# Patient Record
Sex: FEMALE | Race: BLACK OR AFRICAN AMERICAN | NOT HISPANIC OR LATINO | Employment: FULL TIME | URBAN - NONMETROPOLITAN AREA
[De-identification: names, ages, dates, MRNs, and addresses within clinical notes are randomized per-mention and may not be internally consistent; named-entity substitution may affect disease eponyms.]

---

## 2017-03-18 ENCOUNTER — LAB (OUTPATIENT)
Dept: LAB | Facility: HOSPITAL | Age: 57
End: 2017-03-18
Attending: FAMILY MEDICINE

## 2017-03-18 DIAGNOSIS — E83.42 HYPOMAGNESEMIA: ICD-10-CM

## 2017-03-18 DIAGNOSIS — E03.9 ACQUIRED HYPOTHYROIDISM: ICD-10-CM

## 2017-03-18 LAB
MAGNESIUM SERPL-MCNC: 1.4 MG/DL (ref 1.6–2.3)
T4 FREE SERPL-MCNC: 1.1 NG/DL (ref 0.78–2.19)
TSH SERPL DL<=0.05 MIU/L-ACNC: 5.27 MIU/ML (ref 0.46–4.68)

## 2017-03-18 PROCEDURE — 36415 COLL VENOUS BLD VENIPUNCTURE: CPT

## 2017-03-18 PROCEDURE — 83735 ASSAY OF MAGNESIUM: CPT

## 2017-03-18 PROCEDURE — 84439 ASSAY OF FREE THYROXINE: CPT

## 2017-03-18 PROCEDURE — 84443 ASSAY THYROID STIM HORMONE: CPT

## 2017-03-20 ENCOUNTER — TRANSCRIBE ORDERS (OUTPATIENT)
Dept: LAB | Facility: HOSPITAL | Age: 57
End: 2017-03-20

## 2017-03-20 ENCOUNTER — APPOINTMENT (OUTPATIENT)
Dept: LAB | Facility: HOSPITAL | Age: 57
End: 2017-03-20

## 2017-03-20 DIAGNOSIS — Z45.02: ICD-10-CM

## 2017-03-20 DIAGNOSIS — I25.5 ISCHEMIC CARDIOMYOPATHY: Primary | ICD-10-CM

## 2017-03-20 LAB
ALBUMIN SERPL-MCNC: 4.5 G/DL (ref 3.4–4.8)
ALBUMIN/GLOB SERPL: 1.3 G/DL (ref 1.1–1.8)
ALP SERPL-CCNC: 72 U/L (ref 38–126)
ALT SERPL W P-5'-P-CCNC: 28 U/L (ref 9–52)
ANION GAP SERPL CALCULATED.3IONS-SCNC: 15 MMOL/L (ref 5–15)
AST SERPL-CCNC: 44 U/L (ref 14–36)
BILIRUB SERPL-MCNC: 0.6 MG/DL (ref 0.2–1.3)
BUN BLD-MCNC: 18 MG/DL (ref 7–21)
BUN/CREAT SERPL: 17 (ref 7–25)
CALCIUM SPEC-SCNC: 9.3 MG/DL (ref 8.4–10.2)
CHLORIDE SERPL-SCNC: 98 MMOL/L (ref 95–110)
CO2 SERPL-SCNC: 22 MMOL/L (ref 22–31)
CREAT BLD-MCNC: 1.06 MG/DL (ref 0.5–1)
GFR SERPL CREATININE-BSD FRML MDRD: 65 ML/MIN/1.73 (ref 51–120)
GLOBULIN UR ELPH-MCNC: 3.4 GM/DL (ref 2.3–3.5)
GLUCOSE BLD-MCNC: 177 MG/DL (ref 60–100)
POTASSIUM BLD-SCNC: 4.2 MMOL/L (ref 3.5–5.1)
PROT SERPL-MCNC: 7.9 G/DL (ref 6.3–8.6)
SODIUM BLD-SCNC: 135 MMOL/L (ref 137–145)

## 2017-03-20 PROCEDURE — 80053 COMPREHEN METABOLIC PANEL: CPT | Performed by: INTERNAL MEDICINE

## 2017-03-20 PROCEDURE — 36415 COLL VENOUS BLD VENIPUNCTURE: CPT | Performed by: INTERNAL MEDICINE

## 2017-03-20 RX ORDER — POTASSIUM CHLORIDE 750 MG/1
TABLET, EXTENDED RELEASE ORAL
Qty: 60 TABLET | Refills: 3 | Status: SHIPPED | OUTPATIENT
Start: 2017-03-20 | End: 2017-07-14 | Stop reason: SDUPTHER

## 2017-03-26 DIAGNOSIS — E83.42 HYPOMAGNESEMIA: Primary | ICD-10-CM

## 2017-03-26 DIAGNOSIS — E03.9 ACQUIRED HYPOTHYROIDISM: ICD-10-CM

## 2017-03-26 RX ORDER — LEVOTHYROXINE SODIUM 0.07 MG/1
75 TABLET ORAL DAILY
Qty: 30 TABLET | Refills: 5 | Status: SHIPPED | OUTPATIENT
Start: 2017-03-26 | End: 2017-09-19 | Stop reason: SDUPTHER

## 2017-04-05 ENCOUNTER — OFFICE VISIT (OUTPATIENT)
Dept: FAMILY MEDICINE CLINIC | Facility: CLINIC | Age: 57
End: 2017-04-05

## 2017-04-05 VITALS
SYSTOLIC BLOOD PRESSURE: 122 MMHG | OXYGEN SATURATION: 97 % | HEIGHT: 64 IN | HEART RATE: 79 BPM | WEIGHT: 236 LBS | DIASTOLIC BLOOD PRESSURE: 82 MMHG | BODY MASS INDEX: 40.29 KG/M2

## 2017-04-05 DIAGNOSIS — E83.42 HYPOMAGNESEMIA: ICD-10-CM

## 2017-04-05 DIAGNOSIS — E03.9 ACQUIRED HYPOTHYROIDISM: Primary | ICD-10-CM

## 2017-04-05 DIAGNOSIS — E66.01 MORBID OBESITY WITH BMI OF 40.0-44.9, ADULT (HCC): ICD-10-CM

## 2017-04-05 DIAGNOSIS — E11.65 TYPE 2 DIABETES MELLITUS WITH HYPERGLYCEMIA, WITHOUT LONG-TERM CURRENT USE OF INSULIN (HCC): ICD-10-CM

## 2017-04-05 PROCEDURE — 99214 OFFICE O/P EST MOD 30 MIN: CPT | Performed by: FAMILY MEDICINE

## 2017-04-05 NOTE — PROGRESS NOTES
"  Subjective:     Danae Ngo is a 56 y.o. female who presents for a recheck of Type 2 diabetes mellitus, thyroid, hypomagnesium, and other medical problems.  She is gaining weight and thinks it is due to her heart medicine Mexiletine.  She fell down the stairs at home and scraped her right leg.  Her sugar is okay.     Known diabetic complications: peripheral neuropathy and cardiovascular disease  Cardiovascular risk factors: diabetes mellitus, dyslipidemia, family history of premature cardiovascular disease, hypertension, microalbuminuria, obesity (BMI >= 30 kg/m2) and sedentary lifestyle  Current diabetic medications include see mar.     Eye exam current (within one year): yes  Weight trend: increasing steadily  Prior visit with dietician: no  Current diet: in general, a \"healthy\" diet    Current exercise: none    Current monitoring regimen: home blood tests - daily  Home blood sugar records: trend: stable  Any episodes of hypoglycemia? no     ACE inhibitor or angiotensin II receptor blocker?     Yes     lisinopril (generic)               Statin?     Yes    ASA?     Yes     Hemoglobin A1C (%TotHgb)   Date Value   10/27/2016 7.5 (H)   12/05/2015 7.5 (H)   03/31/2015 8.4 (H)       ASSOCIATED SYMPTOMS:     FatigueYes   MalaiseYes    DiaphoresisNo    Weight lossNo    Weight gainYes    Visual impairmentNo   Chest painNo    PalpitationsNo    TachycardiaNo   ClaudicationNo    PolyphagiaNo    PolydipsiaNo    PolyuriaNo    NumbnessYes    Foot painYes    Skin lesionsNo    Memory lossNo     COMORBID CONDITIONS:     ObesityYes    HypothyroidismYes    HyperlipidemiaYes    CADYes    Cerebrovascular diseaseNot Indicated   PVDNo    HTNYes    Sexual dysfunctionNo    DepressionNo      The following portions of the patient's history were reviewed and updated as appropriate: allergies, current medications, past family history, past medical history, past social history, past surgical history and problem " list.    Preventative:  Over the past 2 weeks, have you felt down, depressed, or hopeless?No   Over the past 2 weeks, have you felt little interest or pleasure in doing things?No  Clinical depression screening refused by patient.No     On osteoporosis therapy?No     Past Medical Hx:  Past Medical History:   Diagnosis Date   • Abnormal thyroid function test    • Acquired hypothyroidism    • Acute bacterial pharyngitis    • Acute conjunctivitis    • Acute sinusitis    • Acute tonsillitis    • Allergic rhinitis    • Anemia    • Coronary atherosclerosis     unspecified type of vessel, native or graft      • Dilated cardiomyopathy    • Dyslipidemia    • Esophageal reflux    • Essential hypertension    • Family history of malignant neoplasm of breast    • Fibrocystic disease of breast     Low Mireille score   • GERD (gastroesophageal reflux disease)    • Headache    • Herpes zoster    • History of chicken pox    • Hyperlipidemia    • Hypokalemia    • Hypomagnesemia    • Measles    • Mumps    • Myocardial infarction    • Nasal septal deformity    • Obesity    • Rhinitis    • Type 2 diabetes mellitus     Uncontrolled   • URI (upper respiratory infection)    • V tach    • Visit for screening mammogram    • Vitamin D deficiency        Past Surgical Hx:  Past Surgical History:   Procedure Laterality Date   • BREAST BIOPSY  2010    left breast benign   • CARDIAC CATHETERIZATION  2002    Left cardiac cath, selective coronary angiography, PTCA to the left anterior descending. Stent placement to the left anterior descending   •  SECTION      Intrauterine pregnancy, term gestation, cephalopelvic disproportion   • ECHO - CONVERTED      dilated cardiomyopathy ef 30-40%   • ENDOSCOPY AND COLONOSCOPY  04/15/2008    Normal   • ESOPHAGOSCOPY / EGD  2007    With tube-Esophagus appeared normal. Gastritis of the stomach. A biopsy of the stomach was obtained from the stomach. The duodenum appeared normal   •  EXTERNAL EAR SURGERY  08/15/1991    Repair biifd ear   • INJECTION OF MEDICATION  05/30/2015    Bicillin LA   • INJECTION OF MEDICATION  09/02/2016    Kenalog   • OOPHORECTOMY  1993    Left salpingo-oophorectomy, wedge resection of the right ovary, lysis of adhesions   • PAP SMEAR  10/20/2009   • TRANSESOPHAGEAL ECHOCARDIOGRAM (LORELEI)  03/02/2012    Without color flow-Moderate to severe LV dysfumctional w/ an EF of 30-35%. Wall motion abnormalities as described above. LV enlargement. Mild aortic insufficiency, mild TR regurg, mild M regurg. mild pulmonary insufficiency       Health Maintenance:  Health Maintenance   Topic Date Due   • DIABETIC EYE EXAM  10/07/2016   • URINE MICROALBUMIN  12/05/2016   • HEMOGLOBIN A1C  04/27/2017   • DIABETIC FOOT EXAM  10/07/2017   • LIPID PANEL  10/27/2017   • COLONOSCOPY  04/15/2018   • MAMMOGRAM  09/06/2018   • TDAP/TD VACCINES (2 - Td) 01/20/2019   • PAP SMEAR  10/25/2019   • PNEUMOCOCCAL VACCINE (19-64 MEDIUM RISK)  Completed   • HEPATITIS C SCREENING  Completed   • INFLUENZA VACCINE  Completed       Current Meds:    Current Outpatient Prescriptions:   •  amLODIPine (NORVASC) 5 MG tablet, TAKE ONE TABLET BY MOUTH EVERY NIGHT AT BEDTIME, Disp: 90 tablet, Rfl: 2  •  aspirin 325 MG tablet, Take 325 mg by mouth daily., Disp: , Rfl:   •  atorvastatin (LIPITOR) 20 MG tablet, Take 1 tablet by mouth Every Night., Disp: 90 tablet, Rfl: 3  •  cetirizine (ZyrTEC) 10 MG tablet, Take 10 mg by mouth., Disp: , Rfl:   •  dexlansoprazole (DEXILANT) 60 MG capsule, Take 1 capsule by mouth Daily., Disp: 90 capsule, Rfl: 3  •  FeAsp-FeFum -Suc-C-Thre-B12-FA (MULTIGEN PLUS) -1 MG tablet tablet, TAKE 1 TABLET BY MOUTH ONCE DAILY, Disp: 30 tablet, Rfl: 4  •  glipiZIDE (GLUCOTROL) 10 MG 24 hr tablet, Take 1 tablet by mouth Daily., Disp: 90 tablet, Rfl: 3  •  hydrochlorothiazide (HYDRODIURIL) 50 MG tablet, TAKE ONE TABLET BY MOUTH ONCE A DAY, Disp: 90 tablet, Rfl: 2  •  JANUMET  MG per  tablet, TAKE ONE TABLET BY MOUTH TWICE A DAY, Disp: 180 tablet, Rfl: 2  •  KLOR-CON 10 MEQ CR tablet, TAKE TWO TABLETS BY MOUTH DAILY, Disp: 60 tablet, Rfl: 3  •  levothyroxine (SYNTHROID) 75 MCG tablet, Take 1 tablet by mouth Daily., Disp: 30 tablet, Rfl: 5  •  lisinopril (PRINIVIL,ZESTRIL) 40 MG tablet, , Disp: , Rfl:   •  magnesium oxide (MAGOX) 400 (241.3 MG) MG tablet tablet, Take 2 tablets by mouth 2 (Two) Times a Day., Disp: 120 tablet, Rfl: 5  •  metoprolol succinate XL (TOPROL-XL) 100 MG 24 hr tablet, , Disp: , Rfl:   •  mexiletine (MEXITIL) 150 MG capsule, , Disp: , Rfl:   •  simvastatin (ZOCOR) 20 MG tablet, Take 20 mg by mouth., Disp: , Rfl:     Allergies:  Codeine    Family Hx:  Family History   Problem Relation Age of Onset   • Breast cancer Mother      50's   • Other Mother      CVA   • Coronary artery disease Other    • Diabetes Other    • Heart disease Other    • Hypertension Other    • Autism Other      grandson   • Alcohol abuse Brother    • Cardiomyopathy Brother    • Coronary artery disease Brother         Social History:  Social History     Social History   • Marital status: Single     Spouse name: N/A   • Number of children: N/A   • Years of education: N/A     Occupational History   • Not on file.     Social History Main Topics   • Smoking status: Never Smoker   • Smokeless tobacco: Never Used   • Alcohol use No   • Drug use: No   • Sexual activity: Not on file     Other Topics Concern   • Not on file     Social History Narrative       Review of Systems  Review of Systems   Constitutional: Positive for fatigue and unexpected weight change. Negative for appetite change, chills, diaphoresis and fever.   HENT: Negative for ear pain, nosebleeds, sinus pressure and sore throat.    Eyes: Negative for visual disturbance.   Respiratory: Negative for cough, shortness of breath and wheezing.    Cardiovascular: Negative for chest pain, palpitations and leg swelling.   Gastrointestinal: Negative for  "abdominal pain, blood in stool, constipation, nausea and vomiting.   Genitourinary: Negative for dysuria, hematuria and vaginal bleeding.   Musculoskeletal: Positive for arthralgias.   Skin: Negative for rash.   Neurological: Positive for headaches. Negative for seizures and syncope.   Psychiatric/Behavioral: Negative for hallucinations, self-injury, sleep disturbance and suicidal ideas. The patient is not nervous/anxious.        Objective:     /82 (BP Location: Left arm, Patient Position: Sitting, Cuff Size: Adult)  Pulse 79  Ht 64\" (162.6 cm)  Wt 236 lb (107 kg)  SpO2 97%  BMI 40.51 kg/m2    General:  alert, appears stated age, cooperative, no distress and morbidly obese   Oropharynx: lips, mucosa, and tongue normal; teeth and gums normal    Eyes:  perrl, eomi, no scleral icterus    Ears:  normal TM's and external ear canals both ears   Neck: no adenopathy, no carotid bruit, no JVD, supple, symmetrical, trachea midline and thyroid not enlarged, symmetric, no tenderness/mass/nodules   Thyroid:  no palpable nodule   Lung: clear to auscultation bilaterally   Heart:  regular rate and rhythm, S1, S2 normal, no murmur, click, rub or gallop   Abdomen: soft, non-tender; bowel sounds normal; no masses,  no organomegaly   Extremities: slight edema   Skin: warm and dry, no hyperpigmentation, vitiligo, or suspicious lesions and abrasion right shin   Pulses: 2+ and symmetric   Neuro: normal without focal findings, mental status, speech normal, alert and oriented x3          Assessment:     Diabetes Mellitus type II, under fair control.  1. Acquired hypothyroidism    2. Type 2 diabetes mellitus with hyperglycemia, without long-term current use of insulin    3. Hypomagnesemia    4. Morbid obesity with BMI of 40.0-44.9, adult         Plan:     1.  Rx changes: see mar  2.  Education: Reviewed ‘ABCs’ of diabetes management (respective goals in parentheses):  A1C (<7), preprandial glucose (70 mg/dl - 130 mg/dl), " postprandial glucose (< 180 mg/dl), blood pressure (<130/80), and cholesterol (LDL <100 mg/dl; HDL > 50 mg/dl; Trig < 150 mg/dl).  3.  Issues reviewed with Danae Ngo: annual eye examinations at Ophthalmology discussed, glycohemoglobin and other lab monitoring discussed and long term diabetic complications discussed.  4.  Compliance at present is estimated to be good. Efforts to improve compliance (if necessary) will be directed at increased exercise.  5. Follow up: 6 months    GOALS:  Weight loss  BARRIERS TO GOALS:  meds    Preventative:  Female Preventative: Last PAP was 10/2016  Colon cancer screening is up to date  Mammogram up to date.  increase water intake and increase physical activity  Labs end of the month.  Given samples of Trulicity lot [id number] exp 4/18 and instructed how to use it.    RISK SCORE: 4

## 2017-04-18 ENCOUNTER — TRANSCRIBE ORDERS (OUTPATIENT)
Dept: LAB | Facility: HOSPITAL | Age: 57
End: 2017-04-18

## 2017-04-18 DIAGNOSIS — I25.5 ISCHEMIC CARDIOMYOPATHY: Primary | ICD-10-CM

## 2017-05-08 RX ORDER — IRON FUM,AG/C/B12/FOLIC/CA/SUC 151-60-1MG
TABLET ORAL
Qty: 30 TABLET | Refills: 3 | Status: SHIPPED | OUTPATIENT
Start: 2017-05-08 | End: 2017-09-01 | Stop reason: SDUPTHER

## 2017-05-16 RX ORDER — METOPROLOL SUCCINATE 100 MG/1
TABLET, EXTENDED RELEASE ORAL
Qty: 270 TABLET | Refills: 2 | Status: SHIPPED | OUTPATIENT
Start: 2017-05-16 | End: 2018-02-06 | Stop reason: SDUPTHER

## 2017-05-16 RX ORDER — LISINOPRIL 40 MG/1
TABLET ORAL
Qty: 90 TABLET | Refills: 2 | Status: SHIPPED | OUTPATIENT
Start: 2017-05-16 | End: 2018-02-06 | Stop reason: SDUPTHER

## 2017-07-14 RX ORDER — POTASSIUM CHLORIDE 750 MG/1
TABLET, EXTENDED RELEASE ORAL
Qty: 60 TABLET | Refills: 2 | Status: SHIPPED | OUTPATIENT
Start: 2017-07-14 | End: 2017-10-08 | Stop reason: SDUPTHER

## 2017-07-19 RX ORDER — HYDROCHLOROTHIAZIDE 50 MG/1
TABLET ORAL
Qty: 90 TABLET | Refills: 1 | Status: SHIPPED | OUTPATIENT
Start: 2017-07-19 | End: 2018-01-15 | Stop reason: SDUPTHER

## 2017-08-16 RX ORDER — AMLODIPINE BESYLATE 5 MG/1
TABLET ORAL
Qty: 90 TABLET | Refills: 1 | Status: SHIPPED | OUTPATIENT
Start: 2017-08-16 | End: 2018-02-08 | Stop reason: SDUPTHER

## 2017-08-16 RX ORDER — SITAGLIPTIN AND METFORMIN HYDROCHLORIDE 1000; 50 MG/1; MG/1
TABLET, FILM COATED ORAL
Qty: 180 TABLET | Refills: 1 | Status: SHIPPED | OUTPATIENT
Start: 2017-08-16 | End: 2018-02-08 | Stop reason: SDUPTHER

## 2017-09-01 RX ORDER — IRON FUM,AG/C/B12/FOLIC/CA/SUC 151-60-1MG
TABLET ORAL
Qty: 30 TABLET | Refills: 2 | Status: SHIPPED | OUTPATIENT
Start: 2017-09-01 | End: 2017-11-30 | Stop reason: SDUPTHER

## 2017-09-11 ENCOUNTER — OFFICE VISIT (OUTPATIENT)
Dept: SURGERY | Facility: CLINIC | Age: 57
End: 2017-09-11

## 2017-09-11 VITALS
HEIGHT: 64 IN | BODY MASS INDEX: 38.93 KG/M2 | DIASTOLIC BLOOD PRESSURE: 80 MMHG | SYSTOLIC BLOOD PRESSURE: 140 MMHG | WEIGHT: 228 LBS

## 2017-09-11 DIAGNOSIS — N60.19 FIBROCYSTIC DISEASE OF BREAST, UNSPECIFIED LATERALITY: ICD-10-CM

## 2017-09-11 DIAGNOSIS — Z80.3 FAMILY HISTORY OF MALIGNANT NEOPLASM OF BREAST: Primary | ICD-10-CM

## 2017-09-11 PROCEDURE — 99213 OFFICE O/P EST LOW 20 MIN: CPT | Performed by: SURGERY

## 2017-09-11 NOTE — PROGRESS NOTES
Chief Complaint   Patient presents with   • Follow-up     Routine check on breast and mammograms.        HPI  Routine breast check and mammogram. No newly palpable masses, skin dimpling, nipple discharge, axillary adenopathy, breast pain. Mammograms:    Study Result   MAMMOGRAM: Screening.         HISTORY: Screening mammogram.        COMPARISON:  September 2, 2016.        FINDINGS:     Bilateral low dose digital mammography was performed.    This study was interpreted with the assistance of CAD (computer  aided diagnosis. 3-D Mammotomosynthesis was obtained.     Parenchymal pattern: Scattered areas of parenchymal densities.        There is no evidence of a newly appearing discrete mass lesion,  area of architectural distortion, or abnormal microcalcifications  to suspect the presence of malignancy.  Stable benign mammographic exam.        IMPRESSION:  CONCLUSION:  1.  No mammographic evidence of malignancy - no interval change.     Follow-up recommendations:  annual mammographic surveillance.  BIRADS: 2, Benign finding(s)         Electronically signed by:  Pineda Miguel MD  9/6/2017 8:07 AM CDT  Workstation: [id number]     ( I have personally reviewed the breast imaging and concur with the findings of the radiologist- BiRADS 2)        Past Medical History:   Diagnosis Date   • Abnormal thyroid function test    • Acquired hypothyroidism    • Allergic rhinitis    • Anemia    • Coronary atherosclerosis     unspecified type of vessel, native or graft      • Dilated cardiomyopathy    • Dyslipidemia         • Essential hypertension    • Family history of malignant neoplasm of breast    • Fibrocystic disease of breast        • GERD (gastroesophageal reflux disease)    • Herpes zoster    • History of chicken pox    • Hyperlipidemia    • Measles    • Mumps    • Myocardial infarction 2001   • Nasal septal deformity    • Obesity    • Type 2 diabetes mellitus     Uncontrolled   • V tach    • Vitamin D deficiency        Past Surgical  History:   Procedure Laterality Date   • BREAST BIOPSY  2010    left breast benign   • CARDIAC CATHETERIZATION  2002    Left cardiac cath, selective coronary angiography, PTCA to the left anterior descending. Stent placement to the left anterior descending   •  SECTION      Intrauterine pregnancy, term gestation, cephalopelvic disproportion   • ENDOSCOPY AND COLONOSCOPY  04/15/2008    Normal   • ESOPHAGOSCOPY / EGD  2007    With tube-Esophagus appeared normal. Gastritis of the stomach. A biopsy of the stomach was obtained from the stomach. The duodenum appeared normal   • EXTERNAL EAR SURGERY  08/15/1991    Repair biifd ear   • OOPHORECTOMY      Left salpingo-oophorectomy, wedge resection of the right ovary, lysis of adhesions   • TRANSESOPHAGEAL ECHOCARDIOGRAM (LORELEI)  2012    Without color flow-Moderate to severe LV dysfumctional w/ an EF of 30-35%. Wall motion abnormalities as described above. LV enlargement. Mild aortic insufficiency, mild TR regurg, mild M regurg. mild pulmonary insufficiency         Current Outpatient Prescriptions:   •  amLODIPine (NORVASC) 5 MG tablet, TAKE ONE TABLET BY MOUTH EVERY NIGHT AT BEDTIME, Disp: 90 tablet, Rfl: 1  •  aspirin 325 MG tablet, Take 325 mg by mouth daily., Disp: , Rfl:   •  atorvastatin (LIPITOR) 20 MG tablet, Take 1 tablet by mouth Every Night., Disp: 90 tablet, Rfl: 3  •  cefuroxime (CEFTIN) 250 MG tablet, Take 1 tablet by mouth 2 (Two) Times a Day for 10 days., Disp: 20 tablet, Rfl: 0  •  cetirizine (ZyrTEC) 10 MG tablet, Take 10 mg by mouth., Disp: , Rfl:   •  dexlansoprazole (DEXILANT) 60 MG capsule, Take 1 capsule by mouth Daily., Disp: 90 capsule, Rfl: 3  •  FeAsp-FeFum -Suc-C-Thre-B12-FA (MULTIGEN PLUS) -1 MG tablet tablet, TAKE 1 TABLET BY MOUTH ONCE DAILY, Disp: 30 tablet, Rfl: 2  •  glipiZIDE (GLUCOTROL) 10 MG 24 hr tablet, Take 1 tablet by mouth Daily., Disp: 90 tablet, Rfl: 3  •  hydrochlorothiazide (HYDRODIURIL)  50 MG tablet, TAKE ONE TABLET BY MOUTH ONCE A DAY, Disp: 90 tablet, Rfl: 1  •  JANUMET  MG per tablet, TAKE ONE TABLET BY MOUTH TWICE A DAY, Disp: 180 tablet, Rfl: 1  •  KLOR-CON 10 MEQ CR tablet, TAKE TWO TABLETS BY MOUTH DAILY, Disp: 60 tablet, Rfl: 2  •  levothyroxine (SYNTHROID) 75 MCG tablet, Take 1 tablet by mouth Daily., Disp: 30 tablet, Rfl: 5  •  lisinopril (PRINIVIL,ZESTRIL) 40 MG tablet, TAKE ONE TABLET BY MOUTH EVERY DAY, Disp: 90 tablet, Rfl: 2  •  magnesium oxide (MAGOX) 400 (241.3 MG) MG tablet tablet, Take 2 tablets by mouth 2 (Two) Times a Day., Disp: 120 tablet, Rfl: 5  •  metoprolol succinate XL (TOPROL-XL) 100 MG 24 hr tablet, TAKE THREE TABLETS BY MOUTH EVERY DAY, Disp: 270 tablet, Rfl: 2  •  mexiletine (MEXITIL) 150 MG capsule, , Disp: , Rfl:   •  simvastatin (ZOCOR) 20 MG tablet, Take 20 mg by mouth., Disp: , Rfl:   No current facility-administered medications for this visit.     Allergies   Allergen Reactions   • Codeine        Family History   Problem Relation Age of Onset   • Breast cancer Mother      50's   • Other Mother      CVA   • Coronary artery disease Other    • Diabetes Other    • Heart disease Other    • Hypertension Other    • Autism Other      grandson   • Alcohol abuse Brother    • Cardiomyopathy Brother    • Coronary artery disease Brother        Social History     Social History   • Marital status: Single     Social History Main Topics   • Smoking status: Never Smoker   • Smokeless tobacco: Never Used   • Alcohol use No   • Drug use: No     Review of Systems  Nothing to add  Physical Exam   Constitutional: She appears well-developed and well-nourished. No distress.   HENT:   Head: Normocephalic and atraumatic.   Eyes: EOM are normal. Pupils are equal, round, and reactive to light.   Neck: Normal range of motion. Neck supple. No JVD present. No tracheal deviation present. No thyromegaly present.   Cardiovascular: Normal rate.    Pulmonary/Chest: Effort normal and breath  sounds normal. No stridor. Right breast exhibits no inverted nipple, no mass, no nipple discharge, no skin change and no tenderness. Left breast exhibits no inverted nipple, no mass, no nipple discharge, no skin change and no tenderness. Breasts are symmetrical.   Lymphadenopathy:     She has no cervical adenopathy.     She has no axillary adenopathy.   Skin: Skin is warm, dry and intact. No lesion noted. She is not diaphoretic. No erythema.   Psychiatric: She has a normal mood and affect. Her speech is normal and behavior is normal. Judgment and thought content normal. Cognition and memory are normal.   Vitals reviewed.        ASSESSMENT    Danae was seen today for follow-up.    Diagnoses and all orders for this visit:    Family history of malignant neoplasm of breast    Fibrocystic disease of breast, unspecified laterality      PLAN    1.Yearly mammograms and exam        This document has been electronically signed by Rodo Holcomb MD on September 11, 2017 10:29 AM

## 2017-09-12 DIAGNOSIS — Z12.31 VISIT FOR SCREENING MAMMOGRAM: Primary | ICD-10-CM

## 2017-09-13 DIAGNOSIS — K21.9 GASTROESOPHAGEAL REFLUX DISEASE, ESOPHAGITIS PRESENCE NOT SPECIFIED: ICD-10-CM

## 2017-09-13 RX ORDER — DEXLANSOPRAZOLE 60 MG/1
CAPSULE, DELAYED RELEASE ORAL
Qty: 30 CAPSULE | Refills: 2 | Status: SHIPPED | OUTPATIENT
Start: 2017-09-13 | End: 2017-12-10 | Stop reason: SDUPTHER

## 2017-09-19 DIAGNOSIS — E03.9 ACQUIRED HYPOTHYROIDISM: ICD-10-CM

## 2017-09-19 RX ORDER — LEVOTHYROXINE SODIUM 0.07 MG/1
TABLET ORAL
Qty: 30 TABLET | Refills: 4 | Status: SHIPPED | OUTPATIENT
Start: 2017-09-19 | End: 2018-02-15 | Stop reason: SDUPTHER

## 2017-10-09 RX ORDER — POTASSIUM CHLORIDE 750 MG/1
TABLET, EXTENDED RELEASE ORAL
Qty: 60 TABLET | Refills: 1 | Status: SHIPPED | OUTPATIENT
Start: 2017-10-09 | End: 2017-12-07 | Stop reason: SDUPTHER

## 2017-10-18 ENCOUNTER — OFFICE VISIT (OUTPATIENT)
Dept: FAMILY MEDICINE CLINIC | Facility: CLINIC | Age: 57
End: 2017-10-18

## 2017-10-18 VITALS
DIASTOLIC BLOOD PRESSURE: 80 MMHG | OXYGEN SATURATION: 96 % | BODY MASS INDEX: 39.27 KG/M2 | HEART RATE: 70 BPM | SYSTOLIC BLOOD PRESSURE: 130 MMHG | WEIGHT: 230 LBS | HEIGHT: 64 IN

## 2017-10-18 DIAGNOSIS — E03.9 ACQUIRED HYPOTHYROIDISM: ICD-10-CM

## 2017-10-18 DIAGNOSIS — E83.42 HYPOMAGNESEMIA: ICD-10-CM

## 2017-10-18 DIAGNOSIS — E11.65 TYPE 2 DIABETES MELLITUS WITH HYPERGLYCEMIA, WITHOUT LONG-TERM CURRENT USE OF INSULIN (HCC): Primary | ICD-10-CM

## 2017-10-18 DIAGNOSIS — D64.9 ANEMIA, UNSPECIFIED TYPE: ICD-10-CM

## 2017-10-18 DIAGNOSIS — Z23 FLU VACCINE NEED: ICD-10-CM

## 2017-10-18 DIAGNOSIS — E55.9 VITAMIN D DEFICIENCY: ICD-10-CM

## 2017-10-18 DIAGNOSIS — I10 ESSENTIAL HYPERTENSION: ICD-10-CM

## 2017-10-18 DIAGNOSIS — E78.5 HYPERLIPIDEMIA, UNSPECIFIED HYPERLIPIDEMIA TYPE: ICD-10-CM

## 2017-10-18 DIAGNOSIS — L20.9 ATOPIC DERMATITIS, UNSPECIFIED TYPE: ICD-10-CM

## 2017-10-18 PROCEDURE — 90471 IMMUNIZATION ADMIN: CPT | Performed by: FAMILY MEDICINE

## 2017-10-18 PROCEDURE — 99214 OFFICE O/P EST MOD 30 MIN: CPT | Performed by: FAMILY MEDICINE

## 2017-10-18 PROCEDURE — 90686 IIV4 VACC NO PRSV 0.5 ML IM: CPT | Performed by: FAMILY MEDICINE

## 2017-10-24 DIAGNOSIS — E66.9 DIABETES MELLITUS TYPE 2 IN OBESE (HCC): ICD-10-CM

## 2017-10-24 DIAGNOSIS — E11.69 DIABETES MELLITUS TYPE 2 IN OBESE (HCC): ICD-10-CM

## 2017-10-24 RX ORDER — GLIPIZIDE 10 MG/1
TABLET, FILM COATED, EXTENDED RELEASE ORAL
Qty: 90 TABLET | Refills: 2 | Status: SHIPPED | OUTPATIENT
Start: 2017-10-24 | End: 2018-07-19 | Stop reason: SDUPTHER

## 2017-11-30 RX ORDER — IRON FUM,AG/C/B12/FOLIC/CA/SUC 151-60-1MG
TABLET ORAL
Qty: 30 TABLET | Refills: 1 | Status: SHIPPED | OUTPATIENT
Start: 2017-11-30 | End: 2018-01-26 | Stop reason: SDUPTHER

## 2017-12-07 DIAGNOSIS — L20.9 ATOPIC DERMATITIS, UNSPECIFIED TYPE: ICD-10-CM

## 2017-12-07 RX ORDER — POTASSIUM CHLORIDE 750 MG/1
TABLET, EXTENDED RELEASE ORAL
Qty: 60 TABLET | Refills: 0 | Status: SHIPPED | OUTPATIENT
Start: 2017-12-07 | End: 2018-01-02 | Stop reason: SDUPTHER

## 2017-12-10 DIAGNOSIS — K21.9 GASTROESOPHAGEAL REFLUX DISEASE, ESOPHAGITIS PRESENCE NOT SPECIFIED: ICD-10-CM

## 2017-12-11 RX ORDER — DEXLANSOPRAZOLE 60 MG/1
CAPSULE, DELAYED RELEASE ORAL
Qty: 30 CAPSULE | Refills: 1 | Status: SHIPPED | OUTPATIENT
Start: 2017-12-11 | End: 2018-02-06 | Stop reason: SDUPTHER

## 2017-12-16 DIAGNOSIS — E83.42 HYPOMAGNESEMIA: ICD-10-CM

## 2017-12-22 DIAGNOSIS — E11.69 HYPERLIPIDEMIA ASSOCIATED WITH TYPE 2 DIABETES MELLITUS (HCC): ICD-10-CM

## 2017-12-22 DIAGNOSIS — E78.5 HYPERLIPIDEMIA ASSOCIATED WITH TYPE 2 DIABETES MELLITUS (HCC): ICD-10-CM

## 2017-12-22 RX ORDER — ATORVASTATIN CALCIUM 20 MG/1
TABLET, FILM COATED ORAL
Qty: 90 TABLET | Refills: 2 | Status: SHIPPED | OUTPATIENT
Start: 2017-12-22 | End: 2018-09-18 | Stop reason: SDUPTHER

## 2018-01-02 DIAGNOSIS — E87.6 HYPOKALEMIA: Primary | ICD-10-CM

## 2018-01-02 RX ORDER — POTASSIUM CHLORIDE 750 MG/1
TABLET, EXTENDED RELEASE ORAL
Qty: 60 TABLET | Refills: 0 | Status: SHIPPED | OUTPATIENT
Start: 2018-01-02 | End: 2018-01-05 | Stop reason: SDUPTHER

## 2018-01-05 RX ORDER — POTASSIUM CHLORIDE 750 MG/1
20 TABLET, EXTENDED RELEASE ORAL DAILY
Qty: 60 TABLET | Refills: 5 | Status: SHIPPED | OUTPATIENT
Start: 2018-01-05 | End: 2018-02-02 | Stop reason: SDUPTHER

## 2018-01-15 DIAGNOSIS — I10 ESSENTIAL HYPERTENSION: Primary | ICD-10-CM

## 2018-01-15 RX ORDER — HYDROCHLOROTHIAZIDE 50 MG/1
TABLET ORAL
Qty: 90 TABLET | Refills: 0 | Status: SHIPPED | OUTPATIENT
Start: 2018-01-15 | End: 2018-01-15 | Stop reason: SDUPTHER

## 2018-01-15 RX ORDER — HYDROCHLOROTHIAZIDE 50 MG/1
50 TABLET ORAL DAILY
Qty: 90 TABLET | Refills: 3 | Status: SHIPPED | OUTPATIENT
Start: 2018-01-15 | End: 2019-04-10 | Stop reason: SDUPTHER

## 2018-01-26 DIAGNOSIS — D64.9 ANEMIA, UNSPECIFIED TYPE: Primary | ICD-10-CM

## 2018-01-26 RX ORDER — IRON FUM,AG/C/B12/FOLIC/CA/SUC 151-60-1MG
TABLET ORAL
Qty: 30 TABLET | Refills: 0 | Status: SHIPPED | OUTPATIENT
Start: 2018-01-26 | End: 2018-01-27 | Stop reason: SDUPTHER

## 2018-01-27 RX ORDER — IRON FUM,AG/C/B12/FOLIC/CA/SUC 151-60-1MG
1 TABLET ORAL DAILY
Qty: 30 TABLET | Refills: 5 | Status: SHIPPED | OUTPATIENT
Start: 2018-01-27 | End: 2018-08-24 | Stop reason: SDUPTHER

## 2018-02-02 DIAGNOSIS — E87.6 HYPOKALEMIA: ICD-10-CM

## 2018-02-02 RX ORDER — POTASSIUM CHLORIDE 750 MG/1
TABLET, EXTENDED RELEASE ORAL
Qty: 60 TABLET | Refills: 0 | Status: SHIPPED | OUTPATIENT
Start: 2018-02-02 | End: 2018-06-21 | Stop reason: SDUPTHER

## 2018-02-06 DIAGNOSIS — K21.9 GASTROESOPHAGEAL REFLUX DISEASE, ESOPHAGITIS PRESENCE NOT SPECIFIED: ICD-10-CM

## 2018-02-06 RX ORDER — METOPROLOL SUCCINATE 100 MG/1
TABLET, EXTENDED RELEASE ORAL
Qty: 270 TABLET | Refills: 1 | Status: SHIPPED | OUTPATIENT
Start: 2018-02-06 | End: 2018-09-18 | Stop reason: SDUPTHER

## 2018-02-06 RX ORDER — DEXLANSOPRAZOLE 60 MG/1
CAPSULE, DELAYED RELEASE ORAL
Qty: 30 CAPSULE | Refills: 0 | Status: SHIPPED | OUTPATIENT
Start: 2018-02-06 | End: 2018-03-12 | Stop reason: SDUPTHER

## 2018-02-06 RX ORDER — LISINOPRIL 40 MG/1
TABLET ORAL
Qty: 90 TABLET | Refills: 1 | Status: SHIPPED | OUTPATIENT
Start: 2018-02-06 | End: 2018-09-18 | Stop reason: SDUPTHER

## 2018-02-08 RX ORDER — SITAGLIPTIN AND METFORMIN HYDROCHLORIDE 1000; 50 MG/1; MG/1
TABLET, FILM COATED ORAL
Qty: 180 TABLET | Refills: 0 | Status: SHIPPED | OUTPATIENT
Start: 2018-02-08 | End: 2018-06-20 | Stop reason: SDUPTHER

## 2018-02-08 RX ORDER — AMLODIPINE BESYLATE 5 MG/1
TABLET ORAL
Qty: 90 TABLET | Refills: 0 | Status: SHIPPED | OUTPATIENT
Start: 2018-02-08 | End: 2018-06-20 | Stop reason: SDUPTHER

## 2018-02-15 DIAGNOSIS — E03.9 ACQUIRED HYPOTHYROIDISM: ICD-10-CM

## 2018-02-15 RX ORDER — LEVOTHYROXINE SODIUM 0.07 MG/1
TABLET ORAL
Qty: 30 TABLET | Refills: 3 | Status: SHIPPED | OUTPATIENT
Start: 2018-02-15 | End: 2018-04-04

## 2018-03-12 DIAGNOSIS — K21.9 GASTROESOPHAGEAL REFLUX DISEASE, ESOPHAGITIS PRESENCE NOT SPECIFIED: ICD-10-CM

## 2018-03-12 RX ORDER — DEXLANSOPRAZOLE 60 MG/1
CAPSULE, DELAYED RELEASE ORAL
Qty: 30 CAPSULE | Refills: 0 | Status: SHIPPED | OUTPATIENT
Start: 2018-03-12 | End: 2018-03-16 | Stop reason: SDUPTHER

## 2018-03-16 RX ORDER — DEXLANSOPRAZOLE 60 MG/1
1 CAPSULE, DELAYED RELEASE ORAL DAILY
Qty: 30 CAPSULE | Refills: 5 | Status: SHIPPED | OUTPATIENT
Start: 2018-03-16 | End: 2019-01-20 | Stop reason: SDUPTHER

## 2018-03-23 ENCOUNTER — LAB (OUTPATIENT)
Dept: LAB | Facility: HOSPITAL | Age: 58
End: 2018-03-23

## 2018-03-23 ENCOUNTER — TRANSCRIBE ORDERS (OUTPATIENT)
Dept: LAB | Facility: HOSPITAL | Age: 58
End: 2018-03-23

## 2018-03-23 DIAGNOSIS — I47.29 PAROXYSMAL VENTRICULAR TACHYCARDIA (HCC): Primary | ICD-10-CM

## 2018-03-23 DIAGNOSIS — I25.5 ISCHEMIC CARDIOMYOPATHY: ICD-10-CM

## 2018-03-23 LAB
25(OH)D3 SERPL-MCNC: 28.6 NG/ML (ref 30–100)
ALBUMIN SERPL-MCNC: 4.4 G/DL (ref 3.4–4.8)
ALBUMIN UR-MCNC: 14.6 MG/L
ALBUMIN/GLOB SERPL: 1.1 G/DL (ref 1.1–1.8)
ALP SERPL-CCNC: 71 U/L (ref 38–126)
ALT SERPL W P-5'-P-CCNC: 40 U/L (ref 9–52)
ANION GAP SERPL CALCULATED.3IONS-SCNC: 17 MMOL/L (ref 5–15)
ARTICHOKE IGE QN: 84 MG/DL (ref 1–129)
AST SERPL-CCNC: 48 U/L (ref 14–36)
BILIRUB SERPL-MCNC: 0.4 MG/DL (ref 0.2–1.3)
BUN BLD-MCNC: 17 MG/DL (ref 7–21)
BUN/CREAT SERPL: 17.3 (ref 7–25)
CALCIUM SPEC-SCNC: 9.7 MG/DL (ref 8.4–10.2)
CHLORIDE SERPL-SCNC: 99 MMOL/L (ref 95–110)
CHOLEST SERPL-MCNC: 147 MG/DL (ref 0–199)
CO2 SERPL-SCNC: 24 MMOL/L (ref 22–31)
CREAT BLD-MCNC: 0.98 MG/DL (ref 0.5–1)
CREAT UR-MCNC: 307.1 MG/DL
DEPRECATED RDW RBC AUTO: 44.8 FL (ref 36.4–46.3)
ERYTHROCYTE [DISTWIDTH] IN BLOOD BY AUTOMATED COUNT: 15.1 % (ref 11.5–14.5)
GFR SERPL CREATININE-BSD FRML MDRD: 71 ML/MIN/1.73 (ref 51–120)
GLOBULIN UR ELPH-MCNC: 3.9 GM/DL (ref 2.3–3.5)
GLUCOSE BLD-MCNC: 200 MG/DL (ref 60–100)
HBA1C MFR BLD: 9.5 % (ref 4–5.6)
HCT VFR BLD AUTO: 40.6 % (ref 35–45)
HDLC SERPL-MCNC: 34 MG/DL (ref 60–200)
HGB BLD-MCNC: 13.4 G/DL (ref 12–15.5)
LDLC/HDLC SERPL: 2.69 {RATIO} (ref 0–3.22)
MAGNESIUM SERPL-MCNC: 1.6 MG/DL (ref 1.6–2.3)
MCH RBC QN AUTO: 26.8 PG (ref 26.5–34)
MCHC RBC AUTO-ENTMCNC: 33 G/DL (ref 31.4–36)
MCV RBC AUTO: 81.2 FL (ref 80–98)
MICROALBUMIN/CREAT UR: 47.5 MG/G (ref 0–30)
PLATELET # BLD AUTO: 351 10*3/MM3 (ref 150–450)
PMV BLD AUTO: 10.9 FL (ref 8–12)
POTASSIUM BLD-SCNC: 4.1 MMOL/L (ref 3.5–5.1)
PROT SERPL-MCNC: 8.3 G/DL (ref 6.3–8.6)
RBC # BLD AUTO: 5 10*6/MM3 (ref 3.77–5.16)
SODIUM BLD-SCNC: 140 MMOL/L (ref 137–145)
T4 FREE SERPL-MCNC: 1.1 NG/DL (ref 0.78–2.19)
TRIGL SERPL-MCNC: 107 MG/DL (ref 20–199)
TSH SERPL DL<=0.05 MIU/L-ACNC: 27 MIU/ML (ref 0.46–4.68)
VIT B12 BLD-MCNC: 586 PG/ML (ref 239–931)
WBC NRBC COR # BLD: 7.66 10*3/MM3 (ref 3.2–9.8)

## 2018-03-23 PROCEDURE — 82570 ASSAY OF URINE CREATININE: CPT | Performed by: FAMILY MEDICINE

## 2018-03-23 PROCEDURE — 84439 ASSAY OF FREE THYROXINE: CPT | Performed by: FAMILY MEDICINE

## 2018-03-23 PROCEDURE — 82306 VITAMIN D 25 HYDROXY: CPT | Performed by: FAMILY MEDICINE

## 2018-03-23 PROCEDURE — 82607 VITAMIN B-12: CPT | Performed by: FAMILY MEDICINE

## 2018-03-23 PROCEDURE — 85027 COMPLETE CBC AUTOMATED: CPT | Performed by: FAMILY MEDICINE

## 2018-03-23 PROCEDURE — 36415 COLL VENOUS BLD VENIPUNCTURE: CPT | Performed by: FAMILY MEDICINE

## 2018-03-23 PROCEDURE — 82043 UR ALBUMIN QUANTITATIVE: CPT | Performed by: FAMILY MEDICINE

## 2018-03-23 PROCEDURE — 83735 ASSAY OF MAGNESIUM: CPT | Performed by: FAMILY MEDICINE

## 2018-03-23 PROCEDURE — 84443 ASSAY THYROID STIM HORMONE: CPT | Performed by: FAMILY MEDICINE

## 2018-03-23 PROCEDURE — 80061 LIPID PANEL: CPT | Performed by: FAMILY MEDICINE

## 2018-03-23 PROCEDURE — 80053 COMPREHEN METABOLIC PANEL: CPT | Performed by: FAMILY MEDICINE

## 2018-03-23 PROCEDURE — 83036 HEMOGLOBIN GLYCOSYLATED A1C: CPT | Performed by: FAMILY MEDICINE

## 2018-04-04 DIAGNOSIS — E11.65 TYPE 2 DIABETES MELLITUS WITH HYPERGLYCEMIA, WITHOUT LONG-TERM CURRENT USE OF INSULIN (HCC): Primary | ICD-10-CM

## 2018-04-04 DIAGNOSIS — E03.9 ACQUIRED HYPOTHYROIDISM: Primary | ICD-10-CM

## 2018-04-04 RX ORDER — LEVOTHYROXINE SODIUM 0.1 MG/1
100 TABLET ORAL DAILY
Qty: 30 TABLET | Refills: 5 | Status: SHIPPED | OUTPATIENT
Start: 2018-04-04 | End: 2018-10-09 | Stop reason: SDUPTHER

## 2018-04-09 ENCOUNTER — OFFICE VISIT (OUTPATIENT)
Dept: FAMILY MEDICINE CLINIC | Facility: CLINIC | Age: 58
End: 2018-04-09

## 2018-04-09 VITALS
HEIGHT: 64 IN | WEIGHT: 226 LBS | DIASTOLIC BLOOD PRESSURE: 82 MMHG | SYSTOLIC BLOOD PRESSURE: 122 MMHG | BODY MASS INDEX: 38.58 KG/M2 | OXYGEN SATURATION: 96 % | HEART RATE: 81 BPM

## 2018-04-09 DIAGNOSIS — E03.9 ACQUIRED HYPOTHYROIDISM: ICD-10-CM

## 2018-04-09 DIAGNOSIS — E11.65 TYPE 2 DIABETES MELLITUS WITH HYPERGLYCEMIA, WITHOUT LONG-TERM CURRENT USE OF INSULIN (HCC): Primary | ICD-10-CM

## 2018-04-09 DIAGNOSIS — I10 ESSENTIAL HYPERTENSION: ICD-10-CM

## 2018-04-09 DIAGNOSIS — I25.10 ATHEROSCLEROSIS OF CORONARY ARTERY OF NATIVE HEART WITHOUT ANGINA PECTORIS, UNSPECIFIED VESSEL OR LESION TYPE: ICD-10-CM

## 2018-04-09 PROCEDURE — 99214 OFFICE O/P EST MOD 30 MIN: CPT | Performed by: FAMILY MEDICINE

## 2018-04-09 NOTE — PROGRESS NOTES
"  Subjective:     Danae Ngo is a 58 y.o. female who presents for a recheck of Type 2 diabetes mellitus, thyroid, hypomagnesium, and other medical problems.  She saw her cardiologist a few weeks ago and everything was good.  She has been eating a lot of carbs/breads.  She has not been checking her sugar.  No suicidal or homicidal ideation.  No visual or auditory hallucinations.  No blood in her stool or urine.  No change in the size of her stool.     Known diabetic complications: peripheral neuropathy and cardiovascular disease  Cardiovascular risk factors: diabetes mellitus, dyslipidemia, family history of premature cardiovascular disease, hypertension, microalbuminuria, obesity (BMI >= 30 kg/m2) and sedentary lifestyle  Current diabetic medications include see mar.     Eye exam current (within one year): yes  Weight trend: increasing steadily  Prior visit with dietician: no  Current diet: in general, a \"healthy\" diet    Current exercise: none    Current monitoring regimen: home blood tests - daily  Home blood sugar records: trend: stable  Any episodes of hypoglycemia? no     ACE inhibitor or angiotensin II receptor blocker?     Yes     lisinopril (generic)               Statin?     Yes    ASA?     Yes     Hemoglobin A1C   Date Value   03/23/2018 9.5 % (H)   10/27/2016 7.5 %TotHgb (H)   12/05/2015 7.5 %TotHgb (H)   03/31/2015 8.4 %TotHgb (H)       ASSOCIATED SYMPTOMS:     FatigueYes   MalaiseYes    DiaphoresisNo    Weight lossNo    Weight gainYes    Visual impairmentNo   Chest painNo    PalpitationsNo    TachycardiaNo   ClaudicationNo    PolyphagiaNo    PolydipsiaNo    PolyuriaNo    NumbnessYes    Foot painYes    Skin lesionsNo    Memory lossNo     COMORBID CONDITIONS:     ObesityYes    HypothyroidismYes    HyperlipidemiaYes    CADYes    Cerebrovascular diseaseNot Indicated   PVDNo    HTNYes    Sexual dysfunctionNo    DepressionNo      The following portions of the patient's history were reviewed and updated " as appropriate: allergies, current medications, past family history, past medical history, past social history, past surgical history and problem list.    Preventative:  Over the past 2 weeks, have you felt down, depressed, or hopeless?No   Over the past 2 weeks, have you felt little interest or pleasure in doing things?No  Clinical depression screening refused by patient.No     On osteoporosis therapy?No     Past Medical Hx:  Past Medical History:   Diagnosis Date   • Abnormal thyroid function test    • Acquired hypothyroidism    • Allergic rhinitis    • Anemia    • Coronary atherosclerosis     unspecified type of vessel, native or graft      • Dilated cardiomyopathy    • Dyslipidemia    • Esophageal reflux    • Essential hypertension    • Family history of malignant neoplasm of breast    • Fibrocystic disease of breast     Low Mrieille score   • GERD (gastroesophageal reflux disease)    • Herpes zoster    • History of chicken pox    • Hyperlipidemia    • Measles    • Mumps    • Myocardial infarction    • Nasal septal deformity    • Obesity    • Type 2 diabetes mellitus     Uncontrolled   • V tach    • Vitamin D deficiency        Past Surgical Hx:  Past Surgical History:   Procedure Laterality Date   • BREAST BIOPSY  2010    left breast benign   • CARDIAC CATHETERIZATION  2002    Left cardiac cath, selective coronary angiography, PTCA to the left anterior descending. Stent placement to the left anterior descending   •  SECTION      Intrauterine pregnancy, term gestation, cephalopelvic disproportion   • ENDOSCOPY AND COLONOSCOPY  04/15/2008    Normal   • ESOPHAGOSCOPY / EGD  2007    With tube-Esophagus appeared normal. Gastritis of the stomach. A biopsy of the stomach was obtained from the stomach. The duodenum appeared normal   • EXTERNAL EAR SURGERY  08/15/1991    Repair biifd ear   • OOPHORECTOMY      Left salpingo-oophorectomy, wedge resection of the right ovary, lysis of  adhesions   • TRANSESOPHAGEAL ECHOCARDIOGRAM (LORELEI)  03/02/2012    Without color flow-Moderate to severe LV dysfumctional w/ an EF of 30-35%. Wall motion abnormalities as described above. LV enlargement. Mild aortic insufficiency, mild TR regurg, mild M regurg. mild pulmonary insufficiency       Health Maintenance:  Health Maintenance   Topic Date Due   • DIABETIC EYE EXAM  1960   • COLONOSCOPY  04/15/2018   • INFLUENZA VACCINE  08/01/2018   • HEMOGLOBIN A1C  09/23/2018   • DIABETIC FOOT EXAM  10/18/2018   • TDAP/TD VACCINES (2 - Td) 01/20/2019   • LIPID PANEL  03/23/2019   • URINE MICROALBUMIN  03/23/2019   • MAMMOGRAM  09/05/2019   • PAP SMEAR  10/25/2019   • PNEUMOCOCCAL VACCINE (19-64 MEDIUM RISK)  Completed   • HEPATITIS C SCREENING  Completed       Current Meds:    Current Outpatient Prescriptions:   •  amLODIPine (NORVASC) 5 MG tablet, TAKE ONE TABLET BY MOUTH EVERY NIGHT AT BEDTIME, Disp: 90 tablet, Rfl: 0  •  aspirin 325 MG tablet, Take 325 mg by mouth daily., Disp: , Rfl:   •  atorvastatin (LIPITOR) 20 MG tablet, TAKE ONE TABLET BY MOUTH EVERY NIGHT, Disp: 90 tablet, Rfl: 2  •  cetirizine (ZyrTEC) 10 MG tablet, Take 10 mg by mouth., Disp: , Rfl:   •  Crisaborole (EUCRISA) 2 % ointment, Apply 1 application topically 2 (Two) Times a Day., Disp: 60 g, Rfl: 2  •  dexlansoprazole (DEXILANT) 60 MG capsule, Take 1 capsule by mouth Daily., Disp: 30 capsule, Rfl: 5  •  Empagliflozin 10 MG tablet, Take 10 mg by mouth Daily., Disp: 30 tablet, Rfl: 5  •  FeAsp-FeFum -Suc-C-Thre-B12-FA (MULTIGEN PLUS) -1 MG tablet tablet, Take 1 tablet by mouth Daily., Disp: 30 tablet, Rfl: 5  •  GLIPIZIDE XL 10 MG 24 hr tablet, TAKE ONE TABLET BY MOUTH DAILY, Disp: 90 tablet, Rfl: 2  •  hydrochlorothiazide (HYDRODIURIL) 50 MG tablet, Take 1 tablet by mouth Daily., Disp: 90 tablet, Rfl: 3  •  JANUMET  MG per tablet, TAKE ONE TABLET BY MOUTH TWICE A DAY, Disp: 180 tablet, Rfl: 0  •  levothyroxine (SYNTHROID) 100 MCG  tablet, Take 1 tablet by mouth Daily., Disp: 30 tablet, Rfl: 5  •  lisinopril (PRINIVIL,ZESTRIL) 40 MG tablet, TAKE ONE TABLET BY MOUTH EVERY DAY, Disp: 90 tablet, Rfl: 1  •  magnesium oxide (MAGOX) 400 (241.3 Mg) MG tablet tablet, TAKE TWO TABLETS BY MOUTH TWICE A DAY, Disp: 120 tablet, Rfl: 4  •  metoprolol succinate XL (TOPROL-XL) 100 MG 24 hr tablet, TAKE THREE TABLETS BY MOUTH EVERY DAY, Disp: 270 tablet, Rfl: 1  •  mexiletine (MEXITIL) 150 MG capsule, , Disp: , Rfl:   •  potassium chloride (K-DUR,KLOR-CON) 10 MEQ CR tablet, TAKE TWO TABLETS BY MOUTH DAILY, Disp: 60 tablet, Rfl: 0    Allergies:  Codeine    Family Hx:  Family History   Problem Relation Age of Onset   • Breast cancer Mother      50's   • Other Mother      CVA   • Coronary artery disease Other    • Diabetes Other    • Heart disease Other    • Hypertension Other    • Autism Other      grandson   • Alcohol abuse Brother    • Cardiomyopathy Brother    • Coronary artery disease Brother         Social History:  Social History     Social History   • Marital status: Single     Spouse name: N/A   • Number of children: N/A   • Years of education: N/A     Occupational History   • Not on file.     Social History Main Topics   • Smoking status: Never Smoker   • Smokeless tobacco: Never Used   • Alcohol use No   • Drug use: No   • Sexual activity: Not on file     Other Topics Concern   • Not on file     Social History Narrative   • No narrative on file       Review of Systems  Review of Systems   Constitutional: Positive for fatigue and unexpected weight change. Negative for appetite change, chills, diaphoresis and fever.   HENT: Negative for ear pain, nosebleeds, sinus pressure and sore throat.    Eyes: Negative for visual disturbance.   Respiratory: Negative for cough, shortness of breath and wheezing.    Cardiovascular: Negative for chest pain, palpitations and leg swelling.   Gastrointestinal: Negative for abdominal pain, blood in stool, constipation,  "nausea and vomiting.   Genitourinary: Negative for dysuria, hematuria and vaginal bleeding.   Musculoskeletal: Positive for arthralgias.   Skin: Negative for rash.   Neurological: Positive for headaches. Negative for seizures and syncope.   Psychiatric/Behavioral: Negative for hallucinations, self-injury, sleep disturbance and suicidal ideas. The patient is not nervous/anxious.        Objective:     /82 (BP Location: Left arm, Cuff Size: Adult)   Pulse 81   Ht 162.6 cm (64.02\")   Wt 103 kg (226 lb)   LMP  (LMP Unknown)   SpO2 96%   BMI 38.77 kg/m²     General:  alert, appears stated age, cooperative, no distress and morbidly obese   Oropharynx: lips, mucosa, and tongue normal; teeth and gums normal    Eyes:  perrl, eomi, no scleral icterus    Ears:  normal TM's and external ear canals both ears   Neck: no adenopathy, no carotid bruit, no JVD, supple, symmetrical, trachea midline and thyroid not enlarged, symmetric, no tenderness/mass/nodules   Thyroid:  no palpable nodule   Lung: clear to auscultation bilaterally   Heart:  regular rate and rhythm, S1, S2 normal, no murmur, click, rub or gallop   Abdomen: soft, non-tender; bowel sounds normal; no masses,  no organomegaly   Extremities: slight edema   Skin: dry and atopy arms   Pulses: 2+ and symmetric   Neuro: normal without focal findings, mental status, speech normal, alert and oriented x3           Assessment:     Diabetes Mellitus type II, under poor control.  1. Type 2 diabetes mellitus with hyperglycemia, without long-term current use of insulin    2. Acquired hypothyroidism    3. Essential hypertension    4. Atherosclerosis of coronary artery of native heart without angina pectoris, unspecified vessel or lesion type         Plan:     1.  Rx changes: see mar  2.  Education: Reviewed ‘ABCs’ of diabetes management (respective goals in parentheses):  A1C (<7), preprandial glucose (70 mg/dl - 130 mg/dl), postprandial glucose (< 180 mg/dl), blood " pressure (<130/80), and cholesterol (LDL <100 mg/dl; HDL > 50 mg/dl; Trig < 150 mg/dl).  3.  Issues reviewed with Danae Ngo: annual eye examinations at Ophthalmology discussed, glycohemoglobin and other lab monitoring discussed and long term diabetic complications discussed.  4.  Compliance at present is estimated to be good. Efforts to improve compliance (if necessary) will be directed at increased exercise.  5. Follow up: 3 months    GOALS:  Weight loss  BARRIERS TO GOALS:  meds    Preventative:  Female Preventative: Last PAP was 10/2016  Colon cancer screening is due  Mammogram up to date.  increase water intake and increase physical activity  Labs reviewed with patient.  Given samples of Jardiance 10 mg lot [id number] exp 5/20.  Needs diabetic eye exam and screening colonoscopy.  Advised that if she doesn't get control of her sugar will need to start insulin.    RISK SCORE: 4

## 2018-05-09 ENCOUNTER — TELEPHONE (OUTPATIENT)
Dept: FAMILY MEDICINE CLINIC | Facility: CLINIC | Age: 58
End: 2018-05-09

## 2018-05-09 DIAGNOSIS — E11.65 TYPE 2 DIABETES MELLITUS WITH HYPERGLYCEMIA, WITHOUT LONG-TERM CURRENT USE OF INSULIN (HCC): ICD-10-CM

## 2018-06-20 DIAGNOSIS — IMO0002 UNCONTROLLED TYPE 2 DIABETES MELLITUS WITH OTHER CIRCULATORY COMPLICATION, WITHOUT LONG-TERM CURRENT USE OF INSULIN: ICD-10-CM

## 2018-06-20 DIAGNOSIS — I10 ESSENTIAL HYPERTENSION: Primary | ICD-10-CM

## 2018-06-20 RX ORDER — SITAGLIPTIN AND METFORMIN HYDROCHLORIDE 1000; 50 MG/1; MG/1
TABLET, FILM COATED ORAL
Qty: 180 TABLET | Refills: 0 | Status: SHIPPED | OUTPATIENT
Start: 2018-06-20 | End: 2018-06-20 | Stop reason: SDUPTHER

## 2018-06-20 RX ORDER — AMLODIPINE BESYLATE 5 MG/1
5 TABLET ORAL
Qty: 90 TABLET | Refills: 3 | Status: SHIPPED | OUTPATIENT
Start: 2018-06-20 | End: 2019-09-10 | Stop reason: SDUPTHER

## 2018-06-20 RX ORDER — AMLODIPINE BESYLATE 5 MG/1
TABLET ORAL
Qty: 90 TABLET | Refills: 0 | Status: SHIPPED | OUTPATIENT
Start: 2018-06-20 | End: 2018-06-20 | Stop reason: SDUPTHER

## 2018-06-21 DIAGNOSIS — E87.6 HYPOKALEMIA: ICD-10-CM

## 2018-06-21 RX ORDER — POTASSIUM CHLORIDE 750 MG/1
TABLET, EXTENDED RELEASE ORAL
Qty: 60 TABLET | Refills: 0 | Status: SHIPPED | OUTPATIENT
Start: 2018-06-21 | End: 2018-07-18 | Stop reason: SDUPTHER

## 2018-07-18 DIAGNOSIS — E87.6 HYPOKALEMIA: ICD-10-CM

## 2018-07-18 RX ORDER — POTASSIUM CHLORIDE 750 MG/1
TABLET, EXTENDED RELEASE ORAL
Qty: 60 TABLET | Refills: 0 | Status: SHIPPED | OUTPATIENT
Start: 2018-07-18 | End: 2018-07-20 | Stop reason: SDUPTHER

## 2018-07-19 DIAGNOSIS — E11.69 DIABETES MELLITUS TYPE 2 IN OBESE (HCC): ICD-10-CM

## 2018-07-19 DIAGNOSIS — E66.9 DIABETES MELLITUS TYPE 2 IN OBESE (HCC): ICD-10-CM

## 2018-07-19 DIAGNOSIS — E83.42 HYPOMAGNESEMIA: ICD-10-CM

## 2018-07-19 RX ORDER — GLIPIZIDE 10 MG/1
TABLET, FILM COATED, EXTENDED RELEASE ORAL
Qty: 90 TABLET | Refills: 1 | Status: SHIPPED | OUTPATIENT
Start: 2018-07-19 | End: 2019-01-11 | Stop reason: SDUPTHER

## 2018-07-20 RX ORDER — POTASSIUM CHLORIDE 750 MG/1
20 TABLET, EXTENDED RELEASE ORAL DAILY
Qty: 60 TABLET | Refills: 5 | Status: SHIPPED | OUTPATIENT
Start: 2018-07-20 | End: 2019-02-09 | Stop reason: SDUPTHER

## 2018-07-31 ENCOUNTER — HOSPITAL ENCOUNTER (EMERGENCY)
Facility: HOSPITAL | Age: 58
Discharge: HOME | End: 2018-07-31
Attending: FAMILY MEDICINE | Admitting: FAMILY MEDICINE

## 2018-07-31 ENCOUNTER — APPOINTMENT (OUTPATIENT)
Dept: GENERAL RADIOLOGY | Facility: HOSPITAL | Age: 58
End: 2018-07-31

## 2018-07-31 VITALS
HEART RATE: 77 BPM | TEMPERATURE: 98.9 F | WEIGHT: 226 LBS | OXYGEN SATURATION: 95 % | SYSTOLIC BLOOD PRESSURE: 158 MMHG | RESPIRATION RATE: 20 BRPM | DIASTOLIC BLOOD PRESSURE: 95 MMHG | HEIGHT: 55 IN | BODY MASS INDEX: 52.3 KG/M2

## 2018-07-31 DIAGNOSIS — S20.211A CONTUSION OF RIGHT CHEST WALL, INITIAL ENCOUNTER: ICD-10-CM
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
## 2018-07-31 PROCEDURE — 93005 ELECTROCARDIOGRAM TRACING: CPT | Performed by: FAMILY MEDICINE

## 2018-07-31 PROCEDURE — 71101 X-RAY EXAM UNILAT RIBS/CHEST: CPT

## 2018-07-31 PROCEDURE — 99283 EMERGENCY DEPT VISIT LOW MDM: CPT

## 2018-07-31 PROCEDURE — 93010 ELECTROCARDIOGRAM REPORT: CPT | Performed by: INTERNAL MEDICINE

## 2018-07-31 RX ORDER — MELOXICAM 15 MG/1
15 TABLET ORAL DAILY
Qty: 30 TABLET | Refills: 0 | Status: SHIPPED | OUTPATIENT
Start: 2018-07-31 | End: 2018-09-02

## 2018-07-31 RX ORDER — HYDROCODONE BITARTRATE AND ACETAMINOPHEN 10; 325 MG/1; MG/1
1 TABLET ORAL EVERY 6 HOURS PRN
Qty: 12 TABLET | Refills: 0 | Status: SHIPPED | OUTPATIENT
Start: 2018-07-31 | End: 2018-09-02

## 2018-08-01 ENCOUNTER — TELEPHONE (OUTPATIENT)
Dept: FAMILY MEDICINE CLINIC | Facility: CLINIC | Age: 58
End: 2018-08-01

## 2018-08-02 ENCOUNTER — OFFICE VISIT (OUTPATIENT)
Dept: FAMILY MEDICINE CLINIC | Facility: CLINIC | Age: 58
End: 2018-08-02

## 2018-08-02 VITALS
HEART RATE: 75 BPM | HEIGHT: 64 IN | WEIGHT: 222.4 LBS | BODY MASS INDEX: 37.97 KG/M2 | SYSTOLIC BLOOD PRESSURE: 124 MMHG | DIASTOLIC BLOOD PRESSURE: 82 MMHG | OXYGEN SATURATION: 97 %

## 2018-08-02 DIAGNOSIS — Z09 HOSPITAL DISCHARGE FOLLOW-UP: Primary | ICD-10-CM

## 2018-08-02 DIAGNOSIS — E03.9 ACQUIRED HYPOTHYROIDISM: ICD-10-CM

## 2018-08-02 DIAGNOSIS — E11.65 TYPE 2 DIABETES MELLITUS WITH HYPERGLYCEMIA, WITHOUT LONG-TERM CURRENT USE OF INSULIN (HCC): Primary | ICD-10-CM

## 2018-08-02 DIAGNOSIS — S20.01XD CONTUSION OF RIGHT BREAST, SUBSEQUENT ENCOUNTER: ICD-10-CM
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
## 2018-08-24 DIAGNOSIS — D64.9 ANEMIA, UNSPECIFIED TYPE: ICD-10-CM

## 2018-08-24 RX ORDER — IRON FUM,AG/C/B12/FOLIC/CA/SUC 151-60-1MG
TABLET ORAL
Qty: 30 TABLET | Refills: 4 | Status: SHIPPED | OUTPATIENT
Start: 2018-08-24 | End: 2019-01-20 | Stop reason: SDUPTHER

## 2018-09-18 DIAGNOSIS — E78.5 HYPERLIPIDEMIA ASSOCIATED WITH TYPE 2 DIABETES MELLITUS (HCC): ICD-10-CM

## 2018-09-18 DIAGNOSIS — E11.69 HYPERLIPIDEMIA ASSOCIATED WITH TYPE 2 DIABETES MELLITUS (HCC): ICD-10-CM

## 2018-09-18 RX ORDER — LISINOPRIL 40 MG/1
TABLET ORAL
Qty: 90 TABLET | Refills: 0 | Status: SHIPPED | OUTPATIENT
Start: 2018-09-18 | End: 2018-12-14 | Stop reason: SDUPTHER

## 2018-09-18 RX ORDER — METOPROLOL SUCCINATE 100 MG/1
TABLET, EXTENDED RELEASE ORAL
Qty: 270 TABLET | Refills: 0 | Status: SHIPPED | OUTPATIENT
Start: 2018-09-18 | End: 2018-12-14 | Stop reason: SDUPTHER

## 2018-09-18 RX ORDER — ATORVASTATIN CALCIUM 20 MG/1
TABLET, FILM COATED ORAL
Qty: 90 TABLET | Refills: 1 | Status: SHIPPED | OUTPATIENT
Start: 2018-09-18 | End: 2019-03-14 | Stop reason: SDUPTHER

## 2018-09-24 ENCOUNTER — TRANSCRIBE ORDERS (OUTPATIENT)
Dept: LAB | Facility: HOSPITAL | Age: 58
End: 2018-09-24

## 2018-09-24 ENCOUNTER — OFFICE VISIT (OUTPATIENT)
Dept: SURGERY | Facility: CLINIC | Age: 58
End: 2018-09-24

## 2018-09-24 ENCOUNTER — APPOINTMENT (OUTPATIENT)
Dept: LAB | Facility: HOSPITAL | Age: 58
End: 2018-09-24

## 2018-09-24 VITALS
BODY MASS INDEX: 37.8 KG/M2 | HEIGHT: 64 IN | TEMPERATURE: 97 F | DIASTOLIC BLOOD PRESSURE: 74 MMHG | SYSTOLIC BLOOD PRESSURE: 118 MMHG | HEART RATE: 66 BPM | WEIGHT: 221.4 LBS

## 2018-09-24 DIAGNOSIS — Z79.810 CARE RELATED TO CURRENT TAMOXIFEN USE: ICD-10-CM

## 2018-09-24 DIAGNOSIS — Z12.11 SCREENING FOR COLON CANCER: Primary | ICD-10-CM

## 2018-09-24 DIAGNOSIS — I47.29 PAROXYSMAL VENTRICULAR TACHYCARDIA (HCC): Primary | ICD-10-CM

## 2018-09-24 LAB
ALBUMIN SERPL-MCNC: 4.5 G/DL (ref 3.4–4.8)
ALBUMIN/GLOB SERPL: 1 G/DL (ref 1.1–1.8)
ALP SERPL-CCNC: 91 U/L (ref 38–126)
ALT SERPL W P-5'-P-CCNC: 48 U/L (ref 9–52)
ANION GAP SERPL CALCULATED.3IONS-SCNC: 16 MMOL/L (ref 5–15)
AST SERPL-CCNC: 35 U/L (ref 14–36)
BILIRUB SERPL-MCNC: 0.7 MG/DL (ref 0.2–1.3)
BUN BLD-MCNC: 24 MG/DL (ref 7–21)
BUN/CREAT SERPL: 21.2 (ref 7–25)
CALCIUM SPEC-SCNC: 9.8 MG/DL (ref 8.4–10.2)
CHLORIDE SERPL-SCNC: 94 MMOL/L (ref 95–110)
CO2 SERPL-SCNC: 27 MMOL/L (ref 22–31)
CREAT BLD-MCNC: 1.13 MG/DL (ref 0.5–1)
GFR SERPL CREATININE-BSD FRML MDRD: 60 ML/MIN/1.73 (ref 51–120)
GLOBULIN UR ELPH-MCNC: 4.4 GM/DL (ref 2.3–3.5)
GLUCOSE BLD-MCNC: 173 MG/DL (ref 60–100)
HBA1C MFR BLD: 8.4 % (ref 4–5.6)
POTASSIUM BLD-SCNC: 4.1 MMOL/L (ref 3.5–5.1)
PROT SERPL-MCNC: 8.9 G/DL (ref 6.3–8.6)
SODIUM BLD-SCNC: 137 MMOL/L (ref 137–145)
T4 FREE SERPL-MCNC: 1.59 NG/DL (ref 0.78–2.19)
TSH SERPL DL<=0.05 MIU/L-ACNC: 7.69 MIU/ML (ref 0.46–4.68)

## 2018-09-24 PROCEDURE — 36415 COLL VENOUS BLD VENIPUNCTURE: CPT | Performed by: FAMILY MEDICINE

## 2018-09-24 PROCEDURE — 80053 COMPREHEN METABOLIC PANEL: CPT | Performed by: FAMILY MEDICINE

## 2018-09-24 PROCEDURE — 84439 ASSAY OF FREE THYROXINE: CPT | Performed by: FAMILY MEDICINE

## 2018-09-24 PROCEDURE — 99213 OFFICE O/P EST LOW 20 MIN: CPT | Performed by: SURGERY

## 2018-09-24 PROCEDURE — 84443 ASSAY THYROID STIM HORMONE: CPT | Performed by: FAMILY MEDICINE

## 2018-09-24 PROCEDURE — 83036 HEMOGLOBIN GLYCOSYLATED A1C: CPT | Performed by: FAMILY MEDICINE

## 2018-09-24 NOTE — PATIENT INSTRUCTIONS

## 2018-09-24 NOTE — PROGRESS NOTES
Chief Complaint: This is a 58 y.o. woman seen in consultation for routine followup benign breast disease    History of Present Illness:  Patient has noted no new masses, skin changes, nipple discharge, nipple changes prior to her most recent imaging.  Her most recent imaging includes the following:   Study Result        PROCEDURE: Bilateral screening mammogram with 3-D tomosynthesis  with CAD     REASON FOR EXAM: Screening mammography     FINDINGS: Comparison study dated 9/5/2017, 9/2/2016, 8/31/2015,  8/15/2013, and 8/13/2012.No interval change in appearance the  breast parenchyma. No suspicious mass or malignant type  microcalcifications . No skin thickening or retraction. Bilateral  breast parenchymal stable benign calcification..     Parenchymal pattern: Almost complete fatty involutional change of  the breast     IMPRESSION:  No mammographic evidence of malignancy.     BI-RADS category 2: Benign findings.     Recommendation: Annual mammography      Electronically signed by:  Justin Ac MD  9/10/2018 8:19 AM CDT  Workstation: [id number]       ( I have personally reviewed the breast imaging and concur with the findings of the radiologist- BiRADS 2)    Her family history includes the following: mother with breast cancer    She is here for evaluation.    Past Medical History:   Diagnosis Date   • Abnormal thyroid function test    • Acquired hypothyroidism    • Allergic rhinitis    • Anemia    • Breast cyst    • Coronary atherosclerosis     unspecified type of vessel, native or graft      • Dilated cardiomyopathy (CMS/HCC)    • Dyslipidemia    • Esophageal reflux    • Essential hypertension    • Family history of malignant neoplasm of breast    • Fibrocystic disease of breast     Low Mireille score   • GERD (gastroesophageal reflux disease)    • Herpes zoster    • History of chicken pox    • Hyperlipidemia    • Measles    • Mumps    • Myocardial infarction 2001   • Nasal septal deformity    • Obesity    • Type 2 diabetes  mellitus (CMS/HCC)     Uncontrolled   • V tach (CMS/HCC)    • Vitamin D deficiency      Past Surgical History:   Procedure Laterality Date   • BREAST BIOPSY  2010    left breast benign   • BREAST CYST ASPIRATION     • CARDIAC CATHETERIZATION  2002    Left cardiac cath, selective coronary angiography, PTCA to the left anterior descending. Stent placement to the left anterior descending   • CARDIAC DEFIBRILLATOR PLACEMENT Left     Dr. Brunner   •  SECTION      Intrauterine pregnancy, term gestation, cephalopelvic disproportion   • ENDOSCOPY AND COLONOSCOPY  04/15/2008    Normal   • ESOPHAGOSCOPY / EGD  2007    With tube-Esophagus appeared normal. Gastritis of the stomach. A biopsy of the stomach was obtained from the stomach. The duodenum appeared normal   • EXTERNAL EAR SURGERY  08/15/1991    Repair biifd ear   • OOPHORECTOMY      Left salpingo-oophorectomy, wedge resection of the right ovary, lysis of adhesions   • TRANSESOPHAGEAL ECHOCARDIOGRAM (LORELEI)  2012    Without color flow-Moderate to severe LV dysfumctional w/ an EF of 30-35%. Wall motion abnormalities as described above. LV enlargement. Mild aortic insufficiency, mild TR regurg, mild M regurg. mild pulmonary insufficiency       Current Outpatient Prescriptions:   •  amLODIPine (NORVASC) 5 MG tablet, Take 1 tablet by mouth every night at bedtime., Disp: 90 tablet, Rfl: 3  •  aspirin 325 MG tablet, Take 325 mg by mouth daily., Disp: , Rfl:   •  atorvastatin (LIPITOR) 20 MG tablet, TAKE ONE TABLET BY MOUTH EVERY NIGHT, Disp: 90 tablet, Rfl: 1  •  cetirizine (ZyrTEC) 10 MG tablet, Take 10 mg by mouth., Disp: , Rfl:   •  dexlansoprazole (DEXILANT) 60 MG capsule, Take 1 capsule by mouth Daily., Disp: 30 capsule, Rfl: 5  •  Empagliflozin 10 MG tablet, Take 10 mg by mouth Daily., Disp: 30 tablet, Rfl: 5  •  FeAsp-FeFum -Suc-C-Thre-B12-FA (MULTIGEN PLUS) -1 MG tablet tablet, TAKE ONE TABLET BY MOUTH DAILY, Disp: 30  tablet, Rfl: 4  •  glipiZIDE (GLUCOTROL XL) 10 MG 24 hr tablet, TAKE ONE TABLET BY MOUTH DAILY, Disp: 90 tablet, Rfl: 1  •  hydrochlorothiazide (HYDRODIURIL) 50 MG tablet, Take 1 tablet by mouth Daily., Disp: 90 tablet, Rfl: 3  •  levothyroxine (SYNTHROID) 100 MCG tablet, Take 1 tablet by mouth Daily., Disp: 30 tablet, Rfl: 5  •  lisinopril (PRINIVIL,ZESTRIL) 40 MG tablet, TAKE ONE TABLET BY MOUTH EVERY DAY, Disp: 90 tablet, Rfl: 0  •  magnesium oxide (MAGOX) 400 (241.3 Mg) MG tablet tablet, TAKE TWO TABLETS BY MOUTH TWICE A DAY, Disp: 120 tablet, Rfl: 3  •  metoprolol succinate XL (TOPROL-XL) 100 MG 24 hr tablet, TAKE THREE TABLETS BY MOUTH EVERY DAY, Disp: 270 tablet, Rfl: 0  •  mexiletine (MEXITIL) 150 MG capsule, , Disp: , Rfl:   •  potassium chloride (K-DUR,KLOR-CON) 10 MEQ CR tablet, Take 2 tablets by mouth Daily., Disp: 60 tablet, Rfl: 5  •  sitaGLIPtin-metFORMIN (JANUMET)  MG per tablet, Take 1 tablet by mouth 2 (Two) Times a Day., Disp: 180 tablet, Rfl: 3  •  fluconazole (DIFLUCAN) 200 MG tablet, Take one at the first sign of infection and may repeat in 3 days as needed., Disp: 2 tablet, Rfl: 0  Allergies   Allergen Reactions   • Codeine      Family History   Problem Relation Age of Onset   • Breast cancer Mother         50's   • Other Mother         CVA   • Coronary artery disease Other    • Diabetes Other    • Heart disease Other    • Hypertension Other    • Autism Other         grandson   • Alcohol abuse Brother    • Cardiomyopathy Brother    • Coronary artery disease Brother      Social History     Social History   • Marital status: Single     Spouse name: N/A   • Number of children: N/A   • Years of education: N/A     Occupational History   • Not on file.     Social History Main Topics   • Smoking status: Never Smoker   • Smokeless tobacco: Never Used   • Alcohol use No   • Drug use: No   • Sexual activity: Defer     Other Topics Concern   • Not on file     Social History Narrative   • No  narrative on file     Review of Systems   All other systems reviewed and are negative.    Physical Exam   Constitutional: She appears well-developed and well-nourished.   Pulmonary/Chest: Right breast exhibits no inverted nipple, no mass, no nipple discharge, no skin change and no tenderness. Left breast exhibits no inverted nipple, no mass, no nipple discharge, no skin change and no tenderness. Breasts are symmetrical. There is no breast swelling.   Genitourinary: No breast tenderness, discharge or bleeding.   Vitals reviewed.    Vitals:    09/24/18 0907   BP: 118/74   Pulse: 66   Temp: 97 °F (36.1 °C)     Assessment:    Danae was seen today for follow-up.    Diagnoses and all orders for this visit:    Screening for colon cancer  -     Ambulatory Referral to General Surgery        Plan:  1.  Colonoscopies ordered  2.  Recheck in 1 year with mammograms /examination                This document has been electronically signed by Rodo Holcomb MD on September 24, 2018 9:42 AM

## 2018-09-28 DIAGNOSIS — Z12.31 SCREENING MAMMOGRAM, ENCOUNTER FOR: Primary | ICD-10-CM

## 2018-10-01 ENCOUNTER — PREP FOR SURGERY (OUTPATIENT)
Dept: OTHER | Facility: HOSPITAL | Age: 58
End: 2018-10-01

## 2018-10-01 DIAGNOSIS — Z12.11 SCREEN FOR COLON CANCER: Primary | ICD-10-CM

## 2018-10-01 RX ORDER — DEXTROSE AND SODIUM CHLORIDE 5; .45 G/100ML; G/100ML
100 INJECTION, SOLUTION INTRAVENOUS CONTINUOUS
Status: CANCELLED | OUTPATIENT
Start: 2018-10-12

## 2018-10-02 DIAGNOSIS — E03.9 ACQUIRED HYPOTHYROIDISM: ICD-10-CM

## 2018-10-02 RX ORDER — LEVOTHYROXINE SODIUM 0.1 MG/1
TABLET ORAL
Qty: 30 TABLET | Refills: 4 | OUTPATIENT
Start: 2018-10-02

## 2018-10-04 DIAGNOSIS — K21.9 GASTROESOPHAGEAL REFLUX DISEASE, ESOPHAGITIS PRESENCE NOT SPECIFIED: ICD-10-CM

## 2018-10-04 RX ORDER — DEXLANSOPRAZOLE 60 MG/1
CAPSULE, DELAYED RELEASE ORAL
Qty: 30 CAPSULE | Refills: 4 | OUTPATIENT
Start: 2018-10-04

## 2018-10-09 DIAGNOSIS — E03.9 ACQUIRED HYPOTHYROIDISM: ICD-10-CM

## 2018-10-09 NOTE — TELEPHONE ENCOUNTER
Patient's most recent TSH was 7.69, with a normal T4. Will increase levothyroxine dose from 100 mcg to 125 mcg.     Signature  Shelley Mcwilliams MD  [facility] Medicine Resident, PGY III        This document has been electronically signed by Shelley Mcwilliams MD on October 10, 2018 1:45 PM

## 2018-10-10 ENCOUNTER — TELEPHONE (OUTPATIENT)
Dept: FAMILY MEDICINE CLINIC | Facility: CLINIC | Age: 58
End: 2018-10-10

## 2018-10-10 RX ORDER — LEVOTHYROXINE SODIUM 0.12 MG/1
125 TABLET ORAL
Qty: 30 TABLET | Refills: 0 | Status: SHIPPED | OUTPATIENT
Start: 2018-10-10 | End: 2019-01-10

## 2018-10-10 NOTE — TELEPHONE ENCOUNTER
Pt returned call. She has her phone with her so you can call again. [phone]    Thanks  Minnie

## 2018-10-12 ENCOUNTER — ANESTHESIA EVENT (OUTPATIENT)
Dept: GASTROENTEROLOGY | Facility: HOSPITAL | Age: 58
End: 2018-10-12

## 2018-10-12 ENCOUNTER — ANESTHESIA (OUTPATIENT)
Dept: GASTROENTEROLOGY | Facility: HOSPITAL | Age: 58
End: 2018-10-12

## 2018-10-12 ENCOUNTER — HOSPITAL ENCOUNTER (OUTPATIENT)
Facility: HOSPITAL | Age: 58
Setting detail: HOSPITAL OUTPATIENT SURGERY
Discharge: HOME | End: 2018-10-12
Attending: SURGERY | Admitting: SURGERY

## 2018-10-12 VITALS
SYSTOLIC BLOOD PRESSURE: 106 MMHG | HEIGHT: 64 IN | WEIGHT: 218.26 LBS | BODY MASS INDEX: 37.26 KG/M2 | RESPIRATION RATE: 16 BRPM | TEMPERATURE: 97.8 F | DIASTOLIC BLOOD PRESSURE: 66 MMHG | HEART RATE: 67 BPM | OXYGEN SATURATION: 95 %

## 2018-10-12 DIAGNOSIS — Z12.11 SCREEN FOR COLON CANCER: ICD-10-CM

## 2018-10-12 LAB — GLUCOSE BLDC GLUCOMTR-MCNC: 195 MG/DL (ref 70–130)

## 2018-10-12 PROCEDURE — 45385 COLONOSCOPY W/LESION REMOVAL: CPT | Performed by: SURGERY

## 2018-10-12 PROCEDURE — 88305 TISSUE EXAM BY PATHOLOGIST: CPT | Performed by: SURGERY

## 2018-10-12 PROCEDURE — 82962 GLUCOSE BLOOD TEST: CPT

## 2018-10-12 PROCEDURE — 25010000002 PROPOFOL 10 MG/ML EMULSION: Performed by: NURSE ANESTHETIST, CERTIFIED REGISTERED

## 2018-10-12 PROCEDURE — 88305 TISSUE EXAM BY PATHOLOGIST: CPT | Performed by: PATHOLOGY

## 2018-10-12 RX ORDER — PROPOFOL 10 MG/ML
VIAL (ML) INTRAVENOUS AS NEEDED
Status: DISCONTINUED | OUTPATIENT
Start: 2018-10-12 | End: 2018-10-12 | Stop reason: SURG

## 2018-10-12 RX ORDER — LIDOCAINE HYDROCHLORIDE 20 MG/ML
INJECTION, SOLUTION INFILTRATION; PERINEURAL AS NEEDED
Status: DISCONTINUED | OUTPATIENT
Start: 2018-10-12 | End: 2018-10-12 | Stop reason: SURG

## 2018-10-12 RX ORDER — ONDANSETRON 2 MG/ML
4 INJECTION INTRAMUSCULAR; INTRAVENOUS ONCE AS NEEDED
Status: DISCONTINUED | OUTPATIENT
Start: 2018-10-12 | End: 2018-10-12 | Stop reason: HOSPADM

## 2018-10-12 RX ORDER — DEXTROSE AND SODIUM CHLORIDE 5; .45 G/100ML; G/100ML
100 INJECTION, SOLUTION INTRAVENOUS CONTINUOUS
Status: DISCONTINUED | OUTPATIENT
Start: 2018-10-12 | End: 2018-10-12 | Stop reason: HOSPADM

## 2018-10-12 RX ADMIN — PROPOFOL 20 MG: 10 INJECTION, EMULSION INTRAVENOUS at 11:17

## 2018-10-12 RX ADMIN — PROPOFOL 20 MG: 10 INJECTION, EMULSION INTRAVENOUS at 11:11

## 2018-10-12 RX ADMIN — PROPOFOL 20 MG: 10 INJECTION, EMULSION INTRAVENOUS at 11:15

## 2018-10-12 RX ADMIN — PROPOFOL 20 MG: 10 INJECTION, EMULSION INTRAVENOUS at 11:09

## 2018-10-12 RX ADMIN — PROPOFOL 20 MG: 10 INJECTION, EMULSION INTRAVENOUS at 11:07

## 2018-10-12 RX ADMIN — PROPOFOL 20 MG: 10 INJECTION, EMULSION INTRAVENOUS at 11:13

## 2018-10-12 RX ADMIN — DEXTROSE AND SODIUM CHLORIDE 100 ML/HR: 5; 450 INJECTION, SOLUTION INTRAVENOUS at 10:35

## 2018-10-12 RX ADMIN — PROPOFOL 70 MG: 10 INJECTION, EMULSION INTRAVENOUS at 11:05

## 2018-10-12 RX ADMIN — LIDOCAINE HYDROCHLORIDE 80 MG: 20 INJECTION, SOLUTION INFILTRATION; PERINEURAL at 11:05

## 2018-10-12 RX ADMIN — PROPOFOL 10 MG: 10 INJECTION, EMULSION INTRAVENOUS at 11:19

## 2018-10-12 NOTE — ANESTHESIA POSTPROCEDURE EVALUATION
Patient: Danae Ngo    Procedure Summary     Date:  10/12/18 Room / Location:  [facility] ENDOSCOPY 2 / [facility] ENDOSCOPY    Anesthesia Start:  1101 Anesthesia Stop:  1121    Procedure:  COLONOSCOPY (N/A ) Diagnosis:       Screen for colon cancer      (Screen for colon cancer [Z12.11])    Surgeon:  Delroy Miller MD Provider:  Geovanni Mirza CRNA    Anesthesia Type:  MAC ASA Status:  3          Anesthesia Type: MAC  Last vitals  BP   123/82 (10/12/18 1017)   Temp   98.2 °F (36.8 °C) (10/12/18 1017)   Pulse   70 (10/12/18 1017)   Resp   20 (10/12/18 1017)     SpO2   95 % (10/12/18 1017)     Post Anesthesia Care and Evaluation    Patient location during evaluation: PACU  Level of consciousness: responsive to light touch  Pain score: 0  Pain management: adequate  Airway patency: patent  Anesthetic complications: No anesthetic complications  PONV Status: none  Cardiovascular status: acceptable and hemodynamically stable  Respiratory status: acceptable and spontaneous ventilation  Hydration status: acceptable

## 2018-10-12 NOTE — H&P
No chief complaint on file.      Danae Ngo is a 58 y.o. female referred today for evaluation for colonoscopy.  She notes no change in bowel habits, no blood in the stool.     Prior Colonoscopy:yes  Prior Polyps:no  Family History of Colon Cancer:no  On anticoagulation:no    Past Surgical History:   Procedure Laterality Date   • BREAST BIOPSY  2010    left breast benign   • BREAST CYST ASPIRATION     • CARDIAC CATHETERIZATION  2002    Left cardiac cath, selective coronary angiography, PTCA to the left anterior descending. Stent placement to the left anterior descending   • CARDIAC DEFIBRILLATOR PLACEMENT Left     Dr. Brunner   •  SECTION  1984    Intrauterine pregnancy, term gestation, cephalopelvic disproportion   • ENDOSCOPY AND COLONOSCOPY  04/15/2008    Normal   • ESOPHAGOSCOPY / EGD  2007    With tube-Esophagus appeared normal. Gastritis of the stomach. A biopsy of the stomach was obtained from the stomach. The duodenum appeared normal   • EXTERNAL EAR SURGERY  08/15/1991    Repair biifd ear   • OOPHORECTOMY      Left salpingo-oophorectomy, wedge resection of the right ovary, lysis of adhesions   • TRANSESOPHAGEAL ECHOCARDIOGRAM (LORELEI)  2012    Without color flow-Moderate to severe LV dysfumctional w/ an EF of 30-35%. Wall motion abnormalities as described above. LV enlargement. Mild aortic insufficiency, mild TR regurg, mild M regurg. mild pulmonary insufficiency     Past Medical History:   Diagnosis Date   • Abnormal thyroid function test    • Acquired hypothyroidism    • Allergic rhinitis    • Anemia    • Breast cyst    • Coronary atherosclerosis     unspecified type of vessel, native or graft      • Dilated cardiomyopathy (CMS/HCC)    • Dyslipidemia    • Esophageal reflux    • Essential hypertension    • Family history of malignant neoplasm of breast    • Fibrocystic disease of breast     Low Mireille score   • GERD (gastroesophageal reflux disease)    • Herpes zoster     • History of chicken pox    • Hyperlipidemia    • Measles    • Mumps    • Myocardial infarction (CMS/[facility]) 2001   • Nasal septal deformity    • Obesity    • Type 2 diabetes mellitus (CMS/[facility])     Uncontrolled   • V tach (CMS/[facility])    • Vitamin D deficiency      Social History     Social History   • Marital status: Single     Spouse name: N/A   • Number of children: N/A   • Years of education: N/A     Occupational History   • Not on file.     Social History Main Topics   • Smoking status: Never Smoker   • Smokeless tobacco: Never Used   • Alcohol use No   • Drug use: No   • Sexual activity: Defer     Other Topics Concern   • Not on file     Social History Narrative   • No narrative on file     Allergies   Allergen Reactions   • Codeine        Home Medications:  Prior to Admission medications    Medication Sig Start Date End Date Taking? Authorizing Provider   amLODIPine (NORVASC) 5 MG tablet Take 1 tablet by mouth every night at bedtime. 6/20/18  Yes Jade Bansal MD   atorvastatin (LIPITOR) 20 MG tablet TAKE ONE TABLET BY MOUTH EVERY NIGHT 9/18/18  Yes Jim Ling MD   cetirizine (ZyrTEC) 10 MG tablet Take 10 mg by mouth.   Yes Provider, MD Celestina   Cholecalciferol (VITAMIN D3) 5000 units capsule capsule Take 5,000 Units by mouth Daily.   Yes ProviderCelestina MD   dexlansoprazole (DEXILANT) 60 MG capsule Take 1 capsule by mouth Daily. 3/16/18  Yes Jade Bansal MD   Empagliflozin 10 MG tablet Take 10 mg by mouth Daily. 5/9/18  Yes Jade Bansal MD   FeAsp-FeFum -Suc-C-Thre-B12-FA (MULTIGEN PLUS) -1 MG tablet tablet TAKE ONE TABLET BY MOUTH DAILY 8/24/18  Yes Jade Bansal MD   glipiZIDE (GLUCOTROL XL) 10 MG 24 hr tablet TAKE ONE TABLET BY MOUTH DAILY 7/19/18  Yes Jade Bansal MD   hydrochlorothiazide (HYDRODIURIL) 50 MG tablet Take 1 tablet by mouth Daily. 1/15/18  Yes Jade Bansal MD   levothyroxine (SYNTHROID, LEVOTHROID) 125 MCG tablet Take 1 tablet by mouth Every Morning  Before Breakfast. 10/10/18  Yes Shelley Mcwilliams MD   lisinopril (PRINIVIL,ZESTRIL) 40 MG tablet TAKE ONE TABLET BY MOUTH EVERY DAY 9/18/18  Yes Jim Ling MD   magnesium oxide (MAGOX) 400 (241.3 Mg) MG tablet tablet TAKE TWO TABLETS BY MOUTH TWICE A DAY 7/19/18  Yes Jade Bansal MD   metoprolol succinate XL (TOPROL-XL) 100 MG 24 hr tablet TAKE THREE TABLETS BY MOUTH EVERY DAY 9/18/18  Yes Jim Ling MD   potassium chloride (K-DUR,KLOR-CON) 10 MEQ CR tablet Take 2 tablets by mouth Daily. 7/20/18  Yes Jade Bansal MD   aspirin 325 MG tablet Take 325 mg by mouth daily.    ProviderCelestina MD   fluconazole (DIFLUCAN) 200 MG tablet Take one at the first sign of infection and may repeat in 3 days as needed. 9/2/18   Jinny Montalvo APRN   mexiletine (MEXITIL) 150 MG capsule  9/7/16   ProviderCelestina MD   sitaGLIPtin-metFORMIN (JANUMET)  MG per tablet Take 1 tablet by mouth 2 (Two) Times a Day. 6/20/18   Jade Bansal MD       Review of Systems   Constitutional: Negative.    HENT: Negative for hearing loss, nosebleeds and trouble swallowing.    Respiratory: Negative for apnea, chest tightness and shortness of breath.    Cardiovascular: Negative for chest pain and palpitations.   Gastrointestinal: Negative for abdominal distention, abdominal pain, blood in stool, constipation, diarrhea, nausea and vomiting.   Genitourinary: Negative for difficulty urinating, dysuria, frequency and urgency.   Musculoskeletal: Negative for back pain, joint swelling and neck pain.   Skin: Negative for rash.   Neurological: Negative for dizziness, seizures, weakness, light-headedness, numbness and headaches.   Hematological: Negative for adenopathy.   Psychiatric/Behavioral: Negative for agitation. The patient is not nervous/anxious.        Vitals:    10/12/18 1017   BP: 123/82   Pulse: 70   Resp: 20   Temp: 98.2 °F (36.8 °C)   SpO2: 95%       Physical Exam   Constitutional: She is  oriented to person, place, and time. She appears well-developed and well-nourished. No distress.   HENT:   Head: Normocephalic and atraumatic.   Nose: Nose normal.   Eyes: Conjunctivae are normal.   Neck: Normal range of motion. No tracheal deviation present. No thyromegaly present.   Cardiovascular: Normal rate, regular rhythm and normal heart sounds.    No murmur heard.  Pulmonary/Chest: Effort normal and breath sounds normal. No respiratory distress. She has no wheezes. She has no rales. She exhibits no tenderness.   Abdominal: Soft. She exhibits no distension. There is no tenderness. There is no rebound and no guarding. No hernia.   Musculoskeletal: She exhibits no tenderness or deformity.   Neurological: She is alert and oriented to person, place, and time.   Skin: Skin is warm and dry. No rash noted.   Psychiatric: She has a normal mood and affect. Her behavior is normal. Judgment and thought content normal.   Vitals reviewed.      Assessment     In need of screening colonoscopy.    Plan     Risks, benefits, rationale and prep for colonoscopy have been discussed with the patient.  The patient indicates understanding of these issues and agrees with the plan.

## 2018-10-12 NOTE — ANESTHESIA PREPROCEDURE EVALUATION
Anesthesia Evaluation     Patient summary reviewed   NPO Solid Status: > 8 hours  NPO Liquid Status: > 2 hours           Airway   Mallampati: II  TM distance: >3 FB  Dental      Pulmonary - normal exam   Cardiovascular - normal exam    ECG reviewed  Patient on routine beta blocker    (+) pacemaker ICD interrogated 1-3 months ago, hypertension, past MI  >12 months, CAD, PVD, hyperlipidemia,       Neuro/Psych  (+) headaches,     GI/Hepatic/Renal/Endo    (+) obesity, morbid obesity, GERD,  diabetes mellitus type 2, hypothyroidism,     Musculoskeletal     Abdominal   (+) obese,    Substance History      OB/GYN          Other                      Anesthesia Plan    ASA 3     MAC     intravenous induction   Anesthetic plan, all risks, benefits, and alternatives have been provided, discussed and informed consent has been obtained with: patient.

## 2018-10-15 LAB
LAB AP CASE REPORT: NORMAL
PATH REPORT.FINAL DX SPEC: NORMAL
PATH REPORT.GROSS SPEC: NORMAL

## 2018-10-19 ENCOUNTER — OFFICE VISIT (OUTPATIENT)
Dept: SURGERY | Facility: CLINIC | Age: 58
End: 2018-10-19

## 2018-10-19 VITALS
SYSTOLIC BLOOD PRESSURE: 132 MMHG | WEIGHT: 218.4 LBS | TEMPERATURE: 97.7 F | HEART RATE: 71 BPM | BODY MASS INDEX: 37.28 KG/M2 | HEIGHT: 64 IN | DIASTOLIC BLOOD PRESSURE: 78 MMHG

## 2018-10-19 DIAGNOSIS — Z12.11 SCREEN FOR COLON CANCER: Primary | ICD-10-CM

## 2018-10-19 DIAGNOSIS — Z86.010 HX OF ADENOMATOUS COLONIC POLYPS: ICD-10-CM

## 2018-10-19 PROCEDURE — 99212 OFFICE O/P EST SF 10 MIN: CPT | Performed by: SURGERY

## 2018-10-19 NOTE — PATIENT INSTRUCTIONS

## 2018-10-19 NOTE — PROGRESS NOTES
58-year-old female here to follow-up after recent colonoscopy which was done for screening purposes.  She has a good job with her prep.  Findings were significant for a 7 mm tubular adenomatous polyp removed from her ascending colon.  Felt the entire polyp was removed.  No other findings.  Recommend patient have colonoscopy in 5 years or sooner she has any other concerns or questions

## 2018-10-29 ENCOUNTER — OFFICE VISIT (OUTPATIENT)
Dept: FAMILY MEDICINE CLINIC | Facility: CLINIC | Age: 58
End: 2018-10-29

## 2018-10-29 VITALS
HEIGHT: 64 IN | HEART RATE: 68 BPM | DIASTOLIC BLOOD PRESSURE: 80 MMHG | RESPIRATION RATE: 18 BRPM | TEMPERATURE: 98.8 F | SYSTOLIC BLOOD PRESSURE: 112 MMHG | BODY MASS INDEX: 37.66 KG/M2 | WEIGHT: 220.6 LBS | OXYGEN SATURATION: 98 %

## 2018-10-29 DIAGNOSIS — E87.6 HYPOKALEMIA: ICD-10-CM

## 2018-10-29 DIAGNOSIS — K21.9 GASTROESOPHAGEAL REFLUX DISEASE, ESOPHAGITIS PRESENCE NOT SPECIFIED: ICD-10-CM

## 2018-10-29 DIAGNOSIS — E03.9 ACQUIRED HYPOTHYROIDISM: ICD-10-CM

## 2018-10-29 DIAGNOSIS — E11.65 TYPE 2 DIABETES MELLITUS WITH HYPERGLYCEMIA, WITHOUT LONG-TERM CURRENT USE OF INSULIN (HCC): ICD-10-CM

## 2018-10-29 DIAGNOSIS — Z76.89 ENCOUNTER TO ESTABLISH CARE: Primary | ICD-10-CM

## 2018-10-29 DIAGNOSIS — I10 ESSENTIAL HYPERTENSION: ICD-10-CM

## 2018-10-29 DIAGNOSIS — E78.5 HYPERLIPIDEMIA, UNSPECIFIED HYPERLIPIDEMIA TYPE: ICD-10-CM

## 2018-10-29 DIAGNOSIS — E83.42 HYPOMAGNESEMIA: ICD-10-CM

## 2018-10-29 DIAGNOSIS — Z00.00 ENCOUNTER FOR SCREENING AND PREVENTATIVE CARE: ICD-10-CM

## 2018-10-29 PROBLEM — Z79.899 OTHER LONG TERM (CURRENT) DRUG THERAPY: Status: ACTIVE | Noted: 2018-10-15

## 2018-10-29 PROBLEM — Z79.899 LONG-TERM CURRENT USE OF HIGH RISK MEDICATION OTHER THAN ANTICOAGULANT: Status: ACTIVE | Noted: 2018-10-18

## 2018-10-29 PROBLEM — Z45.02: Status: ACTIVE | Noted: 2018-10-15

## 2018-10-29 PROBLEM — Z95.810 PRESENCE OF AUTOMATIC (IMPLANTABLE) CARDIAC DEFIBRILLATOR: Status: ACTIVE | Noted: 2018-10-15

## 2018-10-29 PROBLEM — I25.5 ISCHEMIC CARDIOMYOPATHY: Status: ACTIVE | Noted: 2018-10-29

## 2018-10-29 PROCEDURE — 99214 OFFICE O/P EST MOD 30 MIN: CPT | Performed by: NURSE PRACTITIONER

## 2018-10-29 RX ORDER — INFLUENZA A VIRUS A/SINGAPORE/GP1908/2015 IVR-180A (H1N1) ANTIGEN (PROPIOLACTONE INACTIVATED), INFLUENZA A VIRUS A/SINGAPORE/INFIMH-16-0019/2016 IVR-186 (H3N2) ANTIGEN (PROPIOLACTONE INACTIVATED), INFLUENZA B VIRUS B/MARYLAND/15/2016 ANTIGEN (PROPIOLACTONE INACTIVATED), AND INFLUENZA B VIRUS B/PHUKET/3073/2013 BVR-1B ANTIGEN (PROPIOLACTONE INACTIVATED) 15; 15; 15; 15 UG/.5ML; UG/.5ML; UG/.5ML; UG/.5ML
INJECTION, SUSPENSION INTRAMUSCULAR
Refills: 0 | COMMUNITY
Start: 2018-10-18 | End: 2018-10-29

## 2018-10-29 NOTE — PROGRESS NOTES
Subjective   Danae Ngo is a 58 y.o. female.     Ms. Ngo is a 58 year old female who presents today to establish care for the management of HTN, hyperlipidemia, GERD, hypothyroidism, type II diabetes, hypomagnesemia, hypokalemia.         The following portions of the patient's history were reviewed and updated as appropriate: allergies, current medications, past family history, past medical history, past social history, past surgical history and problem list.    Review of Systems   Constitutional: Negative for activity change, appetite change, chills, diaphoresis, fatigue, fever, unexpected weight gain and unexpected weight loss.   HENT: Negative for congestion, sore throat, trouble swallowing and voice change.    Eyes: Negative for blurred vision, double vision, photophobia, pain and visual disturbance.   Respiratory: Negative for cough, chest tightness, shortness of breath and wheezing.    Cardiovascular: Negative for chest pain, palpitations and leg swelling.   Gastrointestinal: Negative for abdominal distention, abdominal pain, anal bleeding, blood in stool, constipation, diarrhea, nausea, vomiting, GERD and indigestion.   Endocrine: Negative for cold intolerance, heat intolerance, polydipsia, polyphagia and polyuria.   Genitourinary: Negative for dysuria, frequency, hematuria and urgency.   Musculoskeletal: Negative for arthralgias and myalgias.   Skin: Negative for rash.   Allergic/Immunologic: Negative.    Neurological: Negative for dizziness, syncope, weakness, light-headedness and headache.   Hematological: Negative.    Psychiatric/Behavioral: The patient is not nervous/anxious.        Objective   Physical Exam   Constitutional: She is oriented to person, place, and time. She appears well-developed and well-nourished. No distress.   HENT:   Head: Normocephalic and atraumatic.   Right Ear: External ear normal.   Left Ear: External ear normal.   Nose: Nose normal.   Mouth/Throat: Oropharynx is clear  and moist.   Eyes: Pupils are equal, round, and reactive to light. Conjunctivae and EOM are normal.   Neck: Normal range of motion. Neck supple. No tracheal deviation present. No thyromegaly present.   Cardiovascular: Normal rate, regular rhythm, normal heart sounds and intact distal pulses.  Exam reveals no gallop and no friction rub.    No murmur heard.  Pulmonary/Chest: Effort normal and breath sounds normal. No stridor. No respiratory distress. She has no wheezes. She has no rales.   Abdominal: Soft. Bowel sounds are normal. She exhibits no distension and no mass. There is no tenderness. There is no rebound and no guarding. No hernia.   Musculoskeletal: Normal range of motion. She exhibits no edema or tenderness.   Lymphadenopathy:     She has no cervical adenopathy.   Neurological: She is alert and oriented to person, place, and time. No cranial nerve deficit. Coordination normal.   Skin: Skin is warm and dry. No rash noted. She is not diaphoretic. No erythema. No pallor.   Psychiatric: She has a normal mood and affect. Her behavior is normal. Judgment and thought content normal.   Nursing note and vitals reviewed.        Assessment/Plan   Danae was seen today for establish care.    Diagnoses and all orders for this visit:    Encounter to establish care    Encounter for screening and preventative care    Hyperlipidemia, unspecified hyperlipidemia type    Essential hypertension    Type 2 diabetes mellitus with hyperglycemia, without long-term current use of insulin (CMS/[facility])  -     Hemoglobin A1c; Future  -     CBC & Differential; Future  -     Comprehensive Metabolic Panel; Future  -     Lipid Panel; Future    Gastroesophageal reflux disease, esophagitis presence not specified    Hypomagnesemia  -     Magnesium; Future    Hypokalemia    Acquired hypothyroidism  -     TSH; Future  -     T4, Free; Future    1. HTN- controlled. No change in treatment at this time.     2. Hyperlipidemia- lipid profile. Will call  with results.     3. Type II diabetes- HgbA1c. Will call with results.     4. GERD- controlled. No change in therapy at this time.     5. Hypomagnesemia- serum magnesium level. Will call with results.      6. Hypokalemia- CMP. Will call with results.     7. Hypothyroidism TSH, T4. Will call with results.     8. Follow up in 3 months or sooner if needed.             This document has been electronically signed by WILLIS Manzano on October 29, 2018 12:39 PM

## 2018-11-05 DIAGNOSIS — E03.9 ACQUIRED HYPOTHYROIDISM: ICD-10-CM

## 2018-11-06 DIAGNOSIS — E03.9 ACQUIRED HYPOTHYROIDISM: ICD-10-CM

## 2018-11-06 RX ORDER — LEVOTHYROXINE SODIUM 0.12 MG/1
TABLET ORAL
Qty: 30 TABLET | Refills: 0 | OUTPATIENT
Start: 2018-11-06

## 2018-11-06 NOTE — TELEPHONE ENCOUNTER
Patient is under the care of José Ruano APRN. Patient requesting refill of levothyroxine. Patient advised to get refill from PCP.     Signature  Shelley Mcwilliams MD  [facility] Family Medicine Resident, PGY III        This document has been electronically signed by Shelley Mcwilliams MD on November 6, 2018 3:30 PM

## 2018-11-08 RX ORDER — LEVOTHYROXINE SODIUM 0.1 MG/1
100 TABLET ORAL DAILY
Qty: 30 TABLET | Refills: 1 | Status: SHIPPED | OUTPATIENT
Start: 2018-11-08 | End: 2019-01-04 | Stop reason: SDUPTHER

## 2018-12-14 RX ORDER — METOPROLOL SUCCINATE 100 MG/1
TABLET, EXTENDED RELEASE ORAL
Qty: 270 TABLET | Refills: 0 | Status: SHIPPED | OUTPATIENT
Start: 2018-12-14 | End: 2019-03-11 | Stop reason: SDUPTHER

## 2018-12-14 RX ORDER — LISINOPRIL 40 MG/1
TABLET ORAL
Qty: 90 TABLET | Refills: 0 | Status: SHIPPED | OUTPATIENT
Start: 2018-12-14 | End: 2019-03-11 | Stop reason: SDUPTHER

## 2018-12-27 ENCOUNTER — TELEPHONE (OUTPATIENT)
Dept: FAMILY MEDICINE CLINIC | Facility: CLINIC | Age: 58
End: 2018-12-27

## 2018-12-27 NOTE — TELEPHONE ENCOUNTER
I don't think she needs a prescription for the over-the-counter request. However, if she does. I have no problem with putting an order in for those.

## 2018-12-27 NOTE — TELEPHONE ENCOUNTER
Pt needs a RX for a Naväge Sinus Cleansing System, Depends, a large First Aid Kit, Zyrtec 3 mo supply, Musinex 3 mo supply.  Sent to [facility].  She is trying to use her Flex money to keep from losing it at the end of the year.

## 2018-12-27 NOTE — TELEPHONE ENCOUNTER
Patient was called regarding message for OTC items.  A message was left to inform her that she does not need a prescription for items that are OTC to use Flex monies.

## 2018-12-28 ENCOUNTER — TELEPHONE (OUTPATIENT)
Dept: ENDOCRINOLOGY | Facility: CLINIC | Age: 58
End: 2018-12-28

## 2019-01-04 DIAGNOSIS — E03.9 ACQUIRED HYPOTHYROIDISM: ICD-10-CM

## 2019-01-04 RX ORDER — LEVOTHYROXINE SODIUM 0.12 MG/1
TABLET ORAL
Qty: 30 TABLET | Refills: 2 | Status: SHIPPED | OUTPATIENT
Start: 2019-01-04 | End: 2019-04-04 | Stop reason: SDUPTHER

## 2019-01-11 DIAGNOSIS — E11.69 DIABETES MELLITUS TYPE 2 IN OBESE (HCC): ICD-10-CM

## 2019-01-11 DIAGNOSIS — E66.9 DIABETES MELLITUS TYPE 2 IN OBESE (HCC): ICD-10-CM

## 2019-01-11 RX ORDER — GLIPIZIDE 10 MG/1
TABLET, FILM COATED, EXTENDED RELEASE ORAL
Qty: 90 TABLET | Refills: 0 | Status: SHIPPED | OUTPATIENT
Start: 2019-01-11 | End: 2019-04-07 | Stop reason: SDUPTHER

## 2019-01-20 DIAGNOSIS — D64.9 ANEMIA, UNSPECIFIED TYPE: ICD-10-CM

## 2019-01-20 DIAGNOSIS — K21.9 GASTROESOPHAGEAL REFLUX DISEASE, ESOPHAGITIS PRESENCE NOT SPECIFIED: ICD-10-CM

## 2019-01-21 ENCOUNTER — TELEPHONE (OUTPATIENT)
Dept: FAMILY MEDICINE CLINIC | Facility: CLINIC | Age: 59
End: 2019-01-21

## 2019-01-21 NOTE — TELEPHONE ENCOUNTER
CHANELL MAKAYLA IS NEEDING REFILL ON DEXILANT 60MG TO BE SENT TO [facility]  ALSO IS NEEDING TO KNOW IF SHE CAN GET A TB SKIN TEST DONE AT HER APPT 1/29? CALL HER BACK  [phone]

## 2019-01-22 DIAGNOSIS — K21.9 GASTROESOPHAGEAL REFLUX DISEASE, ESOPHAGITIS PRESENCE NOT SPECIFIED: ICD-10-CM

## 2019-01-22 RX ORDER — DEXLANSOPRAZOLE 60 MG/1
CAPSULE, DELAYED RELEASE ORAL
Qty: 30 CAPSULE | Refills: 4 | Status: SHIPPED | OUTPATIENT
Start: 2019-01-22 | End: 2019-01-22 | Stop reason: SDUPTHER

## 2019-01-22 RX ORDER — IRON FUM,AG/C/B12/FOLIC/CA/SUC 151-60-1MG
TABLET ORAL
Qty: 30 TABLET | Refills: 3 | Status: SHIPPED | OUTPATIENT
Start: 2019-01-22 | End: 2019-08-06 | Stop reason: SDUPTHER

## 2019-01-22 RX ORDER — DEXLANSOPRAZOLE 60 MG/1
1 CAPSULE, DELAYED RELEASE ORAL DAILY
Qty: 30 CAPSULE | Refills: 4 | Status: SHIPPED | OUTPATIENT
Start: 2019-01-22 | End: 2019-06-17 | Stop reason: SDUPTHER

## 2019-01-25 ENCOUNTER — LAB (OUTPATIENT)
Dept: LAB | Facility: HOSPITAL | Age: 59
End: 2019-01-25

## 2019-01-25 DIAGNOSIS — E03.9 ACQUIRED HYPOTHYROIDISM: ICD-10-CM

## 2019-01-25 DIAGNOSIS — E83.42 HYPOMAGNESEMIA: ICD-10-CM

## 2019-01-25 DIAGNOSIS — E11.65 TYPE 2 DIABETES MELLITUS WITH HYPERGLYCEMIA, WITHOUT LONG-TERM CURRENT USE OF INSULIN (HCC): ICD-10-CM

## 2019-01-25 LAB
ALBUMIN SERPL-MCNC: 4.4 G/DL (ref 3.4–4.8)
ALBUMIN/GLOB SERPL: 1.3 G/DL (ref 1.1–1.8)
ALP SERPL-CCNC: 74 U/L (ref 38–126)
ALT SERPL W P-5'-P-CCNC: 26 U/L (ref 9–52)
ANION GAP SERPL CALCULATED.3IONS-SCNC: 8 MMOL/L (ref 5–15)
ARTICHOKE IGE QN: 86 MG/DL (ref 1–129)
AST SERPL-CCNC: 34 U/L (ref 14–36)
BASOPHILS # BLD AUTO: 0.01 10*3/MM3 (ref 0–0.2)
BASOPHILS NFR BLD AUTO: 0.1 % (ref 0–2)
BILIRUB SERPL-MCNC: 0.6 MG/DL (ref 0.2–1.3)
BUN BLD-MCNC: 26 MG/DL (ref 7–21)
BUN/CREAT SERPL: 21.7 (ref 7–25)
CALCIUM SPEC-SCNC: 10.3 MG/DL (ref 8.4–10.2)
CHLORIDE SERPL-SCNC: 99 MMOL/L (ref 95–110)
CHOLEST SERPL-MCNC: 147 MG/DL (ref 0–199)
CO2 SERPL-SCNC: 27 MMOL/L (ref 22–31)
CREAT BLD-MCNC: 1.2 MG/DL (ref 0.5–1)
DEPRECATED RDW RBC AUTO: 44.5 FL (ref 36.4–46.3)
EOSINOPHIL # BLD AUTO: 0.2 10*3/MM3 (ref 0–0.7)
EOSINOPHIL NFR BLD AUTO: 2.7 % (ref 0–7)
ERYTHROCYTE [DISTWIDTH] IN BLOOD BY AUTOMATED COUNT: 15.3 % (ref 11.5–14.5)
GFR SERPL CREATININE-BSD FRML MDRD: 56 ML/MIN/1.73 (ref 51–120)
GLOBULIN UR ELPH-MCNC: 3.3 GM/DL (ref 2.3–3.5)
GLUCOSE BLD-MCNC: 138 MG/DL (ref 60–100)
HBA1C MFR BLD: 7.7 % (ref 4–5.6)
HCT VFR BLD AUTO: 41.8 % (ref 35–45)
HDLC SERPL-MCNC: 33 MG/DL (ref 60–200)
HGB BLD-MCNC: 14.1 G/DL (ref 12–15.5)
IMM GRANULOCYTES # BLD AUTO: 0.03 10*3/MM3 (ref 0–0.02)
IMM GRANULOCYTES NFR BLD AUTO: 0.4 % (ref 0–0.5)
LDLC/HDLC SERPL: 2.77 {RATIO} (ref 0–3.22)
LYMPHOCYTES # BLD AUTO: 1.82 10*3/MM3 (ref 0.6–4.2)
LYMPHOCYTES NFR BLD AUTO: 24.6 % (ref 10–50)
MAGNESIUM SERPL-MCNC: 1.8 MG/DL (ref 1.6–2.3)
MCH RBC QN AUTO: 27.1 PG (ref 26.5–34)
MCHC RBC AUTO-ENTMCNC: 33.7 G/DL (ref 31.4–36)
MCV RBC AUTO: 80.2 FL (ref 80–98)
MONOCYTES # BLD AUTO: 0.47 10*3/MM3 (ref 0–0.9)
MONOCYTES NFR BLD AUTO: 6.3 % (ref 0–12)
NEUTROPHILS # BLD AUTO: 4.88 10*3/MM3 (ref 2–8.6)
NEUTROPHILS NFR BLD AUTO: 65.9 % (ref 37–80)
PLATELET # BLD AUTO: 316 10*3/MM3 (ref 150–450)
PMV BLD AUTO: 11.1 FL (ref 8–12)
POTASSIUM BLD-SCNC: 3.9 MMOL/L (ref 3.5–5.1)
PROT SERPL-MCNC: 7.7 G/DL (ref 6.3–8.6)
RBC # BLD AUTO: 5.21 10*6/MM3 (ref 3.77–5.16)
SODIUM BLD-SCNC: 134 MMOL/L (ref 137–145)
T4 FREE SERPL-MCNC: 1.78 NG/DL (ref 0.78–2.19)
TRIGL SERPL-MCNC: 113 MG/DL (ref 20–199)
TSH SERPL DL<=0.05 MIU/L-ACNC: 0.06 MIU/ML (ref 0.46–4.68)
WBC NRBC COR # BLD: 7.41 10*3/MM3 (ref 3.2–9.8)

## 2019-01-25 PROCEDURE — 36415 COLL VENOUS BLD VENIPUNCTURE: CPT

## 2019-01-25 PROCEDURE — 80061 LIPID PANEL: CPT

## 2019-01-25 PROCEDURE — 83036 HEMOGLOBIN GLYCOSYLATED A1C: CPT

## 2019-01-25 PROCEDURE — 85025 COMPLETE CBC W/AUTO DIFF WBC: CPT

## 2019-01-25 PROCEDURE — 80053 COMPREHEN METABOLIC PANEL: CPT

## 2019-01-25 PROCEDURE — 84443 ASSAY THYROID STIM HORMONE: CPT

## 2019-01-25 PROCEDURE — 83735 ASSAY OF MAGNESIUM: CPT

## 2019-01-25 PROCEDURE — 84439 ASSAY OF FREE THYROXINE: CPT

## 2019-01-29 ENCOUNTER — OFFICE VISIT (OUTPATIENT)
Dept: FAMILY MEDICINE CLINIC | Facility: CLINIC | Age: 59
End: 2019-01-29

## 2019-01-29 VITALS
HEIGHT: 64 IN | HEART RATE: 74 BPM | SYSTOLIC BLOOD PRESSURE: 130 MMHG | OXYGEN SATURATION: 96 % | WEIGHT: 214 LBS | DIASTOLIC BLOOD PRESSURE: 80 MMHG | TEMPERATURE: 98 F | BODY MASS INDEX: 36.54 KG/M2

## 2019-01-29 DIAGNOSIS — E11.65 TYPE 2 DIABETES MELLITUS WITH HYPERGLYCEMIA, WITHOUT LONG-TERM CURRENT USE OF INSULIN (HCC): Primary | ICD-10-CM

## 2019-01-29 DIAGNOSIS — R79.89 ELEVATED SERUM CREATININE: ICD-10-CM

## 2019-01-29 DIAGNOSIS — E03.9 ACQUIRED HYPOTHYROIDISM: ICD-10-CM

## 2019-01-29 PROCEDURE — 99213 OFFICE O/P EST LOW 20 MIN: CPT | Performed by: NURSE PRACTITIONER

## 2019-01-29 RX ORDER — SYRING-NEEDL,DISP,INSUL,0.3 ML 30 GX5/16"
1 SYRINGE, EMPTY DISPOSABLE MISCELLANEOUS 2 TIMES DAILY
Qty: 1 EACH | Refills: 12 | Status: SHIPPED | OUTPATIENT
Start: 2019-01-29

## 2019-01-29 RX ORDER — BLOOD-GLUCOSE METER
1 KIT MISCELLANEOUS DAILY
Qty: 1 EACH | Refills: 0 | Status: SHIPPED | OUTPATIENT
Start: 2019-01-29 | End: 2022-09-30 | Stop reason: SDUPTHER

## 2019-01-29 RX ORDER — METFORMIN HYDROCHLORIDE 500 MG/1
1000 TABLET, EXTENDED RELEASE ORAL
Qty: 120 TABLET | Refills: 1 | Status: SHIPPED | OUTPATIENT
Start: 2019-01-29 | End: 2019-03-27 | Stop reason: SDUPTHER

## 2019-01-29 NOTE — PROGRESS NOTES
Subjective   Danae Ngo is a 58 y.o. female.     Miss Ngo a 58-year-old female presents today for three-month follow-up related hypothyroidism, diabetes. Patient cannot report what her daily blood sugar ranges because she states she does not check her blood sugar. She reports that she tries to follow a diabetic diet. Denies any symptoms of hyper or hypoglycemia. Her last A1c one week ago was 7.7. This is an improvement compared to her last 2 hemoglobin A1c's of 8.4 and 9.5 respectively.    Miss Ngo. His last TSH was 0.06. Her previous TSH was 7.69 in September 2018. She states she is taking her medication as prescribed. She does report recently starting to take biotin for hair and nail health.         The following portions of the patient's history were reviewed and updated as appropriate: allergies, current medications, past family history, past medical history, past social history, past surgical history and problem list.    Review of Systems   Constitutional: Negative for activity change, appetite change, fatigue, unexpected weight gain and unexpected weight loss.   HENT: Negative for congestion, sore throat, trouble swallowing and voice change.    Eyes: Negative.    Respiratory: Negative for cough, chest tightness, shortness of breath and wheezing.    Cardiovascular: Negative for chest pain, palpitations and leg swelling.   Gastrointestinal: Negative for abdominal pain, constipation, diarrhea, nausea and vomiting.   Endocrine: Negative.  Negative for cold intolerance, heat intolerance, polydipsia, polyphagia and polyuria.   Genitourinary: Negative for dysuria.   Musculoskeletal: Negative for arthralgias and myalgias.   Skin: Negative for rash.   Allergic/Immunologic: Negative.    Neurological: Negative.    Hematological: Negative.    Psychiatric/Behavioral: Negative.        Objective   Physical Exam   Constitutional: She is oriented to person, place, and time. She appears well-developed and  well-nourished. No distress.   HENT:   Head: Normocephalic and atraumatic.   Eyes: Conjunctivae are normal.   Neck: Normal range of motion.   Cardiovascular: Normal rate, regular rhythm and normal heart sounds.   Pulmonary/Chest: Effort normal and breath sounds normal. No respiratory distress. She has no wheezes. She has no rales.   Musculoskeletal: Normal range of motion. She exhibits no edema or tenderness.   Neurological: She is alert and oriented to person, place, and time.   Skin: Skin is warm and dry. No rash noted. She is not diaphoretic. No erythema. No pallor.   Psychiatric: She has a normal mood and affect. Her behavior is normal. Judgment and thought content normal.   Nursing note and vitals reviewed.        Assessment/Plan   Danae was seen today for follow-up.    Diagnoses and all orders for this visit:    Type 2 diabetes mellitus with hyperglycemia, without long-term current use of insulin (CMS/[facility])    Acquired hypothyroidism    Elevated serum creatinine    Other orders  -     Dulaglutide 0.75 MG/0.5ML solution pen-injector; Inject 0.75 mg under the skin into the appropriate area as directed 1 (One) Time Per Week.  -     metFORMIN ER (GLUCOPHAGE-XR) 500 MG 24 hr tablet; Take 2 tablets by mouth 2 (Two) Times a Day Before Meals for 30 days.  -     glucose monitor monitoring kit; 1 each Daily.  -     glucose blood test strip; Check twice daily. Use as instructed  -     Lancet Device misc; 1 each 2 (Two) Times a Day.    1. Type 2 diabetes-hemoglobin A1c improving, however, not at goal. Taking into consideration patient's elevated creatinine, and in discussion with patient, we are going to discontinue her Janumet and continue just metformin 1000 mg twice a day. The Januvia is going to be replaced with Trulicity 0.75 milligrams/0.5 ML's weekly. Patient states she was prescribed this before but can't remember if she actually did take the medication. Also sent a new prescription for glucometer test strips and  lancets to patient's pharmacy. Patient instructed to check blood sugars twice daily and as needed for hyper or hypoglycemic symptoms. Patient verbalized understanding of instruction. Reinforced diabetic diet.    2. Hypothyroidism-patient instructed to discontinue biotin vitamin. We'll recheck TSH at next follow-up.    3. Elevated serum creatinine-we'll stop Januvia at this time and change to trulicity. Informed patient of the importance of blood pressure and blood glucose control. Patient verbalized understanding of instruction. Will recheck kidney function and next follow-up.    4. Follow-up in one month or sooner if needed.            This document has been electronically signed by WILLIS Manzano on January 29, 2019 12:49 PM

## 2019-02-09 DIAGNOSIS — E87.6 HYPOKALEMIA: ICD-10-CM

## 2019-02-11 RX ORDER — POTASSIUM CHLORIDE 750 MG/1
TABLET, EXTENDED RELEASE ORAL
Qty: 60 TABLET | Refills: 4 | Status: SHIPPED | OUTPATIENT
Start: 2019-02-11 | End: 2019-07-09 | Stop reason: SDUPTHER

## 2019-02-26 ENCOUNTER — LAB (OUTPATIENT)
Dept: LAB | Facility: HOSPITAL | Age: 59
End: 2019-02-26

## 2019-02-26 ENCOUNTER — OFFICE VISIT (OUTPATIENT)
Dept: FAMILY MEDICINE CLINIC | Facility: CLINIC | Age: 59
End: 2019-02-26

## 2019-02-26 VITALS
HEART RATE: 81 BPM | BODY MASS INDEX: 35.67 KG/M2 | HEIGHT: 64 IN | WEIGHT: 208.9 LBS | RESPIRATION RATE: 18 BRPM | OXYGEN SATURATION: 98 % | DIASTOLIC BLOOD PRESSURE: 86 MMHG | SYSTOLIC BLOOD PRESSURE: 130 MMHG

## 2019-02-26 DIAGNOSIS — R79.89 ELEVATED SERUM CREATININE: ICD-10-CM

## 2019-02-26 DIAGNOSIS — E11.65 TYPE 2 DIABETES MELLITUS WITH HYPERGLYCEMIA, WITHOUT LONG-TERM CURRENT USE OF INSULIN (HCC): Primary | ICD-10-CM

## 2019-02-26 LAB
ANION GAP SERPL CALCULATED.3IONS-SCNC: 11 MMOL/L (ref 5–15)
BUN BLD-MCNC: 27 MG/DL (ref 7–21)
BUN/CREAT SERPL: 25.5 (ref 7–25)
CALCIUM SPEC-SCNC: 10 MG/DL (ref 8.4–10.2)
CHLORIDE SERPL-SCNC: 96 MMOL/L (ref 95–110)
CO2 SERPL-SCNC: 25 MMOL/L (ref 22–31)
CREAT BLD-MCNC: 1.06 MG/DL (ref 0.5–1)
GFR SERPL CREATININE-BSD FRML MDRD: 65 ML/MIN/1.73 (ref 51–120)
GLUCOSE BLD-MCNC: 100 MG/DL (ref 60–100)
POTASSIUM BLD-SCNC: 4 MMOL/L (ref 3.5–5.1)
SODIUM BLD-SCNC: 132 MMOL/L (ref 137–145)

## 2019-02-26 PROCEDURE — 99213 OFFICE O/P EST LOW 20 MIN: CPT | Performed by: NURSE PRACTITIONER

## 2019-02-26 PROCEDURE — 36415 COLL VENOUS BLD VENIPUNCTURE: CPT

## 2019-02-26 PROCEDURE — 80048 BASIC METABOLIC PNL TOTAL CA: CPT

## 2019-02-26 NOTE — PROGRESS NOTES
Subjective   Danae Ngo is a 58 y.o. female.     Ms. Ngo is a 58-year-old female who presents today for follow-up related to type 2 diabetes and elevated serum creatinine.  At her last visit she was taken off of Janumet and placed on just metformin 1000 mg p.o. twice daily related to elevated serum creatinine.  Instead of Janumet she was placed on Trulicity 0.75 mg / 0.5 mL's subcutaneous weekly.  She reports no side effects from Trulicity.  Blood sugars are ranging 100-130.  Denies any hyper or hypoglycemic episodes.         The following portions of the patient's history were reviewed and updated as appropriate: allergies, current medications, past family history, past medical history, past social history, past surgical history and problem list.    Review of Systems   Constitutional: Negative for activity change, appetite change, fatigue, unexpected weight gain and unexpected weight loss.   HENT: Negative for congestion, sore throat, trouble swallowing and voice change.    Eyes: Negative.    Respiratory: Negative for cough, chest tightness, shortness of breath and wheezing.    Cardiovascular: Negative for chest pain, palpitations and leg swelling.   Gastrointestinal: Negative for abdominal pain, constipation, diarrhea, nausea and vomiting.   Endocrine: Negative.  Negative for cold intolerance, heat intolerance, polydipsia, polyphagia and polyuria.   Genitourinary: Negative for dysuria.   Musculoskeletal: Negative for arthralgias and myalgias.   Skin: Negative for rash.   Allergic/Immunologic: Negative.    Neurological: Negative.    Hematological: Negative.    Psychiatric/Behavioral: Negative.        Objective   Physical Exam   Constitutional: She is oriented to person, place, and time. She appears well-developed and well-nourished. No distress.   HENT:   Head: Normocephalic and atraumatic.   Eyes: Conjunctivae are normal.   Neck: Normal range of motion.   Cardiovascular: Normal rate, regular rhythm and  normal heart sounds.   Pulmonary/Chest: Effort normal and breath sounds normal. No respiratory distress. She has no wheezes. She has no rales.   Musculoskeletal: Normal range of motion. She exhibits no edema or tenderness.   Neurological: She is alert and oriented to person, place, and time.   Skin: Skin is warm and dry. No rash noted. She is not diaphoretic. No erythema. No pallor.   Psychiatric: She has a normal mood and affect. Her behavior is normal. Judgment and thought content normal.   Nursing note and vitals reviewed.        Assessment/Plan   Danae was seen today for follow-up.    Diagnoses and all orders for this visit:    Type 2 diabetes mellitus with hyperglycemia, without long-term current use of insulin (CMS/[facility])    Elevated serum creatinine  -     Basic Metabolic Panel; Future    Other orders  -     Dulaglutide 0.75 MG/0.5ML solution pen-injector; Inject 0.75 mg under the skin into the appropriate area as directed 1 (One) Time Per Week.    1.  Type 2 diabetes-daily blood sugar levels improved per patient report.  Refill Trulicity 0.75 mg / 0.5 mL's patient to inject 0.75 mg subcutaneously weekly.    2.  Elevated serum creatinine-BMP.  Will call with results.    3.  Follow-up 3 months or sooner if needed.            This document has been electronically signed by WILLIS Manzano on February 27, 2019 2:19 PM

## 2019-03-11 RX ORDER — METOPROLOL SUCCINATE 100 MG/1
TABLET, EXTENDED RELEASE ORAL
Qty: 270 TABLET | Refills: 0 | Status: SHIPPED | OUTPATIENT
Start: 2019-03-11 | End: 2020-06-02 | Stop reason: SDUPTHER

## 2019-03-11 RX ORDER — METOPROLOL SUCCINATE 100 MG/1
300 TABLET, EXTENDED RELEASE ORAL DAILY
Qty: 270 TABLET | Refills: 3 | Status: SHIPPED | OUTPATIENT
Start: 2019-03-11 | End: 2020-06-02

## 2019-03-11 RX ORDER — LISINOPRIL 40 MG/1
40 TABLET ORAL DAILY
Qty: 90 TABLET | Refills: 3 | Status: SHIPPED | OUTPATIENT
Start: 2019-03-11 | End: 2020-06-02

## 2019-03-11 RX ORDER — LISINOPRIL 40 MG/1
TABLET ORAL
Qty: 90 TABLET | Refills: 0 | Status: SHIPPED | OUTPATIENT
Start: 2019-03-11 | End: 2020-03-02 | Stop reason: SDUPTHER

## 2019-03-14 DIAGNOSIS — E78.5 HYPERLIPIDEMIA ASSOCIATED WITH TYPE 2 DIABETES MELLITUS (HCC): ICD-10-CM

## 2019-03-14 DIAGNOSIS — E11.69 HYPERLIPIDEMIA ASSOCIATED WITH TYPE 2 DIABETES MELLITUS (HCC): ICD-10-CM

## 2019-03-14 RX ORDER — ATORVASTATIN CALCIUM 20 MG/1
TABLET, FILM COATED ORAL
Qty: 90 TABLET | Refills: 0 | Status: SHIPPED | OUTPATIENT
Start: 2019-03-14 | End: 2019-06-09 | Stop reason: SDUPTHER

## 2019-03-27 RX ORDER — METFORMIN HYDROCHLORIDE 500 MG/1
TABLET, EXTENDED RELEASE ORAL
Qty: 120 TABLET | Refills: 0 | Status: SHIPPED | OUTPATIENT
Start: 2019-03-27 | End: 2019-04-24 | Stop reason: SDUPTHER

## 2019-04-04 DIAGNOSIS — E03.9 ACQUIRED HYPOTHYROIDISM: ICD-10-CM

## 2019-04-04 RX ORDER — LEVOTHYROXINE SODIUM 0.12 MG/1
TABLET ORAL
Qty: 30 TABLET | Refills: 1 | Status: SHIPPED | OUTPATIENT
Start: 2019-04-04 | End: 2019-05-31 | Stop reason: SDUPTHER

## 2019-04-07 DIAGNOSIS — E11.69 DIABETES MELLITUS TYPE 2 IN OBESE (HCC): ICD-10-CM

## 2019-04-07 DIAGNOSIS — E66.9 DIABETES MELLITUS TYPE 2 IN OBESE (HCC): ICD-10-CM

## 2019-04-08 RX ORDER — GLIPIZIDE 10 MG/1
TABLET, FILM COATED, EXTENDED RELEASE ORAL
Qty: 90 TABLET | Refills: 0 | Status: SHIPPED | OUTPATIENT
Start: 2019-04-08 | End: 2019-07-04 | Stop reason: SDUPTHER

## 2019-04-10 DIAGNOSIS — I10 ESSENTIAL HYPERTENSION: ICD-10-CM

## 2019-04-11 RX ORDER — HYDROCHLOROTHIAZIDE 50 MG/1
TABLET ORAL
Qty: 90 TABLET | Refills: 2 | Status: SHIPPED | OUTPATIENT
Start: 2019-04-11 | End: 2020-01-03

## 2019-04-24 RX ORDER — METFORMIN HYDROCHLORIDE 500 MG/1
TABLET, EXTENDED RELEASE ORAL
Qty: 120 TABLET | Refills: 5 | Status: SHIPPED | OUTPATIENT
Start: 2019-04-24 | End: 2019-10-19 | Stop reason: SDUPTHER

## 2019-05-07 DIAGNOSIS — E11.65 TYPE 2 DIABETES MELLITUS WITH HYPERGLYCEMIA, WITHOUT LONG-TERM CURRENT USE OF INSULIN (HCC): ICD-10-CM

## 2019-05-29 ENCOUNTER — OFFICE VISIT (OUTPATIENT)
Dept: FAMILY MEDICINE CLINIC | Facility: CLINIC | Age: 59
End: 2019-05-29

## 2019-05-29 VITALS
WEIGHT: 204.3 LBS | HEIGHT: 64 IN | BODY MASS INDEX: 34.88 KG/M2 | DIASTOLIC BLOOD PRESSURE: 82 MMHG | HEART RATE: 74 BPM | RESPIRATION RATE: 20 BRPM | SYSTOLIC BLOOD PRESSURE: 120 MMHG | OXYGEN SATURATION: 98 %

## 2019-05-29 DIAGNOSIS — I10 ESSENTIAL HYPERTENSION: Primary | ICD-10-CM

## 2019-05-29 DIAGNOSIS — E11.65 TYPE 2 DIABETES MELLITUS WITH HYPERGLYCEMIA, WITHOUT LONG-TERM CURRENT USE OF INSULIN (HCC): ICD-10-CM

## 2019-05-29 DIAGNOSIS — E03.9 ACQUIRED HYPOTHYROIDISM: ICD-10-CM

## 2019-05-29 PROBLEM — I48.0 PAROXYSMAL ATRIAL FIBRILLATION (HCC): Status: ACTIVE | Noted: 2019-04-25

## 2019-05-29 PROBLEM — I25.2 OLD MYOCARDIAL INFARCTION: Status: ACTIVE | Noted: 2019-04-09

## 2019-05-29 PROBLEM — Z51.81 ENCOUNTER FOR MONITORING AMIODARONE THERAPY: Status: ACTIVE | Noted: 2018-10-18

## 2019-05-29 PROCEDURE — 90471 IMMUNIZATION ADMIN: CPT | Performed by: NURSE PRACTITIONER

## 2019-05-29 PROCEDURE — 90715 TDAP VACCINE 7 YRS/> IM: CPT | Performed by: NURSE PRACTITIONER

## 2019-05-29 PROCEDURE — 99214 OFFICE O/P EST MOD 30 MIN: CPT | Performed by: NURSE PRACTITIONER

## 2019-05-29 NOTE — PROGRESS NOTES
Subjective   Danae Ngo is a 59 y.o. female.     Ms. Ngo is a 59-year-old female presents today for follow-up related hypertension, hypothyroidism, type 2 diabetes.  Her blood pressure today is 120/82.  She denies chest pain, shortness of breath or palpitations.  She takes levothyroxine 125 mcg p.o. daily for the management of her hypothyroidism.  She reports her blood sugars range from 100-140 on a routine basis.  She denies hyper or hypoglycemic episodes.  She is taking all medications as prescribed.  She has no acute complaints today.         The following portions of the patient's history were reviewed and updated as appropriate: allergies, current medications, past family history, past medical history, past social history, past surgical history and problem list.    Review of Systems   Constitutional: Negative for activity change, appetite change, fatigue, unexpected weight gain and unexpected weight loss.   HENT: Negative for congestion, sore throat, trouble swallowing and voice change.    Eyes: Negative.    Respiratory: Negative for cough, chest tightness, shortness of breath and wheezing.    Cardiovascular: Negative for chest pain, palpitations and leg swelling.   Gastrointestinal: Negative for abdominal pain, constipation, diarrhea, nausea and vomiting.   Endocrine: Negative.    Genitourinary: Negative for dysuria.   Musculoskeletal: Negative for arthralgias and myalgias.   Skin: Negative for rash.   Allergic/Immunologic: Negative.    Neurological: Negative.    Hematological: Negative.    Psychiatric/Behavioral: Negative.        Objective   Physical Exam   Constitutional: She is oriented to person, place, and time. She appears well-developed and well-nourished. No distress.   HENT:   Head: Normocephalic and atraumatic.   Eyes: Conjunctivae are normal.   Neck: Normal range of motion.   Cardiovascular: Normal rate, regular rhythm and normal heart sounds.   Pulmonary/Chest: Effort normal and breath  sounds normal. No respiratory distress. She has no wheezes. She has no rales.   Musculoskeletal: Normal range of motion. She exhibits no edema or tenderness.   Neurological: She is alert and oriented to person, place, and time.   Skin: Skin is warm and dry. No rash noted. She is not diaphoretic. No erythema. No pallor.   Psychiatric: She has a normal mood and affect. Her behavior is normal. Judgment and thought content normal.   Nursing note and vitals reviewed.        Assessment/Plan   Danae was seen today for follow-up.    Diagnoses and all orders for this visit:    Essential hypertension    Type 2 diabetes mellitus with hyperglycemia, without long-term current use of insulin (CMS/[facility])  -     Hemoglobin A1c; Future  -     CBC & Differential; Future  -     Microalbumin / Creatinine Urine Ratio - Urine, Clean Catch; Future    Acquired hypothyroidism  -     TSH; Future    Other orders  -     Tdap Vaccine Greater Than or Equal To 6yo IM    1.  Hypertension-.  No change in therapy at this time.    2.  Type 2 diabetes-hemoglobin A1c, CBC, microalbumin/creatinine urine ratio.  Will call with results.  Reinforced diabetic diet.    3.  Hypothyroidism-TSH.  Will call with results.    4.  Health maintenance- Tdap vaccine today.    5.  Follow-up in 6 months or sooner if needed.            This document has been electronically signed by WILLIS Manzano on May 29, 2019 12:49 PM

## 2019-05-31 DIAGNOSIS — E03.9 ACQUIRED HYPOTHYROIDISM: ICD-10-CM

## 2019-05-31 RX ORDER — LEVOTHYROXINE SODIUM 0.12 MG/1
TABLET ORAL
Qty: 30 TABLET | Refills: 0 | Status: SHIPPED | OUTPATIENT
Start: 2019-05-31 | End: 2019-06-29 | Stop reason: SDUPTHER

## 2019-06-09 DIAGNOSIS — E11.69 HYPERLIPIDEMIA ASSOCIATED WITH TYPE 2 DIABETES MELLITUS (HCC): ICD-10-CM

## 2019-06-09 DIAGNOSIS — E78.5 HYPERLIPIDEMIA ASSOCIATED WITH TYPE 2 DIABETES MELLITUS (HCC): ICD-10-CM

## 2019-06-10 RX ORDER — ATORVASTATIN CALCIUM 20 MG/1
TABLET, FILM COATED ORAL
Qty: 90 TABLET | Refills: 0 | Status: SHIPPED | OUTPATIENT
Start: 2019-06-10 | End: 2019-09-06 | Stop reason: SDUPTHER

## 2019-06-17 DIAGNOSIS — K21.9 GASTROESOPHAGEAL REFLUX DISEASE, ESOPHAGITIS PRESENCE NOT SPECIFIED: ICD-10-CM

## 2019-06-17 RX ORDER — DEXLANSOPRAZOLE 60 MG/1
CAPSULE, DELAYED RELEASE ORAL
Qty: 30 CAPSULE | Refills: 3 | Status: SHIPPED | OUTPATIENT
Start: 2019-06-17 | End: 2020-03-16

## 2019-06-29 DIAGNOSIS — E03.9 ACQUIRED HYPOTHYROIDISM: ICD-10-CM

## 2019-07-01 RX ORDER — LEVOTHYROXINE SODIUM 0.12 MG/1
TABLET ORAL
Qty: 30 TABLET | Refills: 0 | Status: SHIPPED | OUTPATIENT
Start: 2019-07-01 | End: 2019-07-28 | Stop reason: SDUPTHER

## 2019-07-04 DIAGNOSIS — E66.9 DIABETES MELLITUS TYPE 2 IN OBESE (HCC): ICD-10-CM

## 2019-07-04 DIAGNOSIS — E11.69 DIABETES MELLITUS TYPE 2 IN OBESE (HCC): ICD-10-CM

## 2019-07-05 RX ORDER — GLIPIZIDE 10 MG/1
TABLET, FILM COATED, EXTENDED RELEASE ORAL
Qty: 90 TABLET | Refills: 0 | Status: SHIPPED | OUTPATIENT
Start: 2019-07-05 | End: 2019-09-29 | Stop reason: SDUPTHER

## 2019-07-09 DIAGNOSIS — E87.6 HYPOKALEMIA: ICD-10-CM

## 2019-07-09 RX ORDER — POTASSIUM CHLORIDE 750 MG/1
TABLET, EXTENDED RELEASE ORAL
Qty: 60 TABLET | Refills: 3 | Status: SHIPPED | OUTPATIENT
Start: 2019-07-09 | End: 2019-11-02 | Stop reason: SDUPTHER

## 2019-07-11 RX ORDER — DULAGLUTIDE 0.75 MG/.5ML
INJECTION, SOLUTION SUBCUTANEOUS
Qty: 4 PEN | Refills: 2 | Status: SHIPPED | OUTPATIENT
Start: 2019-07-11 | End: 2019-09-30 | Stop reason: SDUPTHER

## 2019-07-28 DIAGNOSIS — E03.9 ACQUIRED HYPOTHYROIDISM: ICD-10-CM

## 2019-07-29 RX ORDER — LEVOTHYROXINE SODIUM 0.12 MG/1
TABLET ORAL
Qty: 30 TABLET | Refills: 4 | Status: SHIPPED | OUTPATIENT
Start: 2019-07-29 | End: 2019-12-02

## 2019-08-06 DIAGNOSIS — D64.9 ANEMIA, UNSPECIFIED TYPE: ICD-10-CM

## 2019-08-06 RX ORDER — IRON FUM,AG/C/B12/FOLIC/CA/SUC 151-60-1MG
TABLET ORAL
Qty: 30 TABLET | Refills: 2 | Status: SHIPPED | OUTPATIENT
Start: 2019-08-06 | End: 2020-01-20 | Stop reason: SDUPTHER

## 2019-08-07 RX ORDER — IRON FUM,AG/C/B12/FOLIC/CA/SUC 151-60-1MG
TABLET ORAL
Qty: 30 TABLET | Refills: 2 | Status: SHIPPED | OUTPATIENT
Start: 2019-08-07 | End: 2019-11-08 | Stop reason: SDUPTHER

## 2019-09-06 DIAGNOSIS — E11.69 HYPERLIPIDEMIA ASSOCIATED WITH TYPE 2 DIABETES MELLITUS (HCC): ICD-10-CM

## 2019-09-06 DIAGNOSIS — E78.5 HYPERLIPIDEMIA ASSOCIATED WITH TYPE 2 DIABETES MELLITUS (HCC): ICD-10-CM

## 2019-09-06 RX ORDER — ATORVASTATIN CALCIUM 20 MG/1
TABLET, FILM COATED ORAL
Qty: 90 TABLET | Refills: 2 | Status: SHIPPED | OUTPATIENT
Start: 2019-09-06 | End: 2020-06-01

## 2019-09-10 DIAGNOSIS — I10 ESSENTIAL HYPERTENSION: ICD-10-CM

## 2019-09-10 RX ORDER — AMLODIPINE BESYLATE 5 MG/1
5 TABLET ORAL
Qty: 90 TABLET | Refills: 3 | Status: SHIPPED | OUTPATIENT
Start: 2019-09-10 | End: 2020-09-08

## 2019-09-23 ENCOUNTER — OFFICE VISIT (OUTPATIENT)
Dept: SURGERY | Facility: CLINIC | Age: 59
End: 2019-09-23

## 2019-09-23 VITALS
HEART RATE: 60 BPM | WEIGHT: 208 LBS | TEMPERATURE: 97.3 F | HEIGHT: 64 IN | SYSTOLIC BLOOD PRESSURE: 128 MMHG | BODY MASS INDEX: 35.51 KG/M2 | DIASTOLIC BLOOD PRESSURE: 74 MMHG

## 2019-09-23 DIAGNOSIS — R92.0 MICROCALCIFICATIONS OF THE BREAST: Primary | ICD-10-CM

## 2019-09-23 PROCEDURE — 99212 OFFICE O/P EST SF 10 MIN: CPT | Performed by: SURGERY

## 2019-09-23 RX ORDER — INFLUENZA A VIRUS A/SINGAPORE/GP1908/2015 IVR-180A (H1N1) ANTIGEN (PROPIOLACTONE INACTIVATED), INFLUENZA A VIRUS A/SINGAPORE/INFIMH-16-0019/2016 IVR-186 (H3N2) ANTIGEN (PROPIOLACTONE INACTIVATED), INFLUENZA B VIRUS B/MARYLAND/15/2016 ANTIGEN (PROPIOLACTONE INACTIVATED), AND INFLUENZA B VIRUS B/PHUKET/3073/2013 BVR-1B ANTIGEN (PROPIOLACTONE INACTIVATED) 15; 15; 15; 15 UG/.5ML; UG/.5ML; UG/.5ML; UG/.5ML
INJECTION, SUSPENSION INTRAMUSCULAR
Refills: 0 | COMMUNITY
Start: 2019-09-20 | End: 2019-12-02

## 2019-09-23 NOTE — PROGRESS NOTES
Chief Complaint: This is a 59 y.o. woman seen in consultation for abnormal breast imaging    History of Present Illness:  Patient has noted no new masses, skin changes, nipple discharge, nipple changes prior to her most recent imaging.  Her most recent imaging includes the following:   Study Result   MAMMOGRAM: Diagnostic right breast   HISTORY: Abnormal mammogram. Additional view, call back.   COMPARISON: September 9, 2019   FINDINGS:   Right breast low dose digital mammography was performed.   This study was interpreted with the assistance of CAD (computer   aided diagnosis. 3-D Mammotomosynthesis was obtained. Spot   compression views, and magnification compression views right   breast.   Clustered calcifications again identified. In comparison to prior   exams calcifications appears somewhat more numerous but still   more likely to be benign than malignant. For this reason a   follow-up examination in six months is suggested.   IMPRESSION:   CONCLUSION:   1. Calcifications right breast though increased since prior   examination are still more likely to be benign than malignant.   BI-RADS: 3, Probably benign finding(s) -recommend follow-up   mammographic examination in six months.   Electronically signed by: Pineda Miguel MD 9/17/2019 2:22 PM CDT   Workstation: [id number]       ( I have personally reviewed the breast imaging and concur with the findings of the radiologist- BiRADS 3)  She is here for evaluation.    Past Medical History:   Diagnosis Date   • Abnormal thyroid function test    • Acquired hypothyroidism    • Allergic    • Allergic rhinitis    • Anemia    • Breast cyst    • Coronary atherosclerosis     unspecified type of vessel, native or graft      • Dilated cardiomyopathy (CMS/HCC)    • Dyslipidemia    • Esophageal reflux    • Essential hypertension    • Family history of malignant neoplasm of breast    • Fibrocystic disease of breast     Low Mireille score   • GERD (gastroesophageal reflux disease)    •  Herpes zoster    • History of chicken pox    • Hyperlipidemia    • Measles    • Mumps    • Myocardial infarction (CMS/HCC)    • Nasal septal deformity    • Obesity    • Type 2 diabetes mellitus (CMS/HCC)     Uncontrolled   • V tach (CMS/HCC)    • Vitamin D deficiency      Past Surgical History:   Procedure Laterality Date   • BREAST BIOPSY  2010    left breast benign   • BREAST CYST ASPIRATION     • CARDIAC CATHETERIZATION  2002    Left cardiac cath, selective coronary angiography, PTCA to the left anterior descending. Stent placement to the left anterior descending   • CARDIAC DEFIBRILLATOR PLACEMENT Left     Dr. Brunner   •  SECTION      Intrauterine pregnancy, term gestation, cephalopelvic disproportion   • COLONOSCOPY N/A 10/12/2018    Procedure: COLONOSCOPY;  Surgeon: Delroy Miller MD;  Location: [facility] ENDOSCOPY;  Service: General   • ENDOSCOPY AND COLONOSCOPY  04/15/2008    Normal   • ESOPHAGOSCOPY / EGD  2007    With tube-Esophagus appeared normal. Gastritis of the stomach. A biopsy of the stomach was obtained from the stomach. The duodenum appeared normal   • EXTERNAL EAR SURGERY  08/15/1991    Repair biifd ear   • OOPHORECTOMY      Left salpingo-oophorectomy, wedge resection of the right ovary, lysis of adhesions   • TRANSESOPHAGEAL ECHOCARDIOGRAM (LORELEI)  2012    Without color flow-Moderate to severe LV dysfumctional w/ an EF of 30-35%. Wall motion abnormalities as described above. LV enlargement. Mild aortic insufficiency, mild TR regurg, mild M regurg. mild pulmonary insufficiency     Prior to Admission medications    Medication Sig Start Date End Date Taking? Authorizing Provider   albuterol sulfate  (90 Base) MCG/ACT inhaler Inhale 1 puff Every 6 (Six) Hours As Needed for Wheezing. 1/10/19  Yes Aleksander Agrawal MD   amLODIPine (NORVASC) 5 MG tablet Take 1 tablet by mouth every night at bedtime. 9/10/19  Yes José Ruano APRN   aspirin 325 MG  tablet Take 325 mg by mouth daily.   Yes Celestina Martinez MD   atorvastatin (LIPITOR) 20 MG tablet TAKE ONE TABLET BY MOUTH EVERY NIGHT 9/6/19  Yes José Ruano APRN   cetirizine (ZyrTEC) 10 MG tablet Take 10 mg by mouth.   Yes Celestina Martinez MD   Cholecalciferol (VITAMIN D3) 5000 units capsule capsule Take 5,000 Units by mouth Daily.   Yes Celestina Martinez MD   DEXILANT 60 MG capsule TAKE ONE CAPSULE BY MOUTH DAILY 6/17/19  Yes José Ruano APRN   Empagliflozin 10 MG tablet Take 10 mg by mouth Daily. 5/9/18  Yes Celestina Martinez MD   FeAsp-FeFum -Suc-C-Thre-B12-FA (MULTIGEN PLUS) -1 MG tablet tablet TAKE ONE TABLET BY MOUTH DAILY 8/6/19  Yes José Ruano APRN   FeAsp-FeFum -Suc-C-Thre-B12-FA (MULTIGEN PLUS) -1 MG tablet tablet TAKE ONE TABLET BY MOUTH DAILY 8/7/19  Yes José Ruano APRN   glipiZIDE (GLUCOTROL XL) 10 MG 24 hr tablet TAKE ONE TABLET BY MOUTH DAILY 7/5/19  Yes José Ruano APRN   glucose blood test strip Check twice daily. Use as instructed 1/29/19  Yes José Ruano APRN   glucose monitor monitoring kit 1 each Daily. 1/29/19  Yes José Ruano APRN   hydrochlorothiazide (HYDRODIURIL) 50 MG tablet TAKE ONE TABLET BY MOUTH DAILY 4/11/19  Yes José Ruano APRN   Lancet Device misc 1 each 2 (Two) Times a Day. 1/29/19  Yes José Ruano APRN   levothyroxine (SYNTHROID, LEVOTHROID) 125 MCG tablet TAKE ONE TABLET BY MOUTH DAILY 7/29/19  Yes José Ruano APRN   lisinopril (PRINIVIL,ZESTRIL) 40 MG tablet TAKE ONE TABLET BY MOUTH DAILY 3/11/19  Yes José Ruano APRN   lisinopril (PRINIVIL,ZESTRIL) 40 MG tablet Take 1 tablet by mouth Daily. 3/11/19  Yes José Ruano APRN   magnesium oxide (MAGOX) 400 (241.3 Mg) MG tablet tablet TAKE TWO TABLETS BY MOUTH TWICE A DAY 7/19/18  Yes Jade Bansal MD   metFORMIN ER (GLUCOPHAGE-XR) 500 MG 24 hr tablet TAKE TWO TABLETS BY MOUTH TWICE A DAY BEFORE MEALS 4/24/19  Yes  Alden, José I, APRN   metoprolol succinate XL (TOPROL-XL) 100 MG 24 hr tablet TAKE THREE TABLETS BY MOUTH DAILY 3/11/19  Yes Alden José I, APRN   metoprolol succinate XL (TOPROL-XL) 100 MG 24 hr tablet Take 3 tablets by mouth Daily. 3/11/19  Yes José Ruano APRN   mexiletine (MEXITIL) 150 MG capsule  9/7/16  Yes ProviderCelestina MD   potassium chloride (K-DUR,KLOR-CON) 10 MEQ CR tablet TAKE TWO TABLETS BY MOUTH DAILY 7/9/19  Yes José Ruaon I, APRN   TRULICITY 0.75 MG/0.5ML solution pen-injector INJECT 0.75 MG UNDER THE SKIN ONCE WEEKLY 7/11/19  Yes José Ruano I, APRN   AFLURIA QUADRIVALENT 0.5 ML suspension prefilled syringe injection ADM 0.5ML IM UTD 9/20/19   ProviderCelestina MD   cephalexin (KEFLEX) 500 MG capsule Take one capsule by mouth 4 times a day until gone. 6/5/19   Jinny Montalvo APRN     Allergies   Allergen Reactions   • Codeine GI Intolerance     Family History   Problem Relation Age of Onset   • Breast cancer Mother         50's   • Other Mother         CVA   • Coronary artery disease Other    • Diabetes Other    • Heart disease Other    • Hypertension Other    • Autism Other         grandson   • Alcohol abuse Brother    • Cardiomyopathy Brother    • Coronary artery disease Brother    • Heart disease Father    • Hypertension Sister    • Diabetes Sister    • Heart disease Son    • Heart attack Son    • Heart disease Maternal Grandmother    • Hypertension Maternal Grandmother    • Hypertension Sister    • Hypertension Sister    • Hypertension Brother    • Hypertension Brother      Social History     Socioeconomic History   • Marital status: Single     Spouse name: Not on file   • Number of children: Not on file   • Years of education: Not on file   • Highest education level: Not on file   Tobacco Use   • Smoking status: Never Smoker   • Smokeless tobacco: Never Used   Substance and Sexual Activity   • Alcohol use: No   • Drug use: No   • Sexual activity: Defer      Birth control/protection: Post-menopausal       Physical Exam   Pulmonary/Chest: Right breast exhibits no inverted nipple, no mass, no nipple discharge, no skin change and no tenderness. Left breast exhibits no inverted nipple, no mass, no nipple discharge, no skin change and no tenderness. Breasts are symmetrical. There is no breast swelling.   Genitourinary: No breast tenderness, discharge or bleeding.     Vitals:    09/23/19 0948   BP: 128/74   Pulse: 60   Temp: 97.3 °F (36.3 °C)     Assessment:    Danae was seen today for follow-up.    Diagnoses and all orders for this visit:    Microcalcifications of the breast        Plan:  1. 6 month recheck                This document has been electronically signed by Rodo Holcomb MD on September 29, 2019 2:52 PM

## 2019-09-23 NOTE — PATIENT INSTRUCTIONS

## 2019-09-29 DIAGNOSIS — E66.9 DIABETES MELLITUS TYPE 2 IN OBESE (HCC): ICD-10-CM

## 2019-09-29 DIAGNOSIS — E11.69 DIABETES MELLITUS TYPE 2 IN OBESE (HCC): ICD-10-CM

## 2019-09-29 PROBLEM — R92.0 MICROCALCIFICATIONS OF THE BREAST: Status: ACTIVE | Noted: 2019-09-29

## 2019-09-30 RX ORDER — DULAGLUTIDE 0.75 MG/.5ML
INJECTION, SOLUTION SUBCUTANEOUS
Qty: 4 PEN | Refills: 3 | Status: SHIPPED | OUTPATIENT
Start: 2019-09-30 | End: 2020-09-03

## 2019-09-30 RX ORDER — GLIPIZIDE 10 MG/1
TABLET, FILM COATED, EXTENDED RELEASE ORAL
Qty: 90 TABLET | Refills: 1 | Status: SHIPPED | OUTPATIENT
Start: 2019-09-30 | End: 2020-03-26

## 2019-10-17 ENCOUNTER — TELEPHONE (OUTPATIENT)
Dept: FAMILY MEDICINE CLINIC | Facility: CLINIC | Age: 59
End: 2019-10-17

## 2019-10-17 DIAGNOSIS — R92.8 ABNORMAL MAMMOGRAM: Primary | ICD-10-CM

## 2019-10-18 NOTE — TELEPHONE ENCOUNTER
CHANELL BENAVIDES HAS CALLED BACK AGAIN ABOUT PRESP...SHE SAID DMITRY TOLD HER  THE DEXILANT WILL NEED NEW PRESP AND A P.A.   PLEASE CALL HER BACK  [phone]

## 2019-10-21 ENCOUNTER — DOCUMENTATION (OUTPATIENT)
Dept: FAMILY MEDICINE CLINIC | Facility: CLINIC | Age: 59
End: 2019-10-21

## 2019-10-21 PROBLEM — Z12.11 SCREEN FOR COLON CANCER: Status: RESOLVED | Noted: 2018-10-01 | Resolved: 2019-10-21

## 2019-10-21 RX ORDER — METFORMIN HYDROCHLORIDE 500 MG/1
TABLET, EXTENDED RELEASE ORAL
Qty: 360 TABLET | Refills: 4 | Status: SHIPPED | OUTPATIENT
Start: 2019-10-21 | End: 2020-10-21

## 2019-10-21 NOTE — PROGRESS NOTES
Prior Authorization for DEXILANT was approved from 10/21/2019-10/20/2022.  Patient/phamracy notified.

## 2019-11-02 DIAGNOSIS — E87.6 HYPOKALEMIA: ICD-10-CM

## 2019-11-04 RX ORDER — POTASSIUM CHLORIDE 750 MG/1
TABLET, EXTENDED RELEASE ORAL
Qty: 60 TABLET | Refills: 2 | Status: SHIPPED | OUTPATIENT
Start: 2019-11-04 | End: 2020-01-29

## 2019-11-08 DIAGNOSIS — D64.9 ANEMIA, UNSPECIFIED TYPE: ICD-10-CM

## 2019-11-08 RX ORDER — IRON FUM,AG/C/B12/FOLIC/CA/SUC 151-60-1MG
TABLET ORAL
Qty: 30 TABLET | Refills: 1 | Status: SHIPPED | OUTPATIENT
Start: 2019-11-08 | End: 2020-09-03 | Stop reason: SDUPTHER

## 2019-11-29 ENCOUNTER — LAB (OUTPATIENT)
Dept: LAB | Facility: HOSPITAL | Age: 59
End: 2019-11-29

## 2019-11-29 DIAGNOSIS — E03.9 ACQUIRED HYPOTHYROIDISM: ICD-10-CM

## 2019-11-29 DIAGNOSIS — E11.65 TYPE 2 DIABETES MELLITUS WITH HYPERGLYCEMIA, WITHOUT LONG-TERM CURRENT USE OF INSULIN (HCC): ICD-10-CM

## 2019-11-29 LAB
ALBUMIN UR-MCNC: <1.2 MG/DL
BASOPHILS # BLD AUTO: 0.06 10*3/MM3 (ref 0–0.2)
BASOPHILS NFR BLD AUTO: 0.9 % (ref 0–1.5)
CREAT UR-MCNC: 212.4 MG/DL
DEPRECATED RDW RBC AUTO: 42.3 FL (ref 37–54)
EOSINOPHIL # BLD AUTO: 0.55 10*3/MM3 (ref 0–0.4)
EOSINOPHIL NFR BLD AUTO: 7.9 % (ref 0.3–6.2)
ERYTHROCYTE [DISTWIDTH] IN BLOOD BY AUTOMATED COUNT: 14.6 % (ref 12.3–15.4)
HBA1C MFR BLD: 8 % (ref 4.8–5.6)
HCT VFR BLD AUTO: 42 % (ref 34–46.6)
HGB BLD-MCNC: 14.5 G/DL (ref 12–15.9)
IMM GRANULOCYTES # BLD AUTO: 0.04 10*3/MM3 (ref 0–0.05)
IMM GRANULOCYTES NFR BLD AUTO: 0.6 % (ref 0–0.5)
LYMPHOCYTES # BLD AUTO: 2.2 10*3/MM3 (ref 0.7–3.1)
LYMPHOCYTES NFR BLD AUTO: 31.5 % (ref 19.6–45.3)
MCH RBC QN AUTO: 27.6 PG (ref 26.6–33)
MCHC RBC AUTO-ENTMCNC: 34.5 G/DL (ref 31.5–35.7)
MCV RBC AUTO: 80 FL (ref 79–97)
MICROALBUMIN/CREAT UR: NORMAL MG/G{CREAT}
MONOCYTES # BLD AUTO: 0.44 10*3/MM3 (ref 0.1–0.9)
MONOCYTES NFR BLD AUTO: 6.3 % (ref 5–12)
NEUTROPHILS # BLD AUTO: 3.7 10*3/MM3 (ref 1.7–7)
NEUTROPHILS NFR BLD AUTO: 52.8 % (ref 42.7–76)
NRBC BLD AUTO-RTO: 0 /100 WBC (ref 0–0.2)
PLATELET # BLD AUTO: 310 10*3/MM3 (ref 140–450)
PMV BLD AUTO: 11.5 FL (ref 6–12)
RBC # BLD AUTO: 5.25 10*6/MM3 (ref 3.77–5.28)
TSH SERPL DL<=0.05 MIU/L-ACNC: 0.16 UIU/ML (ref 0.27–4.2)
WBC NRBC COR # BLD: 6.99 10*3/MM3 (ref 3.4–10.8)

## 2019-11-29 PROCEDURE — 83036 HEMOGLOBIN GLYCOSYLATED A1C: CPT

## 2019-11-29 PROCEDURE — 84443 ASSAY THYROID STIM HORMONE: CPT

## 2019-11-29 PROCEDURE — 82570 ASSAY OF URINE CREATININE: CPT

## 2019-11-29 PROCEDURE — 36415 COLL VENOUS BLD VENIPUNCTURE: CPT

## 2019-11-29 PROCEDURE — 85025 COMPLETE CBC W/AUTO DIFF WBC: CPT

## 2019-11-29 PROCEDURE — 82043 UR ALBUMIN QUANTITATIVE: CPT

## 2019-12-02 ENCOUNTER — OFFICE VISIT (OUTPATIENT)
Dept: FAMILY MEDICINE CLINIC | Facility: CLINIC | Age: 59
End: 2019-12-02

## 2019-12-02 VITALS
DIASTOLIC BLOOD PRESSURE: 72 MMHG | HEART RATE: 68 BPM | SYSTOLIC BLOOD PRESSURE: 122 MMHG | HEIGHT: 64 IN | OXYGEN SATURATION: 99 % | WEIGHT: 204.7 LBS | BODY MASS INDEX: 34.95 KG/M2

## 2019-12-02 DIAGNOSIS — Z11.51 SCREENING FOR HUMAN PAPILLOMAVIRUS (HPV): ICD-10-CM

## 2019-12-02 DIAGNOSIS — E11.65 TYPE 2 DIABETES MELLITUS WITH HYPERGLYCEMIA, WITHOUT LONG-TERM CURRENT USE OF INSULIN (HCC): ICD-10-CM

## 2019-12-02 DIAGNOSIS — E03.9 ACQUIRED HYPOTHYROIDISM: ICD-10-CM

## 2019-12-02 DIAGNOSIS — I10 ESSENTIAL (PRIMARY) HYPERTENSION: ICD-10-CM

## 2019-12-02 DIAGNOSIS — E11.9 ENCOUNTER FOR DIABETIC FOOT EXAM (HCC): ICD-10-CM

## 2019-12-02 DIAGNOSIS — R21 RASH: Primary | ICD-10-CM

## 2019-12-02 PROCEDURE — 99214 OFFICE O/P EST MOD 30 MIN: CPT | Performed by: NURSE PRACTITIONER

## 2019-12-02 RX ORDER — LEVOTHYROXINE SODIUM 112 UG/1
112 TABLET ORAL DAILY
Qty: 30 TABLET | Refills: 3 | Status: SHIPPED | OUTPATIENT
Start: 2019-12-02 | End: 2020-03-31 | Stop reason: DRUGHIGH

## 2019-12-02 RX ORDER — TRIAMCINOLONE ACETONIDE 1 MG/G
CREAM TOPICAL 2 TIMES DAILY
Qty: 60 G | Refills: 0 | OUTPATIENT
Start: 2019-12-02 | End: 2021-03-08

## 2019-12-02 NOTE — PROGRESS NOTES
Subjective   Danae Ngo is a 59 y.o. female.     Ms. Ngo is a 59-year-old female who presents today for follow-up related to hypertension, hypothyroidism, type 2 diabetes and recurrent eczema type rash.  Her blood pressure today is 122/72.  She denies chest pain, shortness of breath or palpitations.  Recent TSH was again low, 0.1.  She is taking levothyroxine 125 mcg p.o. daily.  Recent hemoglobin A1c was 8.0.  She is taking medication as prescribed but however reports not following a strict diabetic diet.  She denies any symptoms of hyper or hypoglycemia.  Patient also reports recurrent eczema type rash to her upper abdomen.  She treats it with triamcinolone cream as needed.  She has no rash currently but states she is out of her cream.         The following portions of the patient's history were reviewed and updated as appropriate: allergies, current medications, past family history, past medical history, past social history, past surgical history and problem list.    Review of Systems   Constitutional: Negative for activity change, appetite change, fatigue, unexpected weight gain and unexpected weight loss.   HENT: Negative for congestion, sore throat, trouble swallowing and voice change.    Eyes: Negative.    Respiratory: Negative for cough, chest tightness, shortness of breath and wheezing.    Cardiovascular: Negative for chest pain, palpitations and leg swelling.   Gastrointestinal: Negative for abdominal pain, constipation, diarrhea, nausea and vomiting.   Endocrine: Negative.  Negative for cold intolerance, heat intolerance, polydipsia, polyphagia and polyuria.   Genitourinary: Negative for dysuria.   Musculoskeletal: Negative for arthralgias and myalgias.   Skin: Negative for rash.   Allergic/Immunologic: Negative.    Neurological: Negative.    Hematological: Negative.    Psychiatric/Behavioral: Negative.        Objective   Physical Exam   Constitutional: She is oriented to person, place, and time.  She appears well-developed and well-nourished. No distress.   HENT:   Head: Normocephalic and atraumatic.   Eyes: Conjunctivae are normal.   Neck: Normal range of motion.   Cardiovascular: Normal rate, regular rhythm and normal heart sounds.   Pulmonary/Chest: Effort normal and breath sounds normal. No stridor. No respiratory distress. She has no wheezes. She has no rales.   Abdominal: Soft. Bowel sounds are normal. She exhibits no distension and no mass. There is no tenderness. There is no rebound and no guarding. No hernia.   Musculoskeletal: Normal range of motion. She exhibits no edema or tenderness.    Danae had a diabetic foot exam performed today.   During the foot exam she had a monofilament test performed (full sensation intact, no ulcers or open wound. no signs of circulatory compromise. ).  Neurological: She is alert and oriented to person, place, and time.   Skin: Skin is warm and dry. No rash noted. She is not diaphoretic. No erythema. No pallor.   Psychiatric: She has a normal mood and affect. Her behavior is normal. Judgment and thought content normal.   Nursing note and vitals reviewed.        Assessment/Plan   Danae was seen today for follow-up.    Diagnoses and all orders for this visit:    Rash    Acquired hypothyroidism  -     levothyroxine (SYNTHROID, LEVOTHROID) 112 MCG tablet; Take 1 tablet by mouth Daily.    Essential (primary) hypertension    Encounter for diabetic foot exam (CMS/HCC)    Type 2 diabetes mellitus with hyperglycemia, without long-term current use of insulin (CMS/[facility])    Screening for human papillomavirus (HPV)  -     Ambulatory Referral to Obstetrics / Gynecology    Other orders  -     triamcinolone (KENALOG) 0.1 % cream; Apply  topically to the appropriate area as directed 2 (Two) Times a Day.    1.  Acquired hypothyroidism- will decrease levothyroxine 212 mcg 1 tablet p.o. daily.  Repeat TSH and T4 at next follow-up.    2.  Essential hypertension-controlled.  No change  in therapy at this time.    3.  Type 2 diabetes mellitus with hyperglycemia without long-term current use of insulin- patient refused increase of Trulicity or addition of other medications to regimen.  Patient states she would refocus on diabetic diet.  Patient instructed that if A1c did not lower medication would be added at next follow-up.  Patient verbalized understanding of instruction.    4.  Rash- refill Kenalog 0.1% cream apply topically twice daily as needed.  Instructed patient to use sparingly.  Patient verbalized understanding of instruction.    5.  Health maintenance- referral to OB/GYN for screening Pap.    6.  Follow-up in 3 months or sooner if needed.            This document has been electronically signed by WILLIS Manzano on December 2, 2019 2:52 PM

## 2019-12-25 DIAGNOSIS — E03.9 ACQUIRED HYPOTHYROIDISM: ICD-10-CM

## 2019-12-26 RX ORDER — LEVOTHYROXINE SODIUM 0.12 MG/1
TABLET ORAL
Qty: 30 TABLET | Refills: 3 | Status: SHIPPED | OUTPATIENT
Start: 2019-12-26 | End: 2020-03-31 | Stop reason: SDUPTHER

## 2019-12-27 ENCOUNTER — OFFICE VISIT (OUTPATIENT)
Dept: OBSTETRICS AND GYNECOLOGY | Facility: CLINIC | Age: 59
End: 2019-12-27

## 2019-12-27 VITALS
BODY MASS INDEX: 34.66 KG/M2 | HEIGHT: 64 IN | DIASTOLIC BLOOD PRESSURE: 72 MMHG | SYSTOLIC BLOOD PRESSURE: 120 MMHG | WEIGHT: 203 LBS

## 2019-12-27 DIAGNOSIS — Z01.419 WOMEN'S ANNUAL ROUTINE GYNECOLOGICAL EXAMINATION: Primary | ICD-10-CM

## 2019-12-27 PROCEDURE — 87624 HPV HI-RISK TYP POOLED RSLT: CPT | Performed by: NURSE PRACTITIONER

## 2019-12-27 PROCEDURE — G0123 SCREEN CERV/VAG THIN LAYER: HCPCS | Performed by: NURSE PRACTITIONER

## 2019-12-27 PROCEDURE — 99396 PREV VISIT EST AGE 40-64: CPT | Performed by: NURSE PRACTITIONER

## 2019-12-27 NOTE — PROGRESS NOTES
"Vanita Ngo is a 59 y.o. Annual gynecological exam    Mammo: BI-RADS 3   LMP: \"years ago\"  Pap: 2016, NIL, negative hrHPV    Pt presents for annual gynecological exam with no complaints.  She denies family history of ovarian, uterine, or colon cancer.  Patient mother had breast cancer.      Gynecologic Exam   The patient's pertinent negatives include no genital itching, genital lesions, genital odor, genital rash, pelvic pain, vaginal bleeding or vaginal discharge. The patient is experiencing no pain. She is not pregnant. Pertinent negatives include no abdominal pain, anorexia, back pain, chills, constipation, diarrhea, discolored urine, dysuria, fever, flank pain, frequency, headaches, hematuria, joint pain, joint swelling, nausea, rash, sore throat, urgency or vomiting. She is postmenopausal.       The following portions of the patient's history were reviewed and updated as appropriate: allergies, current medications, past family history, past medical history, past social history, past surgical history and problem list.    Review of Systems   Constitutional: Negative for chills, diaphoresis, fatigue, fever and unexpected weight change.   HENT: Negative for sore throat.    Respiratory: Negative for apnea, chest tightness and shortness of breath.    Cardiovascular: Negative for chest pain and palpitations.   Gastrointestinal: Negative for abdominal distention, abdominal pain, anorexia, constipation, diarrhea, nausea and vomiting.   Genitourinary: Negative for decreased urine volume, difficulty urinating, dysuria, enuresis, flank pain, frequency, genital sores, hematuria, menstrual problem, pelvic pain, urgency, vaginal bleeding, vaginal discharge and vaginal pain.   Musculoskeletal: Negative for back pain and joint pain.   Skin: Negative for rash.   Neurological: Negative for headaches.   Psychiatric/Behavioral: Negative for sleep disturbance.         Objective   Physical Exam   Constitutional: She " is oriented to person, place, and time. She appears well-developed and well-nourished.   Neck: No thyromegaly present.   Cardiovascular: Normal rate, regular rhythm, normal heart sounds and intact distal pulses.   Pulmonary/Chest: Effort normal and breath sounds normal. Right breast exhibits no inverted nipple, no mass, no nipple discharge, no skin change and no tenderness. Left breast exhibits no inverted nipple, no mass, no nipple discharge, no skin change and no tenderness. No breast discharge or bleeding. Breasts are symmetrical.   Multiple nevi on bilateral breasts   Abdominal: Soft. Bowel sounds are normal. She exhibits no distension. There is no tenderness.   Genitourinary: Vagina normal and uterus normal. No breast discharge or bleeding. Pelvic exam was performed with patient supine. There is no rash, tenderness, lesion or injury on the right labia. There is no rash, tenderness, lesion or injury on the left labia. Cervix exhibits no motion tenderness, no discharge and no friability. Right adnexum displays no mass, no tenderness and no fullness. Left adnexum displays no mass, no tenderness and no fullness.   Genitourinary Comments: Pap obtained   Lymphadenopathy:     She has no axillary adenopathy.        Right: No inguinal adenopathy present.        Left: No inguinal adenopathy present.   Neurological: She is alert and oriented to person, place, and time. GCS eye subscore is 4. GCS verbal subscore is 5. GCS motor subscore is 6.   Skin: Skin is warm, dry and intact.   Psychiatric: She has a normal mood and affect. Her speech is normal and behavior is normal.   Nursing note and vitals reviewed.        Assessment/Plan   Danae was seen today for gynecologic exam.    Diagnoses and all orders for this visit:    Women's annual routine gynecological examination  -     Liquid-based Pap Smear, Screening      Pt educated and encouraged to continue routine SBE and keep appt for follow up mammogram.  Also, recommend  full skin assessment with dermatology for multiple nevi on breasts and body.  She will receive normal pap letter via mail, if abnormal we will call her.  RTC in 1 year for annual gynecological exam or sooner if needed.

## 2019-12-31 LAB
GEN CATEG CVX/VAG CYTO-IMP: NORMAL
LAB AP CASE REPORT: NORMAL
LAB AP GYN ADDITIONAL INFORMATION: NORMAL
PATH INTERP SPEC-IMP: NORMAL
STAT OF ADQ CVX/VAG CYTO-IMP: NORMAL

## 2020-01-03 DIAGNOSIS — I10 ESSENTIAL HYPERTENSION: ICD-10-CM

## 2020-01-03 RX ORDER — HYDROCHLOROTHIAZIDE 50 MG/1
TABLET ORAL
Qty: 90 TABLET | Refills: 1 | Status: SHIPPED | OUTPATIENT
Start: 2020-01-03 | End: 2020-07-06

## 2020-01-04 LAB — HPV I/H RISK 4 DNA CVX QL PROBE+SIG AMP: NEGATIVE

## 2020-01-20 DIAGNOSIS — D64.9 ANEMIA, UNSPECIFIED TYPE: ICD-10-CM

## 2020-01-20 RX ORDER — IRON FUM,AG/C/B12/FOLIC/CA/SUC 151-60-1MG
1 TABLET ORAL DAILY
Qty: 30 TABLET | Refills: 3 | Status: SHIPPED | OUTPATIENT
Start: 2020-01-20 | End: 2020-05-18

## 2020-01-28 RX ORDER — EMPAGLIFLOZIN 10 MG/1
TABLET, FILM COATED ORAL
Qty: 90 TABLET | Refills: 1 | Status: SHIPPED | OUTPATIENT
Start: 2020-01-28 | End: 2020-03-31 | Stop reason: DRUGHIGH

## 2020-01-29 DIAGNOSIS — E87.6 HYPOKALEMIA: ICD-10-CM

## 2020-01-29 RX ORDER — POTASSIUM CHLORIDE 750 MG/1
TABLET, EXTENDED RELEASE ORAL
Qty: 60 TABLET | Refills: 3 | Status: SHIPPED | OUTPATIENT
Start: 2020-01-29 | End: 2020-05-28

## 2020-03-02 ENCOUNTER — OFFICE VISIT (OUTPATIENT)
Dept: FAMILY MEDICINE CLINIC | Facility: CLINIC | Age: 60
End: 2020-03-02

## 2020-03-02 VITALS
BODY MASS INDEX: 35.63 KG/M2 | SYSTOLIC BLOOD PRESSURE: 142 MMHG | HEART RATE: 74 BPM | HEIGHT: 64 IN | DIASTOLIC BLOOD PRESSURE: 84 MMHG | WEIGHT: 208.7 LBS | RESPIRATION RATE: 20 BRPM | OXYGEN SATURATION: 98 %

## 2020-03-02 DIAGNOSIS — E03.9 ACQUIRED HYPOTHYROIDISM: ICD-10-CM

## 2020-03-02 DIAGNOSIS — E11.65 TYPE 2 DIABETES MELLITUS WITH HYPERGLYCEMIA, WITHOUT LONG-TERM CURRENT USE OF INSULIN (HCC): ICD-10-CM

## 2020-03-02 DIAGNOSIS — E83.42 HYPOMAGNESEMIA: ICD-10-CM

## 2020-03-02 DIAGNOSIS — E87.6 HYPOKALEMIA: ICD-10-CM

## 2020-03-02 DIAGNOSIS — I10 ESSENTIAL (PRIMARY) HYPERTENSION: Primary | ICD-10-CM

## 2020-03-02 DIAGNOSIS — E78.2 MIXED HYPERLIPIDEMIA: ICD-10-CM

## 2020-03-02 PROCEDURE — 99214 OFFICE O/P EST MOD 30 MIN: CPT | Performed by: NURSE PRACTITIONER

## 2020-03-02 RX ORDER — CETIRIZINE HYDROCHLORIDE 10 MG/1
10 TABLET ORAL DAILY
Qty: 90 TABLET | Refills: 3 | Status: SHIPPED | OUTPATIENT
Start: 2020-03-02 | End: 2021-10-01 | Stop reason: SDUPTHER

## 2020-03-02 RX ORDER — ASPIRIN 325 MG
325 TABLET ORAL DAILY
Qty: 90 TABLET | Refills: 3 | Status: SHIPPED | OUTPATIENT
Start: 2020-03-02 | End: 2021-01-21

## 2020-03-02 NOTE — PROGRESS NOTES
Subjective   Danae Ngo is a 59 y.o. female.     Ms. Ngo is a 59-year-old female who presents today for follow-up related to hypertension, hyperlipidemia, type 2 diabetes, hypothyroidism, hypokalemia and hypomagnesemia.  Blood pressure today is 142/84.  She denies chest pain, shortness of breath, palpitations, edema.  She is taking her medication as prescribed.  She reports her blood sugars are averaging between 101 140.  She denies any hyper or hypoglycemic episodes.  Recent CMP shows potassium level within normal limits.       The following portions of the patient's history were reviewed and updated as appropriate: allergies, current medications, past family history, past medical history, past social history, past surgical history and problem list.    Review of Systems   Constitutional: Negative for activity change, appetite change, fatigue, unexpected weight gain and unexpected weight loss.   HENT: Negative for congestion, sore throat, trouble swallowing and voice change.    Eyes: Negative.    Respiratory: Negative for cough, chest tightness, shortness of breath and wheezing.    Cardiovascular: Negative for chest pain, palpitations and leg swelling.   Gastrointestinal: Negative for abdominal pain, constipation, diarrhea, nausea and vomiting.   Endocrine: Negative.  Negative for cold intolerance, heat intolerance, polydipsia, polyphagia and polyuria.   Genitourinary: Negative for dysuria.   Musculoskeletal: Negative for arthralgias and myalgias.   Skin: Negative for rash.   Allergic/Immunologic: Negative.    Neurological: Negative.    Hematological: Negative.    Psychiatric/Behavioral: Negative.        Objective   Physical Exam   Constitutional: She is oriented to person, place, and time. She appears well-developed and well-nourished. No distress.   HENT:   Head: Normocephalic and atraumatic.   Eyes: Conjunctivae are normal.   Neck: Normal range of motion.   Cardiovascular: Normal rate, regular rhythm and  normal heart sounds.   Pulmonary/Chest: Effort normal and breath sounds normal. No respiratory distress. She has no wheezes. She has no rales.   Musculoskeletal: Normal range of motion. She exhibits no edema or tenderness.   Neurological: She is alert and oriented to person, place, and time.   Skin: Skin is warm and dry. No rash noted. She is not diaphoretic. No erythema. No pallor.   Psychiatric: She has a normal mood and affect. Her behavior is normal. Judgment and thought content normal.   Nursing note and vitals reviewed.        Assessment/Plan   Danae was seen today for follow-up.    Diagnoses and all orders for this visit:    Essential (primary) hypertension    Mixed hyperlipidemia  -     Lipid Panel; Future    Type 2 diabetes mellitus with hyperglycemia, without long-term current use of insulin (CMS/[facility])  -     Hemoglobin A1c; Future    Acquired hypothyroidism  -     TSH; Future  -     T4, Free; Future    Hypokalemia    Hypomagnesemia  -     Magnesium; Future    Other orders  -     cetirizine (zyrTEC) 10 MG tablet; Take 1 tablet by mouth Daily.  -     Cholecalciferol (VITAMIN D3) 125 MCG (5000 UT) capsule capsule; Take 1 capsule by mouth Daily.  -     aspirin 325 MG tablet; Take 1 tablet by mouth Daily.    1.  Essential hypertension-controlled.  No change in therapy.  We will continue to monitor.    2.  Mixed hyperlipidemia-lipid panel.  Will call with results.  Reinforced low-fat diet.  Continue medication as prescribed.    3.  Type 2 diabetes mellitus with hyperglycemia without long-term current use of insulin-hemoglobin A1c.  Will call with results.  Reinforced diabetic diet medication compliance.    4.  Acquired hypothyroidism-TSH and T4.  Will call with results.    5.  Hypokalemia- lab results reviewed.  Potassium within normal limits.  We will continue to monitor.    6.  Hypomagnesemia-magnesium level.  Will call with results.    7.  Follow-up in 6 months or sooner if needed.            This document  has been electronically signed by WILLIS Manzano on March 2, 2020 10:57 AM

## 2020-03-16 DIAGNOSIS — K21.9 GASTROESOPHAGEAL REFLUX DISEASE, ESOPHAGITIS PRESENCE NOT SPECIFIED: ICD-10-CM

## 2020-03-16 RX ORDER — DEXLANSOPRAZOLE 60 MG/1
CAPSULE, DELAYED RELEASE ORAL
Qty: 30 CAPSULE | Refills: 5 | Status: SHIPPED | OUTPATIENT
Start: 2020-03-16 | End: 2020-08-24

## 2020-03-25 ENCOUNTER — OFFICE VISIT (OUTPATIENT)
Dept: SURGERY | Facility: CLINIC | Age: 60
End: 2020-03-25

## 2020-03-25 VITALS
TEMPERATURE: 97.5 F | DIASTOLIC BLOOD PRESSURE: 78 MMHG | WEIGHT: 209 LBS | HEIGHT: 64 IN | BODY MASS INDEX: 35.68 KG/M2 | HEART RATE: 74 BPM | SYSTOLIC BLOOD PRESSURE: 132 MMHG

## 2020-03-25 DIAGNOSIS — R92.8 ABNORMAL MAMMOGRAM OF RIGHT BREAST: Primary | ICD-10-CM

## 2020-03-25 PROCEDURE — 99213 OFFICE O/P EST LOW 20 MIN: CPT | Performed by: SURGERY

## 2020-03-25 NOTE — PATIENT INSTRUCTIONS

## 2020-03-26 DIAGNOSIS — E11.69 DIABETES MELLITUS TYPE 2 IN OBESE (HCC): ICD-10-CM

## 2020-03-26 DIAGNOSIS — E66.9 DIABETES MELLITUS TYPE 2 IN OBESE (HCC): ICD-10-CM

## 2020-03-26 RX ORDER — GLIPIZIDE 10 MG/1
TABLET, FILM COATED, EXTENDED RELEASE ORAL
Qty: 90 TABLET | Refills: 0 | Status: SHIPPED | OUTPATIENT
Start: 2020-03-26 | End: 2020-06-22

## 2020-03-26 NOTE — PROGRESS NOTES
Chief Complaint: This is a 59 y.o. woman seen in consultation for abnormal breast imaging right side    History of Present Illness:  Patient has noted no new masses, skin changes, nipple discharge, nipple changes prior to her most recent imaging.  Her most recent imaging includes the following:   Study Result        PROCEDURE: Right unilateral diagnostic mammogram with 3-D  tomosynthesis with CAD     REASON FOR EXAM: Abnormal prior screening mammography     FINDINGS: Comparison study dated 9/9/2019, 9/7/2018, 9/5/2017,  9/2/2016, and 8/31/2015.Right breast lower outer quadrant cluster  calcifications which appear mildly more prominent and have  somewhat of a crushed glass appearance. These findings would be  suspicious for malignancy and therefore warrant biopsy to further  evaluate. Otherwise no interval change in appearance of breast  parenchyma. No suspicious mass, skin thickening, or retraction.  Additional benign stable right breast calcifications.     Parenchymal pattern: Almost complete fatty involutional changes  of breast     IMPRESSION:  Right breast lower outer quadrant cluster of calcifications which  would be suspicious for possible malignancy. Recommend biopsy to  further evaluate.     BI-RADS category 4: Suspicious abnormality     Recommendation: Stereotactic/surgical  biopsy.      Electronically signed by:  Jsutin Ac MD  3/19/2020 9:20 AM CDT  Workstation: [id number]       ( I have personally reviewed the breast imaging and concur with the findings of the radiologist- BiRADS 4)    Breast Biopsy History: Previous left core needle biopsies.  Breast Cancer History: Patient does not have a past medical history of breast cancer.  She is here for evaluation.    Past Medical History:   Diagnosis Date   • Abnormal thyroid function test    • Acquired hypothyroidism    • Allergic    • Allergic rhinitis    • Anemia    • Breast cyst    • Coronary atherosclerosis     unspecified type of vessel, native or graft       • Dilated cardiomyopathy (CMS/HCC)    • Dyslipidemia    • Esophageal reflux    • Essential hypertension    • Family history of malignant neoplasm of breast    • Fibrocystic disease of breast     Low Mireille score   • GERD (gastroesophageal reflux disease)    • Herpes zoster    • History of chicken pox    • Hyperlipidemia    • Measles    • Mumps    • Myocardial infarction (CMS/HCC)    • Nasal septal deformity    • Obesity    • Type 2 diabetes mellitus (CMS/HCC)     Uncontrolled   • V tach (CMS/HCC)    • Vitamin D deficiency      Past Surgical History:   Procedure Laterality Date   • BREAST BIOPSY  2010    left breast benign   • BREAST CYST ASPIRATION     • CARDIAC CATHETERIZATION  2002    Left cardiac cath, selective coronary angiography, PTCA to the left anterior descending. Stent placement to the left anterior descending   • CARDIAC DEFIBRILLATOR PLACEMENT Left     Dr. Brunner   •  SECTION      Intrauterine pregnancy, term gestation, cephalopelvic disproportion   • COLONOSCOPY N/A 10/12/2018    Procedure: COLONOSCOPY;  Surgeon: Delroy Millre MD;  Location: [facility] ENDOSCOPY;  Service: General   • ENDOSCOPY AND COLONOSCOPY  04/15/2008    Normal   • ESOPHAGOSCOPY / EGD  2007    With tube-Esophagus appeared normal. Gastritis of the stomach. A biopsy of the stomach was obtained from the stomach. The duodenum appeared normal   • EXTERNAL EAR SURGERY  08/15/1991    Repair biifd ear   • OOPHORECTOMY      Left salpingo-oophorectomy, wedge resection of the right ovary, lysis of adhesions   • TRANSESOPHAGEAL ECHOCARDIOGRAM (LORELEI)  2012    Without color flow-Moderate to severe LV dysfumctional w/ an EF of 30-35%. Wall motion abnormalities as described above. LV enlargement. Mild aortic insufficiency, mild TR regurg, mild M regurg. mild pulmonary insufficiency     Prior to Admission medications    Medication Sig Start Date End Date Taking? Authorizing Provider   amLODIPine  (NORVASC) 5 MG tablet Take 1 tablet by mouth every night at bedtime. 9/10/19  Yes José Ruano APRN   aspirin 325 MG tablet Take 1 tablet by mouth Daily. 3/2/20  Yes José Ruano APRN   atorvastatin (LIPITOR) 20 MG tablet TAKE ONE TABLET BY MOUTH EVERY NIGHT 9/6/19  Yes José Ruano APRN   cetirizine (zyrTEC) 10 MG tablet Take 1 tablet by mouth Daily. 3/2/20  Yes José Ruano APRN   Cholecalciferol (VITAMIN D3) 125 MCG (5000 UT) capsule capsule Take 1 capsule by mouth Daily. 3/2/20  Yes José Ruano APRN   DEXILANT 60 MG capsule TAKE ONE CAPSULE BY MOUTH DAILY 3/16/20  Yes José Ruano APRN   FeAsp-FeFum -Suc-C-Thre-B12-FA (MULTIGEN PLUS) -1 MG tablet tablet TAKE ONE TABLET BY MOUTH DAILY 11/8/19  Yes José Ruano APRN   FeAsp-FeFum -Suc-C-Thre-B12-FA (MULTIGEN PLUS) -1 MG tablet tablet Take 1 tablet by mouth Daily. 1/20/20  Yes José Ruano APRN   glucose blood test strip Check twice daily. Use as instructed 1/29/19  Yes José Ruano APRN   glucose monitor monitoring kit 1 each Daily. 1/29/19  Yes José Ruano APRN   hydroCHLOROthiazide (HYDRODIURIL) 50 MG tablet TAKE ONE TABLET BY MOUTH DAILY 1/3/20  Yes José Ruano APRN   JARDIANCE 10 MG tablet TAKE ONE TABLET BY MOUTH DAILY 1/28/20  Yes José Ruano APRN   Lancet Device misc 1 each 2 (Two) Times a Day. 1/29/19  Yes José Ruano APRN   levothyroxine (SYNTHROID, LEVOTHROID) 112 MCG tablet Take 1 tablet by mouth Daily. 12/2/19  Yes José Ruano APRN   lisinopril (PRINIVIL,ZESTRIL) 40 MG tablet Take 1 tablet by mouth Daily. 3/11/19  Yes José Ruano APRN   magnesium oxide (MAGOX) 400 (241.3 Mg) MG tablet tablet TAKE TWO TABLETS BY MOUTH TWICE A DAY 7/19/18  Yes Jade Bansal MD   metFORMIN ER (GLUCOPHAGE-XR) 500 MG 24 hr tablet TAKE TWO TABLETS BY MOUTH TWICE A DAY BEFORE MEALS 10/21/19  Yes José Ruano APRN   metoprolol succinate XL (TOPROL-XL) 100 MG 24  hr tablet TAKE THREE TABLETS BY MOUTH DAILY 3/11/19  Yes José Ruano I, APRN   metoprolol succinate XL (TOPROL-XL) 100 MG 24 hr tablet Take 3 tablets by mouth Daily. 3/11/19  Yes Alden José I, APRN   mexiletine (MEXITIL) 150 MG capsule  9/7/16  Yes ProviderCelestina MD   potassium chloride (K-DUR,KLOR-CON) 10 MEQ CR tablet TAKE TWO TABLETS BY MOUTH DAILY 1/29/20  Yes Alden, José I, APRN   glipizide (GLUCOTROL XL) 10 MG 24 hr tablet TAKE ONE TABLET BY MOUTH DAILY 9/30/19 3/26/20 Yes Alden, José I, APRN   albuterol sulfate  (90 Base) MCG/ACT inhaler Inhale 1 puff Every 6 (Six) Hours As Needed for Wheezing. 1/10/19   Aleksander Agrawal MD   glipizide (GLUCOTROL XL) 10 MG 24 hr tablet TAKE ONE TABLET BY MOUTH DAILY 3/26/20   Alden, José I, APRN   levothyroxine (SYNTHROID, LEVOTHROID) 125 MCG tablet TAKE ONE TABLET BY MOUTH DAILY 12/26/19   Alden, José I, APRN   triamcinolone (KENALOG) 0.1 % cream Apply  topically to the appropriate area as directed 2 (Two) Times a Day. 12/2/19   L[place], José I, APRN   TRULICITY 0.75 MG/0.5ML solution pen-injector INJECT 0.75 MG UNDER THE SKIN ONCE WEEKLY 9/30/19   [place], José I, APRN     Allergies   Allergen Reactions   • Codeine GI Intolerance     Family History   Problem Relation Age of Onset   • Breast cancer Mother         50's   • Other Mother         CVA   • Coronary artery disease Other    • Diabetes Other    • Heart disease Other    • Hypertension Other    • Autism Other         grandson   • Alcohol abuse Brother    • Cardiomyopathy Brother    • Coronary artery disease Brother    • Heart disease Father    • Hypertension Sister    • Diabetes Sister    • Heart disease Son    • Heart attack Son    • Heart disease Maternal Grandmother    • Hypertension Maternal Grandmother    • Hypertension Sister    • Hypertension Sister    • Hypertension Brother    • Hypertension Brother      Social History     Socioeconomic History   • Marital status:  Single     Spouse name: Not on file   • Number of children: Not on file   • Years of education: Not on file   • Highest education level: Not on file   Tobacco Use   • Smoking status: Never Smoker   • Smokeless tobacco: Never Used   Substance and Sexual Activity   • Alcohol use: No   • Drug use: No   • Sexual activity: Defer     Birth control/protection: Post-menopausal     Review of Systems   Constitutional: Negative for appetite change, chills, fever and unexpected weight change.   HENT: Negative for hearing loss, nosebleeds and trouble swallowing.    Eyes: Negative for visual disturbance.   Respiratory: Negative for apnea, cough, choking, chest tightness, shortness of breath, wheezing and stridor.    Cardiovascular: Negative for chest pain, palpitations and leg swelling.   Gastrointestinal: Negative for abdominal distention, abdominal pain, blood in stool, constipation, diarrhea, nausea and vomiting.   Endocrine: Negative for cold intolerance, heat intolerance, polydipsia, polyphagia and polyuria.   Genitourinary: Negative for difficulty urinating, dysuria, frequency, hematuria and urgency.   Musculoskeletal: Negative for arthralgias, back pain, myalgias and neck pain.   Skin: Negative for color change, pallor and rash.   Allergic/Immunologic: Negative for immunocompromised state.   Neurological: Negative for dizziness, seizures, syncope, light-headedness, numbness and headaches.   Hematological: Negative for adenopathy.   Psychiatric/Behavioral: Negative for suicidal ideas. The patient is not nervous/anxious.      Physical Exam   Constitutional: She is oriented to person, place, and time. She appears well-developed and well-nourished. No distress.   HENT:   Head: Normocephalic and atraumatic.   Mouth/Throat: No oropharyngeal exudate.   Eyes: Pupils are equal, round, and reactive to light. EOM are normal. No scleral icterus.   Neck: Normal range of motion. Neck supple. No JVD present. No tracheal deviation  present. No thyromegaly present.   Cardiovascular: Normal rate, regular rhythm and normal heart sounds.   Pulmonary/Chest: Effort normal and breath sounds normal. No stridor. No respiratory distress. She has no wheezes. She has no rales. She exhibits no tenderness. Right breast exhibits no inverted nipple, no mass, no nipple discharge, no skin change and no tenderness. Left breast exhibits no inverted nipple, no mass, no nipple discharge, no skin change and no tenderness. No breast swelling, tenderness, discharge or bleeding. Breasts are symmetrical.   Musculoskeletal: Normal range of motion. She exhibits no edema, tenderness or deformity.   Lymphadenopathy:     She has no cervical adenopathy.     She has no axillary adenopathy.   Neurological: She is alert and oriented to person, place, and time.   Skin: Skin is warm and dry. No rash noted. She is not diaphoretic. No erythema. No pallor.   Psychiatric: She has a normal mood and affect. Her behavior is normal. Judgment normal.   Vitals reviewed.    Vitals:    03/25/20 0943   BP: 132/78   Pulse: 74   Temp: 97.5 °F (36.4 °C)     Assessment:    Danae was seen today for abnormal breast imaging.    Diagnoses and all orders for this visit:    Abnormal mammogram of right breast        Plan:  1.  Right stereotactic mammotome biopsy with clip placement once this can be safely scheduled.    What are the indications that have led your doctor to the opinion that an operation is necessary?    Breast imaging has determined an abnormal area requiring biopsy.    What, if any, alternative treatments are available for your condition?    Alternatively, open biopsy in the operating room may be performed under sedation or general anesthesia. Observation is not a good option.    What will be the likely result if you don't have the operation?    There is a possibility of missing a breast cancer diagnosis.    What are the basic procedures involved in the operation?    The patient will be  placed prone for the procedure. A small incision will be made under local anesthesia, and a special, large needle will be used to perform the biopsy.   A permanent titanium clip will be placed in the breast to bassam the site of biopsy should further surgery be necessary.    What are the risks?    The risks of bleeding, infection, the possible need for open biopsy or other procedure, scarring, and poor wound healing are explained. Bruising almost always occurs. There is a low transfusion risk. The risks of chronic pain are, likewise, low.     How is the operation expected to improve your health or quality of life?    The lesion can usually be determined to be benign or malignant. Follow up xrays or further open excision may be necessary depending on the pathology.    Is hospitalization necessary and, if so, how long can you expect to be hospitalized?    This procedure is performed on an outpatient basis.    What can you expect during your recovery period?    Minimal pain is usually controlled with non-narcotic analgesia.    When can you expect to resume normal activities?    Normal activity may be resumed the following day.    Are there likely to be residual effects from the operation?    Usually, there are no residual effects following biiopsy.    .   All questions were answered. The patient agrees to operation.                This document has been electronically signed by Rodo Holcomb MD on March 26, 2020 09:46

## 2020-03-30 ENCOUNTER — LAB (OUTPATIENT)
Dept: LAB | Facility: HOSPITAL | Age: 60
End: 2020-03-30

## 2020-03-30 DIAGNOSIS — E03.9 ACQUIRED HYPOTHYROIDISM: ICD-10-CM

## 2020-03-30 DIAGNOSIS — E78.2 MIXED HYPERLIPIDEMIA: ICD-10-CM

## 2020-03-30 DIAGNOSIS — E11.65 TYPE 2 DIABETES MELLITUS WITH HYPERGLYCEMIA, WITHOUT LONG-TERM CURRENT USE OF INSULIN (HCC): ICD-10-CM

## 2020-03-30 DIAGNOSIS — E83.42 HYPOMAGNESEMIA: ICD-10-CM

## 2020-03-30 LAB
CHOLEST SERPL-MCNC: 152 MG/DL (ref 0–200)
HBA1C MFR BLD: 8.67 % (ref 4.8–5.6)
HDLC SERPL-MCNC: 42 MG/DL (ref 40–60)
LDLC SERPL CALC-MCNC: 86 MG/DL (ref 0–100)
LDLC/HDLC SERPL: 2.05 {RATIO}
MAGNESIUM SERPL-MCNC: 1.7 MG/DL (ref 1.6–2.6)
T4 FREE SERPL-MCNC: 1.76 NG/DL (ref 0.93–1.7)
TRIGL SERPL-MCNC: 119 MG/DL (ref 0–150)
TSH SERPL DL<=0.05 MIU/L-ACNC: 1.62 UIU/ML (ref 0.27–4.2)
VLDLC SERPL-MCNC: 23.8 MG/DL (ref 5–40)

## 2020-03-30 PROCEDURE — 84443 ASSAY THYROID STIM HORMONE: CPT

## 2020-03-30 PROCEDURE — 80061 LIPID PANEL: CPT

## 2020-03-30 PROCEDURE — 83036 HEMOGLOBIN GLYCOSYLATED A1C: CPT

## 2020-03-30 PROCEDURE — 36415 COLL VENOUS BLD VENIPUNCTURE: CPT

## 2020-03-30 PROCEDURE — 83735 ASSAY OF MAGNESIUM: CPT

## 2020-03-30 PROCEDURE — 84439 ASSAY OF FREE THYROXINE: CPT

## 2020-03-31 DIAGNOSIS — E03.9 ACQUIRED HYPOTHYROIDISM: ICD-10-CM

## 2020-03-31 RX ORDER — LEVOTHYROXINE SODIUM 0.12 MG/1
125 TABLET ORAL DAILY
Qty: 30 TABLET | Refills: 3 | Status: SHIPPED | OUTPATIENT
Start: 2020-03-31 | End: 2020-09-04

## 2020-04-01 ENCOUNTER — TELEPHONE (OUTPATIENT)
Dept: FAMILY MEDICINE CLINIC | Facility: CLINIC | Age: 60
End: 2020-04-01

## 2020-05-01 ENCOUNTER — TELEPHONE (OUTPATIENT)
Dept: FAMILY MEDICINE CLINIC | Facility: CLINIC | Age: 60
End: 2020-05-01

## 2020-05-01 ENCOUNTER — DOCUMENTATION (OUTPATIENT)
Dept: FAMILY MEDICINE CLINIC | Facility: CLINIC | Age: 60
End: 2020-05-01

## 2020-05-01 NOTE — TELEPHONE ENCOUNTER
Patient was called to clarify medication dosage.  Pharmacy was called to Discontinue the Jardiance 10mg and levothyroxine 125MCG.  Verbal order given for Jardiance 25 mg and Levothyroxine 112 MCG; both to be taken 1 tablet daily.

## 2020-05-01 NOTE — TELEPHONE ENCOUNTER
Jardiance was increased because her blood sugars were not controlled.  In reviewing her notes I believe this was told to her when we called and results to her.  Her levothyroxine dose has been the same since 12/26/2019.  If she was taking a different dosage because she did not go  a new prescription will we called her with lab results previously and she continue to take an old dosage.  Tell her it is very important to take the dosage that we prescribed and sent to the pharmacy when we call with lab results.  Both her blood sugar and and thyroid medications are dosed based on her lab work and what we believe she is taking.  Reinforce the need that she  new prescriptions when lab work is completed if a new prescription is called in.

## 2020-05-01 NOTE — TELEPHONE ENCOUNTER
PT called in reference to the following medications:  · Empagliflozin (Jardiance) 25 MG tablet  · levothyroxine (SYNTHROID, LEVOTHROID) 125 MCG tablet     PT has questions regarding dosing on the medications, states the last few times she's filled the meds the dosing instructions have been completely different. PT is confused and doesn't know what dose she should be taking.    Please advise/call Danae back: [phone].

## 2020-05-06 ENCOUNTER — TELEPHONE (OUTPATIENT)
Dept: SURGERY | Facility: CLINIC | Age: 60
End: 2020-05-06

## 2020-05-06 DIAGNOSIS — R92.8 ABNORMAL MAMMOGRAM: Primary | ICD-10-CM

## 2020-05-06 DIAGNOSIS — R92.0 MICROCALCIFICATIONS OF THE BREAST: Primary | ICD-10-CM

## 2020-05-06 NOTE — TELEPHONE ENCOUNTER
I have reviewed her latest mammograms and feel that they likely represent a BI-RADS Category 3 rather than 4.  Microcalcifications previously seen are again noted.  I would recommend that rather than biopsy, she received an mammogram diagnostic on the right side in September.  I have spoken with the patient in this regard.

## 2020-05-16 DIAGNOSIS — D64.9 ANEMIA, UNSPECIFIED TYPE: ICD-10-CM

## 2020-05-18 RX ORDER — IRON FUM,AG/C/B12/FOLIC/CA/SUC 151-60-1MG
TABLET ORAL
Qty: 30 TABLET | Refills: 4 | Status: SHIPPED | OUTPATIENT
Start: 2020-05-18 | End: 2020-09-03

## 2020-05-28 DIAGNOSIS — E87.6 HYPOKALEMIA: ICD-10-CM

## 2020-05-28 RX ORDER — POTASSIUM CHLORIDE 750 MG/1
TABLET, EXTENDED RELEASE ORAL
Qty: 60 TABLET | Refills: 2 | Status: SHIPPED | OUTPATIENT
Start: 2020-05-28 | End: 2020-07-29

## 2020-06-01 DIAGNOSIS — E11.69 HYPERLIPIDEMIA ASSOCIATED WITH TYPE 2 DIABETES MELLITUS (HCC): ICD-10-CM

## 2020-06-01 DIAGNOSIS — E78.5 HYPERLIPIDEMIA ASSOCIATED WITH TYPE 2 DIABETES MELLITUS (HCC): ICD-10-CM

## 2020-06-01 RX ORDER — ATORVASTATIN CALCIUM 20 MG/1
TABLET, FILM COATED ORAL
Qty: 90 TABLET | Refills: 1 | Status: SHIPPED | OUTPATIENT
Start: 2020-06-01 | End: 2020-11-18

## 2020-06-02 RX ORDER — LISINOPRIL 40 MG/1
TABLET ORAL
Qty: 90 TABLET | Refills: 2 | Status: SHIPPED | OUTPATIENT
Start: 2020-06-02 | End: 2021-01-21

## 2020-06-02 RX ORDER — METOPROLOL SUCCINATE 100 MG/1
TABLET, EXTENDED RELEASE ORAL
Qty: 270 TABLET | Refills: 2 | Status: SHIPPED | OUTPATIENT
Start: 2020-06-02 | End: 2021-01-21

## 2020-06-22 DIAGNOSIS — E11.69 DIABETES MELLITUS TYPE 2 IN OBESE (HCC): ICD-10-CM

## 2020-06-22 DIAGNOSIS — E66.9 DIABETES MELLITUS TYPE 2 IN OBESE (HCC): ICD-10-CM

## 2020-06-22 RX ORDER — GLIPIZIDE 10 MG/1
TABLET, FILM COATED, EXTENDED RELEASE ORAL
Qty: 90 TABLET | Refills: 0 | Status: SHIPPED | OUTPATIENT
Start: 2020-06-22 | End: 2020-09-23

## 2020-07-03 DIAGNOSIS — I10 ESSENTIAL HYPERTENSION: ICD-10-CM

## 2020-07-06 RX ORDER — HYDROCHLOROTHIAZIDE 50 MG/1
TABLET ORAL
Qty: 90 TABLET | Refills: 0 | Status: SHIPPED | OUTPATIENT
Start: 2020-07-06 | End: 2020-10-12

## 2020-07-27 RX ORDER — EMPAGLIFLOZIN 10 MG/1
TABLET, FILM COATED ORAL
Qty: 90 TABLET | Refills: 0 | OUTPATIENT
Start: 2020-07-27

## 2020-07-28 ENCOUNTER — TELEPHONE (OUTPATIENT)
Dept: FAMILY MEDICINE CLINIC | Facility: CLINIC | Age: 60
End: 2020-07-28

## 2020-07-28 DIAGNOSIS — E87.6 HYPOKALEMIA: ICD-10-CM

## 2020-07-28 NOTE — TELEPHONE ENCOUNTER
Pt came in stating she has a sinus infection and would like to have something called into [facility].

## 2020-07-29 RX ORDER — POTASSIUM CHLORIDE 750 MG/1
TABLET, EXTENDED RELEASE ORAL
Qty: 60 TABLET | Refills: 1 | Status: SHIPPED | OUTPATIENT
Start: 2020-07-29 | End: 2020-10-21

## 2020-07-29 NOTE — TELEPHONE ENCOUNTER
Needs to be seen. If it is URI symptoms she will need to go to [facility] because she wont pass the screening at the front door.

## 2020-08-23 DIAGNOSIS — K21.9 GASTROESOPHAGEAL REFLUX DISEASE, ESOPHAGITIS PRESENCE NOT SPECIFIED: ICD-10-CM

## 2020-08-24 RX ORDER — DEXLANSOPRAZOLE 60 MG/1
CAPSULE, DELAYED RELEASE ORAL
Qty: 30 CAPSULE | Refills: 0 | Status: SHIPPED | OUTPATIENT
Start: 2020-08-24 | End: 2020-09-23

## 2020-08-24 RX ORDER — LEVOTHYROXINE SODIUM 0.12 MG/1
125 TABLET ORAL DAILY
Qty: 30 TABLET | Refills: 0 | Status: SHIPPED | OUTPATIENT
Start: 2020-08-24 | End: 2020-09-03

## 2020-08-31 ENCOUNTER — TELEPHONE (OUTPATIENT)
Dept: FAMILY MEDICINE CLINIC | Facility: CLINIC | Age: 60
End: 2020-08-31

## 2020-08-31 RX ORDER — EMPAGLIFLOZIN 10 MG/1
TABLET, FILM COATED ORAL
Qty: 90 TABLET | Refills: 0 | OUTPATIENT
Start: 2020-08-31

## 2020-08-31 RX ORDER — LEVOTHYROXINE SODIUM 112 UG/1
TABLET ORAL
Qty: 30 TABLET | Refills: 2 | OUTPATIENT
Start: 2020-08-31

## 2020-08-31 NOTE — TELEPHONE ENCOUNTER
Caller: Danae Ngo    Relationship to patient: Self    Best call back number: [phone]    Patient is needing: PATIENT WOULD LIKE TO SPEAK WITH A NURSE ABOUT THE DOSAGE AMOUNT ON ONE OF HER MEDICATIONS.

## 2020-09-01 ENCOUNTER — TELEPHONE (OUTPATIENT)
Dept: FAMILY MEDICINE CLINIC | Facility: CLINIC | Age: 60
End: 2020-09-01

## 2020-09-02 ENCOUNTER — TRANSCRIBE ORDERS (OUTPATIENT)
Dept: LAB | Facility: HOSPITAL | Age: 60
End: 2020-09-02

## 2020-09-02 DIAGNOSIS — I47.20 VENTRICULAR TACHYCARDIA (HCC): Primary | ICD-10-CM

## 2020-09-02 DIAGNOSIS — I47.20 VT (VENTRICULAR TACHYCARDIA) (HCC): ICD-10-CM

## 2020-09-02 DIAGNOSIS — Z79.899 ENCOUNTER FOR LONG-TERM (CURRENT) USE OF OTHER MEDICATIONS: ICD-10-CM

## 2020-09-02 DIAGNOSIS — Z79.899 ENCOUNTER FOR MONITORING AMIODARONE THERAPY: ICD-10-CM

## 2020-09-02 DIAGNOSIS — Z51.81 ENCOUNTER FOR MONITORING AMIODARONE THERAPY: ICD-10-CM

## 2020-09-02 RX ORDER — EMPAGLIFLOZIN 10 MG/1
TABLET, FILM COATED ORAL
Qty: 90 TABLET | Refills: 0 | OUTPATIENT
Start: 2020-09-02

## 2020-09-03 ENCOUNTER — OFFICE VISIT (OUTPATIENT)
Dept: FAMILY MEDICINE CLINIC | Facility: CLINIC | Age: 60
End: 2020-09-03

## 2020-09-03 ENCOUNTER — LAB (OUTPATIENT)
Dept: LAB | Facility: HOSPITAL | Age: 60
End: 2020-09-03

## 2020-09-03 VITALS
WEIGHT: 210 LBS | OXYGEN SATURATION: 100 % | HEART RATE: 69 BPM | DIASTOLIC BLOOD PRESSURE: 84 MMHG | RESPIRATION RATE: 20 BRPM | SYSTOLIC BLOOD PRESSURE: 140 MMHG | BODY MASS INDEX: 35.85 KG/M2 | HEIGHT: 64 IN

## 2020-09-03 DIAGNOSIS — D64.9 ANEMIA, UNSPECIFIED TYPE: ICD-10-CM

## 2020-09-03 DIAGNOSIS — Z00.00 PREVENTATIVE HEALTH CARE: ICD-10-CM

## 2020-09-03 DIAGNOSIS — E11.65 TYPE 2 DIABETES MELLITUS WITH HYPERGLYCEMIA, WITHOUT LONG-TERM CURRENT USE OF INSULIN (HCC): ICD-10-CM

## 2020-09-03 DIAGNOSIS — E55.9 VITAMIN D DEFICIENCY: ICD-10-CM

## 2020-09-03 DIAGNOSIS — E03.9 ACQUIRED HYPOTHYROIDISM: ICD-10-CM

## 2020-09-03 DIAGNOSIS — Z00.00 ANNUAL PHYSICAL EXAM: ICD-10-CM

## 2020-09-03 DIAGNOSIS — K21.9 GASTROESOPHAGEAL REFLUX DISEASE WITHOUT ESOPHAGITIS: ICD-10-CM

## 2020-09-03 DIAGNOSIS — I10 ESSENTIAL (PRIMARY) HYPERTENSION: ICD-10-CM

## 2020-09-03 DIAGNOSIS — E83.42 HYPOMAGNESEMIA: ICD-10-CM

## 2020-09-03 DIAGNOSIS — I25.10 ATHEROSCLEROSIS OF NATIVE CORONARY ARTERY OF NATIVE HEART WITHOUT ANGINA PECTORIS: ICD-10-CM

## 2020-09-03 DIAGNOSIS — Z00.00 ANNUAL PHYSICAL EXAM: Primary | ICD-10-CM

## 2020-09-03 DIAGNOSIS — E78.2 MIXED HYPERLIPIDEMIA: ICD-10-CM

## 2020-09-03 DIAGNOSIS — E66.01 CLASS 2 SEVERE OBESITY DUE TO EXCESS CALORIES WITH SERIOUS COMORBIDITY AND BODY MASS INDEX (BMI) OF 36.0 TO 36.9 IN ADULT (HCC): ICD-10-CM

## 2020-09-03 LAB
25(OH)D3 SERPL-MCNC: 66.4 NG/ML (ref 30–100)
ALBUMIN SERPL-MCNC: 4.7 G/DL (ref 3.5–5.2)
ALBUMIN UR-MCNC: <1.2 MG/DL
ALBUMIN/GLOB SERPL: 1.3 G/DL
ALP SERPL-CCNC: 82 U/L (ref 39–117)
ALT SERPL W P-5'-P-CCNC: 22 U/L (ref 1–33)
ANION GAP SERPL CALCULATED.3IONS-SCNC: 10.7 MMOL/L (ref 5–15)
AST SERPL-CCNC: 22 U/L (ref 1–32)
BASOPHILS # BLD AUTO: 0.05 10*3/MM3 (ref 0–0.2)
BASOPHILS NFR BLD AUTO: 0.7 % (ref 0–1.5)
BILIRUB SERPL-MCNC: 0.6 MG/DL (ref 0–1.2)
BUN SERPL-MCNC: 20 MG/DL (ref 6–20)
BUN/CREAT SERPL: 17.5 (ref 7–25)
CALCIUM SPEC-SCNC: 10 MG/DL (ref 8.6–10.5)
CHLORIDE SERPL-SCNC: 98 MMOL/L (ref 98–107)
CHOLEST SERPL-MCNC: 155 MG/DL (ref 0–200)
CO2 SERPL-SCNC: 25.3 MMOL/L (ref 22–29)
CREAT SERPL-MCNC: 1.14 MG/DL (ref 0.57–1)
CREAT UR-MCNC: 89.3 MG/DL
DEPRECATED RDW RBC AUTO: 41.3 FL (ref 37–54)
EOSINOPHIL # BLD AUTO: 0.39 10*3/MM3 (ref 0–0.4)
EOSINOPHIL NFR BLD AUTO: 5.5 % (ref 0.3–6.2)
ERYTHROCYTE [DISTWIDTH] IN BLOOD BY AUTOMATED COUNT: 14.4 % (ref 12.3–15.4)
GFR SERPL CREATININE-BSD FRML MDRD: 59 ML/MIN/1.73
GLOBULIN UR ELPH-MCNC: 3.6 GM/DL
GLUCOSE SERPL-MCNC: 168 MG/DL (ref 65–99)
HBA1C MFR BLD: 9.08 % (ref 4.8–5.6)
HCT VFR BLD AUTO: 47 % (ref 34–46.6)
HDLC SERPL-MCNC: 40 MG/DL (ref 40–60)
HGB BLD-MCNC: 15.2 G/DL (ref 12–15.9)
IMM GRANULOCYTES # BLD AUTO: 0.03 10*3/MM3 (ref 0–0.05)
IMM GRANULOCYTES NFR BLD AUTO: 0.4 % (ref 0–0.5)
LDLC SERPL CALC-MCNC: 92 MG/DL (ref 0–100)
LDLC/HDLC SERPL: 2.3 {RATIO}
LYMPHOCYTES # BLD AUTO: 1.88 10*3/MM3 (ref 0.7–3.1)
LYMPHOCYTES NFR BLD AUTO: 26.4 % (ref 19.6–45.3)
MAGNESIUM SERPL-MCNC: 2.1 MG/DL (ref 1.6–2.6)
MCH RBC QN AUTO: 26 PG (ref 26.6–33)
MCHC RBC AUTO-ENTMCNC: 32.3 G/DL (ref 31.5–35.7)
MCV RBC AUTO: 80.3 FL (ref 79–97)
MICROALBUMIN/CREAT UR: NORMAL MG/G{CREAT}
MONOCYTES # BLD AUTO: 0.41 10*3/MM3 (ref 0.1–0.9)
MONOCYTES NFR BLD AUTO: 5.8 % (ref 5–12)
NEUTROPHILS NFR BLD AUTO: 4.37 10*3/MM3 (ref 1.7–7)
NEUTROPHILS NFR BLD AUTO: 61.2 % (ref 42.7–76)
NRBC BLD AUTO-RTO: 0.1 /100 WBC (ref 0–0.2)
PLATELET # BLD AUTO: 394 10*3/MM3 (ref 140–450)
PMV BLD AUTO: 11.6 FL (ref 6–12)
POTASSIUM SERPL-SCNC: 4.2 MMOL/L (ref 3.5–5.2)
PROT SERPL-MCNC: 8.3 G/DL (ref 6–8.5)
RBC # BLD AUTO: 5.85 10*6/MM3 (ref 3.77–5.28)
SODIUM SERPL-SCNC: 134 MMOL/L (ref 136–145)
T4 FREE SERPL-MCNC: 1.61 NG/DL (ref 0.93–1.7)
TRIGL SERPL-MCNC: 116 MG/DL (ref 0–150)
TSH SERPL DL<=0.05 MIU/L-ACNC: 3.08 UIU/ML (ref 0.27–4.2)
VLDLC SERPL-MCNC: 23.2 MG/DL (ref 5–40)
WBC # BLD AUTO: 7.13 10*3/MM3 (ref 3.4–10.8)

## 2020-09-03 PROCEDURE — 83036 HEMOGLOBIN GLYCOSYLATED A1C: CPT

## 2020-09-03 PROCEDURE — 84443 ASSAY THYROID STIM HORMONE: CPT

## 2020-09-03 PROCEDURE — 80053 COMPREHEN METABOLIC PANEL: CPT

## 2020-09-03 PROCEDURE — 84439 ASSAY OF FREE THYROXINE: CPT

## 2020-09-03 PROCEDURE — 85025 COMPLETE CBC W/AUTO DIFF WBC: CPT

## 2020-09-03 PROCEDURE — 99214 OFFICE O/P EST MOD 30 MIN: CPT | Performed by: NURSE PRACTITIONER

## 2020-09-03 PROCEDURE — 90674 CCIIV4 VAC NO PRSV 0.5 ML IM: CPT | Performed by: NURSE PRACTITIONER

## 2020-09-03 PROCEDURE — 80061 LIPID PANEL: CPT

## 2020-09-03 PROCEDURE — 90471 IMMUNIZATION ADMIN: CPT | Performed by: NURSE PRACTITIONER

## 2020-09-03 PROCEDURE — 83735 ASSAY OF MAGNESIUM: CPT

## 2020-09-03 PROCEDURE — 82306 VITAMIN D 25 HYDROXY: CPT

## 2020-09-03 PROCEDURE — 82570 ASSAY OF URINE CREATININE: CPT

## 2020-09-03 PROCEDURE — 36415 COLL VENOUS BLD VENIPUNCTURE: CPT

## 2020-09-03 PROCEDURE — 82043 UR ALBUMIN QUANTITATIVE: CPT

## 2020-09-03 RX ORDER — IRON FUM,AG/C/B12/FOLIC/CA/SUC 151-60-1MG
1 TABLET ORAL DAILY
Qty: 90 TABLET | Refills: 3 | Status: SHIPPED | OUTPATIENT
Start: 2020-09-03 | End: 2020-11-11

## 2020-09-03 NOTE — PROGRESS NOTES
"Subjective   Danaederrick Ngo is a 60 y.o. female.     Ms. Ngo is a 60-year-old female who presents today for annual follow-up related to hypertension, hyperlipidemia, CAD, vitamin D deficiency, GERD, type 2 diabetes, hypothyroidism, hypomagnesemia, obesity and anemia.  Blood pressure today is 140/84, heart rate 69.  She denies chest pain, shortness of breath, palpitations or edema.  She recently had follow-up with cardiology.  She reports no changes were made in her plan of care.  GERD well controlled.  Patient denies any hyper or hypoglycemic episodes.  She reports that her blood sugars are averaging \"low 200s\".  Patient has had slight weight gain since last visit.  Current weight is 210 pounds with a BMI of 36.       The following portions of the patient's history were reviewed and updated as appropriate: allergies, current medications, past family history, past medical history, past social history, past surgical history and problem list.    Review of Systems   Constitutional: Negative for activity change, appetite change, chills, diaphoresis, fatigue, fever, unexpected weight gain and unexpected weight loss.   HENT: Negative for congestion, sore throat, trouble swallowing and voice change.    Eyes: Negative for blurred vision, double vision, photophobia, pain and visual disturbance.   Respiratory: Negative for cough, chest tightness, shortness of breath and wheezing.    Cardiovascular: Negative for chest pain, palpitations and leg swelling.   Gastrointestinal: Negative for abdominal distention, abdominal pain, anal bleeding, blood in stool, constipation, diarrhea, nausea, vomiting, GERD and indigestion.   Endocrine: Negative for cold intolerance, heat intolerance, polydipsia, polyphagia and polyuria.        Average fsbs low 200's   Genitourinary: Negative for dysuria, frequency, hematuria and urgency.   Musculoskeletal: Negative for arthralgias and myalgias.   Skin: Negative for rash.   Allergic/Immunologic: " Negative.    Neurological: Negative for dizziness, syncope, weakness, light-headedness and headache.   Hematological: Negative.    Psychiatric/Behavioral: The patient is not nervous/anxious.        Objective   Physical Exam   Constitutional: She is oriented to person, place, and time. She appears well-developed and well-nourished. No distress.   HENT:   Head: Normocephalic and atraumatic.   Right Ear: External ear normal.   Left Ear: External ear normal.   Nose: Nose normal.   Mouth/Throat: Oropharynx is clear and moist.   Eyes: Pupils are equal, round, and reactive to light. Conjunctivae and EOM are normal.   Neck: Normal range of motion. Neck supple. No tracheal deviation present. No thyromegaly present.   Cardiovascular: Normal rate, regular rhythm, normal heart sounds and intact distal pulses. Exam reveals no gallop and no friction rub.   No murmur heard.  Pulmonary/Chest: Effort normal and breath sounds normal. No stridor. No respiratory distress. She has no wheezes. She has no rales.   Abdominal: Soft. Bowel sounds are normal. She exhibits no distension and no mass. There is no tenderness. There is no rebound and no guarding. No hernia.   Musculoskeletal: Normal range of motion. She exhibits no edema or tenderness.   Lymphadenopathy:     She has no cervical adenopathy.   Neurological: She is alert and oriented to person, place, and time. No cranial nerve deficit. Coordination normal.   Skin: Skin is warm and dry. No rash noted. She is not diaphoretic. No erythema. No pallor.   Psychiatric: She has a normal mood and affect. Her behavior is normal. Judgment and thought content normal.   Nursing note and vitals reviewed.        Assessment/Plan   Danae was seen today for follow-up.    Diagnoses and all orders for this visit:    Annual physical exam  -     CBC & Differential; Future  -     Comprehensive Metabolic Panel; Future  -     Hemoglobin A1c; Future  -     Lipid Panel; Future  -     TSH; Future  -     T4,  Free; Future    Essential (primary) hypertension    Mixed hyperlipidemia    Atherosclerosis of native coronary artery of native heart without angina pectoris    Vitamin D deficiency  -     Vitamin D 25 Hydroxy; Future    Gastroesophageal reflux disease without esophagitis    Type 2 diabetes mellitus with hyperglycemia, without long-term current use of insulin (CMS/[facility])  -     Comprehensive Metabolic Panel; Future  -     Hemoglobin A1c; Future  -     Microalbumin / Creatinine Urine Ratio - Urine, Clean Catch; Future    Acquired hypothyroidism  -     TSH; Future  -     T4, Free; Future    Hypomagnesemia  -     Magnesium; Future    Class 2 severe obesity due to excess calories with serious comorbidity and body mass index (BMI) of 36.0 to 36.9 in adult (CMS/[facility])    Anemia, unspecified type  -     FeAsp-FeFum -Suc-C-Thre-B12-FA (MULTIGEN PLUS) -1 MG tablet tablet; Take 1 tablet by mouth Daily.    Preventative health care  -     Influenza Vac Subunit Quad (FLUCELVAX) injection 0.5 mL    1.  Essential hypertension-controlled.  Continue medications as prescribed.  Low-sodium diet.  Weight loss recommended.  Will continue to monitor.    2.  Mixed hyperlipidemia-continue statin therapy as prescribed.  Low-fat diet and routine exercise.  Weight loss recommended.  Lipid panel.  Will call with results.    3.  Atherosclerosis of native coronary artery of native heart without angina-no chest pain, shortness of breath, palpitations or edema.  Continue medications for hypertension, hyperlipidemia and anticoagulation.  Continue follow-up with cardiology.    4.  Vitamin D deficiency-vitamin D level.  Will call with results.    5.  GERD without esophagitis-controlled.  No change in therapy at this time.    6.  Type 2 diabetes mellitus with hyperglycemia without long-term current use of insulin-CMP, hemoglobin A1c, microalbumin/creatinine urine ratio.  Will call with results.  Encouraged adherence to a strict diabetic diet and  medication compliance.  We will continue to monitor.    7.  Acquired hypothyroidism-TSH and T4.  Will call with results.    8.  Hypomagnesemia-magnesium level.  Will call with results.    9.  Class II severe obesity due to excess calories with serious comorbidity with a BMI of 36- encouraged a mainly plant-based diet with lean meat/protein choices.,  Exercise 3-5 times a week for a minimum of 30 minutes, 1500-calorie ADA daily diet to facilitate weight loss.  We will continue to monitor.    10.  Anemia-CBC.  Will call with results.  Refill Multigen plus.    11.  Health maintenance-routine labs ordered.  Will call with results.  Seasonal influenza vaccine today.  Patient tolerated well with no side effects.    12.  Follow-up in 3 months or sooner if needed.            This document has been electronically signed by WILLIS Manzano on September 3, 2020 11:36

## 2020-09-04 DIAGNOSIS — R79.89 ELEVATED SERUM CREATININE: ICD-10-CM

## 2020-09-04 DIAGNOSIS — E11.65 TYPE 2 DIABETES MELLITUS WITH HYPERGLYCEMIA, WITHOUT LONG-TERM CURRENT USE OF INSULIN (HCC): Primary | ICD-10-CM

## 2020-09-04 DIAGNOSIS — E03.9 ACQUIRED HYPOTHYROIDISM: ICD-10-CM

## 2020-09-04 RX ORDER — LEVOTHYROXINE SODIUM 112 UG/1
112 TABLET ORAL DAILY
Qty: 30 TABLET | Refills: 3 | Status: SHIPPED | OUTPATIENT
Start: 2020-09-04 | End: 2020-09-08 | Stop reason: SDUPTHER

## 2020-09-04 NOTE — PROGRESS NOTES
Hemoglobin A1c continues to rise, 9.08.  Since it continues to rise despite medication changes I am referring her to endocrinology for ongoing management.  I am going to keep her levothyroxine at 112 mcg.  However explained to her that she needs to make sure she takes it every day as her level is slightly at the higher end of normal limits and missing doses could lead to subtherapeutic levels.  Creatinine is slightly elevated.  She needs to avoid over-the-counter NSAIDs and strictly control her blood pressure and blood sugar.  She needs present to the lab in 1 month to repeat BMP.  Follow-up as scheduled or sooner for any acute needs.

## 2020-09-06 DIAGNOSIS — I10 ESSENTIAL HYPERTENSION: ICD-10-CM

## 2020-09-08 DIAGNOSIS — E03.9 ACQUIRED HYPOTHYROIDISM: ICD-10-CM

## 2020-09-08 RX ORDER — AMLODIPINE BESYLATE 5 MG/1
TABLET ORAL
Qty: 90 TABLET | Refills: 2 | Status: SHIPPED | OUTPATIENT
Start: 2020-09-08 | End: 2021-03-22

## 2020-09-08 RX ORDER — LEVOTHYROXINE SODIUM 112 UG/1
112 TABLET ORAL DAILY
Qty: 30 TABLET | Refills: 3 | Status: SHIPPED | OUTPATIENT
Start: 2020-09-08 | End: 2021-01-18 | Stop reason: SDUPTHER

## 2020-09-23 DIAGNOSIS — E66.9 DIABETES MELLITUS TYPE 2 IN OBESE (HCC): ICD-10-CM

## 2020-09-23 DIAGNOSIS — K21.9 GASTROESOPHAGEAL REFLUX DISEASE, ESOPHAGITIS PRESENCE NOT SPECIFIED: ICD-10-CM

## 2020-09-23 DIAGNOSIS — E11.69 DIABETES MELLITUS TYPE 2 IN OBESE (HCC): ICD-10-CM

## 2020-09-23 RX ORDER — DEXLANSOPRAZOLE 60 MG/1
CAPSULE, DELAYED RELEASE ORAL
Qty: 90 CAPSULE | Refills: 1 | Status: SHIPPED | OUTPATIENT
Start: 2020-09-23 | End: 2021-02-26 | Stop reason: CLARIF

## 2020-09-23 RX ORDER — GLIPIZIDE 10 MG/1
TABLET, FILM COATED, EXTENDED RELEASE ORAL
Qty: 90 TABLET | Refills: 2 | Status: SHIPPED | OUTPATIENT
Start: 2020-09-23 | End: 2020-11-11

## 2020-09-30 ENCOUNTER — OFFICE VISIT (OUTPATIENT)
Dept: SURGERY | Facility: CLINIC | Age: 60
End: 2020-09-30

## 2020-09-30 VITALS
DIASTOLIC BLOOD PRESSURE: 70 MMHG | OXYGEN SATURATION: 98 % | SYSTOLIC BLOOD PRESSURE: 120 MMHG | HEART RATE: 68 BPM | HEIGHT: 64 IN | WEIGHT: 211.8 LBS | BODY MASS INDEX: 36.16 KG/M2 | TEMPERATURE: 98.2 F

## 2020-09-30 DIAGNOSIS — Z80.3 FAMILY HISTORY OF MALIGNANT NEOPLASM OF BREAST: Primary | ICD-10-CM

## 2020-09-30 DIAGNOSIS — N60.19 FIBROCYSTIC BREAST DISEASE (FCBD), UNSPECIFIED LATERALITY: ICD-10-CM

## 2020-09-30 PROCEDURE — 99213 OFFICE O/P EST LOW 20 MIN: CPT | Performed by: SURGERY

## 2020-09-30 NOTE — PROGRESS NOTES
Chief Complaint: This is a 60 y.o. woman seen in consultation for management of breast cancer risk-mother with breast    History of Present Illness:  Patient has noted no new masses, skin changes, nipple discharge, nipple changes prior to her most recent imaging.  Her most recent imaging includes the following:   Study Result    MAMMOGRAM: Right breast diagnostic.     HISTORY: Calcifications, follow-up        COMPARISON:  March 19, 2020. September 17, 2019. September 7, 2018.     FINDINGS:     Bilateral low dose digital mammography was performed.    This study was interpreted with the assistance of CAD (computer  aided diagnosis. 3-D Mammotomosynthesis was obtained.     Parenchymal pattern: Scattered areas  of fibroglandular density.     A cluster of calcifications right breast are unchanged in  comparison with several prior exams. Lack of change therefore  indicates a benign process.     Mammographic examination right and left breast is otherwise  remarkable. Stable benign mammographic exam.        IMPRESSION:  1.  No mammographic evidence of malignancy - no interval change.     Follow-up recommendations:  annual mammographic surveillance.  BIRADS: 2, Benign finding(s)        Electronically signed by:  Pineda Miguel MD  9/22/2020 1:40 PM CDT  Workstation: [id number]     I have personally reviewed the imaging and concur with the radiologist's findings.    ( I have personally reviewed the breast imaging and concur with the findings of the radiologist- BiRADS 2)    Breast Biopsy History: 1 biopsy in 2011 for benign disease.  Breast Cancer History: Patient does not have a past medical history of breast cancer.  Her family history includes the following: Mother with breast cancer in her 70s    She is here for evaluation.    Past Medical History:   Diagnosis Date   • Abnormal thyroid function test    • Acquired hypothyroidism    • Allergic    • Allergic rhinitis    • Anemia    • Breast cyst    • Coronary  atherosclerosis     unspecified type of vessel, native or graft      • Dilated cardiomyopathy (CMS/HCC)    • Dyslipidemia    • Esophageal reflux    • Essential hypertension    • Family history of malignant neoplasm of breast    • Fibrocystic disease of breast     Low Mireille score   • GERD (gastroesophageal reflux disease)    • Herpes zoster    • History of chicken pox    • Hyperlipidemia    • Measles    • Mumps    • Myocardial infarction (CMS/HCC)    • Nasal septal deformity    • Obesity    • Type 2 diabetes mellitus (CMS/HCC)     Uncontrolled   • V tach (CMS/HCC)    • Vitamin D deficiency      Past Surgical History:   Procedure Laterality Date   • BREAST BIOPSY  2010    left breast benign   • BREAST CYST ASPIRATION     • CARDIAC CATHETERIZATION  2002    Left cardiac cath, selective coronary angiography, PTCA to the left anterior descending. Stent placement to the left anterior descending   • CARDIAC DEFIBRILLATOR PLACEMENT Left     Dr. Brunner   •  SECTION      Intrauterine pregnancy, term gestation, cephalopelvic disproportion   • COLONOSCOPY N/A 10/12/2018    Procedure: COLONOSCOPY;  Surgeon: Delroy Miller MD;  Location: [facility] ENDOSCOPY;  Service: General   • ENDOSCOPY AND COLONOSCOPY  04/15/2008    Normal   • ESOPHAGOSCOPY / EGD  2007    With tube-Esophagus appeared normal. Gastritis of the stomach. A biopsy of the stomach was obtained from the stomach. The duodenum appeared normal   • EXTERNAL EAR SURGERY  08/15/1991    Repair biifd ear   • OOPHORECTOMY      Left salpingo-oophorectomy, wedge resection of the right ovary, lysis of adhesions   • TRANSESOPHAGEAL ECHOCARDIOGRAM (LORELEI)  2012    Without color flow-Moderate to severe LV dysfumctional w/ an EF of 30-35%. Wall motion abnormalities as described above. LV enlargement. Mild aortic insufficiency, mild TR regurg, mild M regurg. mild pulmonary insufficiency     Prior to Admission medications    Medication Sig  Start Date End Date Taking? Authorizing Provider   albuterol sulfate  (90 Base) MCG/ACT inhaler Inhale 1 puff Every 6 (Six) Hours As Needed for Wheezing.  Patient taking differently: Inhale 1 puff Every 6 (Six) Hours As Needed for Wheezing. 1/10/19  Yes Aleksander Agrawal MD   amLODIPine (NORVASC) 5 MG tablet TAKE ONE TABLET BY MOUTH EVERY NIGHT AT BEDTIME 9/8/20  Yes José Ruano APRN   aspirin 325 MG tablet Take 1 tablet by mouth Daily. 3/2/20  Yes José Ruano APRN   atorvastatin (LIPITOR) 20 MG tablet TAKE ONE TABLET BY MOUTH ONCE NIGHTLY 6/1/20  Yes José Ruano APRN   cetirizine (zyrTEC) 10 MG tablet Take 1 tablet by mouth Daily. 3/2/20  Yes José Ruano APRN   Cholecalciferol (VITAMIN D3) 125 MCG (5000 UT) capsule capsule Take 1 capsule by mouth Daily. 3/2/20  Yes José Ruano APRN   Dexilant 60 MG capsule TAKE ONE CAPSULE BY MOUTH DAILY 9/23/20  Yes José Ruano APRN   Empagliflozin (Jardiance) 25 MG tablet Take 25 mg by mouth Daily. 3/31/20  Yes José Ruano APRN   FeAsp-FeFum -Suc-C-Thre-B12-FA (MULTIGEN PLUS) -1 MG tablet tablet Take 1 tablet by mouth Daily. 9/3/20  Yes José Ruano APRN   glipizide (GLUCOTROL XL) 10 MG 24 hr tablet TAKE ONE TABLET BY MOUTH DAILY 9/23/20  Yes José Ruano APRN   glucose blood test strip Check twice daily. Use as instructed 1/29/19  Yes José Ruano APRN   glucose monitor monitoring kit 1 each Daily. 1/29/19  Yes José Ruano APRN   hydroCHLOROthiazide (HYDRODIURIL) 50 MG tablet TAKE ONE TABLET BY MOUTH DAILY 7/6/20  Yes José Ruano APRN   Lancet Device misc 1 each 2 (Two) Times a Day. 1/29/19  Yes José Ruano APRN   levothyroxine (SYNTHROID, LEVOTHROID) 112 MCG tablet Take 1 tablet by mouth Daily. 9/8/20  Yes José Ruano APRN   lisinopril (PRINIVIL,ZESTRIL) 40 MG tablet TAKE ONE TABLET BY MOUTH DAILY 6/2/20  Yes José Ruano APRN   magnesium oxide (MAGOX) 400 (241.3 Mg)  MG tablet tablet TAKE TWO TABLETS BY MOUTH TWICE A DAY 7/19/18  Yes Jade Bansal MD   metFORMIN ER (GLUCOPHAGE-XR) 500 MG 24 hr tablet TAKE TWO TABLETS BY MOUTH TWICE A DAY BEFORE MEALS 10/21/19  Yes José Ruano APRDARRICK   metoprolol succinate XL (TOPROL-XL) 100 MG 24 hr tablet TAKE THREE TABLETS BY MOUTH DAILY 6/2/20  Yes Alden José I, APRN   mexiletine (MEXITIL) 150 MG capsule  9/7/16  Yes ProviderCelestina MD   potassium chloride (K-DUR,KLOR-CON) 10 MEQ CR tablet TAKE TWO TABLETS BY MOUTH DAILY 7/29/20  Yes Alden, José I, APRN   triamcinolone (KENALOG) 0.1 % cream Apply  topically to the appropriate area as directed 2 (Two) Times a Day. 12/2/19  Yes [place], José I, APRN     Allergies   Allergen Reactions   • Codeine GI Intolerance     Family History   Problem Relation Age of Onset   • Breast cancer Mother         50's   • Other Mother         CVA   • Coronary artery disease Other    • Diabetes Other    • Heart disease Other    • Hypertension Other    • Autism Other         grandson   • Alcohol abuse Brother    • Cardiomyopathy Brother    • Coronary artery disease Brother    • Heart disease Father    • Hypertension Sister    • Diabetes Sister    • Heart disease Son    • Heart attack Son    • Heart disease Maternal Grandmother    • Hypertension Maternal Grandmother    • Hypertension Sister    • Hypertension Sister    • Hypertension Brother    • Hypertension Brother      Social History     Socioeconomic History   • Marital status: Single     Spouse name: Not on file   • Number of children: Not on file   • Years of education: Not on file   • Highest education level: Not on file   Tobacco Use   • Smoking status: Never Smoker   • Smokeless tobacco: Never Used   Substance and Sexual Activity   • Alcohol use: No   • Drug use: No   • Sexual activity: Defer     Birth control/protection: Post-menopausal       Physical Exam  Vitals signs and nursing note reviewed. Exam conducted with a chaperone  present.   Neck:      Musculoskeletal: Normal range of motion and neck supple.   Cardiovascular:      Rate and Rhythm: Normal rate and regular rhythm.   Pulmonary:      Effort: Pulmonary effort is normal. No respiratory distress.   Chest:      Breasts: Breasts are symmetrical.         Right: No inverted nipple, mass, nipple discharge, skin change or tenderness.         Left: No inverted nipple, mass, nipple discharge, skin change or tenderness.       Vitals:    09/30/20 0911   BP: 120/70   Pulse: 68   Temp: 98.2 °F (36.8 °C)   SpO2: 98%     Assessment:    Danae was seen today for abnormal mammogram of right breast.    Diagnoses and all orders for this visit:    Family history of malignant neoplasm of breast    Fibrocystic breast disease (FCBD), unspecified laterality        Plan:  1.  Recheck 1 year mammogram examination                This document has been electronically signed by Rodo Holcomb MD on September 30, 2020 10:12 CDT

## 2020-10-08 DIAGNOSIS — Z12.31 SCREENING MAMMOGRAM FOR HIGH-RISK PATIENT: Primary | ICD-10-CM

## 2020-10-11 DIAGNOSIS — I10 ESSENTIAL HYPERTENSION: ICD-10-CM

## 2020-10-12 RX ORDER — HYDROCHLOROTHIAZIDE 50 MG/1
TABLET ORAL
Qty: 90 TABLET | Refills: 2 | Status: SHIPPED | OUTPATIENT
Start: 2020-10-12 | End: 2021-06-28 | Stop reason: SDUPTHER

## 2020-10-21 DIAGNOSIS — E87.6 HYPOKALEMIA: ICD-10-CM

## 2020-10-21 DIAGNOSIS — E03.9 ACQUIRED HYPOTHYROIDISM: ICD-10-CM

## 2020-10-21 RX ORDER — METFORMIN HYDROCHLORIDE 500 MG/1
TABLET, EXTENDED RELEASE ORAL
Qty: 40 TABLET | Refills: 3 | Status: SHIPPED | OUTPATIENT
Start: 2020-10-21 | End: 2020-11-11

## 2020-10-21 RX ORDER — POTASSIUM CHLORIDE 750 MG/1
TABLET, EXTENDED RELEASE ORAL
Qty: 60 TABLET | Refills: 0 | Status: SHIPPED | OUTPATIENT
Start: 2020-10-21 | End: 2020-11-25

## 2020-10-21 RX ORDER — LEVOTHYROXINE SODIUM 0.12 MG/1
TABLET ORAL
Qty: 30 TABLET | Refills: 2 | OUTPATIENT
Start: 2020-10-21

## 2020-10-27 NOTE — ADDENDUM NOTE
Addended by: ANNEMARIE FREITAS on: 1/27/2018 06:46 PM     Modules accepted: Orders     As requested, Dr. Gunter submitted the two Rx pens to mail order pharmacy.  Pt's wife advised.

## 2020-11-11 ENCOUNTER — OFFICE VISIT (OUTPATIENT)
Dept: ENDOCRINOLOGY | Facility: CLINIC | Age: 60
End: 2020-11-11

## 2020-11-11 VITALS
SYSTOLIC BLOOD PRESSURE: 120 MMHG | DIASTOLIC BLOOD PRESSURE: 88 MMHG | BODY MASS INDEX: 36.54 KG/M2 | OXYGEN SATURATION: 98 % | HEIGHT: 64 IN | HEART RATE: 67 BPM | WEIGHT: 214 LBS

## 2020-11-11 DIAGNOSIS — E11.65 TYPE 2 DIABETES MELLITUS WITH HYPERGLYCEMIA, WITHOUT LONG-TERM CURRENT USE OF INSULIN (HCC): Primary | ICD-10-CM

## 2020-11-11 DIAGNOSIS — I25.5 ISCHEMIC CARDIOMYOPATHY: ICD-10-CM

## 2020-11-11 PROCEDURE — 99204 OFFICE O/P NEW MOD 45 MIN: CPT | Performed by: INTERNAL MEDICINE

## 2020-11-11 RX ORDER — PROCHLORPERAZINE 25 MG/1
1 SUPPOSITORY RECTAL ONCE
Qty: 1 EACH | Refills: 3 | Status: SHIPPED | OUTPATIENT
Start: 2020-11-11 | End: 2020-11-11

## 2020-11-11 RX ORDER — PROCHLORPERAZINE 25 MG/1
SUPPOSITORY RECTAL AS NEEDED
Qty: 9 EACH | Refills: 3 | Status: SHIPPED | OUTPATIENT
Start: 2020-11-11 | End: 2021-10-11

## 2020-11-11 NOTE — PATIENT INSTRUCTIONS
Combine Jardiance and Metformin as Synjardy XR 12/5 /1000 mg , 2 tabs w bkfast    Start Ozempic 0.25 mg weekly for 4 weeks then increase to 0.5 mg weekly   If nausea try to eat less  If vomiting , stop   Ozempic will help generate more of your own insulin and it decreases the probability of Death / Heart / Stroke by 27%    Stop glipizide    You have to consume no more than 50 grams of carbohydrates per meal    You should exercise 30 min , 5 days of the week     Use Dexcom to track your sugars. Your goal is to not let the glucose rise more than 180 , 2 hours after eating

## 2020-11-11 NOTE — PROGRESS NOTES
Danae Ngo is a 60 y.o. female who presents for  evaluation of   Chief Complaint   Patient presents with   • [facility]     type 2       Referring provider    José Ruano APRN  [address] [place] [address]    Primary Care Provider    José Ruano APRN    60-year-old female comes for consultation.    Duration of diabetes has been approximately 5 years.    She has no microvascular complications from diabetes but she does have coronary artery disease    Last eye exam was in December 2019.    She rarely checks her blood glucose readings,  does not exercise,  does not have low sugars    Her diabetes regimen consists of Metformin, Jardiance and glipizide.    In the past she tried Trulicity but it was not enough to result in euglycemia    Past Medical History:   Diagnosis Date   • Abnormal thyroid function test    • Acquired hypothyroidism    • Allergic    • Allergic rhinitis    • Anemia    • Breast cyst    • Coronary atherosclerosis     unspecified type of vessel, native or graft      • Dilated cardiomyopathy (CMS/HCC)    • Dyslipidemia    • Esophageal reflux    • Essential hypertension    • Family history of malignant neoplasm of breast    • Fibrocystic disease of breast     Low Mireille score   • GERD (gastroesophageal reflux disease)    • Herpes zoster    • History of chicken pox    • Hyperlipidemia    • Measles    • Mumps    • Myocardial infarction (CMS/HCC) 2001   • Nasal septal deformity    • Obesity    • Type 2 diabetes mellitus (CMS/HCC)     Uncontrolled   • V tach (CMS/HCC)    • Vitamin D deficiency      Family History   Problem Relation Age of Onset   • Breast cancer Mother         50's   • Other Mother         CVA   • Coronary artery disease Other    • Diabetes Other    • Heart disease Other    • Hypertension Other    • Autism Other         grandson   • Alcohol abuse Brother    • Cardiomyopathy Brother    • Coronary artery disease Brother    • Heart disease Father    •  Hypertension Sister    • Diabetes Sister    • Heart disease Son    • Heart attack Son    • Heart disease Maternal Grandmother    • Hypertension Maternal Grandmother    • Hypertension Sister    • Hypertension Sister    • Hypertension Brother    • Hypertension Brother      Social History     Tobacco Use   • Smoking status: Never Smoker   • Smokeless tobacco: Never Used   Substance Use Topics   • Alcohol use: No   • Drug use: No         Current Outpatient Medications:   •  albuterol sulfate  (90 Base) MCG/ACT inhaler, Inhale 1 puff Every 6 (Six) Hours As Needed for Wheezing. (Patient taking differently: Inhale 1 puff Every 6 (Six) Hours As Needed for Wheezing.), Disp: 6.7 g, Rfl: 0  •  amLODIPine (NORVASC) 5 MG tablet, TAKE ONE TABLET BY MOUTH EVERY NIGHT AT BEDTIME, Disp: 90 tablet, Rfl: 2  •  aspirin 325 MG tablet, Take 1 tablet by mouth Daily., Disp: 90 tablet, Rfl: 3  •  atorvastatin (LIPITOR) 20 MG tablet, TAKE ONE TABLET BY MOUTH ONCE NIGHTLY, Disp: 90 tablet, Rfl: 1  •  cetirizine (zyrTEC) 10 MG tablet, Take 1 tablet by mouth Daily., Disp: 90 tablet, Rfl: 3  •  Cholecalciferol (VITAMIN D3) 125 MCG (5000 UT) capsule capsule, Take 1 capsule by mouth Daily., Disp: 90 capsule, Rfl: 3  •  Dexilant 60 MG capsule, TAKE ONE CAPSULE BY MOUTH DAILY, Disp: 90 capsule, Rfl: 1  •  Empagliflozin (Jardiance) 25 MG tablet, Take 25 mg by mouth Daily., Disp: 30 tablet, Rfl: 4  •  glucose blood test strip, Check twice daily. Use as instructed, Disp: 100 each, Rfl: 12  •  glucose monitor monitoring kit, 1 each Daily., Disp: 1 each, Rfl: 0  •  hydroCHLOROthiazide (HYDRODIURIL) 50 MG tablet, TAKE ONE TABLET BY MOUTH DAILY, Disp: 90 tablet, Rfl: 2  •  Lancet Device misc, 1 each 2 (Two) Times a Day., Disp: 1 each, Rfl: 12  •  levothyroxine (SYNTHROID, LEVOTHROID) 112 MCG tablet, Take 1 tablet by mouth Daily., Disp: 30 tablet, Rfl: 3  •  lisinopril (PRINIVIL,ZESTRIL) 40 MG tablet, TAKE ONE TABLET BY MOUTH DAILY, Disp: 90 tablet,  Rfl: 2  •  magnesium oxide (MAGOX) 400 (241.3 Mg) MG tablet tablet, TAKE TWO TABLETS BY MOUTH TWICE A DAY, Disp: 120 tablet, Rfl: 3  •  metFORMIN ER (GLUCOPHAGE-XR) 500 MG 24 hr tablet, TAKE TWO TABLETS BY MOUTH TWICE A DAY BEFORE MEALS, Disp: 40 tablet, Rfl: 3  •  metoprolol succinate XL (TOPROL-XL) 100 MG 24 hr tablet, TAKE THREE TABLETS BY MOUTH DAILY, Disp: 270 tablet, Rfl: 2  •  mexiletine (MEXITIL) 150 MG capsule, , Disp: , Rfl:   •  potassium chloride (K-DUR,KLOR-CON) 10 MEQ CR tablet, TAKE TWO TABLETS BY MOUTH DAILY, Disp: 60 tablet, Rfl: 0  •  triamcinolone (KENALOG) 0.1 % cream, Apply  topically to the appropriate area as directed 2 (Two) Times a Day., Disp: 60 g, Rfl: 0    Review of Systems    Review of Systems   Constitutional: Positive for fatigue and unexpected weight change. Negative for activity change, appetite change, chills, diaphoresis and fever.   HENT: Negative for congestion, dental problem, drooling, ear discharge, ear pain, facial swelling, mouth sores, postnasal drip, rhinorrhea, sinus pressure, sore throat, tinnitus, trouble swallowing and voice change.    Eyes: Negative for photophobia, pain, discharge, redness, itching and visual disturbance.   Respiratory: Negative for apnea, cough, choking, chest tightness, shortness of breath, wheezing and stridor.    Cardiovascular: Negative for chest pain, palpitations and leg swelling.   Gastrointestinal: Negative for abdominal distention, abdominal pain, constipation, diarrhea, nausea and vomiting.   Endocrine: Negative for cold intolerance, heat intolerance, polydipsia, polyphagia and polyuria.   Genitourinary: Negative for decreased urine volume, difficulty urinating, dysuria, flank pain, frequency, hematuria and urgency.   Musculoskeletal: Negative for arthralgias, back pain, gait problem, joint swelling, myalgias, neck pain and neck stiffness.   Skin: Negative for color change, pallor, rash and wound.   Allergic/Immunologic: Negative for  "immunocompromised state.   Neurological: Positive for weakness. Negative for dizziness, tremors, seizures, syncope, facial asymmetry, speech difficulty, light-headedness, numbness and headaches.   Hematological: Negative for adenopathy.   Psychiatric/Behavioral: Negative for agitation, behavioral problems, confusion, decreased concentration, dysphoric mood, hallucinations, self-injury, sleep disturbance and suicidal ideas. The patient is not nervous/anxious and is not hyperactive.         Objective:   /88 (BP Location: Left arm, Patient Position: Sitting, Cuff Size: Large Adult)   Pulse 67   Ht 162.6 cm (64\")   Wt 97.1 kg (214 lb)   LMP  (LMP Unknown)   SpO2 98%   BMI 36.73 kg/m²     Physical Exam  Constitutional:       General: She is not in acute distress.     Appearance: Normal appearance. She is normal weight. She is not ill-appearing, toxic-appearing or diaphoretic.   HENT:      Head: Normocephalic and atraumatic.      Right Ear: External ear normal.      Left Ear: External ear normal.      Nose: Nose normal. No congestion or rhinorrhea.      Mouth/Throat:      Mouth: Mucous membranes are moist.   Eyes:      General: No scleral icterus.        Right eye: No discharge.         Left eye: No discharge.      Extraocular Movements: Extraocular movements intact.      Conjunctiva/sclera: Conjunctivae normal.   Neck:      Musculoskeletal: Normal range of motion and neck supple. No neck rigidity or muscular tenderness.      Vascular: No carotid bruit.      Comments: Goiter   Cardiovascular:      Rate and Rhythm: Normal rate and regular rhythm.      Pulses: Normal pulses.      Heart sounds: Normal heart sounds. No murmur.   Pulmonary:      Effort: Pulmonary effort is normal. No respiratory distress.      Breath sounds: Normal breath sounds. No stridor. No wheezing, rhonchi or rales.   Chest:      Chest wall: No tenderness.   Abdominal:      General: There is no distension.      Palpations: Abdomen is soft. "   Musculoskeletal: Normal range of motion.         General: No swelling or tenderness.   Lymphadenopathy:      Cervical: No cervical adenopathy.   Skin:     Coloration: Skin is not jaundiced or pale.      Findings: No bruising, erythema, lesion or rash.   Neurological:      General: No focal deficit present.      Mental Status: She is alert and oriented to person, place, and time.      Cranial Nerves: No cranial nerve deficit.      Motor: No weakness.   Psychiatric:         Mood and Affect: Mood normal.         Behavior: Behavior normal.         Thought Content: Thought content normal.         Judgment: Judgment normal.         Lab Review    Results for orders placed or performed in visit on 09/03/20   Magnesium    Specimen: Blood   Result Value Ref Range    Magnesium 2.1 1.6 - 2.6 mg/dL   Vitamin D 25 Hydroxy    Specimen: Blood   Result Value Ref Range    25 Hydroxy, Vitamin D 66.4 30.0 - 100.0 ng/ml   Microalbumin / Creatinine Urine Ratio - Urine, Clean Catch    Specimen: Urine, Clean Catch   Result Value Ref Range    Microalbumin/Creatinine Ratio      Creatinine, Urine 89.3 mg/dL    Microalbumin, Urine <1.2 mg/dL   T4, Free    Specimen: Blood   Result Value Ref Range    Free T4 1.61 0.93 - 1.70 ng/dL   TSH    Specimen: Blood   Result Value Ref Range    TSH 3.080 0.270 - 4.200 uIU/mL   Lipid Panel    Specimen: Blood   Result Value Ref Range    Total Cholesterol 155 0 - 200 mg/dL    Triglycerides 116 0 - 150 mg/dL    HDL Cholesterol 40 40 - 60 mg/dL    LDL Cholesterol  92 0 - 100 mg/dL    VLDL Cholesterol 23.2 5 - 40 mg/dL    LDL/HDL Ratio 2.30    Hemoglobin A1c    Specimen: Blood   Result Value Ref Range    Hemoglobin A1C 9.08 (H) 4.80 - 5.60 %   Comprehensive Metabolic Panel    Specimen: Blood   Result Value Ref Range    Glucose 168 (H) 65 - 99 mg/dL    BUN 20 6 - 20 mg/dL    Creatinine 1.14 (H) 0.57 - 1.00 mg/dL    Sodium 134 (L) 136 - 145 mmol/L    Potassium 4.2 3.5 - 5.2 mmol/L    Chloride 98 98 - 107 mmol/L     CO2 25.3 22.0 - 29.0 mmol/L    Calcium 10.0 8.6 - 10.5 mg/dL    Total Protein 8.3 6.0 - 8.5 g/dL    Albumin 4.70 3.50 - 5.20 g/dL    ALT (SGPT) 22 1 - 33 U/L    AST (SGOT) 22 1 - 32 U/L    Alkaline Phosphatase 82 39 - 117 U/L    Total Bilirubin 0.6 0.0 - 1.2 mg/dL    eGFR  African Amer 59 (L) >60 mL/min/1.73    Globulin 3.6 gm/dL    A/G Ratio 1.3 g/dL    BUN/Creatinine Ratio 17.5 7.0 - 25.0    Anion Gap 10.7 5.0 - 15.0 mmol/L   CBC Auto Differential    Specimen: Blood   Result Value Ref Range    WBC 7.13 3.40 - 10.80 10*3/mm3    RBC 5.85 (H) 3.77 - 5.28 10*6/mm3    Hemoglobin 15.2 12.0 - 15.9 g/dL    Hematocrit 47.0 (H) 34.0 - 46.6 %    MCV 80.3 79.0 - 97.0 fL    MCH 26.0 (L) 26.6 - 33.0 pg    MCHC 32.3 31.5 - 35.7 g/dL    RDW 14.4 12.3 - 15.4 %    RDW-SD 41.3 37.0 - 54.0 fl    MPV 11.6 6.0 - 12.0 fL    Platelets 394 140 - 450 10*3/mm3    Neutrophil % 61.2 42.7 - 76.0 %    Lymphocyte % 26.4 19.6 - 45.3 %    Monocyte % 5.8 5.0 - 12.0 %    Eosinophil % 5.5 0.3 - 6.2 %    Basophil % 0.7 0.0 - 1.5 %    Immature Grans % 0.4 0.0 - 0.5 %    Neutrophils, Absolute 4.37 1.70 - 7.00 10*3/mm3    Lymphocytes, Absolute 1.88 0.70 - 3.10 10*3/mm3    Monocytes, Absolute 0.41 0.10 - 0.90 10*3/mm3    Eosinophils, Absolute 0.39 0.00 - 0.40 10*3/mm3    Basophils, Absolute 0.05 0.00 - 0.20 10*3/mm3    Immature Grans, Absolute 0.03 0.00 - 0.05 10*3/mm3    nRBC 0.1 0.0 - 0.2 /100 WBC         Assessment/Plan       ICD-10-CM ICD-9-CM   1. Type 2 diabetes mellitus with hyperglycemia, without long-term current use of insulin (CMS/[facility])  E11.65 250.00     790.29   2. Ischemic cardiomyopathy  I25.5 414.8         I reviewed and summarized records from José Ruano APRN from current year  and I reviewed / ordered labs.   From review of records :        Glycemic Management:   Lab Results   Component Value Date    HGBA1C 9.08 (H) 09/03/2020    HGBA1C 8.67 (H) 03/30/2020    HGBA1C 8.00 (H) 11/29/2019     Lab Results   Component Value Date     GLUCOSE 168 (H) 09/03/2020    BUN 20 09/03/2020    CREATININE 1.14 (H) 09/03/2020    EGFRIFAFRI 59 (L) 09/03/2020    BCR 17.5 09/03/2020    K 4.2 09/03/2020    CO2 25.3 09/03/2020    CALCIUM 10.0 09/03/2020    ALBUMIN 4.70 09/03/2020    LABIL2 1.1 04/25/2019    AST 22 09/03/2020    ALT 22 09/03/2020    ANIONGAP 10.7 09/03/2020     Lab Results   Component Value Date    WBC 7.13 09/03/2020    HGB 15.2 09/03/2020    HCT 47.0 (H) 09/03/2020    MCV 80.3 09/03/2020     09/03/2020     Combine Jardiance and Metformin as Synjardy XR 12/5 /1000 mg , 2 tabs w bkfast    Start Ozempic 0.25 mg weekly for 4 weeks then increase to 0.5 mg weekly   If nausea try to eat less  If vomiting , stop   Ozempic will help generate more of your own insulin and it decreases the probability of Death / Heart / Stroke by 27%    Stop glipizide    You have to consume no more than 50 grams of carbohydrates per meal    You should exercise 30 min , 5 days of the week     Use Dexcom to track your sugars. Your goal is to not let the glucose rise more than 180 , 2 hours after eating       Lipid Management  Lab Results   Component Value Date    CHOL 155 09/03/2020    CHOL 152 03/30/2020    CHOL 147 01/25/2019     Lab Results   Component Value Date    TRIG 116 09/03/2020    TRIG 119 03/30/2020    TRIG 113 01/25/2019     Lab Results   Component Value Date    HDL 40 09/03/2020    HDL 42 03/30/2020    HDL 33 (L) 01/25/2019     No components found for: LDLCALC  Lab Results   Component Value Date    LDL 92 09/03/2020    LDL 86 03/30/2020    LDL 86 01/25/2019     No results found for: LDLDIRECT    On atorvastatin 20 mg qhs    Please adjust regimen to known goals       Blood Pressure Management:    Vitals:    11/11/20 0921   BP: 120/88   Pulse: 67   SpO2: 98%     Lab Results   Component Value Date    GLUCOSE 168 (H) 09/03/2020    CALCIUM 10.0 09/03/2020     (L) 09/03/2020    K 4.2 09/03/2020    CO2 25.3 09/03/2020    CL 98 09/03/2020    BUN 20  09/03/2020    CREATININE 1.14 (H) 09/03/2020    EGFRIFAFRI 59 (L) 09/03/2020    BCR 17.5 09/03/2020    ANIONGAP 10.7 09/03/2020       BP controlled on lisinopril regimen     Microvascular Complication Monitoring:      Eye Exam Evaluation  12-19   No retinopathy   -----------    Last Microalbumin-Proteinuria Assessment    Lab Results   Component Value Date    MALBCRERATIO  09/03/2020      Comment:      Unable to calculate    MALBCRERATIO  11/29/2019      Comment:      Unable to calculate    MALBCRERATIO 47.5 (H) 03/23/2018       No results found for: UTPCR    -----------      Neuropathy  None         Weight Related:   Wt Readings from Last 3 Encounters:   11/11/20 97.1 kg (214 lb)   09/30/20 96.1 kg (211 lb 12.8 oz)   09/03/20 95.3 kg (210 lb)     Body mass index is 36.73 kg/m².        Diet interventions: moderate (500 kCal/d) deficit diet.      Bone Health    Lab Results   Component Value Date    CALCIUM 10.0 09/03/2020    [id number] 66.4 09/03/2020       Thyroid Health    Lab Results   Component Value Date    TSH 3.080 09/03/2020    TSH 1.620 03/30/2020    TSH 0.162 (L) 11/29/2019      Levothyroxine 112 mcgs daily , palpable goiter     Other Diabetes Related Aspects       Lab Results   Component Value Date    [id number] 586 03/23/2018            No orders of the defined types were placed in this encounter.        A copy of my note was sent to José Ruano APRN    Please see my above opinion and suggestions.           This document has been electronically signed by Abad Connolly MD on November 11, 2020 09:28 CST

## 2020-11-12 ENCOUNTER — TELEPHONE (OUTPATIENT)
Dept: ENDOCRINOLOGY | Facility: CLINIC | Age: 60
End: 2020-11-12

## 2020-11-12 NOTE — TELEPHONE ENCOUNTER
Pt left a vm stating that Dr Lewis had made some medication changes and she is needing to go over those again so that she knows she is doing it correctly.

## 2020-11-18 DIAGNOSIS — E11.69 HYPERLIPIDEMIA ASSOCIATED WITH TYPE 2 DIABETES MELLITUS (HCC): ICD-10-CM

## 2020-11-18 DIAGNOSIS — E78.5 HYPERLIPIDEMIA ASSOCIATED WITH TYPE 2 DIABETES MELLITUS (HCC): ICD-10-CM

## 2020-11-18 RX ORDER — ATORVASTATIN CALCIUM 20 MG/1
TABLET, FILM COATED ORAL
Qty: 90 TABLET | Refills: 1 | Status: SHIPPED | OUTPATIENT
Start: 2020-11-18 | End: 2021-03-22

## 2020-11-20 ENCOUNTER — TELEPHONE (OUTPATIENT)
Dept: ENDOCRINOLOGY | Facility: CLINIC | Age: 60
End: 2020-11-20

## 2020-11-20 NOTE — TELEPHONE ENCOUNTER
Pt called asking for a script for Synjardy XR 12.5 MG/ 1000 MG     Sent to:   [facility] [address] [place] [address] [address] AT [facility] [address] - [phone]  - [phone] FX

## 2020-11-21 RX ORDER — EMPAGLIFLOZIN, METFORMIN HYDROCHLORIDE 12.5; 1 MG/1; MG/1
2 TABLET, EXTENDED RELEASE ORAL
Qty: 180 TABLET | Refills: 3 | OUTPATIENT
Start: 2020-11-21 | End: 2021-03-08

## 2020-11-24 DIAGNOSIS — E87.6 HYPOKALEMIA: ICD-10-CM

## 2020-11-25 RX ORDER — POTASSIUM CHLORIDE 750 MG/1
TABLET, EXTENDED RELEASE ORAL
Qty: 60 TABLET | Refills: 5 | Status: SHIPPED | OUTPATIENT
Start: 2020-11-25 | End: 2021-03-22

## 2020-12-22 ENCOUNTER — TELEPHONE (OUTPATIENT)
Dept: ENDOCRINOLOGY | Facility: CLINIC | Age: 60
End: 2020-12-22

## 2020-12-23 RX ORDER — SEMAGLUTIDE 1.34 MG/ML
1 INJECTION, SOLUTION SUBCUTANEOUS
Qty: 2 PEN | Refills: 11 | Status: SHIPPED | OUTPATIENT
Start: 2020-12-23 | End: 2021-05-13 | Stop reason: SDUPTHER

## 2021-01-17 DIAGNOSIS — E03.9 ACQUIRED HYPOTHYROIDISM: ICD-10-CM

## 2021-01-18 DIAGNOSIS — E03.9 ACQUIRED HYPOTHYROIDISM: ICD-10-CM

## 2021-01-18 RX ORDER — LEVOTHYROXINE SODIUM 112 UG/1
112 TABLET ORAL DAILY
Qty: 30 TABLET | Refills: 3 | Status: SHIPPED | OUTPATIENT
Start: 2021-01-18 | End: 2021-03-22

## 2021-01-18 RX ORDER — LEVOTHYROXINE SODIUM 0.12 MG/1
TABLET ORAL
Qty: 30 TABLET | Refills: 2 | OUTPATIENT
Start: 2021-01-18

## 2021-01-21 DIAGNOSIS — E03.9 ACQUIRED HYPOTHYROIDISM: ICD-10-CM

## 2021-01-21 RX ORDER — LEVOTHYROXINE SODIUM 112 UG/1
TABLET ORAL
Qty: 90 TABLET | Refills: 2 | OUTPATIENT
Start: 2021-01-21

## 2021-01-21 RX ORDER — ASPIRIN 325 MG/1
TABLET, FILM COATED ORAL
Qty: 62 TABLET | Refills: 2 | Status: SHIPPED | OUTPATIENT
Start: 2021-01-21 | End: 2021-12-27

## 2021-01-21 RX ORDER — EMPAGLIFLOZIN 25 MG/1
TABLET, FILM COATED ORAL
Qty: 30 TABLET | Refills: 3 | OUTPATIENT
Start: 2021-01-21

## 2021-01-21 RX ORDER — LISINOPRIL 40 MG/1
TABLET ORAL
Qty: 90 TABLET | Refills: 1 | Status: SHIPPED | OUTPATIENT
Start: 2021-01-21 | End: 2021-09-27

## 2021-01-21 RX ORDER — METOPROLOL SUCCINATE 100 MG/1
TABLET, EXTENDED RELEASE ORAL
Qty: 180 TABLET | Refills: 1 | Status: SHIPPED | OUTPATIENT
Start: 2021-01-21 | End: 2021-09-21

## 2021-02-16 ENCOUNTER — TELEMEDICINE (OUTPATIENT)
Dept: ENDOCRINOLOGY | Facility: CLINIC | Age: 61
End: 2021-02-16

## 2021-02-16 DIAGNOSIS — E11.65 TYPE 2 DIABETES MELLITUS WITH HYPERGLYCEMIA, WITHOUT LONG-TERM CURRENT USE OF INSULIN (HCC): ICD-10-CM

## 2021-02-16 DIAGNOSIS — E55.9 VITAMIN D DEFICIENCY: Primary | ICD-10-CM

## 2021-02-16 DIAGNOSIS — E03.9 ACQUIRED HYPOTHYROIDISM: ICD-10-CM

## 2021-02-16 DIAGNOSIS — E78.2 MIXED HYPERLIPIDEMIA: ICD-10-CM

## 2021-02-16 PROCEDURE — 99214 OFFICE O/P EST MOD 30 MIN: CPT | Performed by: NURSE PRACTITIONER

## 2021-02-16 NOTE — PROGRESS NOTES
Chief Complaint  Diabetes    Subjective          Danae Ines Ngo presents to [facility] END[facility]  History of Present Illness     You have chosen to receive care through a telehealth visit.  Do you consent to use a video/audio connection for your medical care today? Yes        TELEHEALTH VIDEO VISIT     This a video visit due to [facility] current guidelines for social distancing due to the COVID 19 pandemic        Primary Care Provider     José Ruano APRN    60 year old female presents for follow up     Reason diabetes mellitus type 2    Timing constant     Quality not controlled     Duration diagnosed in 2015     Microvascular complications -- none    Macrovascular complications --- CAD        Current regimen --- oral medications, GLP-1    Current glucose monitoring     Uses the dexcom G6     States am at goal     After meals less than 180     No lows              Review of Systems   Constitutional: Positive for fatigue.     Objective   Vital Signs:   There were no vitals taken for this visit.    Physical Exam  Neurological:      General: No focal deficit present.      Mental Status: She is alert.   Psychiatric:         Mood and Affect: Mood normal.         Thought Content: Thought content normal.         Judgment: Judgment normal.        Result Review :   The following data was reviewed by: WILLIS Gabriel on 02/16/2021:  Common labs    Common Labsle 3/30/20 3/30/20 9/3/20 9/3/20 9/3/20 9/3/20 9/3/20    1035 1035 1044 1044 1044 1044 1044   Glucose     168 (A)     BUN     20     Creatinine     1.14 (A)     eGFR  Am     59 (A)     Sodium     134 (A)     Potassium     4.2     Chloride     98     Calcium     10.0     Albumin     4.70     Total Bilirubin     0.6     Alkaline Phosphatase     82     AST (SGOT)     22     ALT (SGPT)     22     WBC   7.13       Hemoglobin   15.2       Hematocrit   47.0 (A)       Platelets   394       Total Cholesterol  152  155      Triglycerides   119  116      HDL Cholesterol  42  40      LDL Cholesterol   86  92      Hemoglobin A1C 8.67 (A)      9.08 (A)   Microalbumin, Urine      <1.2    (A) Abnormal value                      Assessment and Plan    Diagnoses and all orders for this visit:    1. Vitamin D deficiency (Primary)  -     CBC & Differential; Future  -     Comprehensive Metabolic Panel; Future  -     Hemoglobin A1c; Future  -     Lipid Panel; Future  -     Microalbumin / Creatinine Urine Ratio - Urine, Clean Catch; Future  -     Protein / Creatinine Ratio, Urine - Urine, Clean Catch; Future  -     TSH; Future  -     Vitamin B12; Future  -     Vitamin D 25 Hydroxy; Future    2. Type 2 diabetes mellitus with hyperglycemia, without long-term current use of insulin (CMS/[facility])  -     CBC & Differential; Future  -     Comprehensive Metabolic Panel; Future  -     Hemoglobin A1c; Future  -     Lipid Panel; Future  -     Microalbumin / Creatinine Urine Ratio - Urine, Clean Catch; Future  -     Protein / Creatinine Ratio, Urine - Urine, Clean Catch; Future  -     TSH; Future  -     Vitamin B12; Future  -     Vitamin D 25 Hydroxy; Future    3. Mixed hyperlipidemia  -     CBC & Differential; Future  -     Comprehensive Metabolic Panel; Future  -     Hemoglobin A1c; Future  -     Lipid Panel; Future  -     Microalbumin / Creatinine Urine Ratio - Urine, Clean Catch; Future  -     Protein / Creatinine Ratio, Urine - Urine, Clean Catch; Future  -     TSH; Future  -     Vitamin B12; Future  -     Vitamin D 25 Hydroxy; Future    4. Acquired hypothyroidism  -     CBC & Differential; Future  -     Comprehensive Metabolic Panel; Future  -     Hemoglobin A1c; Future  -     Lipid Panel; Future  -     Microalbumin / Creatinine Urine Ratio - Urine, Clean Catch; Future  -     Protein / Creatinine Ratio, Urine - Urine, Clean Catch; Future  -     TSH; Future  -     Vitamin B12; Future  -     Vitamin D 25 Hydroxy; Future           Glycemic Management:       Diabetes mellitus  type 2        Lab Results   Component Value Date    HGBA1C 9.08 (H) 09/03/2020         Taking Synjardy XR 12/5 /1000 mg , 2 tabs w bkfast     Taking  Ozempic 1 mg  weekly     If nausea try to eat less  If vomiting , stop   Ozempic will help generate more of your own insulin and it decreases the probability of Death / Heart / Stroke by 27%      Keep meals at 50 gm of CHO                  Stopped glipizide    Stopped jardiance     Stopped metformin         Lipid Management      Taking  atorvastatin 20 mg qhs              Blood Pressure Management:      Taking lisinopril 40 mg one daily     Taking metoprolol xl 100 mg daily              Microvascular Complication Monitoring:       Last eye exam Dec. 2019, no retinopathy            Last Microalbumin-Proteinuria Assessment          -----------        Neuropathy  None           Weight Related:       Obesity      Exercise at least 30 minutes a day 5 days a week    Decrease daily caloric intake by 500 amira/day          Bone Health     Vitamin d def    Taking MVI      Thyroid Health     Lab Results   Component Value Date    TSH 3.080 09/03/2020       Taking Levothyroxine 112 mcgs daily      Other Diabetes Related Aspects               Lab Results   Component Value Date     [id number] 586 03/23/2018                    Follow Up   No follow-ups on file.  Patient was given instructions and counseling regarding her condition or for health maintenance advice. Please see specific information pulled into the AVS if appropriate.     We spent 25 minutes including reviewing the patients electronic chart, reviewing medications, reviewing labs, active problems    I provided advice regarding diabetes management, dietary changes, adjustments of medication, self titration of insulin, refilled medications, ordering labs, future appointments    Patient was advised to contact the office if there are unanswered questions, trouble with blood glucose management, or any concerns

## 2021-02-26 ENCOUNTER — TELEPHONE (OUTPATIENT)
Dept: FAMILY MEDICINE CLINIC | Facility: CLINIC | Age: 61
End: 2021-02-26

## 2021-02-26 RX ORDER — OMEPRAZOLE 40 MG/1
40 CAPSULE, DELAYED RELEASE ORAL DAILY
Qty: 90 CAPSULE | Refills: 3 | Status: SHIPPED | OUTPATIENT
Start: 2021-02-26 | End: 2021-03-22 | Stop reason: ALTCHOICE

## 2021-03-01 ENCOUNTER — DOCUMENTATION (OUTPATIENT)
Dept: ENDOCRINOLOGY | Facility: CLINIC | Age: 61
End: 2021-03-01

## 2021-03-13 ENCOUNTER — LAB (OUTPATIENT)
Dept: LAB | Facility: HOSPITAL | Age: 61
End: 2021-03-13

## 2021-03-13 DIAGNOSIS — E03.9 ACQUIRED HYPOTHYROIDISM: ICD-10-CM

## 2021-03-13 DIAGNOSIS — E78.2 MIXED HYPERLIPIDEMIA: ICD-10-CM

## 2021-03-13 DIAGNOSIS — E55.9 VITAMIN D DEFICIENCY: ICD-10-CM

## 2021-03-13 DIAGNOSIS — E11.65 TYPE 2 DIABETES MELLITUS WITH HYPERGLYCEMIA, WITHOUT LONG-TERM CURRENT USE OF INSULIN (HCC): ICD-10-CM

## 2021-03-13 LAB
25(OH)D3 SERPL-MCNC: 83.8 NG/ML (ref 30–100)
ALBUMIN SERPL-MCNC: 4.6 G/DL (ref 3.5–5.2)
ALBUMIN UR-MCNC: 1.4 MG/DL
ALBUMIN/GLOB SERPL: 1.2 G/DL
ALP SERPL-CCNC: 65 U/L (ref 39–117)
ALT SERPL W P-5'-P-CCNC: 15 U/L (ref 1–33)
ANION GAP SERPL CALCULATED.3IONS-SCNC: 14 MMOL/L (ref 5–15)
AST SERPL-CCNC: 17 U/L (ref 1–32)
BASOPHILS # BLD AUTO: 0.04 10*3/MM3 (ref 0–0.2)
BASOPHILS NFR BLD AUTO: 0.6 % (ref 0–1.5)
BILIRUB SERPL-MCNC: 0.5 MG/DL (ref 0–1.2)
BUN SERPL-MCNC: 20 MG/DL (ref 6–20)
BUN/CREAT SERPL: 14.5 (ref 7–25)
CALCIUM SPEC-SCNC: 9.6 MG/DL (ref 8.6–10.5)
CHLORIDE SERPL-SCNC: 101 MMOL/L (ref 98–107)
CHOLEST SERPL-MCNC: 108 MG/DL (ref 0–200)
CO2 SERPL-SCNC: 24 MMOL/L (ref 22–29)
CREAT SERPL-MCNC: 1.38 MG/DL (ref 0.57–1)
CREAT UR-MCNC: 172.9 MG/DL
CREAT UR-MCNC: 172.9 MG/DL
DEPRECATED RDW RBC AUTO: 44.4 FL (ref 37–54)
EOSINOPHIL # BLD AUTO: 0.28 10*3/MM3 (ref 0–0.4)
EOSINOPHIL NFR BLD AUTO: 4.2 % (ref 0.3–6.2)
ERYTHROCYTE [DISTWIDTH] IN BLOOD BY AUTOMATED COUNT: 15 % (ref 12.3–15.4)
GFR SERPL CREATININE-BSD FRML MDRD: 47 ML/MIN/1.73
GLOBULIN UR ELPH-MCNC: 3.7 GM/DL
GLUCOSE SERPL-MCNC: 133 MG/DL (ref 65–99)
HBA1C MFR BLD: 7.49 % (ref 4.8–5.6)
HCT VFR BLD AUTO: 44.3 % (ref 34–46.6)
HDLC SERPL-MCNC: 37 MG/DL (ref 40–60)
HGB BLD-MCNC: 14.6 G/DL (ref 12–15.9)
IMM GRANULOCYTES # BLD AUTO: 0.02 10*3/MM3 (ref 0–0.05)
IMM GRANULOCYTES NFR BLD AUTO: 0.3 % (ref 0–0.5)
LDLC SERPL CALC-MCNC: 57 MG/DL (ref 0–100)
LDLC/HDLC SERPL: 1.58 {RATIO}
LYMPHOCYTES # BLD AUTO: 1.66 10*3/MM3 (ref 0.7–3.1)
LYMPHOCYTES NFR BLD AUTO: 24.7 % (ref 19.6–45.3)
MCH RBC QN AUTO: 26.9 PG (ref 26.6–33)
MCHC RBC AUTO-ENTMCNC: 33 G/DL (ref 31.5–35.7)
MCV RBC AUTO: 81.7 FL (ref 79–97)
MICROALBUMIN/CREAT UR: 8.1 MG/G
MONOCYTES # BLD AUTO: 0.36 10*3/MM3 (ref 0.1–0.9)
MONOCYTES NFR BLD AUTO: 5.4 % (ref 5–12)
NEUTROPHILS NFR BLD AUTO: 4.35 10*3/MM3 (ref 1.7–7)
NEUTROPHILS NFR BLD AUTO: 64.8 % (ref 42.7–76)
NRBC BLD AUTO-RTO: 0 /100 WBC (ref 0–0.2)
PLATELET # BLD AUTO: 378 10*3/MM3 (ref 140–450)
PMV BLD AUTO: 10.5 FL (ref 6–12)
POTASSIUM SERPL-SCNC: 3.8 MMOL/L (ref 3.5–5.2)
PROT SERPL-MCNC: 8.3 G/DL (ref 6–8.5)
PROT UR-MCNC: 12 MG/DL
PROT/CREAT UR: 69.4 MG/G CREA (ref 0–200)
RBC # BLD AUTO: 5.42 10*6/MM3 (ref 3.77–5.28)
SODIUM SERPL-SCNC: 139 MMOL/L (ref 136–145)
TRIGL SERPL-MCNC: 62 MG/DL (ref 0–150)
TSH SERPL DL<=0.05 MIU/L-ACNC: 2.25 UIU/ML (ref 0.27–4.2)
VIT B12 BLD-MCNC: 606 PG/ML (ref 211–946)
VLDLC SERPL-MCNC: 14 MG/DL (ref 5–40)
WBC # BLD AUTO: 6.71 10*3/MM3 (ref 3.4–10.8)

## 2021-03-13 PROCEDURE — 82570 ASSAY OF URINE CREATININE: CPT

## 2021-03-13 PROCEDURE — 83036 HEMOGLOBIN GLYCOSYLATED A1C: CPT

## 2021-03-13 PROCEDURE — 80061 LIPID PANEL: CPT

## 2021-03-13 PROCEDURE — 84156 ASSAY OF PROTEIN URINE: CPT

## 2021-03-13 PROCEDURE — 82607 VITAMIN B-12: CPT

## 2021-03-13 PROCEDURE — 36415 COLL VENOUS BLD VENIPUNCTURE: CPT

## 2021-03-13 PROCEDURE — 82306 VITAMIN D 25 HYDROXY: CPT

## 2021-03-13 PROCEDURE — 82043 UR ALBUMIN QUANTITATIVE: CPT

## 2021-03-13 PROCEDURE — 84443 ASSAY THYROID STIM HORMONE: CPT

## 2021-03-13 PROCEDURE — 85025 COMPLETE CBC W/AUTO DIFF WBC: CPT

## 2021-03-13 PROCEDURE — 80053 COMPREHEN METABOLIC PANEL: CPT

## 2021-03-22 DIAGNOSIS — E87.6 HYPOKALEMIA: ICD-10-CM

## 2021-03-22 DIAGNOSIS — I10 ESSENTIAL HYPERTENSION: ICD-10-CM

## 2021-03-22 DIAGNOSIS — E03.9 ACQUIRED HYPOTHYROIDISM: ICD-10-CM

## 2021-03-22 DIAGNOSIS — K21.9 GASTROESOPHAGEAL REFLUX DISEASE: ICD-10-CM

## 2021-03-22 DIAGNOSIS — E78.5 HYPERLIPIDEMIA ASSOCIATED WITH TYPE 2 DIABETES MELLITUS (HCC): ICD-10-CM

## 2021-03-22 DIAGNOSIS — E66.9 DIABETES MELLITUS TYPE 2 IN OBESE (HCC): ICD-10-CM

## 2021-03-22 DIAGNOSIS — E11.69 DIABETES MELLITUS TYPE 2 IN OBESE (HCC): ICD-10-CM

## 2021-03-22 DIAGNOSIS — E11.69 HYPERLIPIDEMIA ASSOCIATED WITH TYPE 2 DIABETES MELLITUS (HCC): ICD-10-CM

## 2021-03-22 RX ORDER — DEXLANSOPRAZOLE 60 MG/1
CAPSULE, DELAYED RELEASE ORAL
Qty: 90 CAPSULE | Refills: 1 | Status: SHIPPED | OUTPATIENT
Start: 2021-03-22 | End: 2021-05-12

## 2021-03-22 RX ORDER — AMLODIPINE BESYLATE 5 MG/1
TABLET ORAL
Qty: 90 TABLET | Refills: 1 | Status: SHIPPED | OUTPATIENT
Start: 2021-03-22 | End: 2021-10-01 | Stop reason: SDUPTHER

## 2021-03-22 RX ORDER — LEVOTHYROXINE SODIUM 0.12 MG/1
TABLET ORAL
Qty: 30 TABLET | Refills: 2 | OUTPATIENT
Start: 2021-03-22

## 2021-03-22 RX ORDER — POTASSIUM CHLORIDE 750 MG/1
TABLET, EXTENDED RELEASE ORAL
Qty: 120 TABLET | Refills: 4 | Status: SHIPPED | OUTPATIENT
Start: 2021-03-22 | End: 2021-10-01 | Stop reason: SDUPTHER

## 2021-03-22 RX ORDER — ATORVASTATIN CALCIUM 20 MG/1
TABLET, FILM COATED ORAL
Qty: 90 TABLET | Refills: 3 | Status: SHIPPED | OUTPATIENT
Start: 2021-03-22 | End: 2022-05-12

## 2021-03-22 RX ORDER — LEVOTHYROXINE SODIUM 112 UG/1
TABLET ORAL
Qty: 90 TABLET | Refills: 2 | Status: SHIPPED | OUTPATIENT
Start: 2021-03-22 | End: 2021-10-01 | Stop reason: SDUPTHER

## 2021-03-22 RX ORDER — EMPAGLIFLOZIN 25 MG/1
TABLET, FILM COATED ORAL
Qty: 30 TABLET | Refills: 3 | Status: SHIPPED | OUTPATIENT
Start: 2021-03-22 | End: 2021-05-12

## 2021-03-22 RX ORDER — GLIPIZIDE 10 MG/1
TABLET, FILM COATED, EXTENDED RELEASE ORAL
Qty: 90 TABLET | Refills: 1 | Status: SHIPPED | OUTPATIENT
Start: 2021-03-22 | End: 2021-05-12

## 2021-05-13 ENCOUNTER — LAB (OUTPATIENT)
Dept: LAB | Facility: HOSPITAL | Age: 61
End: 2021-05-13

## 2021-05-13 ENCOUNTER — OFFICE VISIT (OUTPATIENT)
Dept: ENDOCRINOLOGY | Facility: CLINIC | Age: 61
End: 2021-05-13

## 2021-05-13 VITALS
SYSTOLIC BLOOD PRESSURE: 124 MMHG | DIASTOLIC BLOOD PRESSURE: 80 MMHG | BODY MASS INDEX: 33.16 KG/M2 | HEART RATE: 84 BPM | OXYGEN SATURATION: 99 % | WEIGHT: 194.2 LBS | HEIGHT: 64 IN

## 2021-05-13 DIAGNOSIS — E11.65 TYPE 2 DIABETES MELLITUS WITH HYPERGLYCEMIA, WITHOUT LONG-TERM CURRENT USE OF INSULIN (HCC): Primary | ICD-10-CM

## 2021-05-13 DIAGNOSIS — E03.9 ACQUIRED HYPOTHYROIDISM: ICD-10-CM

## 2021-05-13 DIAGNOSIS — E55.9 VITAMIN D DEFICIENCY: ICD-10-CM

## 2021-05-13 LAB
ALBUMIN SERPL-MCNC: 4.4 G/DL (ref 3.5–5.2)
ALBUMIN/GLOB SERPL: 1.4 G/DL
ALP SERPL-CCNC: 65 U/L (ref 39–117)
ALT SERPL W P-5'-P-CCNC: 17 U/L (ref 1–33)
ANION GAP SERPL CALCULATED.3IONS-SCNC: 12.6 MMOL/L (ref 5–15)
AST SERPL-CCNC: 17 U/L (ref 1–32)
BASOPHILS # BLD AUTO: 0.06 10*3/MM3 (ref 0–0.2)
BASOPHILS NFR BLD AUTO: 1.2 % (ref 0–1.5)
BILIRUB SERPL-MCNC: 0.8 MG/DL (ref 0–1.2)
BUN SERPL-MCNC: 17 MG/DL (ref 8–23)
BUN/CREAT SERPL: 16 (ref 7–25)
CALCIUM SPEC-SCNC: 9.8 MG/DL (ref 8.6–10.5)
CHLORIDE SERPL-SCNC: 96 MMOL/L (ref 98–107)
CO2 SERPL-SCNC: 29.4 MMOL/L (ref 22–29)
CREAT SERPL-MCNC: 1.06 MG/DL (ref 0.57–1)
DEPRECATED RDW RBC AUTO: 44.4 FL (ref 37–54)
EOSINOPHIL # BLD AUTO: 0.24 10*3/MM3 (ref 0–0.4)
EOSINOPHIL NFR BLD AUTO: 4.6 % (ref 0.3–6.2)
ERYTHROCYTE [DISTWIDTH] IN BLOOD BY AUTOMATED COUNT: 14.8 % (ref 12.3–15.4)
GFR SERPL CREATININE-BSD FRML MDRD: 64 ML/MIN/1.73
GLOBULIN UR ELPH-MCNC: 3.2 GM/DL
GLUCOSE SERPL-MCNC: 189 MG/DL (ref 65–99)
HBA1C MFR BLD: 6.9 % (ref 4.8–5.6)
HCT VFR BLD AUTO: 46.5 % (ref 34–46.6)
HGB BLD-MCNC: 15 G/DL (ref 12–15.9)
IMM GRANULOCYTES # BLD AUTO: 0.02 10*3/MM3 (ref 0–0.05)
IMM GRANULOCYTES NFR BLD AUTO: 0.4 % (ref 0–0.5)
LYMPHOCYTES # BLD AUTO: 1.35 10*3/MM3 (ref 0.7–3.1)
LYMPHOCYTES NFR BLD AUTO: 26.1 % (ref 19.6–45.3)
MCH RBC QN AUTO: 26.7 PG (ref 26.6–33)
MCHC RBC AUTO-ENTMCNC: 32.3 G/DL (ref 31.5–35.7)
MCV RBC AUTO: 82.9 FL (ref 79–97)
MONOCYTES # BLD AUTO: 0.3 10*3/MM3 (ref 0.1–0.9)
MONOCYTES NFR BLD AUTO: 5.8 % (ref 5–12)
NEUTROPHILS NFR BLD AUTO: 3.21 10*3/MM3 (ref 1.7–7)
NEUTROPHILS NFR BLD AUTO: 61.9 % (ref 42.7–76)
NRBC BLD AUTO-RTO: 0.2 /100 WBC (ref 0–0.2)
PLATELET # BLD AUTO: 351 10*3/MM3 (ref 140–450)
PMV BLD AUTO: 11 FL (ref 6–12)
POTASSIUM SERPL-SCNC: 3.7 MMOL/L (ref 3.5–5.2)
PROT SERPL-MCNC: 7.6 G/DL (ref 6–8.5)
RBC # BLD AUTO: 5.61 10*6/MM3 (ref 3.77–5.28)
SODIUM SERPL-SCNC: 138 MMOL/L (ref 136–145)
TSH SERPL DL<=0.05 MIU/L-ACNC: 2.86 UIU/ML (ref 0.27–4.2)
WBC # BLD AUTO: 5.18 10*3/MM3 (ref 3.4–10.8)

## 2021-05-13 PROCEDURE — 95251 CONT GLUC MNTR ANALYSIS I&R: CPT | Performed by: NURSE PRACTITIONER

## 2021-05-13 PROCEDURE — 36415 COLL VENOUS BLD VENIPUNCTURE: CPT | Performed by: NURSE PRACTITIONER

## 2021-05-13 PROCEDURE — 85025 COMPLETE CBC W/AUTO DIFF WBC: CPT | Performed by: NURSE PRACTITIONER

## 2021-05-13 PROCEDURE — 80053 COMPREHEN METABOLIC PANEL: CPT | Performed by: NURSE PRACTITIONER

## 2021-05-13 PROCEDURE — 84443 ASSAY THYROID STIM HORMONE: CPT | Performed by: NURSE PRACTITIONER

## 2021-05-13 PROCEDURE — 99214 OFFICE O/P EST MOD 30 MIN: CPT | Performed by: NURSE PRACTITIONER

## 2021-05-13 PROCEDURE — 83036 HEMOGLOBIN GLYCOSYLATED A1C: CPT | Performed by: NURSE PRACTITIONER

## 2021-05-13 RX ORDER — SEMAGLUTIDE 1.34 MG/ML
1 INJECTION, SOLUTION SUBCUTANEOUS
Qty: 3 PEN | Refills: 11 | Status: SHIPPED | OUTPATIENT
Start: 2021-05-13 | End: 2021-11-12 | Stop reason: SDUPTHER

## 2021-05-13 RX ORDER — EMPAGLIFLOZIN AND METFORMIN HYDROCHLORIDE 12.5; 1 MG/1; MG/1
1 TABLET ORAL 2 TIMES DAILY
Qty: 60 TABLET | Refills: 11 | Status: SHIPPED | OUTPATIENT
Start: 2021-05-13 | End: 2021-10-01 | Stop reason: SDUPTHER

## 2021-05-13 NOTE — PROGRESS NOTES
"Chief Complaint  Diabetes    Subjective          Danae Ngo presents to [facility] ENDOCRINOLOGY  History of Present Illness     In office visit       Primary Care Provider     José Ruano APRN    61 year old female presents for follow up     Reason diabetes mellitus type 2     Timing constant     Duration diagnosed in 2015    Quality improved control          Microvascular complications -- none     Macrovascular complications --- CAD         Current regimen --- oral medications, GLP-1     Current glucose monitoring      Uses the dexcom G6      Dated from April 30 to May 13, 2021     Average BG - 153    In target 85%     High 15%     Very high 0.1 %     Low 0 %     Very low 0 %     Review of Systems - General ROS: positive for  - fatigue           Objective   Vital Signs:   /80   Pulse 84   Ht 162.6 cm (64\")   Wt 88.1 kg (194 lb 3.2 oz)   SpO2 99%   BMI 33.33 kg/m²     Physical Exam  Constitutional:       Appearance: Normal appearance.   HENT:      Head: Normocephalic.   Cardiovascular:      Rate and Rhythm: Regular rhythm.      Heart sounds: Normal heart sounds.   Pulmonary:      Breath sounds: Normal breath sounds.   Musculoskeletal:         General: Normal range of motion.      Cervical back: Neck supple.   Skin:     Coloration: Skin is not pale.   Neurological:      General: No focal deficit present.      Mental Status: She is alert.   Psychiatric:         Mood and Affect: Mood normal.         Thought Content: Thought content normal.         Judgment: Judgment normal.        Result Review :   The following data was reviewed by: WILLIS Gabriel on 05/13/2021:  Common labs    Common Labsle 9/3/20 9/3/20 9/3/20 9/3/20 9/3/20 3/13/21 3/13/21 3/13/21 3/13/21 3/13/21    1044 1044 1044 1044 1044 0959 0959 0959 0959 1115   Glucose   168 (A)     133 (A)     BUN   20     20     Creatinine   1.14 (A)     1.38 (A)     eGFR  Am   59 (A)     47 (A)     Sodium   134 " (A)     139     Potassium   4.2     3.8     Chloride   98     101     Calcium   10.0     9.6     Albumin   4.70     4.60     Total Bilirubin   0.6     0.5     Alkaline Phosphatase   82     65     AST (SGOT)   22     17     ALT (SGPT)   22     15     WBC 7.13     6.71       Hemoglobin 15.2     14.6       Hematocrit 47.0 (A)     44.3       Platelets 394     378       Total Cholesterol  155       108    Triglycerides  116       62    HDL Cholesterol  40       37 (A)    LDL Cholesterol   92       57    Hemoglobin A1C     9.08 (A)  7.49 (A)      Microalbumin, Urine    <1.2      1.4   (A) Abnormal value                      Assessment and Plan    Diagnoses and all orders for this visit:    1. Type 2 diabetes mellitus with hyperglycemia, without long-term current use of insulin (CMS/[facility]) (Primary)  -     CBC & Differential  -     Comprehensive Metabolic Panel  -     Hemoglobin A1c  -     TSH    2. Vitamin D deficiency  -     CBC & Differential  -     Comprehensive Metabolic Panel  -     Hemoglobin A1c  -     TSH    3. Acquired hypothyroidism  -     CBC & Differential  -     Comprehensive Metabolic Panel  -     Hemoglobin A1c  -     TSH    Other orders  -     Empagliflozin-metFORMIN HCl (Synjardy) 12.5-1000 MG tablet; Take 1 tablet by mouth 2 (two) times a day.  Dispense: 60 tablet; Refill: 11  -     Semaglutide, 1 MG/DOSE, (Ozempic, 1 MG/DOSE,) 2 MG/1.5ML solution pen-injector; Inject 1 mg under the skin into the appropriate area as directed Every 7 (Seven) Days.  Dispense: 3 pen; Refill: 11                 Glycemic Management:         Diabetes mellitus type 2         Lab Results   Component Value Date    HGBA1C 7.49 (H) 03/13/2021     Uses the dexcom G6      Dated from April 30 to May 13, 2021     Average BG - 153    In target 85%     High 15%     Very high 0.1 %     Low 0 %     Very low 0 %     Most at goal     occasional hyperglycemia with high carb meal        Taking Synjardy XR 12/5 /1000 mg , 2 tabs w bkfast      Taking  Ozempic 1 mg  weekly      If nausea try to eat less  If vomiting , stop   Ozempic will help generate more of your own insulin and it decreases the probability of Death / Heart / Stroke by 27%        Keep meals at 50 gm of CHO                        Stopped glipizide     Stopped jardiance      Stopped metformin         Lipid Management        Taking  atorvastatin 20 mg qhs           Total Cholesterol   Date Value Ref Range Status   03/13/2021 108 0 - 200 mg/dL Final     Triglycerides   Date Value Ref Range Status   03/13/2021 62 0 - 150 mg/dL Final     HDL Cholesterol   Date Value Ref Range Status   03/13/2021 37 (L) 40 - 60 mg/dL Final     LDL Cholesterol    Date Value Ref Range Status   03/13/2021 57 0 - 100 mg/dL Final      Blood Pressure Management:       Taking lisinopril 40 mg one daily      Taking metoprolol xl 100 mg daily               Microvascular Complication Monitoring:       Last eye exam Dec. 2019, no retinopathy               Last Microalbumin-Proteinuria Assessment        Lab Results   Component Value Date    MICROALBUR 1.4 03/13/2021     -----------        Neuropathy  None           Weight Related:            Patient's Body mass index is 33.33 kg/m². indicating that she is obese (BMI >30). Obesity-related health conditions include the following: diabetes mellitus. Obesity is unchanged. BMI is is above average; no BMI management plan is appropriate. We discussed portion control and increasing exercise..          Bone Health     Vitamin d def     Taking MVI      Thyroid Health       Lab Results   Component Value Date    TSH 2.250 03/13/2021       Taking Levothyroxine 112 mcgs daily      Other Diabetes Related Aspects                   Lab Results   Component Value Date     [id number] 586 03/23/2018                 Follow Up   Return in about 6 months (around 11/13/2021) for Recheck.  Patient was given instructions and counseling regarding her condition or for health maintenance advice.  Please see specific information pulled into the AVS if appropriate.

## 2021-05-14 RX ORDER — MELOXICAM 7.5 MG/1
7.5 TABLET ORAL DAILY
Qty: 10 TABLET | Refills: 0 | Status: SHIPPED | OUTPATIENT
Start: 2021-05-14 | End: 2021-10-01

## 2021-06-01 RX ORDER — MELOXICAM 7.5 MG/1
TABLET ORAL
Qty: 10 TABLET | Refills: 0 | OUTPATIENT
Start: 2021-06-01

## 2021-06-28 DIAGNOSIS — I10 ESSENTIAL HYPERTENSION: ICD-10-CM

## 2021-06-28 RX ORDER — HYDROCHLOROTHIAZIDE 50 MG/1
50 TABLET ORAL DAILY
Qty: 90 TABLET | Refills: 2 | Status: SHIPPED | OUTPATIENT
Start: 2021-06-28 | End: 2022-07-14

## 2021-06-28 RX ORDER — HYDROCHLOROTHIAZIDE 50 MG/1
TABLET ORAL
Qty: 90 TABLET | Refills: 1 | OUTPATIENT
Start: 2021-06-28

## 2021-08-25 ENCOUNTER — TELEPHONE (OUTPATIENT)
Dept: ENDOCRINOLOGY | Facility: CLINIC | Age: 61
End: 2021-08-25

## 2021-09-21 RX ORDER — METOPROLOL SUCCINATE 100 MG/1
TABLET, EXTENDED RELEASE ORAL
Qty: 90 TABLET | Refills: 0 | Status: SHIPPED | OUTPATIENT
Start: 2021-09-21 | End: 2021-10-01 | Stop reason: SDUPTHER

## 2021-09-23 ENCOUNTER — TRANSCRIBE ORDERS (OUTPATIENT)
Dept: LAB | Facility: HOSPITAL | Age: 61
End: 2021-09-23

## 2021-09-23 DIAGNOSIS — I47.20 VT (VENTRICULAR TACHYCARDIA) (HCC): ICD-10-CM

## 2021-09-23 DIAGNOSIS — Z45.02 ENCOUNTER FOR ADJUSTMENT OR MANAGEMENT OF AUTOMATIC IMPLANTABLE CARDIOVERTER-DEFIBRILLATOR: Primary | ICD-10-CM

## 2021-09-24 ENCOUNTER — OFFICE VISIT (OUTPATIENT)
Dept: SURGERY | Facility: CLINIC | Age: 61
End: 2021-09-24

## 2021-09-24 ENCOUNTER — LAB (OUTPATIENT)
Dept: LAB | Facility: HOSPITAL | Age: 61
End: 2021-09-24

## 2021-09-24 VITALS
TEMPERATURE: 97.1 F | SYSTOLIC BLOOD PRESSURE: 122 MMHG | HEART RATE: 79 BPM | BODY MASS INDEX: 33.05 KG/M2 | DIASTOLIC BLOOD PRESSURE: 84 MMHG | WEIGHT: 193.6 LBS | OXYGEN SATURATION: 98 % | HEIGHT: 64 IN

## 2021-09-24 DIAGNOSIS — R92.0 MICROCALCIFICATIONS OF THE BREAST: Primary | ICD-10-CM

## 2021-09-24 DIAGNOSIS — Z45.02 ENCOUNTER FOR ADJUSTMENT OR MANAGEMENT OF AUTOMATIC IMPLANTABLE CARDIOVERTER-DEFIBRILLATOR: ICD-10-CM

## 2021-09-24 DIAGNOSIS — I47.20 VT (VENTRICULAR TACHYCARDIA) (HCC): ICD-10-CM

## 2021-09-24 LAB
ALBUMIN SERPL-MCNC: 4.5 G/DL (ref 3.5–5.2)
ALBUMIN/GLOB SERPL: 1.4 G/DL
ALP SERPL-CCNC: 67 U/L (ref 39–117)
ALT SERPL W P-5'-P-CCNC: 10 U/L (ref 1–33)
ANION GAP SERPL CALCULATED.3IONS-SCNC: 11.4 MMOL/L (ref 5–15)
AST SERPL-CCNC: 13 U/L (ref 1–32)
BILIRUB SERPL-MCNC: 0.6 MG/DL (ref 0–1.2)
BUN SERPL-MCNC: 23 MG/DL (ref 8–23)
BUN/CREAT SERPL: 19.7 (ref 7–25)
CALCIUM SPEC-SCNC: 9.9 MG/DL (ref 8.6–10.5)
CHLORIDE SERPL-SCNC: 96 MMOL/L (ref 98–107)
CO2 SERPL-SCNC: 28.6 MMOL/L (ref 22–29)
CREAT SERPL-MCNC: 1.17 MG/DL (ref 0.57–1)
GFR SERPL CREATININE-BSD FRML MDRD: 57 ML/MIN/1.73
GLOBULIN UR ELPH-MCNC: 3.3 GM/DL
GLUCOSE SERPL-MCNC: 140 MG/DL (ref 65–99)
POTASSIUM SERPL-SCNC: 3.9 MMOL/L (ref 3.5–5.2)
PROT SERPL-MCNC: 7.8 G/DL (ref 6–8.5)
SODIUM SERPL-SCNC: 136 MMOL/L (ref 136–145)

## 2021-09-24 PROCEDURE — 80053 COMPREHEN METABOLIC PANEL: CPT

## 2021-09-24 PROCEDURE — 36415 COLL VENOUS BLD VENIPUNCTURE: CPT

## 2021-09-24 PROCEDURE — 99213 OFFICE O/P EST LOW 20 MIN: CPT | Performed by: SURGERY

## 2021-09-24 RX ORDER — LIDOCAINE HYDROCHLORIDE AND EPINEPHRINE 10; 10 MG/ML; UG/ML
30 INJECTION, SOLUTION INFILTRATION; PERINEURAL ONCE
Status: CANCELLED | OUTPATIENT
Start: 2021-09-24 | End: 2021-09-24

## 2021-09-24 RX ORDER — OMEPRAZOLE 40 MG/1
40 CAPSULE, DELAYED RELEASE ORAL DAILY
COMMUNITY
Start: 2021-09-02 | End: 2022-03-07

## 2021-09-24 RX ORDER — OXYCODONE HYDROCHLORIDE AND ACETAMINOPHEN 5; 325 MG/1; MG/1
1 TABLET ORAL ONCE
Status: CANCELLED | OUTPATIENT
Start: 2021-09-24 | End: 2021-09-24

## 2021-09-24 RX ORDER — DIAZEPAM 5 MG/1
5 TABLET ORAL ONCE
Status: CANCELLED | OUTPATIENT
Start: 2021-09-24 | End: 2021-09-24

## 2021-09-24 NOTE — PATIENT INSTRUCTIONS
MyPlate from USDA    MyPlate is an outline of a general healthy diet based on the 2010 Dietary Guidelines for Americans, from the U.S. Department of Agriculture (USDA). It sets guidelines for how much food you should eat from each food group based on your age, sex, and level of physical activity.  What are tips for following MyPlate?  To follow MyPlate recommendations:  · Eat a wide variety of fruits and vegetables, grains, and protein foods.  · Serve smaller portions and eat less food throughout the day.  · Limit portion sizes to avoid overeating.  · Enjoy your food.  · Get at least 150 minutes of exercise every week. This is about 30 minutes each day, 5 or more days per week.  It can be difficult to have every meal look like MyPlate. Think about MyPlate as eating guidelines for an entire day, rather than each individual meal.  Fruits and vegetables  · Make half of your plate fruits and vegetables.  · Eat many different colors of fruits and vegetables each day.  · For a 2,000 calorie daily food plan, eat:  ? 2½ cups of vegetables every day.  ? 2 cups of fruit every day.  · 1 cup is equal to:  ? 1 cup raw or cooked vegetables.  ? 1 cup raw fruit.  ? 1 medium-sized orange, apple, or banana.  ? 1 cup 100% fruit or vegetable juice.  ? 2 cups raw leafy greens, such as lettuce, spinach, or kale.  ? ½ cup dried fruit.  Grains  · One fourth of your plate should be grains.  · Make at least half of the grains you eat each day whole grains.  · For a 2,000 calorie daily food plan, eat 6 oz of grains every day.  · 1 oz is equal to:  ? 1 slice bread.  ? 1 cup cereal.  ? ½ cup cooked rice, cereal, or pasta.  Protein  · One fourth of your plate should be protein.  · Eat a wide variety of protein foods, including meat, poultry, fish, eggs, beans, nuts, and tofu.  · For a 2,000 calorie daily food plan, eat 5½ oz of protein every day.  · 1 oz is equal to:  ? 1 oz meat, poultry, or fish.  ? ¼ cup cooked beans.  ? 1 egg.  ? ½ oz nuts  or seeds.  ? 1 Tbsp peanut butter.  Dairy  · Drink fat-free or low-fat (1%) milk.  · Eat or drink dairy as a side to meals.  · For a 2,000 calorie daily food plan, eat or drink 3 cups of dairy every day.  · 1 cup is equal to:  ? 1 cup milk, yogurt, cottage cheese, or soy milk (soy beverage).  ? 2 oz processed cheese.  ? 1½ oz natural cheese.  Fats, oils, salt, and sugars  · Only small amounts of oils are recommended.  · Avoid foods that are high in calories and low in nutritional value (empty calories), like foods high in fat or added sugars.  · Choose foods that are low in salt (sodium). Choose foods that have less than 140 milligrams (mg) of sodium per serving.  · Drink water instead of sugary drinks. Drink enough water each day to keep your urine pale yellow.  Where to find support  · Work with your health care provider or a nutrition specialist (dietitian) to develop a customized eating plan that is right for you.  · Download an radhika (mobile application) to help you track your daily food intake.  Where to find more information  · Go to ChooseMyPlate.gov for more information.  Summary  · MyPlate is a general guideline for healthy eating from the USDA. It is based on the 2010 Dietary Guidelines for Americans.  · In general, fruits and vegetables should take up ½ of your plate, grains should take up ¼ of your plate, and protein should take up ¼ of your plate.  This information is not intended to replace advice given to you by your health care provider. Make sure you discuss any questions you have with your health care provider.  Document Revised: 05/21/2020 Document Reviewed: 03/19/2018  Elsevier Patient Education © 2021 [employer] Inc.

## 2021-09-27 RX ORDER — LISINOPRIL 40 MG/1
40 TABLET ORAL DAILY
Qty: 60 TABLET | Refills: 3 | Status: SHIPPED | OUTPATIENT
Start: 2021-09-27 | End: 2022-09-30 | Stop reason: SDUPTHER

## 2021-09-27 RX ORDER — LISINOPRIL 40 MG/1
TABLET ORAL
Qty: 60 TABLET | Refills: 3 | Status: SHIPPED | OUTPATIENT
Start: 2021-09-27 | End: 2021-09-27 | Stop reason: SDUPTHER

## 2021-09-27 NOTE — TELEPHONE ENCOUNTER
Incoming Refill Request      Medication requested (name and dose): Lisinopril    Pharmacy where request should be sent:[facility] in [place]    Additional details provided by patient: She has an upcoming appt on 10/01/21    Best call back number: [phone]  Does the patient have less than a 3 day supply:  [x] Yes  [] No    Rodrick Combs Rep  09/27/21, 09:04 CDT

## 2021-09-28 NOTE — H&P (VIEW-ONLY)
Chief Complaint   Patient presents with   • Yearly Mammorgram Completed On 09/17/2021        HPI  61-year-old woman with a abnormal right mammogram.  She has had no newly palpable masses, skin dimpling, nipple discharge, or axillary adenopathy.  She does have a pacemaker present in the left side.    Imaging:    Study Result    Narrative & Impression   PROCEDURE: MAMMO BREAST DIAGNOSTIC TOMOSYNTHESIS RIGHT     HISTORY: abnormal mammogram, R92.8 Other abnormal and  inconclusive findings on diagnostic imaging of breast     COMPARISON: Previous exams dated 9/17/2021 through 9/5/2017     Computer-aided detection was utilized during this exam.   Digital breast tomosynthesis was performed.     FINDINGS:   Magnification and true lateral views were obtained of the right  breast. Small cluster of calcifications noted in the upper outer  right breast is suspicious on these additional views. Recommend  biopsy.      IMPRESSION:  CONCLUSION:    Small cluster of calcifications noted in the upper outer right  breast is suspicious on these additional views. Recommend biopsy.  BI-RADS Category 4: Suspicious abnormality.  Biopsy should be  considered.     Findings and recommendations were discussed with the patient with  time of exam.  Findings and recommendations  discussed with LALIT KINCAID on  9/23/2021 3:26 PM CDT     Electronically signed by:  Camacho Husain MD  9/23/2021 3:27 PM CDT  Workstation: [id number]     I have personally reviewed the imaging and concur with the radiologist's findings.    Past Medical History:   Diagnosis Date   • Abnormal thyroid function test    • Acquired hypothyroidism    • Allergic    • Allergic rhinitis    • Anemia    • Breast cyst    • Coronary atherosclerosis     unspecified type of vessel, native or graft      • Dilated cardiomyopathy (CMS/HCC)    • Dyslipidemia    • Esophageal reflux    • Essential hypertension    • Family history of malignant neoplasm of breast    • Fibrocystic disease of  breast     Low Mireille score   • GERD (gastroesophageal reflux disease)    • Herpes zoster    • History of chicken pox    • Hyperlipidemia    • Measles    • Mumps    • Myocardial infarction (CMS/HCC)    • Nasal septal deformity    • Obesity    • Type 2 diabetes mellitus (CMS/HCC)     Uncontrolled   • V tach (CMS/HCC)    • Vitamin D deficiency        Past Surgical History:   Procedure Laterality Date   • BREAST BIOPSY  2010    left breast benign   • BREAST CYST ASPIRATION     • CARDIAC CATHETERIZATION  2002    Left cardiac cath, selective coronary angiography, PTCA to the left anterior descending. Stent placement to the left anterior descending   • CARDIAC DEFIBRILLATOR PLACEMENT Left     Dr. Brunner   •  SECTION      Intrauterine pregnancy, term gestation, cephalopelvic disproportion   • COLONOSCOPY N/A 10/12/2018    Procedure: COLONOSCOPY;  Surgeon: Delroy Miller MD;  Location: [facility] ENDOSCOPY;  Service: General   • ENDOSCOPY AND COLONOSCOPY  04/15/2008    Normal   • ESOPHAGOSCOPY / EGD  2007    With tube-Esophagus appeared normal. Gastritis of the stomach. A biopsy of the stomach was obtained from the stomach. The duodenum appeared normal   • EXTERNAL EAR SURGERY  08/15/1991    Repair biifd ear   • OOPHORECTOMY      Left salpingo-oophorectomy, wedge resection of the right ovary, lysis of adhesions   • TRANSESOPHAGEAL ECHOCARDIOGRAM (LORELEI)  2012    Without color flow-Moderate to severe LV dysfumctional w/ an EF of 30-35%. Wall motion abnormalities as described above. LV enlargement. Mild aortic insufficiency, mild TR regurg, mild M regurg. mild pulmonary insufficiency         Current Outpatient Medications:   •  albuterol sulfate  (90 Base) MCG/ACT inhaler, Inhale 1 puff Every 6 (Six) Hours As Needed for Wheezing. (Patient taking differently: Inhale 1 puff Every 6 (Six) Hours As Needed for Wheezing.), Disp: 6.7 g, Rfl: 0  •  amLODIPine (NORVASC) 5 MG tablet,  TAKE ONE TABLET BY MOUTH EVERY NIGHT AT BEDTIME, Disp: 90 tablet, Rfl: 1  •  Aspirin Adult 325 MG tablet, TAKE ONE TABLET BY MOUTH DAILY, Disp: 62 tablet, Rfl: 2  •  atorvastatin (LIPITOR) 20 MG tablet, TAKE ONE TABLET BY MOUTH ONCE NIGHTLY, Disp: 90 tablet, Rfl: 3  •  cetirizine (zyrTEC) 10 MG tablet, Take 1 tablet by mouth Daily., Disp: 90 tablet, Rfl: 3  •  Cholecalciferol (VITAMIN D3) 125 MCG (5000 UT) capsule capsule, Take 1 capsule by mouth Daily., Disp: 90 capsule, Rfl: 3  •  Continuous Blood Gluc Sensor (Dexcom G6 Sensor), As Needed (glucose control). Every 10 daysDexcom G6 Sensor Kit [address]-21 Use as directed for continuous glucose monitoring, Disp: 9 each, Rfl: 3  •  Continuous Blood Gluc Transmit (Dexcom G6 Transmitter) misc, , Disp: , Rfl:   •  Empagliflozin-metFORMIN HCl (Synjardy) 12.5-1000 MG tablet, Take 1 tablet by mouth 2 (two) times a day., Disp: 60 tablet, Rfl: 11  •  fluconazole (Diflucan) 200 MG tablet, Take one tablet PO at first sign of infection. May repeat in 3 days if needed., Disp: 2 tablet, Rfl: 0  •  glucose monitor monitoring kit, 1 each Daily., Disp: 1 each, Rfl: 0  •  hydroCHLOROthiazide (HYDRODIURIL) 50 MG tablet, Take 1 tablet by mouth Daily., Disp: 90 tablet, Rfl: 2  •  Lancet Device misc, 1 each 2 (Two) Times a Day., Disp: 1 each, Rfl: 12  •  levothyroxine (SYNTHROID, LEVOTHROID) 112 MCG tablet, TAKE ONE TABLET BY MOUTH DAILY, Disp: 90 tablet, Rfl: 2  •  metoprolol succinate XL (TOPROL-XL) 100 MG 24 hr tablet, TAKE THREE TABLETS BY MOUTH DAILY, Disp: 90 tablet, Rfl: 0  •  mexiletine (MEXITIL) 150 MG capsule, Take 150 mg by mouth Every 8 (Eight) Hours., Disp: , Rfl:   •  omeprazole (priLOSEC) 40 MG capsule, Take 40 mg by mouth Daily., Disp: , Rfl:   •  potassium chloride (K-DUR,KLOR-CON) 10 MEQ CR tablet, TAKE TWO TABLETS BY MOUTH DAILY, Disp: 120 tablet, Rfl: 4  •  Semaglutide, 1 MG/DOSE, (Ozempic, 1 MG/DOSE,) 2 MG/1.5ML solution pen-injector, Inject 1 mg under the skin into  the appropriate area as directed Every 7 (Seven) Days., Disp: 3 pen, Rfl: 11  •  lisinopril (PRINIVIL,ZESTRIL) 40 MG tablet, Take 1 tablet by mouth Daily., Disp: 60 tablet, Rfl: 3  •  meloxicam (Mobic) 7.5 MG tablet, Take 1 tablet by mouth Daily., Disp: 10 tablet, Rfl: 0    Allergies   Allergen Reactions   • Codeine GI Intolerance       Family History   Problem Relation Age of Onset   • Breast cancer Mother         50's   • Other Mother         CVA   • Coronary artery disease Other    • Diabetes Other    • Heart disease Other    • Hypertension Other    • Autism Other         grandson   • Alcohol abuse Brother    • Cardiomyopathy Brother    • Coronary artery disease Brother    • Heart disease Father    • Hypertension Sister    • Diabetes Sister    • Heart disease Son    • Heart attack Son    • Heart disease Maternal Grandmother    • Hypertension Maternal Grandmother    • Hypertension Sister    • Hypertension Sister    • Hypertension Brother    • Hypertension Brother        Social History     Socioeconomic History   • Marital status: Single     Spouse name: Not on file   • Number of children: Not on file   • Years of education: Not on file   • Highest education level: Not on file   Tobacco Use   • Smoking status: Never Smoker   • Smokeless tobacco: Never Used   Vaping Use   • Vaping Use: Never used   Substance and Sexual Activity   • Alcohol use: No   • Drug use: No   • Sexual activity: Defer     Birth control/protection: Post-menopausal       Review of Systems   Constitutional: Negative for appetite change, chills, fever and unexpected weight change.   HENT: Negative for hearing loss, nosebleeds and trouble swallowing.    Eyes: Negative for visual disturbance.   Respiratory: Negative for apnea, cough, choking, chest tightness, shortness of breath, wheezing and stridor.    Cardiovascular: Negative for chest pain, palpitations and leg swelling.   Gastrointestinal: Negative for abdominal distention, abdominal pain,  blood in stool, constipation, diarrhea, nausea and vomiting.   Endocrine: Negative for cold intolerance, heat intolerance, polydipsia, polyphagia and polyuria.   Genitourinary: Negative for difficulty urinating, dysuria, frequency, hematuria and urgency.   Musculoskeletal: Negative for arthralgias, back pain, myalgias and neck pain.   Skin: Negative for color change, pallor and rash.   Allergic/Immunologic: Negative for immunocompromised state.   Neurological: Negative for dizziness, seizures, syncope, light-headedness, numbness and headaches.   Hematological: Negative for adenopathy.   Psychiatric/Behavioral: Negative for suicidal ideas. The patient is not nervous/anxious.        Physical Exam  Vitals reviewed.   Constitutional:       Appearance: Normal appearance.   HENT:      Head: Normocephalic and atraumatic.      Nose: Nose normal.   Cardiovascular:      Rate and Rhythm: Normal rate and regular rhythm.   Pulmonary:      Effort: Pulmonary effort is normal. No respiratory distress.   Chest:      Breasts: Breasts are symmetrical.         Right: No inverted nipple, mass, nipple discharge, skin change or tenderness.         Left: No inverted nipple, mass, nipple discharge, skin change or tenderness.   Musculoskeletal:      Cervical back: Normal range of motion and neck supple.   Skin:     General: Skin is warm and dry.   Neurological:      Mental Status: She is alert.   Psychiatric:         Mood and Affect: Mood normal.         Behavior: Behavior normal.         Thought Content: Thought content normal.         Judgment: Judgment normal.           ASSESSMENT    Diagnoses and all orders for this visit:    1. Microcalcifications of the breast (Primary)  -     Mammo Stereotactic Breast Biopsy Initial With & Without Device Right  -     diazePAM (VALIUM) tablet 5 mg  -     oxyCODONE-acetaminophen (PERCOCET) 5-325 MG per tablet 1 tablet  -     lidocaine 1% - EPINEPHrine 1:326882 (XYLOCAINE W/EPI) 1 %-1:516314 injection 30  mL    Other orders  -     Obtain Informed Consent; Standing  -     Tissue Pathology Exam; Standing        PLAN    1.  Right-sided stereotactic mammotome biopsy with clip placement is planned    The following were discussed with the patient/family:      What are the indications that have led your doctor to the opinion that an operation is necessary?    Breast imaging has determined an abnormal area requiring biopsy.    What, if any, alternative treatments are available for your condition?    Alternatively, open biopsy in the operating room may be performed under sedation or general anesthesia. Observation is not a good option.    What will be the likely result if you don't have the operation?    There is a possibility of missing a breast cancer diagnosis.    What are the basic procedures involved in the operation?    The patient will be placed prone for the procedure. A small incision will be made under local anesthesia, and a special, large needle will be used to perform the biopsy.   A permanent titanium clip will be placed in the breast to bassam the site of biopsy should further surgery be necessary.    What are the risks?    The risks of bleeding, infection, the possible need for open biopsy or other procedure, scarring, and poor wound healing are explained. Bruising almost always occurs. There is a low transfusion risk. The risks of chronic pain are, likewise, low.     How is the operation expected to improve your health or quality of life?    The lesion can usually be determined to be benign or malignant. Follow up xrays or further open excision may be necessary depending on the pathology.    Is hospitalization necessary and, if so, how long can you expect to be hospitalized?    This procedure is performed on an outpatient basis.    What can you expect during your recovery period?    Minimal pain is usually controlled with non-narcotic analgesia.    When can you expect to resume normal activities?    Normal  activity may be resumed the following day.    Are there likely to be residual effects from the operation?    Usually, there are no residual effects following biiopsy.    .   All questions were answered. The patient agrees to operation.              This document has been electronically signed by Rodo Holcomb MD on September 27, 2021 22:24 CDT

## 2021-09-28 NOTE — PROGRESS NOTES
Chief Complaint   Patient presents with   • Yearly Mammorgram Completed On 09/17/2021        HPI  61-year-old woman with a abnormal right mammogram.  She has had no newly palpable masses, skin dimpling, nipple discharge, or axillary adenopathy.  She does have a pacemaker present in the left side.    Imaging:    Study Result    Narrative & Impression   PROCEDURE: MAMMO BREAST DIAGNOSTIC TOMOSYNTHESIS RIGHT     HISTORY: abnormal mammogram, R92.8 Other abnormal and  inconclusive findings on diagnostic imaging of breast     COMPARISON: Previous exams dated 9/17/2021 through 9/5/2017     Computer-aided detection was utilized during this exam.   Digital breast tomosynthesis was performed.     FINDINGS:   Magnification and true lateral views were obtained of the right  breast. Small cluster of calcifications noted in the upper outer  right breast is suspicious on these additional views. Recommend  biopsy.      IMPRESSION:  CONCLUSION:    Small cluster of calcifications noted in the upper outer right  breast is suspicious on these additional views. Recommend biopsy.  BI-RADS Category 4: Suspicious abnormality.  Biopsy should be  considered.     Findings and recommendations were discussed with the patient with  time of exam.  Findings and recommendations  discussed with LALIT KINCAID on  9/23/2021 3:26 PM CDT     Electronically signed by:  Camacho Husain MD  9/23/2021 3:27 PM CDT  Workstation: [id number]     I have personally reviewed the imaging and concur with the radiologist's findings.    Past Medical History:   Diagnosis Date   • Abnormal thyroid function test    • Acquired hypothyroidism    • Allergic    • Allergic rhinitis    • Anemia    • Breast cyst    • Coronary atherosclerosis     unspecified type of vessel, native or graft      • Dilated cardiomyopathy (CMS/HCC)    • Dyslipidemia    • Esophageal reflux    • Essential hypertension    • Family history of malignant neoplasm of breast    • Fibrocystic disease of  breast     Low Mireille score   • GERD (gastroesophageal reflux disease)    • Herpes zoster    • History of chicken pox    • Hyperlipidemia    • Measles    • Mumps    • Myocardial infarction (CMS/HCC)    • Nasal septal deformity    • Obesity    • Type 2 diabetes mellitus (CMS/HCC)     Uncontrolled   • V tach (CMS/HCC)    • Vitamin D deficiency        Past Surgical History:   Procedure Laterality Date   • BREAST BIOPSY  2010    left breast benign   • BREAST CYST ASPIRATION     • CARDIAC CATHETERIZATION  2002    Left cardiac cath, selective coronary angiography, PTCA to the left anterior descending. Stent placement to the left anterior descending   • CARDIAC DEFIBRILLATOR PLACEMENT Left     Dr. Brunner   •  SECTION      Intrauterine pregnancy, term gestation, cephalopelvic disproportion   • COLONOSCOPY N/A 10/12/2018    Procedure: COLONOSCOPY;  Surgeon: Delroy Miller MD;  Location: [facility] ENDOSCOPY;  Service: General   • ENDOSCOPY AND COLONOSCOPY  04/15/2008    Normal   • ESOPHAGOSCOPY / EGD  2007    With tube-Esophagus appeared normal. Gastritis of the stomach. A biopsy of the stomach was obtained from the stomach. The duodenum appeared normal   • EXTERNAL EAR SURGERY  08/15/1991    Repair biifd ear   • OOPHORECTOMY      Left salpingo-oophorectomy, wedge resection of the right ovary, lysis of adhesions   • TRANSESOPHAGEAL ECHOCARDIOGRAM (LORELEI)  2012    Without color flow-Moderate to severe LV dysfumctional w/ an EF of 30-35%. Wall motion abnormalities as described above. LV enlargement. Mild aortic insufficiency, mild TR regurg, mild M regurg. mild pulmonary insufficiency         Current Outpatient Medications:   •  albuterol sulfate  (90 Base) MCG/ACT inhaler, Inhale 1 puff Every 6 (Six) Hours As Needed for Wheezing. (Patient taking differently: Inhale 1 puff Every 6 (Six) Hours As Needed for Wheezing.), Disp: 6.7 g, Rfl: 0  •  amLODIPine (NORVASC) 5 MG tablet,  TAKE ONE TABLET BY MOUTH EVERY NIGHT AT BEDTIME, Disp: 90 tablet, Rfl: 1  •  Aspirin Adult 325 MG tablet, TAKE ONE TABLET BY MOUTH DAILY, Disp: 62 tablet, Rfl: 2  •  atorvastatin (LIPITOR) 20 MG tablet, TAKE ONE TABLET BY MOUTH ONCE NIGHTLY, Disp: 90 tablet, Rfl: 3  •  cetirizine (zyrTEC) 10 MG tablet, Take 1 tablet by mouth Daily., Disp: 90 tablet, Rfl: 3  •  Cholecalciferol (VITAMIN D3) 125 MCG (5000 UT) capsule capsule, Take 1 capsule by mouth Daily., Disp: 90 capsule, Rfl: 3  •  Continuous Blood Gluc Sensor (Dexcom G6 Sensor), As Needed (glucose control). Every 10 daysDexcom G6 Sensor Kit [address]-70 Use as directed for continuous glucose monitoring, Disp: 9 each, Rfl: 3  •  Continuous Blood Gluc Transmit (Dexcom G6 Transmitter) misc, , Disp: , Rfl:   •  Empagliflozin-metFORMIN HCl (Synjardy) 12.5-1000 MG tablet, Take 1 tablet by mouth 2 (two) times a day., Disp: 60 tablet, Rfl: 11  •  fluconazole (Diflucan) 200 MG tablet, Take one tablet PO at first sign of infection. May repeat in 3 days if needed., Disp: 2 tablet, Rfl: 0  •  glucose monitor monitoring kit, 1 each Daily., Disp: 1 each, Rfl: 0  •  hydroCHLOROthiazide (HYDRODIURIL) 50 MG tablet, Take 1 tablet by mouth Daily., Disp: 90 tablet, Rfl: 2  •  Lancet Device misc, 1 each 2 (Two) Times a Day., Disp: 1 each, Rfl: 12  •  levothyroxine (SYNTHROID, LEVOTHROID) 112 MCG tablet, TAKE ONE TABLET BY MOUTH DAILY, Disp: 90 tablet, Rfl: 2  •  metoprolol succinate XL (TOPROL-XL) 100 MG 24 hr tablet, TAKE THREE TABLETS BY MOUTH DAILY, Disp: 90 tablet, Rfl: 0  •  mexiletine (MEXITIL) 150 MG capsule, Take 150 mg by mouth Every 8 (Eight) Hours., Disp: , Rfl:   •  omeprazole (priLOSEC) 40 MG capsule, Take 40 mg by mouth Daily., Disp: , Rfl:   •  potassium chloride (K-DUR,KLOR-CON) 10 MEQ CR tablet, TAKE TWO TABLETS BY MOUTH DAILY, Disp: 120 tablet, Rfl: 4  •  Semaglutide, 1 MG/DOSE, (Ozempic, 1 MG/DOSE,) 2 MG/1.5ML solution pen-injector, Inject 1 mg under the skin into  the appropriate area as directed Every 7 (Seven) Days., Disp: 3 pen, Rfl: 11  •  lisinopril (PRINIVIL,ZESTRIL) 40 MG tablet, Take 1 tablet by mouth Daily., Disp: 60 tablet, Rfl: 3  •  meloxicam (Mobic) 7.5 MG tablet, Take 1 tablet by mouth Daily., Disp: 10 tablet, Rfl: 0    Allergies   Allergen Reactions   • Codeine GI Intolerance       Family History   Problem Relation Age of Onset   • Breast cancer Mother         50's   • Other Mother         CVA   • Coronary artery disease Other    • Diabetes Other    • Heart disease Other    • Hypertension Other    • Autism Other         grandson   • Alcohol abuse Brother    • Cardiomyopathy Brother    • Coronary artery disease Brother    • Heart disease Father    • Hypertension Sister    • Diabetes Sister    • Heart disease Son    • Heart attack Son    • Heart disease Maternal Grandmother    • Hypertension Maternal Grandmother    • Hypertension Sister    • Hypertension Sister    • Hypertension Brother    • Hypertension Brother        Social History     Socioeconomic History   • Marital status: Single     Spouse name: Not on file   • Number of children: Not on file   • Years of education: Not on file   • Highest education level: Not on file   Tobacco Use   • Smoking status: Never Smoker   • Smokeless tobacco: Never Used   Vaping Use   • Vaping Use: Never used   Substance and Sexual Activity   • Alcohol use: No   • Drug use: No   • Sexual activity: Defer     Birth control/protection: Post-menopausal       Review of Systems   Constitutional: Negative for appetite change, chills, fever and unexpected weight change.   HENT: Negative for hearing loss, nosebleeds and trouble swallowing.    Eyes: Negative for visual disturbance.   Respiratory: Negative for apnea, cough, choking, chest tightness, shortness of breath, wheezing and stridor.    Cardiovascular: Negative for chest pain, palpitations and leg swelling.   Gastrointestinal: Negative for abdominal distention, abdominal pain,  blood in stool, constipation, diarrhea, nausea and vomiting.   Endocrine: Negative for cold intolerance, heat intolerance, polydipsia, polyphagia and polyuria.   Genitourinary: Negative for difficulty urinating, dysuria, frequency, hematuria and urgency.   Musculoskeletal: Negative for arthralgias, back pain, myalgias and neck pain.   Skin: Negative for color change, pallor and rash.   Allergic/Immunologic: Negative for immunocompromised state.   Neurological: Negative for dizziness, seizures, syncope, light-headedness, numbness and headaches.   Hematological: Negative for adenopathy.   Psychiatric/Behavioral: Negative for suicidal ideas. The patient is not nervous/anxious.        Physical Exam  Vitals reviewed.   Constitutional:       Appearance: Normal appearance.   HENT:      Head: Normocephalic and atraumatic.      Nose: Nose normal.   Cardiovascular:      Rate and Rhythm: Normal rate and regular rhythm.   Pulmonary:      Effort: Pulmonary effort is normal. No respiratory distress.   Chest:      Breasts: Breasts are symmetrical.         Right: No inverted nipple, mass, nipple discharge, skin change or tenderness.         Left: No inverted nipple, mass, nipple discharge, skin change or tenderness.   Musculoskeletal:      Cervical back: Normal range of motion and neck supple.   Skin:     General: Skin is warm and dry.   Neurological:      Mental Status: She is alert.   Psychiatric:         Mood and Affect: Mood normal.         Behavior: Behavior normal.         Thought Content: Thought content normal.         Judgment: Judgment normal.           ASSESSMENT    Diagnoses and all orders for this visit:    1. Microcalcifications of the breast (Primary)  -     Mammo Stereotactic Breast Biopsy Initial With & Without Device Right  -     diazePAM (VALIUM) tablet 5 mg  -     oxyCODONE-acetaminophen (PERCOCET) 5-325 MG per tablet 1 tablet  -     lidocaine 1% - EPINEPHrine 1:[id number] (XYLOCAINE W/EPI) 1 %-1:[id number] injection 30  mL    Other orders  -     Obtain Informed Consent; Standing  -     Tissue Pathology Exam; Standing        PLAN    1.  Right-sided stereotactic mammotome biopsy with clip placement is planned    The following were discussed with the patient/family:      What are the indications that have led your doctor to the opinion that an operation is necessary?    Breast imaging has determined an abnormal area requiring biopsy.    What, if any, alternative treatments are available for your condition?    Alternatively, open biopsy in the operating room may be performed under sedation or general anesthesia. Observation is not a good option.    What will be the likely result if you don't have the operation?    There is a possibility of missing a breast cancer diagnosis.    What are the basic procedures involved in the operation?    The patient will be placed prone for the procedure. A small incision will be made under local anesthesia, and a special, large needle will be used to perform the biopsy.   A permanent titanium clip will be placed in the breast to bassam the site of biopsy should further surgery be necessary.    What are the risks?    The risks of bleeding, infection, the possible need for open biopsy or other procedure, scarring, and poor wound healing are explained. Bruising almost always occurs. There is a low transfusion risk. The risks of chronic pain are, likewise, low.     How is the operation expected to improve your health or quality of life?    The lesion can usually be determined to be benign or malignant. Follow up xrays or further open excision may be necessary depending on the pathology.    Is hospitalization necessary and, if so, how long can you expect to be hospitalized?    This procedure is performed on an outpatient basis.    What can you expect during your recovery period?    Minimal pain is usually controlled with non-narcotic analgesia.    When can you expect to resume normal activities?    Normal  activity may be resumed the following day.    Are there likely to be residual effects from the operation?    Usually, there are no residual effects following biiopsy.    .   All questions were answered. The patient agrees to operation.              This document has been electronically signed by Rodo Holcomb MD on September 27, 2021 22:24 CDT

## 2021-09-29 ENCOUNTER — HOSPITAL ENCOUNTER (OUTPATIENT)
Dept: MAMMOGRAPHY | Facility: HOSPITAL | Age: 61
Discharge: HOME | End: 2021-09-29
Admitting: SURGERY

## 2021-09-29 VITALS
WEIGHT: 190.7 LBS | HEART RATE: 70 BPM | TEMPERATURE: 97.9 F | RESPIRATION RATE: 18 BRPM | DIASTOLIC BLOOD PRESSURE: 69 MMHG | HEIGHT: 64 IN | SYSTOLIC BLOOD PRESSURE: 117 MMHG | BODY MASS INDEX: 32.56 KG/M2 | OXYGEN SATURATION: 94 %

## 2021-09-29 DIAGNOSIS — R92.0 MICROCALCIFICATIONS OF THE BREAST: ICD-10-CM

## 2021-09-29 PROCEDURE — 19081 BX BREAST 1ST LESION STRTCTC: CPT | Performed by: SURGERY

## 2021-09-29 PROCEDURE — A4648 IMPLANTABLE TISSUE MARKER: HCPCS

## 2021-09-29 RX ORDER — OXYCODONE HYDROCHLORIDE AND ACETAMINOPHEN 5; 325 MG/1; MG/1
1 TABLET ORAL ONCE
Status: COMPLETED | OUTPATIENT
Start: 2021-09-29 | End: 2021-09-29

## 2021-09-29 RX ORDER — LIDOCAINE HYDROCHLORIDE AND EPINEPHRINE 10; 10 MG/ML; UG/ML
30 INJECTION, SOLUTION INFILTRATION; PERINEURAL ONCE
Status: COMPLETED | OUTPATIENT
Start: 2021-09-29 | End: 2021-09-29

## 2021-09-29 RX ORDER — DIAZEPAM 5 MG/1
5 TABLET ORAL ONCE
Status: COMPLETED | OUTPATIENT
Start: 2021-09-29 | End: 2021-09-29

## 2021-09-29 RX ADMIN — DIAZEPAM 5 MG: 5 TABLET ORAL at 06:47

## 2021-09-29 RX ADMIN — OXYCODONE HYDROCHLORIDE AND ACETAMINOPHEN 1 TABLET: 5; 325 TABLET ORAL at 06:47

## 2021-09-29 RX ADMIN — LIDOCAINE HYDROCHLORIDE,EPINEPHRINE BITARTRATE 30 ML: 10; .01 INJECTION, SOLUTION INFILTRATION; PERINEURAL at 08:00

## 2021-09-29 NOTE — INTERVAL H&P NOTE
H&P reviewed. The patient was examined and there are no changes to the H&P.      Temp:  [98.1 °F (36.7 °C)] 98.1 °F (36.7 °C)  Heart Rate:  [73] 73  Resp:  [18] 18  BP: (125)/(77) 125/77

## 2021-09-30 LAB
LAB AP CASE REPORT: NORMAL
PATH REPORT.FINAL DX SPEC: NORMAL

## 2021-10-01 ENCOUNTER — OFFICE VISIT (OUTPATIENT)
Dept: FAMILY MEDICINE CLINIC | Facility: CLINIC | Age: 61
End: 2021-10-01

## 2021-10-01 VITALS
TEMPERATURE: 97.1 F | OXYGEN SATURATION: 95 % | SYSTOLIC BLOOD PRESSURE: 120 MMHG | HEIGHT: 64 IN | BODY MASS INDEX: 32.91 KG/M2 | WEIGHT: 192.8 LBS | DIASTOLIC BLOOD PRESSURE: 88 MMHG | RESPIRATION RATE: 20 BRPM | HEART RATE: 89 BPM

## 2021-10-01 DIAGNOSIS — E87.6 HYPOKALEMIA: ICD-10-CM

## 2021-10-01 DIAGNOSIS — I10 ESSENTIAL (PRIMARY) HYPERTENSION: ICD-10-CM

## 2021-10-01 DIAGNOSIS — E78.2 MIXED HYPERLIPIDEMIA: ICD-10-CM

## 2021-10-01 DIAGNOSIS — I10 ESSENTIAL HYPERTENSION: ICD-10-CM

## 2021-10-01 DIAGNOSIS — Z00.00 ANNUAL PHYSICAL EXAM: Primary | ICD-10-CM

## 2021-10-01 DIAGNOSIS — D64.9 ANEMIA, UNSPECIFIED TYPE: ICD-10-CM

## 2021-10-01 DIAGNOSIS — E03.9 ACQUIRED HYPOTHYROIDISM: ICD-10-CM

## 2021-10-01 DIAGNOSIS — J30.2 SEASONAL ALLERGIES: ICD-10-CM

## 2021-10-01 DIAGNOSIS — Z23 NEED FOR INFLUENZA VACCINATION: ICD-10-CM

## 2021-10-01 DIAGNOSIS — E11.65 TYPE 2 DIABETES MELLITUS WITH HYPERGLYCEMIA, WITHOUT LONG-TERM CURRENT USE OF INSULIN (HCC): ICD-10-CM

## 2021-10-01 PROBLEM — N60.19 FIBROCYSTIC DISEASE OF BREAST: Status: ACTIVE | Noted: 2019-04-18

## 2021-10-01 PROBLEM — I25.10 ATHEROSCLEROTIC HEART DISEASE OF NATIVE CORONARY ARTERY WITHOUT ANGINA PECTORIS: Status: ACTIVE | Noted: 2019-04-09

## 2021-10-01 PROBLEM — E78.5 HYPERLIPIDEMIA: Status: ACTIVE | Noted: 2018-10-23

## 2021-10-01 PROBLEM — E66.01 CLASS 2 SEVERE OBESITY DUE TO EXCESS CALORIES WITH SERIOUS COMORBIDITY AND BODY MASS INDEX (BMI) OF 36.0 TO 36.9 IN ADULT: Status: ACTIVE | Noted: 2021-09-23

## 2021-10-01 PROBLEM — E11.9 TYPE 2 DIABETES MELLITUS: Status: ACTIVE | Noted: 2019-04-18

## 2021-10-01 PROCEDURE — 90471 IMMUNIZATION ADMIN: CPT | Performed by: NURSE PRACTITIONER

## 2021-10-01 PROCEDURE — 99214 OFFICE O/P EST MOD 30 MIN: CPT | Performed by: NURSE PRACTITIONER

## 2021-10-01 PROCEDURE — 90686 IIV4 VACC NO PRSV 0.5 ML IM: CPT | Performed by: NURSE PRACTITIONER

## 2021-10-01 RX ORDER — POTASSIUM CHLORIDE 750 MG/1
20 TABLET, EXTENDED RELEASE ORAL DAILY
Qty: 120 TABLET | Refills: 4 | Status: SHIPPED | OUTPATIENT
Start: 2021-10-01 | End: 2022-08-22

## 2021-10-01 RX ORDER — METOPROLOL SUCCINATE 100 MG/1
300 TABLET, EXTENDED RELEASE ORAL DAILY
Qty: 270 TABLET | Refills: 3 | Status: SHIPPED | OUTPATIENT
Start: 2021-10-01 | End: 2022-09-30 | Stop reason: SDUPTHER

## 2021-10-01 RX ORDER — CETIRIZINE HYDROCHLORIDE 10 MG/1
10 TABLET ORAL DAILY
Qty: 90 TABLET | Refills: 3 | Status: SHIPPED | OUTPATIENT
Start: 2021-10-01 | End: 2022-11-03

## 2021-10-01 RX ORDER — LANCETS 33 GAUGE
1 EACH MISCELLANEOUS 3 TIMES DAILY
Qty: 90 EACH | Refills: 3 | Status: SHIPPED | OUTPATIENT
Start: 2021-10-01 | End: 2022-09-30 | Stop reason: SDUPTHER

## 2021-10-01 RX ORDER — AMLODIPINE BESYLATE 5 MG/1
5 TABLET ORAL
Qty: 90 TABLET | Refills: 3 | Status: SHIPPED | OUTPATIENT
Start: 2021-10-01 | End: 2022-09-30 | Stop reason: SDUPTHER

## 2021-10-01 RX ORDER — EMPAGLIFLOZIN AND METFORMIN HYDROCHLORIDE 12.5; 1 MG/1; MG/1
1 TABLET ORAL 2 TIMES DAILY
Qty: 60 TABLET | Refills: 11 | Status: SHIPPED | OUTPATIENT
Start: 2021-10-01 | End: 2021-10-04 | Stop reason: SDUPTHER

## 2021-10-01 RX ORDER — BLOOD-GLUCOSE METER
1 KIT MISCELLANEOUS 3 TIMES DAILY
Qty: 1 EACH | Refills: 0 | Status: SHIPPED | OUTPATIENT
Start: 2021-10-01

## 2021-10-01 RX ORDER — LEVOTHYROXINE SODIUM 112 UG/1
112 TABLET ORAL DAILY
Qty: 90 TABLET | Refills: 2 | Status: SHIPPED | OUTPATIENT
Start: 2021-10-01 | End: 2022-06-27

## 2021-10-01 NOTE — PROGRESS NOTES
Subjective   Danae Ngo is a 61 y.o. female.     CC: Hypertension, hyperlipidemia, anemia, seasonal allergies, hypokalemia, hypothyroidism    Hypertension  This is a chronic problem. The current episode started more than 1 year ago. The problem is controlled. Pertinent negatives include no blurred vision, chest pain, headaches, malaise/fatigue, palpitations or shortness of breath. Risk factors for coronary artery disease include family history, dyslipidemia, diabetes mellitus, obesity and sedentary lifestyle. Current antihypertension treatment includes calcium channel blockers, diuretics, ACE inhibitors and beta blockers. The current treatment provides significant improvement. Compliance problems include exercise and diet.  Hypertensive end-organ damage includes CAD/MI.   Hyperlipidemia  This is a chronic problem. The current episode started more than 1 year ago. Recent lipid tests were reviewed and are variable. Exacerbating diseases include diabetes, hypothyroidism and obesity. Factors aggravating her hyperlipidemia include fatty foods, beta blockers and thiazides. Pertinent negatives include no chest pain, focal sensory loss, focal weakness, leg pain, myalgias or shortness of breath. Current antihyperlipidemic treatment includes statins, exercise and diet change. The current treatment provides significant improvement of lipids. Compliance problems include adherence to exercise and adherence to diet.  Risk factors for coronary artery disease include family history, dyslipidemia, diabetes mellitus, hypertension, obesity, a sedentary lifestyle and stress.   Anemia  Presents for follow-up visit. There has been no abdominal pain, anorexia, bruising/bleeding easily, confusion, fever, leg swelling, light-headedness, malaise/fatigue, pallor, palpitations, paresthesias, pica or weight loss. Signs of blood loss that are not present include hematemesis, hematochezia, melena, menorrhagia and vaginal bleeding. Past  medical history includes hypothyroidism. There are no compliance problems.  Compliance with medications is %.   Hypothyroidism  This is a chronic problem. The current episode started more than 1 year ago. Pertinent negatives include no abdominal pain, anorexia, arthralgias, chest pain, chills, congestion, coughing, diaphoresis, fatigue, fever, headaches, myalgias, nausea, rash, sore throat, vomiting or weakness. Nothing aggravates the symptoms. Treatments tried: levothyroxine. The treatment provided significant relief.        The following portions of the patient's history were reviewed and updated as appropriate: allergies, current medications, past family history, past medical history, past social history, past surgical history and problem list.    Review of Systems   Constitutional: Negative for activity change, appetite change, chills, diaphoresis, fatigue, fever, malaise/fatigue, unexpected weight gain and unexpected weight loss.   HENT: Negative for congestion, sore throat, trouble swallowing and voice change.    Eyes: Negative for blurred vision, double vision, photophobia, pain and visual disturbance.   Respiratory: Negative for cough, chest tightness, shortness of breath and wheezing.    Cardiovascular: Negative for chest pain, palpitations and leg swelling.   Gastrointestinal: Negative for abdominal distention, abdominal pain, anal bleeding, anorexia, blood in stool, constipation, diarrhea, hematemesis, hematochezia, melena, nausea, vomiting, GERD and indigestion.   Endocrine: Negative for cold intolerance, heat intolerance, polydipsia, polyphagia and polyuria.   Genitourinary: Negative for dysuria, frequency, hematuria, menorrhagia, urgency and vaginal bleeding.   Musculoskeletal: Negative for arthralgias and myalgias.   Skin: Negative for pallor and rash.   Allergic/Immunologic: Negative.    Neurological: Negative for dizziness, focal weakness, syncope, weakness, light-headedness, headache,  paresthesias and confusion.   Hematological: Negative.  Does not bruise/bleed easily.   Psychiatric/Behavioral: The patient is not nervous/anxious.        Objective   Physical Exam  Vitals and nursing note reviewed.   Constitutional:       General: She is not in acute distress.     Appearance: Normal appearance. She is well-developed. She is obese. She is not ill-appearing, toxic-appearing or diaphoretic.   HENT:      Head: Normocephalic and atraumatic.      Right Ear: External ear normal.      Left Ear: External ear normal.      Nose: Nose normal.   Eyes:      Conjunctiva/sclera: Conjunctivae normal.      Pupils: Pupils are equal, round, and reactive to light.   Neck:      Thyroid: No thyromegaly.      Trachea: No tracheal deviation.   Cardiovascular:      Rate and Rhythm: Normal rate and regular rhythm.      Heart sounds: Normal heart sounds. No murmur heard.   No friction rub. No gallop.    Pulmonary:      Effort: Pulmonary effort is normal. No respiratory distress.      Breath sounds: Normal breath sounds. No stridor. No wheezing, rhonchi or rales.   Abdominal:      General: Bowel sounds are normal. There is no distension.      Palpations: Abdomen is soft. There is no mass.      Tenderness: There is no abdominal tenderness. There is no guarding or rebound.      Hernia: No hernia is present.   Musculoskeletal:         General: No tenderness. Normal range of motion.      Cervical back: Normal range of motion and neck supple.   Lymphadenopathy:      Cervical: No cervical adenopathy.   Skin:     General: Skin is warm and dry.      Coloration: Skin is not pale.      Findings: No erythema or rash.   Neurological:      Mental Status: She is alert and oriented to person, place, and time.      Cranial Nerves: No cranial nerve deficit.      Coordination: Coordination normal.   Psychiatric:         Mood and Affect: Mood normal.         Behavior: Behavior normal.         Thought Content: Thought content normal.          Judgment: Judgment normal.           Assessment/Plan   Diagnoses and all orders for this visit:    1. Annual physical exam (Primary)    2. Essential (primary) hypertension  -     Controlled.  Refill, metoprolol succinate XL (TOPROL-XL) 100 MG 24 hr tablet; Take 3 tablets by mouth Daily.  Dispense: 270 tablet; Refill: 3    3. Mixed hyperlipidemia  -     Lipid Panel; Future, will call with results.  Continue low-fat diet Lipitor as prescribed.  We will continue to monitor.    4. Anemia, unspecified type  -     CBC & Differential; Future, will call with results.  Asymptomatic.    5. Need for influenza vaccination  -     Cancel: FluLaval/Fluarix >6 Months (4866-9899); Future  -     FluLaval/Fluarix >6 Months (7700-1676), tolerated well with no adverse reaction.    6. Type 2 diabetes mellitus with hyperglycemia, without long-term current use of insulin ([facility])  -     glucose blood test strip; 1 each by Other route 3 (Three) Times a Day. Use as instructed  Dispense: 90 each; Refill: 12  -     glucose monitor monitoring kit; 1 each 3 (Three) Times a Day.  Dispense: 1 each; Refill: 0  -     Lancets 33G misc; 1 each 3 (Three) Times a Day.  Dispense: 90 each; Refill: 3  -     Empagliflozin-metFORMIN HCl (Synjardy) 12.5-1000 MG tablet; Take 1 tablet by mouth 2 (two) times a day.  Dispense: 60 tablet; Refill: 11   -Continue Synjardy and diabetic diet as prescribed.  New prescription for glucometer sent to pharmacy.  Continue follow-up with endocrinology as scheduled.    7. Essential hypertension  -    Controlled.  Refill, amLODIPine (NORVASC) 5 MG tablet; Take 1 tablet by mouth every night at bedtime.  Dispense: 90 tablet; Refill: 3    8. Seasonal allergies  -    Controlled.  Refill cetirizine (zyrTEC) 10 MG tablet; Take 1 tablet by mouth Daily.  Dispense: 90 tablet; Refill: 3    9. Hypokalemia  -     Stable.  Refill, potassium chloride (K-DUR,KLOR-CON) 10 MEQ CR tablet; Take 2 tablets by mouth Daily.  Dispense: 120 tablet;  Refill: 4    10. Acquired hypothyroidism  -     levothyroxine (SYNTHROID, LEVOTHROID) 112 MCG tablet; Take 1 tablet by mouth Daily.  Dispense: 90 tablet; Refill: 2  -     TSH; Future  -     T4, Free; Future   -Will call with lab results.  Continue levothyroxine as prescribed.  We will continue to monitor.    11.  Medication refill  -     vitamin D3 125 MCG (5000 UT) capsule capsule; Take 1 capsule by mouth Daily.  Dispense: 90 capsule; Refill: 3    12.  Follow-up in 6 months or sooner for any acute needs.          This document has been electronically signed by WILLIS Manzano on October 1, 2021 16:00 CDT

## 2021-10-04 ENCOUNTER — OFFICE VISIT (OUTPATIENT)
Dept: SURGERY | Facility: CLINIC | Age: 61
End: 2021-10-04

## 2021-10-04 VITALS
WEIGHT: 192.2 LBS | BODY MASS INDEX: 32.99 KG/M2 | OXYGEN SATURATION: 97 % | DIASTOLIC BLOOD PRESSURE: 86 MMHG | HEART RATE: 74 BPM | SYSTOLIC BLOOD PRESSURE: 118 MMHG | TEMPERATURE: 97.1 F

## 2021-10-04 DIAGNOSIS — R92.0 MICROCALCIFICATIONS OF THE BREAST: Primary | ICD-10-CM

## 2021-10-04 DIAGNOSIS — E11.65 TYPE 2 DIABETES MELLITUS WITH HYPERGLYCEMIA, WITHOUT LONG-TERM CURRENT USE OF INSULIN (HCC): ICD-10-CM

## 2021-10-04 PROCEDURE — 99024 POSTOP FOLLOW-UP VISIT: CPT | Performed by: NURSE PRACTITIONER

## 2021-10-04 RX ORDER — EMPAGLIFLOZIN, METFORMIN HYDROCHLORIDE 12.5; 1 MG/1; MG/1
2 TABLET, EXTENDED RELEASE ORAL DAILY
Qty: 60 TABLET | Refills: 0 | Status: SHIPPED | OUTPATIENT
Start: 2021-10-04 | End: 2021-10-15 | Stop reason: SDUPTHER

## 2021-10-04 RX ORDER — EMPAGLIFLOZIN AND METFORMIN HYDROCHLORIDE 12.5; 1 MG/1; MG/1
1 TABLET ORAL 2 TIMES DAILY
Qty: 60 TABLET | Refills: 0 | Status: SHIPPED | OUTPATIENT
Start: 2021-10-04 | End: 2021-10-04

## 2021-10-04 RX ORDER — EMPAGLIFLOZIN, METFORMIN HYDROCHLORIDE 12.5; 1 MG/1; MG/1
1 TABLET, EXTENDED RELEASE ORAL DAILY
Qty: 30 TABLET | Refills: 0 | Status: SHIPPED | OUTPATIENT
Start: 2021-10-04 | End: 2021-10-04 | Stop reason: SDUPTHER

## 2021-10-04 NOTE — TELEPHONE ENCOUNTER
Dr. Ruano Patient     Patient called stating that Dr. Ruano called in her medication incorrectly and the insurance is not covering it.   It needs to be the Synjardy XR 12.5-1000mg, 1 tablet, oral, 2x daily    [facility].

## 2021-10-04 NOTE — PROGRESS NOTES
CHIEF COMPLAINT:   Chief Complaint   Patient presents with   • Follow-up     Mammotome Results       HPI: This patient presents for a post-operative visit after undergoing a right stereotactic breast biopsy per .  Patient reports no problems. Minimal pain. No bruising. She denies fever or chills.     PATHOLOGY:       Physical Exam  Incisions healing with no infection, cellulitis or hematoma    ASSESSMENT:    Diagnoses and all orders for this visit:    1. Microcalcifications of the breast (Primary)  -     Mammo diagnostic digital tomosynthesis right w CAD; Future        PLAN:    1. The patient will follow-up in 6 week for repeat imaging.  2. May shower.   3. May return to normal activity without restrictions.                This document has been electronically signed by WILLIS Quinteros on October 4, 2021 13:41 CDT

## 2021-10-04 NOTE — TELEPHONE ENCOUNTER
Dr Ruano Patient     The medication order was the same one the insurance did not pay for     The medication needs the XR Synjardy 12.5-1000mg called in for the insurance to cover it.    Sorry for the confusion     thanks

## 2021-10-04 NOTE — PATIENT INSTRUCTIONS
"BMI for Adults  What is BMI?  Body mass index (BMI) is a number that is calculated from a person's weight and height. BMI can help estimate how much of a person's weight is composed of fat. BMI does not measure body fat directly. Rather, it is an alternative to procedures that directly measure body fat, which can be difficult and expensive.  BMI can help identify people who may be at higher risk for certain medical problems.  What are BMI measurements used for?  BMI is used as a screening tool to identify possible weight problems. It helps determine whether a person is obese, overweight, a healthy weight, or underweight.  BMI is useful for:  · Identifying a weight problem that may be related to a medical condition or may increase the risk for medical problems.  · Promoting changes, such as changes in diet and exercise, to help reach a healthy weight. BMI screening can be repeated to see if these changes are working.  How is BMI calculated?  BMI involves measuring your weight in relation to your height. Both height and weight are measured, and the BMI is calculated from those numbers. This can be done either in English (U.S.) or metric measurements. Note that charts and online BMI calculators are available to help you find your BMI quickly and easily without having to do these calculations yourself.  To calculate your BMI in English (U.S.) measurements:    1. Measure your weight in pounds (lb).  2. Multiply the number of pounds by 703.  ? For example, for a person who weighs 180 lb, multiply that number by 703, which equals 126,540.  3. Measure your height in inches. Then multiply that number by itself to get a measurement called \"inches squared.\"  ? For example, for a person who is 70 inches tall, the \"inches squared\" measurement is 70 inches x 70 inches, which equals 4,900 inches squared.  4. Divide the total from step 2 (number of lb x 703) by the total from step 3 (inches squared): 126,540 ÷ 4,900 = 25.8. This is " "your BMI.    To calculate your BMI in metric measurements:  1. Measure your weight in kilograms (kg).  2. Measure your height in meters (m). Then multiply that number by itself to get a measurement called \"meters squared.\"  ? For example, for a person who is 1.75 m tall, the \"meters squared\" measurement is 1.75 m x 1.75 m, which is equal to 3.1 meters squared.  3. Divide the number of kilograms (your weight) by the meters squared number. In this example: 70 ÷ 3.1 = 22.6. This is your BMI.  What do the results mean?  BMI charts are used to identify whether you are underweight, normal weight, overweight, or obese. The following guidelines will be used:  · Underweight: BMI less than 18.5.  · Normal weight: BMI between 18.5 and 24.9.  · Overweight: BMI between 25 and 29.9.  · Obese: BMI of 30 or above.  Keep these notes in mind:  · Weight includes both fat and muscle, so someone with a muscular build, such as an athlete, may have a BMI that is higher than 24.9. In cases like these, BMI is not an accurate measure of body fat.  · To determine if excess body fat is the cause of a BMI of 25 or higher, further assessments may need to be done by a health care provider.  · BMI is usually interpreted in the same way for men and women.  Where to find more information  For more information about BMI, including tools to quickly calculate your BMI, go to these websites:  · Centers for Disease Control and Prevention: www.cdc.gov  · American Heart Association: www.heart.org  · National Heart, Lung, and Blood [place]: www.nhlbi.nih.gov  Summary  · Body mass index (BMI) is a number that is calculated from a person's weight and height.  · BMI may help estimate how much of a person's weight is composed of fat. BMI can help identify those who may be at higher risk for certain medical problems.  · BMI can be measured using English measurements or metric measurements.  · BMI charts are used to identify whether you are underweight, normal " weight, overweight, or obese.  This information is not intended to replace advice given to you by your health care provider. Make sure you discuss any questions you have with your health care provider.  Document Revised: 09/09/2020 Document Reviewed: 07/17/2020  Elsevier Patient Education © 2021 [employer].

## 2021-10-04 NOTE — TELEPHONE ENCOUNTER
Incoming Refill Request      Medication requested (name and dose): Synjardy 12.5-1000mg    Pharmacy where request should be sent: [facility]     Additional details provided by patient: Dr. Ruano Patient     Best call back number: [phone]    Does the patient have less than a 3 day supply:  [x] Yes  [] No    Rodrick Mccormick Rep  10/04/21, 09:08 CDT

## 2021-10-11 RX ORDER — PROCHLORPERAZINE 25 MG/1
SUPPOSITORY RECTAL
Qty: 3 EACH | Refills: 3 | Status: SHIPPED | OUTPATIENT
Start: 2021-10-11 | End: 2022-03-30

## 2021-10-14 ENCOUNTER — TELEPHONE (OUTPATIENT)
Dept: ENDOCRINOLOGY | Facility: CLINIC | Age: 61
End: 2021-10-14

## 2021-10-14 ENCOUNTER — DOCUMENTATION (OUTPATIENT)
Dept: ENDOCRINOLOGY | Facility: CLINIC | Age: 61
End: 2021-10-14

## 2021-10-14 NOTE — PROGRESS NOTES
CHANELL BENAVIDES (Key: [id number]) - [id number]  Synjardy XR 12.5-1000MG er tablets        PA SUBMITTED

## 2021-10-14 NOTE — TELEPHONE ENCOUNTER
Pt left VM that she has been out of her synjardy for 2 weeks now waiting on a PA and needs to know when it will be approved or what to do ?    [phone]

## 2021-10-15 ENCOUNTER — DOCUMENTATION (OUTPATIENT)
Dept: ENDOCRINOLOGY | Facility: CLINIC | Age: 61
End: 2021-10-15

## 2021-10-15 ENCOUNTER — TELEPHONE (OUTPATIENT)
Dept: ENDOCRINOLOGY | Facility: CLINIC | Age: 61
End: 2021-10-15

## 2021-10-15 DIAGNOSIS — E11.65 TYPE 2 DIABETES MELLITUS WITH HYPERGLYCEMIA, WITHOUT LONG-TERM CURRENT USE OF INSULIN (HCC): Primary | ICD-10-CM

## 2021-10-15 RX ORDER — EMPAGLIFLOZIN, METFORMIN HYDROCHLORIDE 12.5; 1 MG/1; MG/1
2 TABLET, EXTENDED RELEASE ORAL DAILY
Qty: 60 TABLET | Refills: 0 | Status: SHIPPED | OUTPATIENT
Start: 2021-10-15 | End: 2021-11-12 | Stop reason: SDUPTHER

## 2021-10-15 NOTE — TELEPHONE ENCOUNTER
PT called and needs refills for Empagliflozin-metFORMIN HCl ER (Synjardy XR) 12.5-1000 MG tablet sustained-release 24 hour sent to [facility] in [place]. Please call pt when prescription is sent.

## 2021-11-12 ENCOUNTER — OFFICE VISIT (OUTPATIENT)
Dept: ENDOCRINOLOGY | Facility: CLINIC | Age: 61
End: 2021-11-12

## 2021-11-12 VITALS
OXYGEN SATURATION: 100 % | BODY MASS INDEX: 32.91 KG/M2 | WEIGHT: 192.8 LBS | SYSTOLIC BLOOD PRESSURE: 124 MMHG | DIASTOLIC BLOOD PRESSURE: 78 MMHG | HEART RATE: 93 BPM | HEIGHT: 64 IN

## 2021-11-12 DIAGNOSIS — I10 ESSENTIAL (PRIMARY) HYPERTENSION: ICD-10-CM

## 2021-11-12 DIAGNOSIS — E78.2 MIXED HYPERLIPIDEMIA: ICD-10-CM

## 2021-11-12 DIAGNOSIS — E11.65 TYPE 2 DIABETES MELLITUS WITH HYPERGLYCEMIA, WITHOUT LONG-TERM CURRENT USE OF INSULIN (HCC): Primary | ICD-10-CM

## 2021-11-12 DIAGNOSIS — E55.9 VITAMIN D DEFICIENCY: ICD-10-CM

## 2021-11-12 DIAGNOSIS — E03.9 ACQUIRED HYPOTHYROIDISM: ICD-10-CM

## 2021-11-12 PROCEDURE — 99214 OFFICE O/P EST MOD 30 MIN: CPT | Performed by: NURSE PRACTITIONER

## 2021-11-12 PROCEDURE — 95251 CONT GLUC MNTR ANALYSIS I&R: CPT | Performed by: NURSE PRACTITIONER

## 2021-11-12 RX ORDER — SEMAGLUTIDE 1.34 MG/ML
1 INJECTION, SOLUTION SUBCUTANEOUS
Qty: 3 PEN | Refills: 11 | Status: SHIPPED | OUTPATIENT
Start: 2021-11-12 | End: 2022-05-13

## 2021-11-12 RX ORDER — EMPAGLIFLOZIN, METFORMIN HYDROCHLORIDE 12.5; 1 MG/1; MG/1
2 TABLET, EXTENDED RELEASE ORAL DAILY
Qty: 60 TABLET | Refills: 11 | Status: SHIPPED | OUTPATIENT
Start: 2021-11-12 | End: 2022-08-23 | Stop reason: SDUPTHER

## 2021-11-12 NOTE — PROGRESS NOTES
"Chief Complaint  Diabetes    Subjective          Danae Ngo presents to [facility] ENDOCRINOLOGY  History of Present Illness         In office visit        Primary Care Provider     José Ruano APRN      61-year-old female presents for follow-up    Reason diabetes mellitus type 2    Duration diagnosed in 2015    Timing is constant      Lab Results   Component Value Date    HGBA1C 6.90 (H) 05/13/2021         Quality improved    Severity is moderate    Macrovascular complications CAD    Microvascular complications none     Current diabetes regimen    Oral medications, GLP-1    Current glucose monitoring    Checks 4 times daily    Uses the Dexcom G6          Review of Systems - General ROS: negative            Objective   Vital Signs:   /78   Pulse 93   Ht 162.6 cm (64\")   Wt 87.5 kg (192 lb 12.8 oz)   SpO2 100%   BMI 33.09 kg/m²     Physical Exam  Constitutional:       Appearance: Normal appearance.   Cardiovascular:      Rate and Rhythm: Regular rhythm.      Heart sounds: Normal heart sounds.   Pulmonary:      Breath sounds: Normal breath sounds.   Musculoskeletal:      Cervical back: Normal range of motion.   Neurological:      Mental Status: She is alert.        Result Review :   The following data was reviewed by: WILLIS Gabriel on 11/12/2021:  Common labs    Common Labsle 3/13/21 3/13/21 3/13/21 3/13/21 3/13/21 5/13/21 5/13/21 5/13/21 9/24/21    0959 0959 0959 0959 1115 0926 0926 0926    Glucose   133 (A)     189 (A) 140 (A)   BUN   20     17 23   Creatinine   1.38 (A)     1.06 (A) 1.17 (A)   eGFR  Am   47 (A)     64 57 (A)   Sodium   139     138 136   Potassium   3.8     3.7 3.9   Chloride   101     96 (A) 96 (A)   Calcium   9.6     9.8 9.9   Albumin   4.60     4.40 4.50   Total Bilirubin   0.5     0.8 0.6   Alkaline Phosphatase   65     65 67   AST (SGOT)   17     17 13   ALT (SGPT)   15     17 10   WBC 6.71     5.18      Hemoglobin 14.6    "  15.0      Hematocrit 44.3     46.5      Platelets 378     351      Total Cholesterol    108        Triglycerides    62        HDL Cholesterol    37 (A)        LDL Cholesterol     57        Hemoglobin A1C  7.49 (A)     6.90 (A)     Microalbumin, Urine     1.4       (A) Abnormal value                      Assessment and Plan    Diagnoses and all orders for this visit:    1. Type 2 diabetes mellitus with hyperglycemia, without long-term current use of insulin (HCC) (Primary)  -     Hemoglobin A1c  -     Comprehensive Metabolic Panel  -     Empagliflozin-metFORMIN HCl ER (Synjardy XR) 12.5-1000 MG tablet sustained-release 24 hour; Take 2 tablets by mouth Daily.  Dispense: 60 tablet; Refill: 11    2. Acquired hypothyroidism    3. Vitamin D deficiency    4. Mixed hyperlipidemia    5. Essential (primary) hypertension    Other orders  -     Semaglutide, 1 MG/DOSE, (Ozempic, 1 MG/DOSE,) 2 MG/1.5ML solution pen-injector; Inject 1 mg under the skin into the appropriate area as directed Every 7 (Seven) Days.  Dispense: 3 pen; Refill: 11           Glycemic Management:         Diabetes mellitus type 2       Lab Results   Component Value Date    HGBA1C 6.90 (H) 05/13/2021          Uses the dexcom G6     Downloaded and reviewed          Dated from Oct. 30 to Nov. 12, 2021     Average blood glucose 146    Time in target range 88%    High 12%    Very high 1%    Low 0%    Very low 0%    Overall she is at goal occasional postprandial hyperglycemia    In target 88% of the time no changes to current regimen                     Taking Synjardy XR 12/5 /1000 mg , 2 tabs w bkfast     Taking  Ozempic 1 mg  weekly      If nausea try to eat less  If vomiting , stop   Ozempic will help generate more of your own insulin and it decreases the probability of Death / Heart / Stroke by 27%        Keep meals at 50 gm of CHO                        Stopped glipizide     Stopped jardiance      Stopped metformin         Lipid  Management        Taking  atorvastatin 20 mg qhs           Total Cholesterol   Date Value Ref Range Status   03/13/2021 108 0 - 200 mg/dL Final     Triglycerides   Date Value Ref Range Status   03/13/2021 62 0 - 150 mg/dL Final     HDL Cholesterol   Date Value Ref Range Status   03/13/2021 37 (L) 40 - 60 mg/dL Final     LDL Cholesterol    Date Value Ref Range Status   03/13/2021 57 0 - 100 mg/dL Final        Blood Pressure Management:       Taking lisinopril 40 mg one daily      Taking metoprolol xl 100 mg daily               Microvascular Complication Monitoring:       Last eye exam Dec. 2019, no retinopathy            Component      Latest Ref Rng & Units 3/13/2021 3/13/2021          11:15 AM 11:15 AM   Microalbumin/Creatinine Ratio      mg/g 8.1    Creatinine, Urine      mg/dL 172.9 172.9   Microalbumin, Urine      mg/dL 1.4    Protein/Creatinine Ratio      0.0 - 200.0 mg/G Crea  69.4   Total Protein, Urine      mg/dL  12.0                 Neuropathy  None           Weight Related:                Bone Health     Vitamin d def     Taking MVI      Thyroid Health           Lab Results   Component Value Date    TSH 2.860 05/13/2021          Taking Levothyroxine 112 mcgs daily      Other Diabetes Related Aspects         Lab Results   Component Value Date    [id number] 606 03/13/2021                   Follow Up   Return in about 6 months (around 5/12/2022) for Recheck.  Patient was given instructions and counseling regarding her condition or for health maintenance advice. Please see specific information pulled into the AVS if appropriate.         This document has been electronically signed by WILLIS Gabriel on November 12, 2021 09:20 CST.

## 2021-11-19 ENCOUNTER — OFFICE VISIT (OUTPATIENT)
Dept: SURGERY | Facility: CLINIC | Age: 61
End: 2021-11-19

## 2021-11-19 VITALS
TEMPERATURE: 98.2 F | BODY MASS INDEX: 33.29 KG/M2 | SYSTOLIC BLOOD PRESSURE: 114 MMHG | HEIGHT: 64 IN | HEART RATE: 71 BPM | DIASTOLIC BLOOD PRESSURE: 70 MMHG | WEIGHT: 195 LBS

## 2021-11-19 DIAGNOSIS — Z12.31 ENCOUNTER FOR SCREENING MAMMOGRAM FOR MALIGNANT NEOPLASM OF BREAST: ICD-10-CM

## 2021-11-19 DIAGNOSIS — R92.8 ABNORMAL MAMMOGRAM OF RIGHT BREAST: Primary | ICD-10-CM

## 2021-11-19 PROCEDURE — 99212 OFFICE O/P EST SF 10 MIN: CPT | Performed by: NURSE PRACTITIONER

## 2021-11-19 NOTE — PROGRESS NOTES
"Chief Complaint  Follow-up (Recheck Right Breast Mammogram)    Subjective          Danae Ngo presents to [facility] GENERAL SURGERY  History of Present Illness  Mrs. Danae Ngo presents for 6 week follow up imaging following benign right stereotactic breast biopsy by Dr. Holcomb. She denies any lumps, masses, nipple changes, skin changes or lymph node enlargement. Denies any issues with biopsy site. She does have family history of breast cancer in mother.  She is post menopausal and does not take any hormones.        Study Result  Narrative & Impression   PROCEDURE: MAMMO BREAST DIAGNOSTIC TOMOSYNTHESIS RIGHT   HISTORY: follow up mammogram, R92.0 Mammographic   microcalcification found on diagnostic imaging of breast   COMPARISON: 9/23/2021 through 9/9/2019   Computer-aided detection was utilized during this exam.   Digital breast tomosynthesis was performed.   FINDINGS:   CC and MLO views were obtained of the right breast. A biopsy clip   was placed in the upper outer right breast in the site of   previously noted calcifications. There is a small surrounding   hematoma. College he reported benign fibroadenoma with stromal   calcifications, consistent with prebiopsy findings. No suspicious   mammographic findings are seen.   IMPRESSION:   CONCLUSION:   1. No suspicious mammographic findings.   2. Recommend annual screening mammography unless clinical   circumstances dictate earlier evaluation   3. BI-RADS category 2, benign findings   Electronically signed by: Camacho Husain MD 11/16/2021 5:31 PM   CST Workstation: [id number]             Objective   Vital Signs:   /70   Pulse 71   Temp 98.2 °F (36.8 °C) (Oral)   Ht 162.6 cm (64\")   Wt 88.5 kg (195 lb)   BMI 33.47 kg/m²     Physical Exam  Vitals reviewed.   Constitutional:       General: She is not in acute distress.     Appearance: Normal appearance. She is not ill-appearing, toxic-appearing or diaphoretic.   HENT:      " Head: Normocephalic and atraumatic.   Pulmonary:      Effort: Pulmonary effort is normal. No respiratory distress.   Chest:   Breasts:      Right: No swelling, bleeding, inverted nipple, mass, nipple discharge, skin change or tenderness.         Neurological:      General: No focal deficit present.      Mental Status: She is alert and oriented to person, place, and time.   Psychiatric:         Mood and Affect: Mood normal.         Behavior: Behavior normal.         Thought Content: Thought content normal.         Judgment: Judgment normal.        Result Review :       Data reviewed: Radiologic studies mammography          Assessment and Plan    Diagnoses and all orders for this visit:    1. Abnormal mammogram of right breast (Primary)  -     Mammo Screening Digital Tomosynthesis Bilateral With CAD; Future    2. Encounter for screening mammogram for malignant neoplasm of breast  -     Mammo Screening Digital Tomosynthesis Bilateral With CAD; Future      I spent 17 minutes caring for Danae on this date of service. This time includes time spent by me in the following activities:preparing for the visit, reviewing tests, performing a medically appropriate examination and/or evaluation , documenting information in the medical record and care coordination  Follow Up   Return in about 10 months (around 9/19/2022). Continue annual mammography and breast exam unless concerns arise sooner. Encouraged to continue self breast exams as well.    Patient was given instructions and counseling regarding her condition or for health maintenance advice. Please see specific information pulled into the AVS if appropriate.                 This document has been electronically signed by WILLIS Quinteros on November 19, 2021 10:14 CST

## 2021-11-19 NOTE — PATIENT INSTRUCTIONS
"BMI for Adults  What is BMI?  Body mass index (BMI) is a number that is calculated from a person's weight and height. BMI can help estimate how much of a person's weight is composed of fat. BMI does not measure body fat directly. Rather, it is an alternative to procedures that directly measure body fat, which can be difficult and expensive.  BMI can help identify people who may be at higher risk for certain medical problems.  What are BMI measurements used for?  BMI is used as a screening tool to identify possible weight problems. It helps determine whether a person is obese, overweight, a healthy weight, or underweight.  BMI is useful for:  · Identifying a weight problem that may be related to a medical condition or may increase the risk for medical problems.  · Promoting changes, such as changes in diet and exercise, to help reach a healthy weight. BMI screening can be repeated to see if these changes are working.  How is BMI calculated?  BMI involves measuring your weight in relation to your height. Both height and weight are measured, and the BMI is calculated from those numbers. This can be done either in English (U.S.) or metric measurements. Note that charts and online BMI calculators are available to help you find your BMI quickly and easily without having to do these calculations yourself.  To calculate your BMI in English (U.S.) measurements:    1. Measure your weight in pounds (lb).  2. Multiply the number of pounds by 703.  ? For example, for a person who weighs 180 lb, multiply that number by 703, which equals 126,540.  3. Measure your height in inches. Then multiply that number by itself to get a measurement called \"inches squared.\"  ? For example, for a person who is 70 inches tall, the \"inches squared\" measurement is 70 inches x 70 inches, which equals 4,900 inches squared.  4. Divide the total from step 2 (number of lb x 703) by the total from step 3 (inches squared): 126,540 ÷ 4,900 = 25.8. This is " "your BMI.    To calculate your BMI in metric measurements:  1. Measure your weight in kilograms (kg).  2. Measure your height in meters (m). Then multiply that number by itself to get a measurement called \"meters squared.\"  ? For example, for a person who is 1.75 m tall, the \"meters squared\" measurement is 1.75 m x 1.75 m, which is equal to 3.1 meters squared.  3. Divide the number of kilograms (your weight) by the meters squared number. In this example: 70 ÷ 3.1 = 22.6. This is your BMI.  What do the results mean?  BMI charts are used to identify whether you are underweight, normal weight, overweight, or obese. The following guidelines will be used:  · Underweight: BMI less than 18.5.  · Normal weight: BMI between 18.5 and 24.9.  · Overweight: BMI between 25 and 29.9.  · Obese: BMI of 30 or above.  Keep these notes in mind:  · Weight includes both fat and muscle, so someone with a muscular build, such as an athlete, may have a BMI that is higher than 24.9. In cases like these, BMI is not an accurate measure of body fat.  · To determine if excess body fat is the cause of a BMI of 25 or higher, further assessments may need to be done by a health care provider.  · BMI is usually interpreted in the same way for men and women.  Where to find more information  For more information about BMI, including tools to quickly calculate your BMI, go to these websites:  · Centers for Disease Control and Prevention: www.cdc.gov  · American Heart Association: www.heart.org  · National Heart, Lung, and Blood [place]: www.nhlbi.nih.gov  Summary  · Body mass index (BMI) is a number that is calculated from a person's weight and height.  · BMI may help estimate how much of a person's weight is composed of fat. BMI can help identify those who may be at higher risk for certain medical problems.  · BMI can be measured using English measurements or metric measurements.  · BMI charts are used to identify whether you are underweight, normal " weight, overweight, or obese.  This information is not intended to replace advice given to you by your health care provider. Make sure you discuss any questions you have with your health care provider.  Document Revised: 09/09/2020 Document Reviewed: 07/17/2020  Elsevier Patient Education © 2021 [employer].

## 2021-12-15 ENCOUNTER — TELEPHONE (OUTPATIENT)
Dept: ENDOCRINOLOGY | Facility: CLINIC | Age: 61
End: 2021-12-15

## 2021-12-15 NOTE — TELEPHONE ENCOUNTER
Pt left vm needing a prescription for her transmitter for the Dexcom sent to her pharmacy. Thank you

## 2021-12-16 DIAGNOSIS — E11.65 TYPE 2 DIABETES MELLITUS WITH HYPERGLYCEMIA, WITHOUT LONG-TERM CURRENT USE OF INSULIN (HCC): Primary | ICD-10-CM

## 2021-12-16 RX ORDER — PROCHLORPERAZINE 25 MG/1
1 SUPPOSITORY RECTAL
Qty: 1 EACH | Refills: 3 | Status: SHIPPED | OUTPATIENT
Start: 2021-12-16 | End: 2022-12-12

## 2021-12-27 RX ORDER — ASPIRIN 325 MG
325 TABLET ORAL DAILY
Qty: 60 TABLET | Refills: 2 | Status: SHIPPED | OUTPATIENT
Start: 2021-12-27

## 2022-02-08 PROCEDURE — U0003 INFECTIOUS AGENT DETECTION BY NUCLEIC ACID (DNA OR RNA); SEVERE ACUTE RESPIRATORY SYNDROME CORONAVIRUS 2 (SARS-COV-2) (CORONAVIRUS DISEASE [COVID-19]), AMPLIFIED PROBE TECHNIQUE, MAKING USE OF HIGH THROUGHPUT TECHNOLOGIES AS DESCRIBED BY CMS-2020-01-R: HCPCS | Performed by: NURSE PRACTITIONER

## 2022-03-07 RX ORDER — OMEPRAZOLE 40 MG/1
CAPSULE, DELAYED RELEASE ORAL
Qty: 90 CAPSULE | Refills: 3 | Status: SHIPPED | OUTPATIENT
Start: 2022-03-07 | End: 2023-02-17

## 2022-03-15 ENCOUNTER — LAB (OUTPATIENT)
Dept: LAB | Facility: HOSPITAL | Age: 62
End: 2022-03-15

## 2022-03-15 ENCOUNTER — TRANSCRIBE ORDERS (OUTPATIENT)
Dept: LAB | Facility: HOSPITAL | Age: 62
End: 2022-03-15

## 2022-03-15 DIAGNOSIS — E78.2 MIXED HYPERLIPIDEMIA: ICD-10-CM

## 2022-03-15 DIAGNOSIS — Z79.899 ENCOUNTER FOR MONITORING ANTI-ARRHYTHMIC THERAPY: ICD-10-CM

## 2022-03-15 DIAGNOSIS — D64.9 ANEMIA, UNSPECIFIED TYPE: ICD-10-CM

## 2022-03-15 DIAGNOSIS — E03.9 ACQUIRED HYPOTHYROIDISM: ICD-10-CM

## 2022-03-15 DIAGNOSIS — Z51.81 ENCOUNTER FOR MONITORING ANTI-ARRHYTHMIC THERAPY: Primary | ICD-10-CM

## 2022-03-15 DIAGNOSIS — Z79.899 ENCOUNTER FOR MONITORING ANTI-ARRHYTHMIC THERAPY: Primary | ICD-10-CM

## 2022-03-15 DIAGNOSIS — Z51.81 ENCOUNTER FOR MONITORING ANTI-ARRHYTHMIC THERAPY: ICD-10-CM

## 2022-03-15 LAB
ALBUMIN SERPL-MCNC: 4.6 G/DL (ref 3.5–5.2)
ALBUMIN/GLOB SERPL: 1.5 G/DL
ALP SERPL-CCNC: 63 U/L (ref 39–117)
ALT SERPL W P-5'-P-CCNC: 13 U/L (ref 1–33)
ANION GAP SERPL CALCULATED.3IONS-SCNC: 13 MMOL/L (ref 5–15)
AST SERPL-CCNC: 16 U/L (ref 1–32)
BASOPHILS # BLD AUTO: 0.04 10*3/MM3 (ref 0–0.2)
BASOPHILS NFR BLD AUTO: 0.8 % (ref 0–1.5)
BILIRUB SERPL-MCNC: 0.9 MG/DL (ref 0–1.2)
BUN SERPL-MCNC: 25 MG/DL (ref 8–23)
BUN/CREAT SERPL: 18.7 (ref 7–25)
CALCIUM SPEC-SCNC: 10 MG/DL (ref 8.6–10.5)
CHLORIDE SERPL-SCNC: 98 MMOL/L (ref 98–107)
CHOLEST SERPL-MCNC: 127 MG/DL (ref 0–200)
CO2 SERPL-SCNC: 27 MMOL/L (ref 22–29)
CREAT SERPL-MCNC: 1.34 MG/DL (ref 0.57–1)
DEPRECATED RDW RBC AUTO: 40.8 FL (ref 37–54)
EGFRCR SERPLBLD CKD-EPI 2021: 45.5 ML/MIN/1.73
EOSINOPHIL # BLD AUTO: 0.46 10*3/MM3 (ref 0–0.4)
EOSINOPHIL NFR BLD AUTO: 8.8 % (ref 0.3–6.2)
ERYTHROCYTE [DISTWIDTH] IN BLOOD BY AUTOMATED COUNT: 14.4 % (ref 12.3–15.4)
GLOBULIN UR ELPH-MCNC: 3.1 GM/DL
GLUCOSE SERPL-MCNC: 152 MG/DL (ref 65–99)
HBA1C MFR BLD: 8.2 % (ref 4.8–5.6)
HCT VFR BLD AUTO: 43.5 % (ref 34–46.6)
HDLC SERPL-MCNC: 36 MG/DL (ref 40–60)
HGB BLD-MCNC: 14.5 G/DL (ref 12–15.9)
IMM GRANULOCYTES # BLD AUTO: 0.01 10*3/MM3 (ref 0–0.05)
IMM GRANULOCYTES NFR BLD AUTO: 0.2 % (ref 0–0.5)
LDLC SERPL CALC-MCNC: 77 MG/DL (ref 0–100)
LDLC/HDLC SERPL: 2.14 {RATIO}
LYMPHOCYTES # BLD AUTO: 1.43 10*3/MM3 (ref 0.7–3.1)
LYMPHOCYTES NFR BLD AUTO: 27.3 % (ref 19.6–45.3)
MCH RBC QN AUTO: 26.7 PG (ref 26.6–33)
MCHC RBC AUTO-ENTMCNC: 33.3 G/DL (ref 31.5–35.7)
MCV RBC AUTO: 80.1 FL (ref 79–97)
MONOCYTES # BLD AUTO: 0.33 10*3/MM3 (ref 0.1–0.9)
MONOCYTES NFR BLD AUTO: 6.3 % (ref 5–12)
NEUTROPHILS NFR BLD AUTO: 2.97 10*3/MM3 (ref 1.7–7)
NEUTROPHILS NFR BLD AUTO: 56.6 % (ref 42.7–76)
NRBC BLD AUTO-RTO: 0 /100 WBC (ref 0–0.2)
PLATELET # BLD AUTO: 341 10*3/MM3 (ref 140–450)
PMV BLD AUTO: 11.1 FL (ref 6–12)
POTASSIUM SERPL-SCNC: 3.6 MMOL/L (ref 3.5–5.2)
PROT SERPL-MCNC: 7.7 G/DL (ref 6–8.5)
RBC # BLD AUTO: 5.43 10*6/MM3 (ref 3.77–5.28)
SODIUM SERPL-SCNC: 138 MMOL/L (ref 136–145)
T4 FREE SERPL-MCNC: 2.1 NG/DL (ref 0.93–1.7)
TRIGL SERPL-MCNC: 69 MG/DL (ref 0–150)
TSH SERPL DL<=0.05 MIU/L-ACNC: 0.42 UIU/ML (ref 0.27–4.2)
VLDLC SERPL-MCNC: 14 MG/DL (ref 5–40)
WBC NRBC COR # BLD: 5.24 10*3/MM3 (ref 3.4–10.8)

## 2022-03-15 PROCEDURE — 80053 COMPREHEN METABOLIC PANEL: CPT | Performed by: NURSE PRACTITIONER

## 2022-03-15 PROCEDURE — 36415 COLL VENOUS BLD VENIPUNCTURE: CPT | Performed by: NURSE PRACTITIONER

## 2022-03-15 PROCEDURE — 85025 COMPLETE CBC W/AUTO DIFF WBC: CPT

## 2022-03-15 PROCEDURE — 84439 ASSAY OF FREE THYROXINE: CPT

## 2022-03-15 PROCEDURE — 83036 HEMOGLOBIN GLYCOSYLATED A1C: CPT | Performed by: NURSE PRACTITIONER

## 2022-03-15 PROCEDURE — 84443 ASSAY THYROID STIM HORMONE: CPT

## 2022-03-15 PROCEDURE — 80061 LIPID PANEL: CPT

## 2022-03-30 RX ORDER — PROCHLORPERAZINE 25 MG/1
SUPPOSITORY RECTAL
Qty: 3 EACH | Refills: 3 | Status: SHIPPED | OUTPATIENT
Start: 2022-03-30 | End: 2023-03-28

## 2022-04-01 ENCOUNTER — OFFICE VISIT (OUTPATIENT)
Dept: FAMILY MEDICINE CLINIC | Facility: CLINIC | Age: 62
End: 2022-04-01

## 2022-04-01 VITALS
BODY MASS INDEX: 33.28 KG/M2 | DIASTOLIC BLOOD PRESSURE: 70 MMHG | HEIGHT: 64 IN | OXYGEN SATURATION: 99 % | WEIGHT: 194.9 LBS | SYSTOLIC BLOOD PRESSURE: 112 MMHG | HEART RATE: 75 BPM

## 2022-04-01 DIAGNOSIS — E78.2 MIXED HYPERLIPIDEMIA: ICD-10-CM

## 2022-04-01 DIAGNOSIS — Z00.00 ANNUAL PHYSICAL EXAM: Primary | ICD-10-CM

## 2022-04-01 DIAGNOSIS — E11.65 TYPE 2 DIABETES MELLITUS WITH HYPERGLYCEMIA, WITHOUT LONG-TERM CURRENT USE OF INSULIN: ICD-10-CM

## 2022-04-01 DIAGNOSIS — E03.9 ACQUIRED HYPOTHYROIDISM: ICD-10-CM

## 2022-04-01 DIAGNOSIS — I10 ESSENTIAL (PRIMARY) HYPERTENSION: ICD-10-CM

## 2022-04-01 PROCEDURE — 99214 OFFICE O/P EST MOD 30 MIN: CPT | Performed by: NURSE PRACTITIONER

## 2022-04-01 NOTE — PROGRESS NOTES
Subjective   Danae Ngo is a 61 y.o. female.     CC: Annual follow-up-hypertension, hyperlipidemia, diabetes, hypothyroidism    Hypertension  This is a chronic problem. The current episode started more than 1 year ago. The problem is controlled. Pertinent negatives include no blurred vision, chest pain, headaches, palpitations or shortness of breath. Risk factors for coronary artery disease include family history, dyslipidemia, diabetes mellitus, obesity, sedentary lifestyle and post-menopausal state. Current antihypertension treatment includes calcium channel blockers and diuretics. The current treatment provides significant improvement. Compliance problems include exercise and diet.  There is no history of angina or CAD/MI.   Hyperlipidemia  This is a chronic problem. The current episode started more than 1 year ago. The problem is controlled. Exacerbating diseases include diabetes, hypothyroidism and obesity. Factors aggravating her hyperlipidemia include fatty foods, thiazides and beta blockers. Pertinent negatives include no chest pain, focal sensory loss, focal weakness, leg pain, myalgias or shortness of breath. Current antihyperlipidemic treatment includes statins. The current treatment provides significant improvement of lipids. Compliance problems include adherence to exercise and adherence to diet.  Risk factors for coronary artery disease include family history, dyslipidemia, diabetes mellitus, hypertension, obesity, a sedentary lifestyle and post-menopausal.   Diabetes  She presents for her follow-up diabetic visit. She has type 2 diabetes mellitus. Her disease course has been fluctuating. There are no hypoglycemic associated symptoms. Pertinent negatives for hypoglycemia include no dizziness, headaches or nervousness/anxiousness. There are no diabetic associated symptoms. Pertinent negatives for diabetes include no blurred vision, no chest pain, no fatigue, no polydipsia, no polyphagia, no  polyuria, no weakness and no weight loss. There are no hypoglycemic complications. Symptoms are stable. There are no diabetic complications. Current diabetic treatment includes oral agent (dual therapy) (ozempic). She is compliant with treatment some of the time. She is following a diabetic diet. An ACE inhibitor/angiotensin II receptor blocker is being taken.   Hypothyroidism  This is a chronic problem. The current episode started more than 1 year ago. Pertinent negatives include no abdominal pain, arthralgias, chest pain, chills, congestion, coughing, diaphoresis, fatigue, fever, headaches, myalgias, nausea, rash, sore throat, vomiting or weakness. Nothing aggravates the symptoms. Treatments tried: levothyroxine. The treatment provided significant relief.        The following portions of the patient's history were reviewed and updated as appropriate: allergies, current medications, past family history, past medical history, past social history, past surgical history and problem list.    Review of Systems   Constitutional: Negative for activity change, appetite change, chills, diaphoresis, fatigue, fever, unexpected weight gain and unexpected weight loss.   HENT: Negative for congestion, sore throat, trouble swallowing and voice change.    Eyes: Negative for blurred vision, double vision, photophobia, pain and visual disturbance.   Respiratory: Negative for cough, chest tightness, shortness of breath and wheezing.    Cardiovascular: Negative for chest pain, palpitations and leg swelling.   Gastrointestinal: Negative for abdominal distention, abdominal pain, anal bleeding, blood in stool, constipation, diarrhea, nausea, vomiting, GERD and indigestion.   Endocrine: Negative for cold intolerance, heat intolerance, polydipsia, polyphagia and polyuria.   Genitourinary: Negative for dysuria, frequency, hematuria and urgency.   Musculoskeletal: Negative for arthralgias and myalgias.   Skin: Negative for rash.    Allergic/Immunologic: Negative.    Neurological: Negative for dizziness, focal weakness, syncope, weakness, light-headedness and headache.   Hematological: Negative.    Psychiatric/Behavioral: The patient is not nervous/anxious.        Objective   Physical Exam  Vitals and nursing note reviewed.   Constitutional:       General: She is not in acute distress.     Appearance: Normal appearance. She is well-developed. She is obese. She is not ill-appearing, toxic-appearing or diaphoretic.   HENT:      Head: Normocephalic and atraumatic.      Right Ear: External ear normal.      Left Ear: External ear normal.      Nose: Nose normal.   Eyes:      Conjunctiva/sclera: Conjunctivae normal.      Pupils: Pupils are equal, round, and reactive to light.   Neck:      Thyroid: No thyromegaly.      Trachea: No tracheal deviation.   Cardiovascular:      Rate and Rhythm: Normal rate and regular rhythm.      Heart sounds: Normal heart sounds. No murmur heard.    No friction rub. No gallop.   Pulmonary:      Effort: Pulmonary effort is normal. No respiratory distress.      Breath sounds: Normal breath sounds. No stridor. No wheezing, rhonchi or rales.   Abdominal:      General: Bowel sounds are normal. There is no distension.      Palpations: Abdomen is soft. There is no mass.      Tenderness: There is no abdominal tenderness. There is no guarding or rebound.      Hernia: No hernia is present.   Musculoskeletal:         General: No tenderness. Normal range of motion.      Cervical back: Normal range of motion and neck supple.   Lymphadenopathy:      Cervical: No cervical adenopathy.   Skin:     General: Skin is warm and dry.      Coloration: Skin is not pale.      Findings: No erythema or rash.   Neurological:      Mental Status: She is alert and oriented to person, place, and time.      Cranial Nerves: No cranial nerve deficit.      Coordination: Coordination normal.   Psychiatric:         Mood and Affect: Mood normal.          Behavior: Behavior normal.         Thought Content: Thought content normal.         Judgment: Judgment normal.           Assessment/Plan   Diagnoses and all orders for this visit:    1. Annual physical exam (Primary)    2. Essential (primary) hypertension   -Controlled.  Continue amlodipine, HCTZ, lisinopril, Toprol-XL.  We will continue to monitor.    3. Mixed hyperlipidemia   -Lipid panel reviewed.  Results within normal limits.  Continue low-fat diet Lipitor as prescribed.  We will continue to monitor.    4. Type 2 diabetes mellitus with hyperglycemia, without long-term current use of insulin (HCC)   -Hemoglobin A1c elevated above 8.  Patient has follow-up with endocrinology next month.  Instructed patient to follow-up as scheduled continue all current medicines as prescribed.  Strict diabetic diet.  Patient verbalized understanding of instruction.    5. Acquired hypothyroidism   -TSH reviewed and within normal limits.  Patient asymptomatic.  We will continue levothyroxine as prescribed at this time.  Continue follow-up with endocrinology as scheduled.    6.  Follow-up in 6 months or sooner for any acute needs.            This document has been electronically signed by WILLIS Manzano on April 1, 2022 10:05 CDT

## 2022-05-12 DIAGNOSIS — E11.69 HYPERLIPIDEMIA ASSOCIATED WITH TYPE 2 DIABETES MELLITUS: ICD-10-CM

## 2022-05-12 DIAGNOSIS — E78.5 HYPERLIPIDEMIA ASSOCIATED WITH TYPE 2 DIABETES MELLITUS: ICD-10-CM

## 2022-05-12 RX ORDER — ATORVASTATIN CALCIUM 20 MG/1
TABLET, FILM COATED ORAL
Qty: 90 TABLET | Refills: 3 | Status: SHIPPED | OUTPATIENT
Start: 2022-05-12

## 2022-05-13 ENCOUNTER — TELEMEDICINE (OUTPATIENT)
Dept: ENDOCRINOLOGY | Facility: CLINIC | Age: 62
End: 2022-05-13

## 2022-05-13 DIAGNOSIS — E03.9 ACQUIRED HYPOTHYROIDISM: ICD-10-CM

## 2022-05-13 DIAGNOSIS — E11.65 TYPE 2 DIABETES MELLITUS WITH HYPERGLYCEMIA, WITHOUT LONG-TERM CURRENT USE OF INSULIN: Primary | ICD-10-CM

## 2022-05-13 DIAGNOSIS — E55.9 VITAMIN D DEFICIENCY: ICD-10-CM

## 2022-05-13 DIAGNOSIS — E78.2 MIXED HYPERLIPIDEMIA: ICD-10-CM

## 2022-05-13 PROCEDURE — 99214 OFFICE O/P EST MOD 30 MIN: CPT | Performed by: NURSE PRACTITIONER

## 2022-05-13 RX ORDER — SEMAGLUTIDE 2.68 MG/ML
2 INJECTION, SOLUTION SUBCUTANEOUS WEEKLY
Qty: 12 PEN | Refills: 3 | Status: SHIPPED | OUTPATIENT
Start: 2022-05-13 | End: 2022-08-23 | Stop reason: SDUPTHER

## 2022-05-13 NOTE — PROGRESS NOTES
Chief Complaint  No chief complaint on file.    Subjective          Danae Ngo presents to [facility] ENDOCRINOLOGY  History of Present Illness     You have chosen to receive care through a telehealth visit.  Do you consent to use a video/audio connection for your medical care today? Yes            TELEHEALTH VIDEO VISIT     This a video visit due to [facility] current guidelines for social distancing due to the COVID 19 pandemic        Primary Care Provider     José Ruano APRN     62 year old male presents for follow up      Reason diabetes mellitus type 2     Duration diagnosed in 2015     Timing is constant        Quality improved     Severity is moderate     Macrovascular complications CAD           Current diabetes regimen     Oral medications, GLP-1     Current glucose monitoring     Checks 4 times daily     Uses the Dexcom G6    Am reading 130     Generally under 200       Review of Systems - General ROS: negative                 Objective   Vital Signs:   There were no vitals taken for this visit.    Physical Exam  Neurological:      General: No focal deficit present.      Mental Status: She is alert.   Psychiatric:         Mood and Affect: Mood normal.         Thought Content: Thought content normal.         Judgment: Judgment normal.        Result Review :   The following data was reviewed by: WILLIS Gabriel on 05/13/2022:  Common labs    Common Labsle 9/24/21 3/15/22 3/15/22 3/15/22 3/15/22     0948 0948 0948 0948   Glucose 140 (A) 152 (A)      BUN 23 25 (A)      Creatinine 1.17 (A) 1.34 (A)      eGFR African Am 57 (A)       Sodium 136 138      Potassium 3.9 3.6      Chloride 96 (A) 98      Calcium 9.9 10.0      Albumin 4.50 4.60      Total Bilirubin 0.6 0.9      Alkaline Phosphatase 67 63      AST (SGOT) 13 16      ALT (SGPT) 10 13      WBC   5.24     Hemoglobin   14.5     Hematocrit   43.5     Platelets   341     Total Cholesterol     127   Triglycerides      69   HDL Cholesterol     36 (A)   LDL Cholesterol      77   Hemoglobin A1C    8.20 (A)    (A) Abnormal value                      Assessment and Plan    Diagnoses and all orders for this visit:    1. Type 2 diabetes mellitus with hyperglycemia, without long-term current use of insulin (HCC) (Primary)  -     CBC & Differential; Future  -     Comprehensive Metabolic Panel; Future  -     Hemoglobin A1c; Future  -     Lipid Panel; Future  -     Microalbumin / Creatinine Urine Ratio - Urine, Clean Catch; Future  -     TSH; Future  -     Vitamin B12; Future  -     Vitamin D 25 Hydroxy; Future    2. Mixed hyperlipidemia  -     CBC & Differential; Future  -     Comprehensive Metabolic Panel; Future  -     Hemoglobin A1c; Future  -     Lipid Panel; Future  -     Microalbumin / Creatinine Urine Ratio - Urine, Clean Catch; Future  -     TSH; Future  -     Vitamin B12; Future  -     Vitamin D 25 Hydroxy; Future    3. Vitamin D deficiency  -     CBC & Differential; Future  -     Comprehensive Metabolic Panel; Future  -     Hemoglobin A1c; Future  -     Lipid Panel; Future  -     Microalbumin / Creatinine Urine Ratio - Urine, Clean Catch; Future  -     TSH; Future  -     Vitamin B12; Future  -     Vitamin D 25 Hydroxy; Future    4. Acquired hypothyroidism  -     CBC & Differential; Future  -     Comprehensive Metabolic Panel; Future  -     Hemoglobin A1c; Future  -     Lipid Panel; Future  -     Microalbumin / Creatinine Urine Ratio - Urine, Clean Catch; Future  -     TSH; Future  -     Vitamin B12; Future  -     Vitamin D 25 Hydroxy; Future    Other orders  -     Semaglutide, 2 MG/DOSE, (Ozempic, 2 MG/DOSE,) 8 MG/3ML solution pen-injector; Inject 2 mg under the skin into the appropriate area as directed 1 (One) Time Per Week.  Dispense: 12 pen; Refill: 3             Glycemic Management:         Diabetes mellitus type 2         Lab Results   Component Value Date    HGBA1C 8.20 (H) 03/15/2022          Uses the dexcom  G6      Downloaded and reviewed              Taking Synjardy XR 12/5 /1000 mg , 2 tabs w bkfast      Taking  Ozempic 1 mg  weekly --- increase to 2 mg one weekly      If nausea try to eat less  If vomiting , stop   Ozempic will help generate more of your own insulin and it decreases the probability of Death / Heart / Stroke by 27%        Keep meals at 50 gm of CHO             she does not want insulin at this time         Stopped glipizide     Stopped jardiance      Stopped metformin         Lipid Management        Taking  atorvastatin 20 mg qhs        Total Cholesterol   Date Value Ref Range Status   03/15/2022 127 0 - 200 mg/dL Final     Triglycerides   Date Value Ref Range Status   03/15/2022 69 0 - 150 mg/dL Final     HDL Cholesterol   Date Value Ref Range Status   03/15/2022 36 (L) 40 - 60 mg/dL Final     LDL Cholesterol    Date Value Ref Range Status   03/15/2022 77 0 - 100 mg/dL Final        Blood Pressure Management:       Taking lisinopril 40 mg one daily      Taking metoprolol xl 100 mg daily               Microvascular Complication Monitoring:       Last eye exam Dec. 2019, no retinopathy            Component      Latest Ref Rng & Units 3/13/2021 3/13/2021          11:15 AM 11:15 AM   Microalbumin/Creatinine Ratio      mg/g 8.1     Creatinine, Urine      mg/dL 172.9 172.9   Microalbumin, Urine      mg/dL 1.4     Protein/Creatinine Ratio      0.0 - 200.0 mg/G Crea   69.4   Total Protein, Urine      mg/dL   12.0                     Neuropathy  None           Weight Related:                  Bone Health     Vitamin d def     Taking MVI      Thyroid Health        Lab Results   Component Value Date    TSH 0.416 03/15/2022          Taking Levothyroxine 112 mcgs daily              Other Diabetes Related Aspects         Lab Results   Component Value Date    [id number] 03/13/2021                     Follow Up   No follow-ups on file.  Patient was given instructions and counseling regarding her condition or for  health maintenance advice. Please see specific information pulled into the AVS if appropriate.         This document has been electronically signed by WILLIS Gabriel on May 13, 2022 10:16 CDT.

## 2022-06-25 DIAGNOSIS — E03.9 ACQUIRED HYPOTHYROIDISM: ICD-10-CM

## 2022-06-27 RX ORDER — LEVOTHYROXINE SODIUM 112 UG/1
TABLET ORAL
Qty: 90 TABLET | Refills: 2 | Status: SHIPPED | OUTPATIENT
Start: 2022-06-27 | End: 2022-08-23 | Stop reason: SDUPTHER

## 2022-07-14 DIAGNOSIS — I10 ESSENTIAL HYPERTENSION: ICD-10-CM

## 2022-07-14 RX ORDER — HYDROCHLOROTHIAZIDE 50 MG/1
TABLET ORAL
Qty: 90 TABLET | Refills: 2 | Status: SHIPPED | OUTPATIENT
Start: 2022-07-14 | End: 2023-01-06 | Stop reason: HOSPADM

## 2022-08-17 ENCOUNTER — LAB (OUTPATIENT)
Dept: LAB | Facility: HOSPITAL | Age: 62
End: 2022-08-17

## 2022-08-17 DIAGNOSIS — E03.9 ACQUIRED HYPOTHYROIDISM: ICD-10-CM

## 2022-08-17 DIAGNOSIS — E78.2 MIXED HYPERLIPIDEMIA: ICD-10-CM

## 2022-08-17 DIAGNOSIS — E55.9 VITAMIN D DEFICIENCY: ICD-10-CM

## 2022-08-17 DIAGNOSIS — E11.65 TYPE 2 DIABETES MELLITUS WITH HYPERGLYCEMIA, WITHOUT LONG-TERM CURRENT USE OF INSULIN: ICD-10-CM

## 2022-08-17 LAB
25(OH)D3 SERPL-MCNC: 82.2 NG/ML (ref 30–100)
ALBUMIN SERPL-MCNC: 4.4 G/DL (ref 3.5–5.2)
ALBUMIN UR-MCNC: <1.2 MG/DL
ALBUMIN/GLOB SERPL: 1 G/DL
ALP SERPL-CCNC: 71 U/L (ref 39–117)
ALT SERPL W P-5'-P-CCNC: 13 U/L (ref 1–33)
ANION GAP SERPL CALCULATED.3IONS-SCNC: 12 MMOL/L (ref 5–15)
AST SERPL-CCNC: 18 U/L (ref 1–32)
BASOPHILS # BLD AUTO: 0.05 10*3/MM3 (ref 0–0.2)
BASOPHILS NFR BLD AUTO: 0.7 % (ref 0–1.5)
BILIRUB SERPL-MCNC: 0.7 MG/DL (ref 0–1.2)
BUN SERPL-MCNC: 16 MG/DL (ref 8–23)
BUN/CREAT SERPL: 16.8 (ref 7–25)
CALCIUM SPEC-SCNC: 9.6 MG/DL (ref 8.6–10.5)
CHLORIDE SERPL-SCNC: 93 MMOL/L (ref 98–107)
CHOLEST SERPL-MCNC: 137 MG/DL (ref 0–200)
CO2 SERPL-SCNC: 29 MMOL/L (ref 22–29)
CREAT SERPL-MCNC: 0.95 MG/DL (ref 0.57–1)
CREAT UR-MCNC: 71.2 MG/DL
DEPRECATED RDW RBC AUTO: 38.8 FL (ref 37–54)
EGFRCR SERPLBLD CKD-EPI 2021: 68.3 ML/MIN/1.73
EOSINOPHIL # BLD AUTO: 0.67 10*3/MM3 (ref 0–0.4)
EOSINOPHIL NFR BLD AUTO: 10 % (ref 0.3–6.2)
ERYTHROCYTE [DISTWIDTH] IN BLOOD BY AUTOMATED COUNT: 13.6 % (ref 12.3–15.4)
GLOBULIN UR ELPH-MCNC: 4.4 GM/DL
GLUCOSE SERPL-MCNC: 131 MG/DL (ref 65–99)
HBA1C MFR BLD: 7 % (ref 4.8–5.6)
HCT VFR BLD AUTO: 41.4 % (ref 34–46.6)
HDLC SERPL-MCNC: 39 MG/DL (ref 40–60)
HGB BLD-MCNC: 13.8 G/DL (ref 12–15.9)
IMM GRANULOCYTES # BLD AUTO: 0.04 10*3/MM3 (ref 0–0.05)
IMM GRANULOCYTES NFR BLD AUTO: 0.6 % (ref 0–0.5)
LDLC SERPL CALC-MCNC: 80 MG/DL (ref 0–100)
LDLC/HDLC SERPL: 2.03 {RATIO}
LYMPHOCYTES # BLD AUTO: 1.66 10*3/MM3 (ref 0.7–3.1)
LYMPHOCYTES NFR BLD AUTO: 24.7 % (ref 19.6–45.3)
MCH RBC QN AUTO: 26.5 PG (ref 26.6–33)
MCHC RBC AUTO-ENTMCNC: 33.3 G/DL (ref 31.5–35.7)
MCV RBC AUTO: 79.6 FL (ref 79–97)
MICROALBUMIN/CREAT UR: NORMAL MG/G{CREAT}
MONOCYTES # BLD AUTO: 0.33 10*3/MM3 (ref 0.1–0.9)
MONOCYTES NFR BLD AUTO: 4.9 % (ref 5–12)
NEUTROPHILS NFR BLD AUTO: 3.96 10*3/MM3 (ref 1.7–7)
NEUTROPHILS NFR BLD AUTO: 59.1 % (ref 42.7–76)
NRBC BLD AUTO-RTO: 0 /100 WBC (ref 0–0.2)
PLATELET # BLD AUTO: 313 10*3/MM3 (ref 140–450)
PMV BLD AUTO: 11.2 FL (ref 6–12)
POTASSIUM SERPL-SCNC: 3.4 MMOL/L (ref 3.5–5.2)
PROT SERPL-MCNC: 8.8 G/DL (ref 6–8.5)
RBC # BLD AUTO: 5.2 10*6/MM3 (ref 3.77–5.28)
SODIUM SERPL-SCNC: 134 MMOL/L (ref 136–145)
TRIGL SERPL-MCNC: 94 MG/DL (ref 0–150)
TSH SERPL DL<=0.05 MIU/L-ACNC: 0.26 UIU/ML (ref 0.27–4.2)
VIT B12 BLD-MCNC: 436 PG/ML (ref 211–946)
VLDLC SERPL-MCNC: 18 MG/DL (ref 5–40)
WBC NRBC COR # BLD: 6.71 10*3/MM3 (ref 3.4–10.8)

## 2022-08-17 PROCEDURE — 82607 VITAMIN B-12: CPT

## 2022-08-17 PROCEDURE — 80061 LIPID PANEL: CPT

## 2022-08-17 PROCEDURE — 82570 ASSAY OF URINE CREATININE: CPT

## 2022-08-17 PROCEDURE — 36415 COLL VENOUS BLD VENIPUNCTURE: CPT

## 2022-08-17 PROCEDURE — 80050 GENERAL HEALTH PANEL: CPT

## 2022-08-17 PROCEDURE — 82043 UR ALBUMIN QUANTITATIVE: CPT

## 2022-08-17 PROCEDURE — 82306 VITAMIN D 25 HYDROXY: CPT

## 2022-08-17 PROCEDURE — 83036 HEMOGLOBIN GLYCOSYLATED A1C: CPT

## 2022-08-22 DIAGNOSIS — E87.6 HYPOKALEMIA: ICD-10-CM

## 2022-08-22 RX ORDER — POTASSIUM CHLORIDE 750 MG/1
TABLET, EXTENDED RELEASE ORAL
Qty: 120 TABLET | Refills: 4 | Status: SHIPPED | OUTPATIENT
Start: 2022-08-22

## 2022-08-23 ENCOUNTER — OFFICE VISIT (OUTPATIENT)
Dept: ENDOCRINOLOGY | Facility: CLINIC | Age: 62
End: 2022-08-23

## 2022-08-23 VITALS
WEIGHT: 183.5 LBS | BODY MASS INDEX: 31.33 KG/M2 | HEIGHT: 64 IN | SYSTOLIC BLOOD PRESSURE: 110 MMHG | DIASTOLIC BLOOD PRESSURE: 68 MMHG | HEART RATE: 75 BPM | OXYGEN SATURATION: 100 %

## 2022-08-23 DIAGNOSIS — E03.9 ACQUIRED HYPOTHYROIDISM: ICD-10-CM

## 2022-08-23 DIAGNOSIS — E78.2 MIXED HYPERLIPIDEMIA: ICD-10-CM

## 2022-08-23 DIAGNOSIS — E55.9 VITAMIN D DEFICIENCY: ICD-10-CM

## 2022-08-23 DIAGNOSIS — E11.65 TYPE 2 DIABETES MELLITUS WITH HYPERGLYCEMIA, WITHOUT LONG-TERM CURRENT USE OF INSULIN: Primary | ICD-10-CM

## 2022-08-23 PROCEDURE — 99214 OFFICE O/P EST MOD 30 MIN: CPT | Performed by: NURSE PRACTITIONER

## 2022-08-23 RX ORDER — LEVOTHYROXINE SODIUM 112 UG/1
112 TABLET ORAL DAILY
Qty: 90 TABLET | Refills: 3 | Status: SHIPPED | OUTPATIENT
Start: 2022-08-23

## 2022-08-23 RX ORDER — SEMAGLUTIDE 2.68 MG/ML
2 INJECTION, SOLUTION SUBCUTANEOUS WEEKLY
Qty: 12 PEN | Refills: 3 | Status: SHIPPED | OUTPATIENT
Start: 2022-08-23

## 2022-08-23 RX ORDER — EMPAGLIFLOZIN, METFORMIN HYDROCHLORIDE 12.5; 1 MG/1; MG/1
2 TABLET, EXTENDED RELEASE ORAL DAILY
Qty: 60 TABLET | Refills: 11 | Status: SHIPPED | OUTPATIENT
Start: 2022-08-23

## 2022-08-23 NOTE — PROGRESS NOTES
"Chief Complaint  Diabetes    Subjective          Danae Ngo presents to [facility] ENDOCRINOLOGY  History of Present Illness       In office visit         Primary Care Provider     José Ruano APRN     62 year old male presents for follow up      Reason diabetes mellitus type 2     Duration diagnosed in 2015     Timing is constant        Quality improved     Severity is moderate     Macrovascular complications CAD           Current diabetes regimen     Oral medications, GLP-1     Current glucose monitoring     Checks 4 times daily     Uses the Dexcom G6     Could not download       Review of Systems - General ROS: negative                 Objective   Vital Signs:   /68   Pulse 75   Ht 162.6 cm (64\")   Wt 83.2 kg (183 lb 8 oz)   SpO2 100%   BMI 31.50 kg/m²     Physical Exam  Constitutional:       Appearance: Normal appearance.   Cardiovascular:      Rate and Rhythm: Regular rhythm.      Heart sounds: Normal heart sounds.   Pulmonary:      Breath sounds: Normal breath sounds.   Musculoskeletal:      Cervical back: Normal range of motion.   Neurological:      General: No focal deficit present.      Mental Status: She is alert.   Psychiatric:         Mood and Affect: Mood normal.         Thought Content: Thought content normal.         Judgment: Judgment normal.        Result Review :   The following data was reviewed by: WILLIS Gabriel on 05/13/2022:  Common labs    Common Labsle 9/24/21 3/15/22 3/15/22 3/15/22 3/15/22 8/17/22 8/17/22 8/17/22 8/17/22 8/17/22     0948 0948 0948 0948 0906 0906 0906 0906 0934   Glucose 140 (A) 152 (A)     131 (A)      BUN 23 25 (A)     16      Creatinine 1.17 (A) 1.34 (A)     0.95      eGFR  Am 57 (A)            Sodium 136 138     134 (A)      Potassium 3.9 3.6     3.4 (A)      Chloride 96 (A) 98     93 (A)      Calcium 9.9 10.0     9.6      Albumin 4.50 4.60     4.40      Total Bilirubin 0.6 0.9     0.7      Alkaline " Phosphatase 67 63     71      AST (SGOT) 13 16     18      ALT (SGPT) 10 13     13      WBC   5.24   6.71       Hemoglobin   14.5   13.8       Hematocrit   43.5   41.4       Platelets   341   313       Total Cholesterol     127   137     Triglycerides     69   94     HDL Cholesterol     36 (A)   39 (A)     LDL Cholesterol      77   80     Hemoglobin A1C    8.20 (A)     7.00 (A)    Microalbumin, Urine          <1.2   (A) Abnormal value                        Assessment and Plan    Diagnoses and all orders for this visit:    1. Type 2 diabetes mellitus with hyperglycemia, without long-term current use of insulin (HCC) (Primary)  -     CBC & Differential; Future  -     Comprehensive Metabolic Panel; Future  -     Hemoglobin A1c; Future  -     Lipid Panel; Future  -     Microalbumin / Creatinine Urine Ratio - Urine, Clean Catch; Future  -     TSH; Future  -     Vitamin D 25 Hydroxy; Future  -     Empagliflozin-metFORMIN HCl ER (Synjardy XR) 12.5-1000 MG tablet sustained-release 24 hour; Take 2 tablets by mouth Daily.  Dispense: 60 tablet; Refill: 11    2. Vitamin D deficiency  -     CBC & Differential; Future  -     Comprehensive Metabolic Panel; Future  -     Hemoglobin A1c; Future  -     Lipid Panel; Future  -     Microalbumin / Creatinine Urine Ratio - Urine, Clean Catch; Future  -     TSH; Future  -     Vitamin D 25 Hydroxy; Future    3. Mixed hyperlipidemia  -     CBC & Differential; Future  -     Comprehensive Metabolic Panel; Future  -     Hemoglobin A1c; Future  -     Lipid Panel; Future  -     Microalbumin / Creatinine Urine Ratio - Urine, Clean Catch; Future  -     TSH; Future  -     Vitamin D 25 Hydroxy; Future    4. Acquired hypothyroidism  -     CBC & Differential; Future  -     Comprehensive Metabolic Panel; Future  -     Hemoglobin A1c; Future  -     Lipid Panel; Future  -     Microalbumin / Creatinine Urine Ratio - Urine, Clean Catch; Future  -     TSH; Future  -     Vitamin D 25 Hydroxy; Future  -      levothyroxine (SYNTHROID, LEVOTHROID) 112 MCG tablet; Take 1 tablet by mouth Daily.  Dispense: 90 tablet; Refill: 3    Other orders  -     Semaglutide, 2 MG/DOSE, (Ozempic, 2 MG/DOSE,) 8 MG/3ML solution pen-injector; Inject 2 mg under the skin into the appropriate area as directed 1 (One) Time Per Week.  Dispense: 12 pen; Refill: 3             Glycemic Management:         Diabetes mellitus type 2         Lab Results   Component Value Date    HGBA1C 7.00 (H) 08/17/2022          Uses the dexcom G6      Could not download         Taking Synjardy XR 12/5 /1000 mg , 2 tabs w bkfast      Taking  Ozempic  2 mg one weekly      If nausea try to eat less  If vomiting , stop   Ozempic will help generate more of your own insulin and it decreases the probability of Death / Heart / Stroke by 27%        Keep meals at 50 gm of CHO             she does not want insulin at this time         Stopped glipizide     Stopped jardiance      Stopped metformin         Lipid Management        Taking  atorvastatin 20 mg qhs        Total Cholesterol   Date Value Ref Range Status   08/17/2022 137 0 - 200 mg/dL Final     Triglycerides   Date Value Ref Range Status   08/17/2022 94 0 - 150 mg/dL Final     HDL Cholesterol   Date Value Ref Range Status   08/17/2022 39 (L) 40 - 60 mg/dL Final     LDL Cholesterol    Date Value Ref Range Status   08/17/2022 80 0 - 100 mg/dL Final        Blood Pressure Management:       Taking lisinopril 40 mg one daily      Taking metoprolol xl 100 mg daily               Microvascular Complication Monitoring:       Last eye exam Dec. 2019, no retinopathy                Component      Latest Ref Rng & Units 8/17/2022   Microalbumin/Creatinine Ratio          Creatinine, Urine      mg/dL 71.2   Microalbumin, Urine      mg/dL <1.2                 Neuropathy  None           Weight Related:     Obesity       Body mass index is 31.5 kg/m².     Has lost 11 lbs since last visit              Bone Health     Vitamin d  def     Taking MVI      Thyroid Health        Lab Results   Component Value Date    TSH 0.258 (L) 08/17/2022          Taking Levothyroxine 112 mcgs daily ---change to daily but on Sunday take 1/2 tablet              Other Diabetes Related Aspects         Lab Results   Component Value Date    [id number] 08/17/2022                     Follow Up   Return in about 4 months (around 12/23/2022).  Patient was given instructions and counseling regarding her condition or for health maintenance advice. Please see specific information pulled into the AVS if appropriate.         This document has been electronically signed by WILLIS Gabriel on August 23, 2022 09:00 CDT.

## 2022-09-27 ENCOUNTER — OFFICE VISIT (OUTPATIENT)
Dept: SURGERY | Facility: CLINIC | Age: 62
End: 2022-09-27

## 2022-09-27 VITALS
HEART RATE: 83 BPM | TEMPERATURE: 97 F | DIASTOLIC BLOOD PRESSURE: 70 MMHG | HEIGHT: 64 IN | BODY MASS INDEX: 31.28 KG/M2 | SYSTOLIC BLOOD PRESSURE: 118 MMHG | WEIGHT: 183.2 LBS | OXYGEN SATURATION: 98 %

## 2022-09-27 DIAGNOSIS — R92.8 ABNORMAL MAMMOGRAM OF LEFT BREAST: Primary | ICD-10-CM

## 2022-09-27 PROCEDURE — 99213 OFFICE O/P EST LOW 20 MIN: CPT | Performed by: SURGERY

## 2022-09-27 RX ORDER — OXYCODONE HYDROCHLORIDE AND ACETAMINOPHEN 5; 325 MG/1; MG/1
1 TABLET ORAL ONCE
Status: CANCELLED | OUTPATIENT
Start: 2022-09-27 | End: 2022-09-27

## 2022-09-27 RX ORDER — DIAZEPAM 5 MG/1
5 TABLET ORAL ONCE
Status: CANCELLED | OUTPATIENT
Start: 2022-09-27 | End: 2022-09-27

## 2022-09-27 RX ORDER — LIDOCAINE HYDROCHLORIDE AND EPINEPHRINE 10; 10 MG/ML; UG/ML
30 INJECTION, SOLUTION INFILTRATION; PERINEURAL ONCE
Status: CANCELLED | OUTPATIENT
Start: 2022-09-27 | End: 2022-09-27

## 2022-09-30 ENCOUNTER — OFFICE VISIT (OUTPATIENT)
Dept: FAMILY MEDICINE CLINIC | Facility: CLINIC | Age: 62
End: 2022-09-30

## 2022-09-30 VITALS
HEART RATE: 83 BPM | HEIGHT: 64 IN | RESPIRATION RATE: 18 BRPM | SYSTOLIC BLOOD PRESSURE: 118 MMHG | BODY MASS INDEX: 30.97 KG/M2 | WEIGHT: 181.4 LBS | DIASTOLIC BLOOD PRESSURE: 80 MMHG | TEMPERATURE: 96.6 F | OXYGEN SATURATION: 97 %

## 2022-09-30 DIAGNOSIS — Z23 NEED FOR SHINGLES VACCINE: ICD-10-CM

## 2022-09-30 DIAGNOSIS — I10 ESSENTIAL (PRIMARY) HYPERTENSION: Primary | ICD-10-CM

## 2022-09-30 DIAGNOSIS — E03.9 ACQUIRED HYPOTHYROIDISM: ICD-10-CM

## 2022-09-30 DIAGNOSIS — E78.2 MIXED HYPERLIPIDEMIA: ICD-10-CM

## 2022-09-30 DIAGNOSIS — Z23 NEED FOR INFLUENZA VACCINATION: ICD-10-CM

## 2022-09-30 DIAGNOSIS — E11.65 TYPE 2 DIABETES MELLITUS WITH HYPERGLYCEMIA, WITHOUT LONG-TERM CURRENT USE OF INSULIN: ICD-10-CM

## 2022-09-30 PROCEDURE — 99214 OFFICE O/P EST MOD 30 MIN: CPT | Performed by: NURSE PRACTITIONER

## 2022-09-30 PROCEDURE — 90686 IIV4 VACC NO PRSV 0.5 ML IM: CPT | Performed by: NURSE PRACTITIONER

## 2022-09-30 PROCEDURE — 90471 IMMUNIZATION ADMIN: CPT | Performed by: NURSE PRACTITIONER

## 2022-09-30 RX ORDER — AMLODIPINE BESYLATE 5 MG/1
5 TABLET ORAL
Qty: 90 TABLET | Refills: 3 | Status: SHIPPED | OUTPATIENT
Start: 2022-09-30

## 2022-09-30 RX ORDER — ZOSTER VACCINE RECOMBINANT, ADJUVANTED 50 MCG/0.5
0.5 KIT INTRAMUSCULAR ONCE
Qty: 1 EACH | Refills: 0 | Status: SHIPPED | OUTPATIENT
Start: 2022-09-30 | End: 2022-09-30

## 2022-09-30 RX ORDER — LISINOPRIL 40 MG/1
40 TABLET ORAL DAILY
Qty: 90 TABLET | Refills: 3 | Status: SHIPPED | OUTPATIENT
Start: 2022-09-30

## 2022-09-30 RX ORDER — METOPROLOL SUCCINATE 100 MG/1
300 TABLET, EXTENDED RELEASE ORAL DAILY
Qty: 270 TABLET | Refills: 3 | Status: ON HOLD | OUTPATIENT
Start: 2022-09-30 | End: 2023-01-06 | Stop reason: SDUPTHER

## 2022-09-30 NOTE — PROGRESS NOTES
Subjective   Danae Ngo is a 62 y.o. female.     History of Present Illness  CC: Follow-up- hypertension, hyperlipidemia, diabetes, hypothyroidism  Diabetes  She presents for her follow-up diabetic visit. She has type 2 diabetes mellitus. Her disease course has been improving. There are no hypoglycemic associated symptoms. Pertinent negatives for hypoglycemia include no headaches. There are no diabetic associated symptoms. Pertinent negatives for diabetes include no chest pain, no fatigue, no polydipsia, no polyphagia, no polyuria and no weight loss. There are no hypoglycemic complications. Symptoms are stable. There are no diabetic complications. Risk factors for coronary artery disease include family history, dyslipidemia, diabetes mellitus, hypertension, obesity and sedentary lifestyle. Current diabetic treatment includes insulin injections and oral agent (dual therapy). She is compliant with treatment most of the time. Her weight is stable. She is following a diabetic diet. Her home blood glucose trend is decreasing steadily. An ACE inhibitor/angiotensin II receptor blocker is being taken. Eye exam is current.   Hypertension  This is a chronic problem. The current episode started more than 1 year ago. The problem is controlled. Pertinent negatives include no chest pain, headaches, palpitations or shortness of breath. Risk factors for coronary artery disease include family history, dyslipidemia, diabetes mellitus, obesity, post-menopausal state and sedentary lifestyle. Current antihypertension treatment includes ACE inhibitors, calcium channel blockers, diuretics and beta blockers. The current treatment provides significant improvement. Compliance problems include exercise and diet.  Hypertensive end-organ damage includes CAD/MI.   Hyperlipidemia  This is a chronic problem. The current episode started more than 1 year ago. Recent lipid tests were reviewed and are variable. Exacerbating diseases include  diabetes, hypothyroidism and obesity. Factors aggravating her hyperlipidemia include fatty foods and beta blockers. Pertinent negatives include no chest pain, focal sensory loss, focal weakness, leg pain, myalgias or shortness of breath. Current antihyperlipidemic treatment includes statins. The current treatment provides significant improvement of lipids. Compliance problems include adherence to exercise and adherence to diet.  Risk factors for coronary artery disease include family history, dyslipidemia, diabetes mellitus, hypertension, obesity, a sedentary lifestyle and post-menopausal.   Hypothyroidism  This is a chronic problem. The current episode started more than 1 year ago. Pertinent negatives include no abdominal pain, arthralgias, chest pain, chills, congestion, coughing, fatigue, fever, headaches, myalgias, nausea, rash, sore throat or vomiting. Nothing aggravates the symptoms. Treatments tried: levothyroxine. The treatment provided significant relief.        The following portions of the patient's history were reviewed and updated as appropriate: allergies, current medications, past family history, past medical history, past social history, past surgical history and problem list.    Review of Systems   Constitutional: Negative for activity change, appetite change, chills, fatigue, fever, unexpected weight gain and unexpected weight loss.   HENT: Negative for congestion, sore throat, trouble swallowing and voice change.    Eyes: Negative.    Respiratory: Negative for cough, chest tightness, shortness of breath and wheezing.    Cardiovascular: Negative for chest pain, palpitations and leg swelling.   Gastrointestinal: Negative for abdominal pain, constipation, diarrhea, nausea and vomiting.   Endocrine: Negative.  Negative for cold intolerance, heat intolerance, polydipsia, polyphagia and polyuria.   Genitourinary: Negative for dysuria.   Musculoskeletal: Negative for arthralgias and myalgias.   Skin:  Negative for rash.   Allergic/Immunologic: Negative.    Neurological: Negative.  Negative for focal weakness.   Hematological: Negative.    Psychiatric/Behavioral: Negative.        Objective   Physical Exam  Vitals and nursing note reviewed.   Constitutional:       General: She is not in acute distress.     Appearance: Normal appearance. She is well-developed. She is obese. She is not ill-appearing, toxic-appearing or diaphoretic.   HENT:      Head: Normocephalic and atraumatic.   Eyes:      Conjunctiva/sclera: Conjunctivae normal.   Cardiovascular:      Rate and Rhythm: Normal rate and regular rhythm.      Heart sounds: Normal heart sounds.   Pulmonary:      Effort: Pulmonary effort is normal. No respiratory distress.      Breath sounds: Normal breath sounds. No stridor. No wheezing, rhonchi or rales.   Musculoskeletal:         General: No tenderness. Normal range of motion.      Cervical back: Normal range of motion.   Skin:     General: Skin is warm and dry.      Coloration: Skin is not pale.      Findings: No erythema or rash.   Neurological:      Mental Status: She is alert and oriented to person, place, and time.   Psychiatric:         Mood and Affect: Mood normal.         Behavior: Behavior normal.         Thought Content: Thought content normal.         Judgment: Judgment normal.           Assessment & Plan   Diagnoses and all orders for this visit:    1. Essential (primary) hypertension (Primary)  -     lisinopril (PRINIVIL,ZESTRIL) 40 MG tablet; Take 1 tablet by mouth Daily.  Dispense: 90 tablet; Refill: 3  -     metoprolol succinate XL (TOPROL-XL) 100 MG 24 hr tablet; Take 3 tablets by mouth Daily.  Dispense: 270 tablet; Refill: 3  -     amLODIPine (NORVASC) 5 MG tablet; Take 1 tablet by mouth every night at bedtime.  Dispense: 90 tablet; Refill: 3   -Controlled.  Continue amlodipine, lisinopril, Toprol-XL, HCTZ as prescribed.  Low-sodium diet.  Refill sent to pharmacy.  We will continue to monitor.    2.  Mixed hyperlipidemia   -Lipid panel reviewed.  Continue low-fat diet and Lipitor as prescribed.  We will continue to monitor.    3. Type 2 diabetes mellitus with hyperglycemia, without long-term current use of insulin (HCC)   -Hemoglobin A1c reviewed and has improved to 7.0.  Continue diabetic diet, Ozempic, Synjardy as prescribed.  Follow-up with endocrinology as scheduled.    4. Acquired hypothyroidism   -TSH and T4 reviewed.  Continue levothyroxine as prescribed.  Continue follow-up with endocrinology as scheduled.    5. Need for influenza vaccination  -     FluLaval/Fluzone >6 mos ([id number]), will call with results    6. Need for shingles vaccine  -     Zoster Vac Recomb Adjuvanted (Shingrix) 50 MCG/0.5ML reconstituted suspension; Inject 0.5 mL into the appropriate muscle as directed by prescriber 1 (One) Time for 1 dose.  Dispense: 1 each; Refill: 0      7.  Follow-up in 6 months or sooner for any acute needs.            This document has been electronically signed by WILLIS Manzano on September 30, 2022 11:32 CDT

## 2022-10-01 NOTE — H&P (VIEW-ONLY)
Chief Complaint   Patient presents with   • Abnormal Breast Imaging     Abnormal Breast and Mammogram ,Left Breast        HPI  No breast masses. Abnormal mammogram demonstrating microcalcifications. Previous biopsies for benign microcalcifications.    Study Result    Narrative & Impression   PROCEDURE: MAMMO BREAST DIAGNOSTIC TOMOSYNTHESIS LEFT     HISTORY: abnormal mammogram, R92.8 Other abnormal and  inconclusive findings on diagnostic imaging of breast     COMPARISON: 9/19/2022 through 9/17/2019     Computer-aided detection was utilized during this exam.   Digital breast tomosynthesis was performed.     FINDINGS:   Magnification and true lateral views were obtained of the left  breast.   On these additional views, the two groups of calcifications in  the upper outer left breast are mildly suspicious. Recommend  biopsies of both.     IMPRESSION:  CONCLUSION:    On these additional views, the two groups of calcifications in  the upper outer left breast are mildly suspicious.   Recommend biopsies of both.  BI-RADS Category 4: Suspicious abnormality.  Biopsy should be  considered.  Findings and recommendations  discussed with ALEXUS BERUMEN on  9/26/2022 9:34 AM CDT     Electronically signed by:  Camacho Husain MD  9/26/2022 9:34 AM CDT  Workstation: [id number]         Past Medical History:   Diagnosis Date   • Abnormal thyroid function test    • Acquired hypothyroidism    • Allergic    • Allergic rhinitis    • Anemia    • Breast cyst    • Coronary atherosclerosis     unspecified type of vessel, native or graft      • Dilated cardiomyopathy (HCC)    • Dyslipidemia    • Esophageal reflux    • Essential hypertension    • Family history of malignant neoplasm of breast    • Fibrocystic disease of breast     Low Mireille score   • GERD (gastroesophageal reflux disease)    • Herpes zoster    • History of chicken pox    • Hyperlipidemia    • Measles    • Mumps    • Myocardial infarction (HCC) 2001   • Nasal septal deformity     • Obesity    • Type 2 diabetes mellitus (HCC)     Uncontrolled   • V tach (HCC)    • Vitamin D deficiency        Past Surgical History:   Procedure Laterality Date   • BREAST BIOPSY  2010    left breast benign   • BREAST CYST ASPIRATION     • CARDIAC CATHETERIZATION  2002    Left cardiac cath, selective coronary angiography, PTCA to the left anterior descending. Stent placement to the left anterior descending   • CARDIAC DEFIBRILLATOR PLACEMENT Left     Dr. Brunner   •  SECTION      Intrauterine pregnancy, term gestation, cephalopelvic disproportion   • COLONOSCOPY N/A 10/12/2018    Procedure: COLONOSCOPY;  Surgeon: Delroy Miller MD;  Location: [facility] ENDOSCOPY;  Service: General   • ENDOSCOPY AND COLONOSCOPY  04/15/2008    Normal   • ESOPHAGOSCOPY / EGD  2007    With tube-Esophagus appeared normal. Gastritis of the stomach. A biopsy of the stomach was obtained from the stomach. The duodenum appeared normal   • EXTERNAL EAR SURGERY  08/15/1991    Repair biifd ear   • OOPHORECTOMY      Left salpingo-oophorectomy, wedge resection of the right ovary, lysis of adhesions   • TRANSESOPHAGEAL ECHOCARDIOGRAM (LORELEI)  2012    Without color flow-Moderate to severe LV dysfumctional w/ an EF of 30-35%. Wall motion abnormalities as described above. LV enlargement. Mild aortic insufficiency, mild TR regurg, mild M regurg. mild pulmonary insufficiency         Current Outpatient Medications:   •  aspirin (Aspirin Adult) 325 MG tablet, Take 1 tablet by mouth Daily., Disp: 60 tablet, Rfl: 2  •  atorvastatin (LIPITOR) 20 MG tablet, TAKE ONE TABLET BY MOUTH ONCE NIGHTLY, Disp: 90 tablet, Rfl: 3  •  cetirizine (zyrTEC) 10 MG tablet, Take 1 tablet by mouth Daily., Disp: 90 tablet, Rfl: 3  •  Continuous Blood Gluc Sensor (Dexcom G6 Sensor), USE EVERY 10 DAYS AS DIRECTED FOR CONTINUOUS GLUCOSE MONITORING, Disp: 3 each, Rfl: 3  •  Continuous Blood Gluc Transmit (Dexcom G6 Transmitter) misc, ,  Disp: , Rfl:   •  Continuous Blood Gluc Transmit (Dexcom G6 Transmitter) misc, 1 each Every 3 (Three) Months., Disp: 1 each, Rfl: 3  •  Empagliflozin-metFORMIN HCl ER (Synjardy XR) 12.5-1000 MG tablet sustained-release 24 hour, Take 2 tablets by mouth Daily., Disp: 60 tablet, Rfl: 11  •  glucose blood test strip, 1 each by Other route 3 (Three) Times a Day. Use as instructed, Disp: 90 each, Rfl: 12  •  glucose monitor monitoring kit, 1 each 3 (Three) Times a Day., Disp: 1 each, Rfl: 0  •  hydroCHLOROthiazide (HYDRODIURIL) 50 MG tablet, TAKE ONE TABLET BY MOUTH DAILY, Disp: 90 tablet, Rfl: 2  •  Lancet Device misc, 1 each 2 (Two) Times a Day., Disp: 1 each, Rfl: 12  •  levothyroxine (SYNTHROID, LEVOTHROID) 112 MCG tablet, Take 1 tablet by mouth Daily., Disp: 90 tablet, Rfl: 3  •  mexiletine (MEXITIL) 150 MG capsule, Take 150 mg by mouth Every 8 (Eight) Hours., Disp: , Rfl:   •  omeprazole (priLOSEC) 40 MG capsule, TAKE ONE CAPSULE BY MOUTH DAILY, Disp: 90 capsule, Rfl: 3  •  potassium chloride (K-DUR,KLOR-CON) 10 MEQ CR tablet, TAKE TWO TABLETS BY MOUTH DAILY, Disp: 120 tablet, Rfl: 4  •  Semaglutide, 2 MG/DOSE, (Ozempic, 2 MG/DOSE,) 8 MG/3ML solution pen-injector, Inject 2 mg under the skin into the appropriate area as directed 1 (One) Time Per Week., Disp: 12 pen, Rfl: 3  •  vitamin D3 125 MCG (5000 UT) capsule capsule, TAKE ONE CAPSULE BY MOUTH DAILY, Disp: 90 capsule, Rfl: 3  •  amLODIPine (NORVASC) 5 MG tablet, Take 1 tablet by mouth every night at bedtime., Disp: 90 tablet, Rfl: 3  •  lisinopril (PRINIVIL,ZESTRIL) 40 MG tablet, Take 1 tablet by mouth Daily., Disp: 90 tablet, Rfl: 3  •  metoprolol succinate XL (TOPROL-XL) 100 MG 24 hr tablet, Take 3 tablets by mouth Daily., Disp: 270 tablet, Rfl: 3  •  Zoster Vac Recomb Adjuvanted (Shingrix) 50 MCG/0.5ML reconstituted suspension, Inject 0.5 mL into the appropriate muscle as directed by prescriber 1 (One) Time for 1 dose., Disp: 1 each, Rfl: 0    Allergies    Allergen Reactions   • Codeine GI Intolerance       Family History   Problem Relation Age of Onset   • Breast cancer Mother         50's   • Other Mother         CVA   • Coronary artery disease Other    • Diabetes Other    • Heart disease Other    • Hypertension Other    • Autism Other         grandson   • Alcohol abuse Brother    • Cardiomyopathy Brother    • Coronary artery disease Brother    • Heart disease Father    • Hypertension Sister    • Diabetes Sister    • Heart disease Son    • Heart attack Son    • Heart disease Maternal Grandmother    • Hypertension Maternal Grandmother    • Hypertension Sister    • Hypertension Sister    • Hypertension Brother    • Hypertension Brother        Social History     Socioeconomic History   • Marital status: Single   Tobacco Use   • Smoking status: Never Smoker   • Smokeless tobacco: Never Used   Vaping Use   • Vaping Use: Never used   Substance and Sexual Activity   • Alcohol use: No   • Drug use: No   • Sexual activity: Defer     Birth control/protection: Post-menopausal       Review of Systems   Constitutional: Negative for appetite change, chills, fever and unexpected weight change.   HENT: Negative for hearing loss, nosebleeds and trouble swallowing.    Eyes: Negative for visual disturbance.   Respiratory: Negative for apnea, cough, choking, chest tightness, shortness of breath, wheezing and stridor.    Cardiovascular: Negative for chest pain, palpitations and leg swelling.   Gastrointestinal: Negative for abdominal distention, abdominal pain, blood in stool, constipation, diarrhea, nausea and vomiting.   Endocrine: Negative for cold intolerance, heat intolerance, polydipsia, polyphagia and polyuria.   Genitourinary: Negative for difficulty urinating, dysuria, frequency, hematuria and urgency.   Musculoskeletal: Negative for arthralgias, back pain, myalgias and neck pain.   Skin: Negative for color change, pallor and rash.   Allergic/Immunologic: Negative for  immunocompromised state.   Neurological: Negative for dizziness, seizures, syncope, light-headedness, numbness and headaches.   Hematological: Negative for adenopathy.   Psychiatric/Behavioral: Negative for suicidal ideas. The patient is not nervous/anxious.        Physical Exam  Vitals reviewed.   Constitutional:       Appearance: She is obese.   Cardiovascular:      Rate and Rhythm: Normal rate and regular rhythm.   Pulmonary:      Effort: Pulmonary effort is normal. No respiratory distress.   Chest:   Breasts: Breasts are symmetrical.      Right: No inverted nipple, mass, nipple discharge, skin change or tenderness.      Left: No inverted nipple, mass, nipple discharge, skin change or tenderness.       Musculoskeletal:      Cervical back: Normal range of motion and neck supple.   Neurological:      Mental Status: She is alert.           ASSESSMENT    Diagnoses and all orders for this visit:    1. Abnormal mammogram of left breast (Primary)  -     Mammo Stereotactic Breast Biopsy Initial With & Without Device Left        PLAN    1. LEFT stereotactic mammogram- 2 areas    The following were discussed with the patient/family:      What are the indications that have led your doctor to the opinion that an operation is necessary?    Breast imaging has determined an abnormal area requiring biopsy.    What, if any, alternative treatments are available for your condition?    Alternatively, open biopsy in the operating room may be performed under sedation or general anesthesia. Observation is not a good option.    What will be the likely result if you don't have the operation?    There is a possibility of missing a breast cancer diagnosis.    What are the basic procedures involved in the operation?    The patient will be placed prone for the procedure. A small incision will be made under local anesthesia, and a special, large needle will be used to perform the biopsy.   A permanent titanium clip will be placed in the  breast to bassam the site of biopsy should further surgery be necessary.    What are the risks?    The risks of bleeding, infection, the possible need for open biopsy or other procedure, scarring, and poor wound healing are explained. Bruising almost always occurs. There is a low transfusion risk. The risks of chronic pain are, likewise, low.     How is the operation expected to improve your health or quality of life?    The lesion can usually be determined to be benign or malignant. Follow up xrays or further open excision may be necessary depending on the pathology.    Is hospitalization necessary and, if so, how long can you expect to be hospitalized?    This procedure is performed on an outpatient basis.    What can you expect during your recovery period?    Minimal pain is usually controlled with non-narcotic analgesia.    When can you expect to resume normal activities?    Normal activity may be resumed the following day.    Are there likely to be residual effects from the operation?    Usually, there are no residual effects following biiopsy.    .   All questions were answered. The patient agrees to operation.              This document has been electronically signed by Rodo Holcomb MD on September 30, 2022 20:22 CDT

## 2022-10-01 NOTE — H&P (VIEW-ONLY)
Chief Complaint   Patient presents with   • Abnormal Breast Imaging     Abnormal Breast and Mammogram ,Left Breast        HPI  No breast masses. Abnormal mammogram demonstrating microcalcifications. Previous biopsies for benign microcalcifications.    Study Result    Narrative & Impression   PROCEDURE: MAMMO BREAST DIAGNOSTIC TOMOSYNTHESIS LEFT     HISTORY: abnormal mammogram, R92.8 Other abnormal and  inconclusive findings on diagnostic imaging of breast     COMPARISON: 9/19/2022 through 9/17/2019     Computer-aided detection was utilized during this exam.   Digital breast tomosynthesis was performed.     FINDINGS:   Magnification and true lateral views were obtained of the left  breast.   On these additional views, the two groups of calcifications in  the upper outer left breast are mildly suspicious. Recommend  biopsies of both.     IMPRESSION:  CONCLUSION:    On these additional views, the two groups of calcifications in  the upper outer left breast are mildly suspicious.   Recommend biopsies of both.  BI-RADS Category 4: Suspicious abnormality.  Biopsy should be  considered.  Findings and recommendations  discussed with ALEXUS BERUMEN on  9/26/2022 9:34 AM CDT     Electronically signed by:  Camacho Husain MD  9/26/2022 9:34 AM CDT  Workstation: [id number]         Past Medical History:   Diagnosis Date   • Abnormal thyroid function test    • Acquired hypothyroidism    • Allergic    • Allergic rhinitis    • Anemia    • Breast cyst    • Coronary atherosclerosis     unspecified type of vessel, native or graft      • Dilated cardiomyopathy (HCC)    • Dyslipidemia    • Esophageal reflux    • Essential hypertension    • Family history of malignant neoplasm of breast    • Fibrocystic disease of breast     Low Mireille score   • GERD (gastroesophageal reflux disease)    • Herpes zoster    • History of chicken pox    • Hyperlipidemia    • Measles    • Mumps    • Myocardial infarction (HCC) 2001   • Nasal septal deformity     • Obesity    • Type 2 diabetes mellitus (HCC)     Uncontrolled   • V tach (HCC)    • Vitamin D deficiency        Past Surgical History:   Procedure Laterality Date   • BREAST BIOPSY  2010    left breast benign   • BREAST CYST ASPIRATION     • CARDIAC CATHETERIZATION  2002    Left cardiac cath, selective coronary angiography, PTCA to the left anterior descending. Stent placement to the left anterior descending   • CARDIAC DEFIBRILLATOR PLACEMENT Left     Dr. Brunner   •  SECTION      Intrauterine pregnancy, term gestation, cephalopelvic disproportion   • COLONOSCOPY N/A 10/12/2018    Procedure: COLONOSCOPY;  Surgeon: Delroy Miller MD;  Location: [facility] ENDOSCOPY;  Service: General   • ENDOSCOPY AND COLONOSCOPY  04/15/2008    Normal   • ESOPHAGOSCOPY / EGD  2007    With tube-Esophagus appeared normal. Gastritis of the stomach. A biopsy of the stomach was obtained from the stomach. The duodenum appeared normal   • EXTERNAL EAR SURGERY  08/15/1991    Repair biifd ear   • OOPHORECTOMY      Left salpingo-oophorectomy, wedge resection of the right ovary, lysis of adhesions   • TRANSESOPHAGEAL ECHOCARDIOGRAM (LORELEI)  2012    Without color flow-Moderate to severe LV dysfumctional w/ an EF of 30-35%. Wall motion abnormalities as described above. LV enlargement. Mild aortic insufficiency, mild TR regurg, mild M regurg. mild pulmonary insufficiency         Current Outpatient Medications:   •  aspirin (Aspirin Adult) 325 MG tablet, Take 1 tablet by mouth Daily., Disp: 60 tablet, Rfl: 2  •  atorvastatin (LIPITOR) 20 MG tablet, TAKE ONE TABLET BY MOUTH ONCE NIGHTLY, Disp: 90 tablet, Rfl: 3  •  cetirizine (zyrTEC) 10 MG tablet, Take 1 tablet by mouth Daily., Disp: 90 tablet, Rfl: 3  •  Continuous Blood Gluc Sensor (Dexcom G6 Sensor), USE EVERY 10 DAYS AS DIRECTED FOR CONTINUOUS GLUCOSE MONITORING, Disp: 3 each, Rfl: 3  •  Continuous Blood Gluc Transmit (Dexcom G6 Transmitter) misc, ,  Disp: , Rfl:   •  Continuous Blood Gluc Transmit (Dexcom G6 Transmitter) misc, 1 each Every 3 (Three) Months., Disp: 1 each, Rfl: 3  •  Empagliflozin-metFORMIN HCl ER (Synjardy XR) 12.5-1000 MG tablet sustained-release 24 hour, Take 2 tablets by mouth Daily., Disp: 60 tablet, Rfl: 11  •  glucose blood test strip, 1 each by Other route 3 (Three) Times a Day. Use as instructed, Disp: 90 each, Rfl: 12  •  glucose monitor monitoring kit, 1 each 3 (Three) Times a Day., Disp: 1 each, Rfl: 0  •  hydroCHLOROthiazide (HYDRODIURIL) 50 MG tablet, TAKE ONE TABLET BY MOUTH DAILY, Disp: 90 tablet, Rfl: 2  •  Lancet Device misc, 1 each 2 (Two) Times a Day., Disp: 1 each, Rfl: 12  •  levothyroxine (SYNTHROID, LEVOTHROID) 112 MCG tablet, Take 1 tablet by mouth Daily., Disp: 90 tablet, Rfl: 3  •  mexiletine (MEXITIL) 150 MG capsule, Take 150 mg by mouth Every 8 (Eight) Hours., Disp: , Rfl:   •  omeprazole (priLOSEC) 40 MG capsule, TAKE ONE CAPSULE BY MOUTH DAILY, Disp: 90 capsule, Rfl: 3  •  potassium chloride (K-DUR,KLOR-CON) 10 MEQ CR tablet, TAKE TWO TABLETS BY MOUTH DAILY, Disp: 120 tablet, Rfl: 4  •  Semaglutide, 2 MG/DOSE, (Ozempic, 2 MG/DOSE,) 8 MG/3ML solution pen-injector, Inject 2 mg under the skin into the appropriate area as directed 1 (One) Time Per Week., Disp: 12 pen, Rfl: 3  •  vitamin D3 125 MCG (5000 UT) capsule capsule, TAKE ONE CAPSULE BY MOUTH DAILY, Disp: 90 capsule, Rfl: 3  •  amLODIPine (NORVASC) 5 MG tablet, Take 1 tablet by mouth every night at bedtime., Disp: 90 tablet, Rfl: 3  •  lisinopril (PRINIVIL,ZESTRIL) 40 MG tablet, Take 1 tablet by mouth Daily., Disp: 90 tablet, Rfl: 3  •  metoprolol succinate XL (TOPROL-XL) 100 MG 24 hr tablet, Take 3 tablets by mouth Daily., Disp: 270 tablet, Rfl: 3  •  Zoster Vac Recomb Adjuvanted (Shingrix) 50 MCG/0.5ML reconstituted suspension, Inject 0.5 mL into the appropriate muscle as directed by prescriber 1 (One) Time for 1 dose., Disp: 1 each, Rfl: 0    Allergies    Allergen Reactions   • Codeine GI Intolerance       Family History   Problem Relation Age of Onset   • Breast cancer Mother         50's   • Other Mother         CVA   • Coronary artery disease Other    • Diabetes Other    • Heart disease Other    • Hypertension Other    • Autism Other         grandson   • Alcohol abuse Brother    • Cardiomyopathy Brother    • Coronary artery disease Brother    • Heart disease Father    • Hypertension Sister    • Diabetes Sister    • Heart disease Son    • Heart attack Son    • Heart disease Maternal Grandmother    • Hypertension Maternal Grandmother    • Hypertension Sister    • Hypertension Sister    • Hypertension Brother    • Hypertension Brother        Social History     Socioeconomic History   • Marital status: Single   Tobacco Use   • Smoking status: Never Smoker   • Smokeless tobacco: Never Used   Vaping Use   • Vaping Use: Never used   Substance and Sexual Activity   • Alcohol use: No   • Drug use: No   • Sexual activity: Defer     Birth control/protection: Post-menopausal       Review of Systems   Constitutional: Negative for appetite change, chills, fever and unexpected weight change.   HENT: Negative for hearing loss, nosebleeds and trouble swallowing.    Eyes: Negative for visual disturbance.   Respiratory: Negative for apnea, cough, choking, chest tightness, shortness of breath, wheezing and stridor.    Cardiovascular: Negative for chest pain, palpitations and leg swelling.   Gastrointestinal: Negative for abdominal distention, abdominal pain, blood in stool, constipation, diarrhea, nausea and vomiting.   Endocrine: Negative for cold intolerance, heat intolerance, polydipsia, polyphagia and polyuria.   Genitourinary: Negative for difficulty urinating, dysuria, frequency, hematuria and urgency.   Musculoskeletal: Negative for arthralgias, back pain, myalgias and neck pain.   Skin: Negative for color change, pallor and rash.   Allergic/Immunologic: Negative for  immunocompromised state.   Neurological: Negative for dizziness, seizures, syncope, light-headedness, numbness and headaches.   Hematological: Negative for adenopathy.   Psychiatric/Behavioral: Negative for suicidal ideas. The patient is not nervous/anxious.        Physical Exam  Vitals reviewed.   Constitutional:       Appearance: She is obese.   Cardiovascular:      Rate and Rhythm: Normal rate and regular rhythm.   Pulmonary:      Effort: Pulmonary effort is normal. No respiratory distress.   Chest:   Breasts: Breasts are symmetrical.      Right: No inverted nipple, mass, nipple discharge, skin change or tenderness.      Left: No inverted nipple, mass, nipple discharge, skin change or tenderness.       Musculoskeletal:      Cervical back: Normal range of motion and neck supple.   Neurological:      Mental Status: She is alert.           ASSESSMENT    Diagnoses and all orders for this visit:    1. Abnormal mammogram of left breast (Primary)  -     Mammo Stereotactic Breast Biopsy Initial With & Without Device Left        PLAN    1. LEFT stereotactic mammogram- 2 areas    The following were discussed with the patient/family:      What are the indications that have led your doctor to the opinion that an operation is necessary?    Breast imaging has determined an abnormal area requiring biopsy.    What, if any, alternative treatments are available for your condition?    Alternatively, open biopsy in the operating room may be performed under sedation or general anesthesia. Observation is not a good option.    What will be the likely result if you don't have the operation?    There is a possibility of missing a breast cancer diagnosis.    What are the basic procedures involved in the operation?    The patient will be placed prone for the procedure. A small incision will be made under local anesthesia, and a special, large needle will be used to perform the biopsy.   A permanent titanium clip will be placed in the  breast to bassam the site of biopsy should further surgery be necessary.    What are the risks?    The risks of bleeding, infection, the possible need for open biopsy or other procedure, scarring, and poor wound healing are explained. Bruising almost always occurs. There is a low transfusion risk. The risks of chronic pain are, likewise, low.     How is the operation expected to improve your health or quality of life?    The lesion can usually be determined to be benign or malignant. Follow up xrays or further open excision may be necessary depending on the pathology.    Is hospitalization necessary and, if so, how long can you expect to be hospitalized?    This procedure is performed on an outpatient basis.    What can you expect during your recovery period?    Minimal pain is usually controlled with non-narcotic analgesia.    When can you expect to resume normal activities?    Normal activity may be resumed the following day.    Are there likely to be residual effects from the operation?    Usually, there are no residual effects following biiopsy.    .   All questions were answered. The patient agrees to operation.              This document has been electronically signed by Rodo Holcomb MD on September 30, 2022 20:22 CDT

## 2022-10-04 ENCOUNTER — HOSPITAL ENCOUNTER (OUTPATIENT)
Dept: MAMMOGRAPHY | Facility: HOSPITAL | Age: 62
Discharge: HOME | End: 2022-10-04

## 2022-10-04 VITALS
HEART RATE: 76 BPM | HEIGHT: 64 IN | OXYGEN SATURATION: 98 % | WEIGHT: 183.86 LBS | RESPIRATION RATE: 18 BRPM | TEMPERATURE: 97.1 F | BODY MASS INDEX: 31.39 KG/M2 | SYSTOLIC BLOOD PRESSURE: 114 MMHG | DIASTOLIC BLOOD PRESSURE: 68 MMHG

## 2022-10-04 DIAGNOSIS — R92.8 ABNORMAL MAMMOGRAM: ICD-10-CM

## 2022-10-04 DIAGNOSIS — R92.8 ABNORMAL MAMMOGRAM OF LEFT BREAST: ICD-10-CM

## 2022-10-04 PROCEDURE — 88305 TISSUE EXAM BY PATHOLOGIST: CPT

## 2022-10-04 PROCEDURE — 19082 BX BREAST ADD LESION STRTCTC: CPT | Performed by: SURGERY

## 2022-10-04 PROCEDURE — 19081 BX BREAST 1ST LESION STRTCTC: CPT | Performed by: SURGERY

## 2022-10-04 PROCEDURE — A4648 IMPLANTABLE TISSUE MARKER: HCPCS

## 2022-10-04 RX ORDER — LIDOCAINE HYDROCHLORIDE AND EPINEPHRINE 10; 10 MG/ML; UG/ML
30 INJECTION, SOLUTION INFILTRATION; PERINEURAL ONCE
Status: COMPLETED | OUTPATIENT
Start: 2022-10-04 | End: 2022-10-04

## 2022-10-04 RX ORDER — LIDOCAINE HYDROCHLORIDE AND EPINEPHRINE 10; 10 MG/ML; UG/ML
20 INJECTION, SOLUTION INFILTRATION; PERINEURAL ONCE
Status: COMPLETED | OUTPATIENT
Start: 2022-10-04 | End: 2022-10-04

## 2022-10-04 RX ORDER — DIAZEPAM 5 MG/1
5 TABLET ORAL ONCE
Status: COMPLETED | OUTPATIENT
Start: 2022-10-04 | End: 2022-10-04

## 2022-10-04 RX ORDER — OXYCODONE HYDROCHLORIDE AND ACETAMINOPHEN 5; 325 MG/1; MG/1
1 TABLET ORAL ONCE
Status: COMPLETED | OUTPATIENT
Start: 2022-10-04 | End: 2022-10-04

## 2022-10-04 RX ADMIN — LIDOCAINE HYDROCHLORIDE AND EPINEPHRINE 30 ML: 10; 10 INJECTION, SOLUTION INFILTRATION; PERINEURAL at 07:35

## 2022-10-04 RX ADMIN — LIDOCAINE HYDROCHLORIDE AND EPINEPHRINE 20 ML: 10; 10 INJECTION, SOLUTION INFILTRATION; PERINEURAL at 08:05

## 2022-10-04 RX ADMIN — DIAZEPAM 5 MG: 5 TABLET ORAL at 06:17

## 2022-10-04 RX ADMIN — OXYCODONE HYDROCHLORIDE AND ACETAMINOPHEN 1 TABLET: 5; 325 TABLET ORAL at 06:18

## 2022-10-04 NOTE — INTERVAL H&P NOTE
H&P reviewed. The patient was examined and there are no changes to the H&P.      Temp:  [97.9 °F (36.6 °C)] 97.9 °F (36.6 °C)  Heart Rate:  [85] 85  Resp:  [18] 18  BP: (131)/(79) 131/79

## 2022-10-05 LAB — REF LAB TEST METHOD: NORMAL

## 2022-10-07 ENCOUNTER — OFFICE VISIT (OUTPATIENT)
Dept: SURGERY | Facility: CLINIC | Age: 62
End: 2022-10-07

## 2022-10-07 VITALS
HEIGHT: 64 IN | BODY MASS INDEX: 31.45 KG/M2 | WEIGHT: 184.2 LBS | HEART RATE: 77 BPM | SYSTOLIC BLOOD PRESSURE: 130 MMHG | TEMPERATURE: 96.6 F | OXYGEN SATURATION: 99 % | DIASTOLIC BLOOD PRESSURE: 90 MMHG

## 2022-10-07 DIAGNOSIS — R92.8 ABNORMAL MAMMOGRAM OF LEFT BREAST: Primary | ICD-10-CM

## 2022-10-07 DIAGNOSIS — R92.1 BREAST CALCIFICATIONS: Primary | ICD-10-CM

## 2022-10-07 PROCEDURE — 99212 OFFICE O/P EST SF 10 MIN: CPT | Performed by: SURGERY

## 2022-10-10 NOTE — PROGRESS NOTES
Chief Complaint   Patient presents with   • Follow-up     Follow up path results        HPI  Path:    DIAGNOSIS:   A.   LEFT POSTERIOR BREAST AT 6:30, CORE BIOPSY:   Hyalinized and calcified fibroadenoma   Negative for atypical hyperplasia or malignancy   B.   LEFT ANTERIOR BREAST AT 3:30, CORE BIOPSY:   Fibrous breast tissue with thick-walled vessels   Negative for atypical hyperplasia or malignancy   RLL     COMMENT:  I recommend clinical correlation to determine if these findings   are representative or if additional sampling is indicated.  Case discussed with Dr. Holcomb 10/5/22.     I am concerned about whether biopsy of the superficial calcifications was adequate.  Past Medical History:   Diagnosis Date   • Abnormal thyroid function test    • Acquired hypothyroidism    • Allergic    • Allergic rhinitis    • Anemia    • Breast cyst    • Coronary atherosclerosis     unspecified type of vessel, native or graft      • Dilated cardiomyopathy (HCC)    • Dyslipidemia    • Esophageal reflux    • Essential hypertension    • Family history of malignant neoplasm of breast    • Fibrocystic disease of breast     Low Mireille score   • GERD (gastroesophageal reflux disease)    • Herpes zoster    • History of chicken pox    • Hyperlipidemia    • Measles    • Mumps    • Myocardial infarction (HCC)    • Nasal septal deformity    • Obesity    • Type 2 diabetes mellitus (HCC)     Uncontrolled   • V tach    • Vitamin D deficiency        Past Surgical History:   Procedure Laterality Date   • BREAST BIOPSY  2010    left breast benign   • BREAST CYST ASPIRATION     • CARDIAC CATHETERIZATION  2002    Left cardiac cath, selective coronary angiography, PTCA to the left anterior descending. Stent placement to the left anterior descending   • CARDIAC DEFIBRILLATOR PLACEMENT Left     Dr. Brunner   •  SECTION  1984    Intrauterine pregnancy, term gestation, cephalopelvic disproportion   • COLONOSCOPY N/A 10/12/2018     Procedure: COLONOSCOPY;  Surgeon: Delroy Miller MD;  Location: [facility] ENDOSCOPY;  Service: General   • ENDOSCOPY AND COLONOSCOPY  04/15/2008    Normal   • ESOPHAGOSCOPY / EGD  11/30/2007    With tube-Esophagus appeared normal. Gastritis of the stomach. A biopsy of the stomach was obtained from the stomach. The duodenum appeared normal   • EXTERNAL EAR SURGERY  08/15/1991    Repair biifd ear   • OOPHORECTOMY  1993    Left salpingo-oophorectomy, wedge resection of the right ovary, lysis of adhesions   • TRANSESOPHAGEAL ECHOCARDIOGRAM (LORELEI)  03/02/2012    Without color flow-Moderate to severe LV dysfumctional w/ an EF of 30-35%. Wall motion abnormalities as described above. LV enlargement. Mild aortic insufficiency, mild TR regurg, mild M regurg. mild pulmonary insufficiency         Current Outpatient Medications:   •  amLODIPine (NORVASC) 5 MG tablet, Take 1 tablet by mouth every night at bedtime., Disp: 90 tablet, Rfl: 3  •  aspirin (Aspirin Adult) 325 MG tablet, Take 1 tablet by mouth Daily., Disp: 60 tablet, Rfl: 2  •  atorvastatin (LIPITOR) 20 MG tablet, TAKE ONE TABLET BY MOUTH ONCE NIGHTLY, Disp: 90 tablet, Rfl: 3  •  cetirizine (zyrTEC) 10 MG tablet, Take 1 tablet by mouth Daily., Disp: 90 tablet, Rfl: 3  •  Continuous Blood Gluc Sensor (Dexcom G6 Sensor), USE EVERY 10 DAYS AS DIRECTED FOR CONTINUOUS GLUCOSE MONITORING, Disp: 3 each, Rfl: 3  •  Continuous Blood Gluc Transmit (Dexcom G6 Transmitter) misc, , Disp: , Rfl:   •  Continuous Blood Gluc Transmit (Dexcom G6 Transmitter) misc, 1 each Every 3 (Three) Months., Disp: 1 each, Rfl: 3  •  Empagliflozin-metFORMIN HCl ER (Synjardy XR) 12.5-1000 MG tablet sustained-release 24 hour, Take 2 tablets by mouth Daily., Disp: 60 tablet, Rfl: 11  •  glucose blood test strip, 1 each by Other route 3 (Three) Times a Day. Use as instructed, Disp: 90 each, Rfl: 12  •  glucose monitor monitoring kit, 1 each 3 (Three) Times a Day., Disp: 1 each, Rfl: 0  •   hydroCHLOROthiazide (HYDRODIURIL) 50 MG tablet, TAKE ONE TABLET BY MOUTH DAILY, Disp: 90 tablet, Rfl: 2  •  Lancet Device misc, 1 each 2 (Two) Times a Day., Disp: 1 each, Rfl: 12  •  levothyroxine (SYNTHROID, LEVOTHROID) 112 MCG tablet, Take 1 tablet by mouth Daily., Disp: 90 tablet, Rfl: 3  •  lisinopril (PRINIVIL,ZESTRIL) 40 MG tablet, Take 1 tablet by mouth Daily., Disp: 90 tablet, Rfl: 3  •  metoprolol succinate XL (TOPROL-XL) 100 MG 24 hr tablet, Take 3 tablets by mouth Daily., Disp: 270 tablet, Rfl: 3  •  mexiletine (MEXITIL) 150 MG capsule, Take 150 mg by mouth Every 8 (Eight) Hours., Disp: , Rfl:   •  omeprazole (priLOSEC) 40 MG capsule, TAKE ONE CAPSULE BY MOUTH DAILY, Disp: 90 capsule, Rfl: 3  •  potassium chloride (K-DUR,KLOR-CON) 10 MEQ CR tablet, TAKE TWO TABLETS BY MOUTH DAILY, Disp: 120 tablet, Rfl: 4  •  Semaglutide, 2 MG/DOSE, (Ozempic, 2 MG/DOSE,) 8 MG/3ML solution pen-injector, Inject 2 mg under the skin into the appropriate area as directed 1 (One) Time Per Week., Disp: 12 pen, Rfl: 3  •  vitamin D3 125 MCG (5000 UT) capsule capsule, TAKE ONE CAPSULE BY MOUTH DAILY, Disp: 90 capsule, Rfl: 3    Allergies   Allergen Reactions   • Codeine GI Intolerance       Family History   Problem Relation Age of Onset   • Breast cancer Mother         50's   • Other Mother         CVA   • Coronary artery disease Other    • Diabetes Other    • Heart disease Other    • Hypertension Other    • Autism Other         grandson   • Alcohol abuse Brother    • Cardiomyopathy Brother    • Coronary artery disease Brother    • Heart disease Father    • Hypertension Sister    • Diabetes Sister    • Heart disease Son    • Heart attack Son    • Heart disease Maternal Grandmother    • Hypertension Maternal Grandmother    • Hypertension Sister    • Hypertension Sister    • Hypertension Brother    • Hypertension Brother        Social History     Socioeconomic History   • Marital status: Single   Tobacco Use   • Smoking status:  Never   • Smokeless tobacco: Never   Vaping Use   • Vaping Use: Never used   Substance and Sexual Activity   • Alcohol use: No   • Drug use: No   • Sexual activity: Defer     Birth control/protection: Post-menopausal           Physical Exam  Incisions healed- no hematoma.    ASSESSMENT    Diagnoses and all orders for this visit:    1. Breast calcifications (Primary)        PLAN    1. Mammogram in 6 weeks.            This document has been electronically signed by Rodo Holcomb MD on October 9, 2022 23:54 CDT

## 2022-11-03 DIAGNOSIS — J30.2 SEASONAL ALLERGIES: ICD-10-CM

## 2022-11-03 RX ORDER — CETIRIZINE HYDROCHLORIDE 10 MG/1
TABLET ORAL
Qty: 90 TABLET | Refills: 3 | Status: SHIPPED | OUTPATIENT
Start: 2022-11-03

## 2022-11-22 ENCOUNTER — OFFICE VISIT (OUTPATIENT)
Dept: SURGERY | Facility: CLINIC | Age: 62
End: 2022-11-22

## 2022-11-22 VITALS
HEART RATE: 85 BPM | WEIGHT: 181 LBS | HEIGHT: 64 IN | BODY MASS INDEX: 30.9 KG/M2 | SYSTOLIC BLOOD PRESSURE: 108 MMHG | OXYGEN SATURATION: 96 % | DIASTOLIC BLOOD PRESSURE: 70 MMHG | TEMPERATURE: 98 F

## 2022-11-22 DIAGNOSIS — N60.19 FIBROCYSTIC BREAST DISEASE (FCBD), UNSPECIFIED LATERALITY: Primary | ICD-10-CM

## 2022-11-22 DIAGNOSIS — Z12.31 SCREENING MAMMOGRAM FOR HIGH-RISK PATIENT: Primary | ICD-10-CM

## 2022-11-22 PROCEDURE — 99212 OFFICE O/P EST SF 10 MIN: CPT | Performed by: SURGERY

## 2022-11-24 NOTE — PROGRESS NOTES
Chief Complaint   Patient presents with   • Follow-up     Follow up 6 week Dx left        HPI  6-week follow-up for diagnostic left mammogram following a mammotome biopsy demonstrating benign disease imaging demonstrates the following:    Study Result    Narrative & Impression   MAMMOGRAM: Diagnostic.     HISTORY: Post mammotome biopsy follow-up     COMPARISON:  September 26, 2022.        FINDINGS:     Left breast low dose digital mammography was performed.    This study was interpreted with the assistance of CAD (computer  aided diagnosis. 3-D Mammotomosynthesis was obtained.     The two groups of calcifications seen on prior mammographic  examination is no longer present. Two new surgical microclips are  noted at the site of mammotome biopsies.     IMPRESSION:  1.  No mammographic evidence of malignancy. Post  mammotome  biopsy changes. Follow-up recommendations:  annual mammographic  surveillance.  BIRADS: 2, Benign finding(s)        Electronically signed by:  Pineda Miguel MD  11/15/2022 3:03 PM CST  Workstation: [id number]        I have personally reviewed the imaging and concur with the radiologist's findings.    Past Medical History:   Diagnosis Date   • Abnormal thyroid function test    • Acquired hypothyroidism    • Allergic    • Allergic rhinitis    • Anemia    • Breast cyst    • Coronary atherosclerosis     unspecified type of vessel, native or graft      • Dilated cardiomyopathy (HCC)    • Dyslipidemia    • Esophageal reflux    • Essential hypertension    • Family history of malignant neoplasm of breast    • Fibrocystic disease of breast     Low Mireille score   • GERD (gastroesophageal reflux disease)    • Herpes zoster    • History of chicken pox    • Hyperlipidemia    • Measles    • Mumps    • Myocardial infarction (HCC) 2001   • Nasal septal deformity    • Obesity    • Type 2 diabetes mellitus (HCC)     Uncontrolled   • V tach    • Vitamin D deficiency        Past Surgical History:   Procedure  Laterality Date   • BREAST BIOPSY  2010    left breast benign   • BREAST CYST ASPIRATION     • CARDIAC CATHETERIZATION  2002    Left cardiac cath, selective coronary angiography, PTCA to the left anterior descending. Stent placement to the left anterior descending   • CARDIAC DEFIBRILLATOR PLACEMENT Left     Dr. Brunner   •  SECTION      Intrauterine pregnancy, term gestation, cephalopelvic disproportion   • COLONOSCOPY N/A 10/12/2018    Procedure: COLONOSCOPY;  Surgeon: Delroy Miller MD;  Location: [facility] ENDOSCOPY;  Service: General   • ENDOSCOPY AND COLONOSCOPY  04/15/2008    Normal   • ESOPHAGOSCOPY / EGD  2007    With tube-Esophagus appeared normal. Gastritis of the stomach. A biopsy of the stomach was obtained from the stomach. The duodenum appeared normal   • EXTERNAL EAR SURGERY  08/15/1991    Repair biifd ear   • OOPHORECTOMY      Left salpingo-oophorectomy, wedge resection of the right ovary, lysis of adhesions   • TRANSESOPHAGEAL ECHOCARDIOGRAM (LORELEI)  2012    Without color flow-Moderate to severe LV dysfumctional w/ an EF of 30-35%. Wall motion abnormalities as described above. LV enlargement. Mild aortic insufficiency, mild TR regurg, mild M regurg. mild pulmonary insufficiency         Current Outpatient Medications:   •  amLODIPine (NORVASC) 5 MG tablet, Take 1 tablet by mouth every night at bedtime., Disp: 90 tablet, Rfl: 3  •  aspirin (Aspirin Adult) 325 MG tablet, Take 1 tablet by mouth Daily., Disp: 60 tablet, Rfl: 2  •  atorvastatin (LIPITOR) 20 MG tablet, TAKE ONE TABLET BY MOUTH ONCE NIGHTLY, Disp: 90 tablet, Rfl: 3  •  cetirizine (zyrTEC) 10 MG tablet, TAKE ONE TABLET BY MOUTH DAILY, Disp: 90 tablet, Rfl: 3  •  Continuous Blood Gluc Sensor (Dexcom G6 Sensor), USE EVERY 10 DAYS AS DIRECTED FOR CONTINUOUS GLUCOSE MONITORING, Disp: 3 each, Rfl: 3  •  Continuous Blood Gluc Transmit (Dexcom G6 Transmitter) misc, , Disp: , Rfl:   •  Continuous Blood Gluc  Transmit (Dexcom G6 Transmitter) misc, 1 each Every 3 (Three) Months., Disp: 1 each, Rfl: 3  •  Empagliflozin-metFORMIN HCl ER (Synjardy XR) 12.5-1000 MG tablet sustained-release 24 hour, Take 2 tablets by mouth Daily., Disp: 60 tablet, Rfl: 11  •  glucose blood test strip, 1 each by Other route 3 (Three) Times a Day. Use as instructed, Disp: 90 each, Rfl: 12  •  glucose monitor monitoring kit, 1 each 3 (Three) Times a Day., Disp: 1 each, Rfl: 0  •  hydroCHLOROthiazide (HYDRODIURIL) 50 MG tablet, TAKE ONE TABLET BY MOUTH DAILY, Disp: 90 tablet, Rfl: 2  •  Lancet Device misc, 1 each 2 (Two) Times a Day., Disp: 1 each, Rfl: 12  •  levothyroxine (SYNTHROID, LEVOTHROID) 112 MCG tablet, Take 1 tablet by mouth Daily., Disp: 90 tablet, Rfl: 3  •  lisinopril (PRINIVIL,ZESTRIL) 40 MG tablet, Take 1 tablet by mouth Daily., Disp: 90 tablet, Rfl: 3  •  metoprolol succinate XL (TOPROL-XL) 100 MG 24 hr tablet, Take 3 tablets by mouth Daily., Disp: 270 tablet, Rfl: 3  •  mexiletine (MEXITIL) 150 MG capsule, Take 150 mg by mouth Every 8 (Eight) Hours., Disp: , Rfl:   •  omeprazole (priLOSEC) 40 MG capsule, TAKE ONE CAPSULE BY MOUTH DAILY, Disp: 90 capsule, Rfl: 3  •  potassium chloride (K-DUR,KLOR-CON) 10 MEQ CR tablet, TAKE TWO TABLETS BY MOUTH DAILY, Disp: 120 tablet, Rfl: 4  •  Semaglutide, 2 MG/DOSE, (Ozempic, 2 MG/DOSE,) 8 MG/3ML solution pen-injector, Inject 2 mg under the skin into the appropriate area as directed 1 (One) Time Per Week., Disp: 12 pen, Rfl: 3  •  vitamin D3 125 MCG (5000 UT) capsule capsule, TAKE ONE CAPSULE BY MOUTH DAILY, Disp: 90 capsule, Rfl: 3    Allergies   Allergen Reactions   • Codeine GI Intolerance       Family History   Problem Relation Age of Onset   • Breast cancer Mother         50's   • Other Mother         CVA   • Coronary artery disease Other    • Diabetes Other    • Heart disease Other    • Hypertension Other    • Autism Other         grandson   • Alcohol abuse Brother    • Cardiomyopathy  Brother    • Coronary artery disease Brother    • Heart disease Father    • Hypertension Sister    • Diabetes Sister    • Heart disease Son    • Heart attack Son    • Heart disease Maternal Grandmother    • Hypertension Maternal Grandmother    • Hypertension Sister    • Hypertension Sister    • Hypertension Brother    • Hypertension Brother        Social History     Socioeconomic History   • Marital status: Single   Tobacco Use   • Smoking status: Never   • Smokeless tobacco: Never   Vaping Use   • Vaping Use: Never used   Substance and Sexual Activity   • Alcohol use: No   • Drug use: No   • Sexual activity: Defer     Birth control/protection: Post-menopausal           Physical Exam  Not repeated    ASSESSMENT    Diagnoses and all orders for this visit:    1. Fibrocystic breast disease (FCBD), both breasts (Primary)        PLAN    1.  Recheck 1 year mammograms and exam              This document has been electronically signed by Rodo Holcomb MD on November 24, 2022 11:14 CST

## 2022-12-11 DIAGNOSIS — E11.65 TYPE 2 DIABETES MELLITUS WITH HYPERGLYCEMIA, WITHOUT LONG-TERM CURRENT USE OF INSULIN: ICD-10-CM

## 2022-12-12 RX ORDER — PROCHLORPERAZINE 25 MG/1
SUPPOSITORY RECTAL
Qty: 1 EACH | Refills: 3 | Status: SHIPPED | OUTPATIENT
Start: 2022-12-12

## 2022-12-23 ENCOUNTER — TELEPHONE (OUTPATIENT)
Dept: ENDOCRINOLOGY | Facility: CLINIC | Age: 62
End: 2022-12-23

## 2022-12-23 NOTE — TELEPHONE ENCOUNTER
Left message regarding patients appointment, due to the weather the appointment on 12/23/22 was rescheduled to 12/27/22 at 10:30am.

## 2022-12-27 ENCOUNTER — TELEMEDICINE (OUTPATIENT)
Dept: ENDOCRINOLOGY | Facility: CLINIC | Age: 62
End: 2022-12-27

## 2022-12-27 DIAGNOSIS — E11.65 TYPE 2 DIABETES MELLITUS WITH HYPERGLYCEMIA, WITHOUT LONG-TERM CURRENT USE OF INSULIN: Primary | ICD-10-CM

## 2022-12-27 DIAGNOSIS — E78.2 MIXED HYPERLIPIDEMIA: ICD-10-CM

## 2022-12-27 DIAGNOSIS — E03.9 ACQUIRED HYPOTHYROIDISM: ICD-10-CM

## 2022-12-27 DIAGNOSIS — E55.9 VITAMIN D DEFICIENCY: ICD-10-CM

## 2022-12-27 PROCEDURE — 99214 OFFICE O/P EST MOD 30 MIN: CPT | Performed by: NURSE PRACTITIONER

## 2022-12-27 NOTE — PROGRESS NOTES
Chief Complaint  Diabetes    Subjective          Danaederrick Ngo presents to [facility]  Diabetes         You have chosen to receive care through a telehealth visit.  Do you consent to use a video/audio connection for your medical care today? Yes            TELEHEALTH VIDEO VISIT     This a video visit due to [facility] current guidelines for social distancing due to the COVID 19 pandemic        Mode of Visit: Video  Location of patient: home  You have chosen to receive care through a telehealth visit.  Does the patient consent to use a video/audio connection for your medical care today? Yes  The visit included audio and video interaction. No technical issues occurred during this visit.     Primary Care Provider     José Ruano APRN     62 year old male presents for follow up      Reason diabetes mellitus type 2     Duration diagnosed in 2015     Timing is constant        Quality improved     Severity is moderate     Macrovascular complications CAD           Current diabetes regimen     Oral medications, GLP-1     Current glucose monitoring     Checks 4 times daily     Uses the Dexcom G6     Could not download       States at goal    Review of Systems - General ROS: negative                Objective   Vital Signs:   There were no vitals taken for this visit.    Physical Exam  Neurological:      General: No focal deficit present.      Mental Status: She is alert.   Psychiatric:         Mood and Affect: Mood normal.         Thought Content: Thought content normal.         Judgment: Judgment normal.        Result Review :   The following data was reviewed by: WILLIS Gabriel on 05/13/2022:  Common labs    Common Labs 3/15/22 3/15/22 3/15/22 3/15/22 8/17/22 8/17/22 8/17/22 8/17/22 8/17/22    0948 0948 0948 0948 0906 0906 0906 0906 0934   Glucose 152 (A)     131 (A)      BUN 25 (A)     16      Creatinine 1.34 (A)     0.95      Sodium 138     134 (A)      Potassium  3.6     3.4 (A)      Chloride 98     93 (A)      Calcium 10.0     9.6      Albumin 4.60     4.40      Total Bilirubin 0.9     0.7      Alkaline Phosphatase 63     71      AST (SGOT) 16     18      ALT (SGPT) 13     13      WBC  5.24   6.71       Hemoglobin  14.5   13.8       Hematocrit  43.5   41.4       Platelets  341   313       Total Cholesterol    127   137     Triglycerides    69   94     HDL Cholesterol    36 (A)   39 (A)     LDL Cholesterol     77   80     Hemoglobin A1C   8.20 (A)     7.00 (A)    Microalbumin, Urine         <1.2   (A) Abnormal value                        Assessment and Plan    Diagnoses and all orders for this visit:    1. Type 2 diabetes mellitus with hyperglycemia, without long-term current use of insulin (HCC) (Primary)  -     CBC & Differential; Future  -     Comprehensive Metabolic Panel; Future  -     Hemoglobin A1c; Future  -     Lipid Panel; Future  -     Microalbumin / Creatinine Urine Ratio - Urine, Clean Catch; Future  -     TSH; Future  -     Vitamin D,25-Hydroxy; Future    2. Vitamin D deficiency  -     CBC & Differential; Future  -     Comprehensive Metabolic Panel; Future  -     Hemoglobin A1c; Future  -     Lipid Panel; Future  -     Microalbumin / Creatinine Urine Ratio - Urine, Clean Catch; Future  -     TSH; Future  -     Vitamin D,25-Hydroxy; Future    3. Mixed hyperlipidemia  -     CBC & Differential; Future  -     Comprehensive Metabolic Panel; Future  -     Hemoglobin A1c; Future  -     Lipid Panel; Future  -     Microalbumin / Creatinine Urine Ratio - Urine, Clean Catch; Future  -     TSH; Future  -     Vitamin D,25-Hydroxy; Future    4. Acquired hypothyroidism  -     CBC & Differential; Future  -     Comprehensive Metabolic Panel; Future  -     Hemoglobin A1c; Future  -     Lipid Panel; Future  -     Microalbumin / Creatinine Urine Ratio - Urine, Clean Catch; Future  -     TSH; Future  -     Vitamin D,25-Hydroxy; Future             Glycemic  Management:         Diabetes mellitus type 2         Lab Results   Component Value Date    HGBA1C 7.00 (H) 08/17/2022          Uses the dexcom G6      Could not download          Taking Synjardy XR 12/5 /1000 mg , 2 tabs w bkfast--keep      Taking  Ozempic  2 mg one weekly --keep     If nausea try to eat less  If vomiting , stop   Ozempic will help generate more of your own insulin and it decreases the probability of Death / Heart / Stroke by 27%        Keep meals at 50 gm of CHO             she does not want insulin at this time         Stopped glipizide     Stopped jardiance      Stopped metformin         Lipid Management        Taking  atorvastatin 20 mg qhs        Total Cholesterol   Date Value Ref Range Status   08/17/2022 137 0 - 200 mg/dL Final     Triglycerides   Date Value Ref Range Status   08/17/2022 94 0 - 150 mg/dL Final     HDL Cholesterol   Date Value Ref Range Status   08/17/2022 39 (L) 40 - 60 mg/dL Final     LDL Cholesterol    Date Value Ref Range Status   08/17/2022 80 0 - 100 mg/dL Final        Blood Pressure Management:       Taking lisinopril 40 mg one daily      Taking metoprolol xl 100 mg daily               Microvascular Complication Monitoring:       Last eye exam Dec. 2019, no retinopathy                Component      Latest Ref Rng & Units 8/17/2022   Microalbumin/Creatinine Ratio          Creatinine, Urine      mg/dL 71.2   Microalbumin, Urine      mg/dL <1.2                 Neuropathy  None           Weight Related:           Has lost 11 lbs since last visit              Bone Health     Vitamin d def     Taking MVI      Thyroid Health        Lab Results   Component Value Date    TSH 0.258 (L) 08/17/2022          Taking Levothyroxine 112 mcgs daily ---change to daily but on Sunday take 1/2 tablet              Other Diabetes Related Aspects         Lab Results   Component Value Date    [id number] 08/17/2022             Labs before the next visit         Follow Up   No follow-ups on  file.  Patient was given instructions and counseling regarding her condition or for health maintenance advice. Please see specific information pulled into the AVS if appropriate.         This document has been electronically signed by WILLIS Gabriel on December 27, 2022 10:31 CST.

## 2023-01-03 ENCOUNTER — APPOINTMENT (OUTPATIENT)
Dept: GENERAL RADIOLOGY | Facility: HOSPITAL | Age: 63
DRG: 287 | End: 2023-01-03
Payer: COMMERCIAL

## 2023-01-03 ENCOUNTER — HOSPITAL ENCOUNTER (INPATIENT)
Facility: HOSPITAL | Age: 63
LOS: 2 days | Discharge: HOME | DRG: 287 | End: 2023-01-06
Attending: EMERGENCY MEDICINE | Admitting: FAMILY MEDICINE
Payer: COMMERCIAL

## 2023-01-03 DIAGNOSIS — E83.42 HYPOMAGNESEMIA: ICD-10-CM

## 2023-01-03 DIAGNOSIS — I21.4 NSTEMI, INITIAL EPISODE OF CARE: ICD-10-CM

## 2023-01-03 DIAGNOSIS — I10 ESSENTIAL (PRIMARY) HYPERTENSION: ICD-10-CM

## 2023-01-03 DIAGNOSIS — R77.8 ELEVATED TROPONIN: ICD-10-CM

## 2023-01-03 DIAGNOSIS — Z45.02 DEFIBRILLATOR DISCHARGE: Primary | ICD-10-CM

## 2023-01-03 DIAGNOSIS — E87.6 HYPOKALEMIA: ICD-10-CM

## 2023-01-03 LAB
ALBUMIN SERPL-MCNC: 4.4 G/DL (ref 3.5–5.2)
ALBUMIN/GLOB SERPL: 1 G/DL
ALP SERPL-CCNC: 84 U/L (ref 39–117)
ALT SERPL W P-5'-P-CCNC: 14 U/L (ref 1–33)
ANION GAP SERPL CALCULATED.3IONS-SCNC: 14 MMOL/L (ref 5–15)
ANION GAP SERPL CALCULATED.3IONS-SCNC: 22 MMOL/L (ref 5–15)
AST SERPL-CCNC: 21 U/L (ref 1–32)
BASOPHILS # BLD AUTO: 0.06 10*3/MM3 (ref 0–0.2)
BASOPHILS NFR BLD AUTO: 0.5 % (ref 0–1.5)
BILIRUB SERPL-MCNC: 0.6 MG/DL (ref 0–1.2)
BUN SERPL-MCNC: 17 MG/DL (ref 8–23)
BUN SERPL-MCNC: 18 MG/DL (ref 8–23)
BUN/CREAT SERPL: 15.3 (ref 7–25)
BUN/CREAT SERPL: 18.1 (ref 7–25)
CALCIUM SPEC-SCNC: 9.8 MG/DL (ref 8.6–10.5)
CALCIUM SPEC-SCNC: 9.9 MG/DL (ref 8.6–10.5)
CHLORIDE SERPL-SCNC: 92 MMOL/L (ref 98–107)
CHLORIDE SERPL-SCNC: 97 MMOL/L (ref 98–107)
CO2 SERPL-SCNC: 22 MMOL/L (ref 22–29)
CO2 SERPL-SCNC: 23 MMOL/L (ref 22–29)
CREAT SERPL-MCNC: 0.94 MG/DL (ref 0.57–1)
CREAT SERPL-MCNC: 1.18 MG/DL (ref 0.57–1)
DEPRECATED RDW RBC AUTO: 41.3 FL (ref 37–54)
EGFRCR SERPLBLD CKD-EPI 2021: 52.7 ML/MIN/1.73
EGFRCR SERPLBLD CKD-EPI 2021: 69.2 ML/MIN/1.73
EOSINOPHIL # BLD AUTO: 0.07 10*3/MM3 (ref 0–0.4)
EOSINOPHIL NFR BLD AUTO: 0.6 % (ref 0.3–6.2)
ERYTHROCYTE [DISTWIDTH] IN BLOOD BY AUTOMATED COUNT: 14.2 % (ref 12.3–15.4)
GLOBULIN UR ELPH-MCNC: 4.3 GM/DL
GLUCOSE SERPL-MCNC: 157 MG/DL (ref 65–99)
GLUCOSE SERPL-MCNC: 216 MG/DL (ref 65–99)
HCT VFR BLD AUTO: 48.6 % (ref 34–46.6)
HGB BLD-MCNC: 15.5 G/DL (ref 12–15.9)
HOLD SPECIMEN: NORMAL
IMM GRANULOCYTES # BLD AUTO: 0.05 10*3/MM3 (ref 0–0.05)
IMM GRANULOCYTES NFR BLD AUTO: 0.5 % (ref 0–0.5)
LYMPHOCYTES # BLD AUTO: 2.28 10*3/MM3 (ref 0.7–3.1)
LYMPHOCYTES NFR BLD AUTO: 20.6 % (ref 19.6–45.3)
MAGNESIUM SERPL-MCNC: 1.4 MG/DL (ref 1.6–2.4)
MAGNESIUM SERPL-MCNC: 1.5 MG/DL (ref 1.6–2.4)
MCH RBC QN AUTO: 25.9 PG (ref 26.6–33)
MCHC RBC AUTO-ENTMCNC: 31.9 G/DL (ref 31.5–35.7)
MCV RBC AUTO: 81.1 FL (ref 79–97)
MONOCYTES # BLD AUTO: 0.85 10*3/MM3 (ref 0.1–0.9)
MONOCYTES NFR BLD AUTO: 7.7 % (ref 5–12)
NEUTROPHILS NFR BLD AUTO: 7.75 10*3/MM3 (ref 1.7–7)
NEUTROPHILS NFR BLD AUTO: 70.1 % (ref 42.7–76)
NRBC BLD AUTO-RTO: 0 /100 WBC (ref 0–0.2)
NT-PROBNP SERPL-MCNC: 845.3 PG/ML (ref 0–900)
PLATELET # BLD AUTO: 376 10*3/MM3 (ref 140–450)
PMV BLD AUTO: 10.6 FL (ref 6–12)
POTASSIUM SERPL-SCNC: 2.9 MMOL/L (ref 3.5–5.2)
POTASSIUM SERPL-SCNC: 4.2 MMOL/L (ref 3.5–5.2)
PROT SERPL-MCNC: 8.7 G/DL (ref 6–8.5)
RBC # BLD AUTO: 5.99 10*6/MM3 (ref 3.77–5.28)
SODIUM SERPL-SCNC: 134 MMOL/L (ref 136–145)
SODIUM SERPL-SCNC: 136 MMOL/L (ref 136–145)
T4 FREE SERPL-MCNC: 1.66 NG/DL (ref 0.93–1.7)
TROPONIN T SERPL-MCNC: 0.08 NG/ML (ref 0–0.03)
TROPONIN T SERPL-MCNC: 1.23 NG/ML (ref 0–0.03)
TSH SERPL DL<=0.05 MIU/L-ACNC: 1.12 UIU/ML (ref 0.27–4.2)
WBC NRBC COR # BLD: 11.06 10*3/MM3 (ref 3.4–10.8)
WHOLE BLOOD HOLD COAG: NORMAL

## 2023-01-03 PROCEDURE — 83880 ASSAY OF NATRIURETIC PEPTIDE: CPT | Performed by: EMERGENCY MEDICINE

## 2023-01-03 PROCEDURE — 93005 ELECTROCARDIOGRAM TRACING: CPT | Performed by: EMERGENCY MEDICINE

## 2023-01-03 PROCEDURE — 93010 ELECTROCARDIOGRAM REPORT: CPT | Performed by: INTERNAL MEDICINE

## 2023-01-03 PROCEDURE — 0 POTASSIUM CHLORIDE 10 MEQ/100ML SOLUTION: Performed by: EMERGENCY MEDICINE

## 2023-01-03 PROCEDURE — 84484 ASSAY OF TROPONIN QUANT: CPT | Performed by: EMERGENCY MEDICINE

## 2023-01-03 PROCEDURE — 25010000002 MAGNESIUM SULFATE 2 GM/50ML SOLUTION: Performed by: EMERGENCY MEDICINE

## 2023-01-03 PROCEDURE — 71046 X-RAY EXAM CHEST 2 VIEWS: CPT

## 2023-01-03 PROCEDURE — 25010000002 MIDAZOLAM PER 1 MG: Performed by: EMERGENCY MEDICINE

## 2023-01-03 PROCEDURE — 84439 ASSAY OF FREE THYROXINE: CPT | Performed by: EMERGENCY MEDICINE

## 2023-01-03 PROCEDURE — 25010000002 AMIODARONE IN DEXTROSE 5% 360-4.14 MG/200ML-% SOLUTION: Performed by: EMERGENCY MEDICINE

## 2023-01-03 PROCEDURE — 25010000002 AMIODARONE IN DEXTROSE 5% 150-4.21 MG/100ML-% SOLUTION: Performed by: EMERGENCY MEDICINE

## 2023-01-03 PROCEDURE — 83735 ASSAY OF MAGNESIUM: CPT | Performed by: EMERGENCY MEDICINE

## 2023-01-03 PROCEDURE — 80048 BASIC METABOLIC PNL TOTAL CA: CPT | Performed by: EMERGENCY MEDICINE

## 2023-01-03 PROCEDURE — 25010000002 FENTANYL CITRATE (PF) 50 MCG/ML SOLUTION: Performed by: EMERGENCY MEDICINE

## 2023-01-03 PROCEDURE — 80050 GENERAL HEALTH PANEL: CPT | Performed by: EMERGENCY MEDICINE

## 2023-01-03 PROCEDURE — 99285 EMERGENCY DEPT VISIT HI MDM: CPT

## 2023-01-03 PROCEDURE — 36415 COLL VENOUS BLD VENIPUNCTURE: CPT

## 2023-01-03 RX ORDER — MIDAZOLAM HYDROCHLORIDE 1 MG/ML
1 INJECTION INTRAMUSCULAR; INTRAVENOUS ONCE
Status: COMPLETED | OUTPATIENT
Start: 2023-01-03 | End: 2023-01-03

## 2023-01-03 RX ORDER — METOPROLOL TARTRATE 5 MG/5ML
5 INJECTION INTRAVENOUS ONCE
Status: COMPLETED | OUTPATIENT
Start: 2023-01-03 | End: 2023-01-03

## 2023-01-03 RX ORDER — POTASSIUM CHLORIDE 750 MG/1
40 CAPSULE, EXTENDED RELEASE ORAL ONCE
Status: COMPLETED | OUTPATIENT
Start: 2023-01-03 | End: 2023-01-03

## 2023-01-03 RX ORDER — MAGNESIUM SULFATE HEPTAHYDRATE 40 MG/ML
2 INJECTION, SOLUTION INTRAVENOUS ONCE
Status: COMPLETED | OUTPATIENT
Start: 2023-01-03 | End: 2023-01-04

## 2023-01-03 RX ORDER — FENTANYL CITRATE 50 UG/ML
50 INJECTION, SOLUTION INTRAMUSCULAR; INTRAVENOUS ONCE
Status: COMPLETED | OUTPATIENT
Start: 2023-01-03 | End: 2023-01-03

## 2023-01-03 RX ORDER — MAGNESIUM SULFATE HEPTAHYDRATE 40 MG/ML
2 INJECTION, SOLUTION INTRAVENOUS ONCE
Status: COMPLETED | OUTPATIENT
Start: 2023-01-03 | End: 2023-01-03

## 2023-01-03 RX ORDER — POTASSIUM CHLORIDE 7.45 MG/ML
10 INJECTION INTRAVENOUS ONCE
Status: COMPLETED | OUTPATIENT
Start: 2023-01-03 | End: 2023-01-03

## 2023-01-03 RX ORDER — METOPROLOL TARTRATE 50 MG/1
50 TABLET, FILM COATED ORAL EVERY 12 HOURS SCHEDULED
Status: DISCONTINUED | OUTPATIENT
Start: 2023-01-03 | End: 2023-01-06 | Stop reason: HOSPADM

## 2023-01-03 RX ADMIN — METOPROLOL TARTRATE 50 MG: 50 TABLET, FILM COATED ORAL at 21:13

## 2023-01-03 RX ADMIN — POTASSIUM CHLORIDE 40 MEQ: 750 CAPSULE, EXTENDED RELEASE ORAL at 15:46

## 2023-01-03 RX ADMIN — AMIODARONE HYDROCHLORIDE 0.5 MG/MIN: 1.8 INJECTION, SOLUTION INTRAVENOUS at 22:53

## 2023-01-03 RX ADMIN — POTASSIUM CHLORIDE 10 MEQ: 7.46 INJECTION, SOLUTION INTRAVENOUS at 17:34

## 2023-01-03 RX ADMIN — FENTANYL CITRATE 50 MCG: 50 INJECTION INTRAMUSCULAR; INTRAVENOUS at 15:38

## 2023-01-03 RX ADMIN — AMIODARONE HYDROCHLORIDE 1 MG/MIN: 1.8 INJECTION, SOLUTION INTRAVENOUS at 16:16

## 2023-01-03 RX ADMIN — METOPROLOL TARTRATE 5 MG: 5 INJECTION INTRAVENOUS at 15:38

## 2023-01-03 RX ADMIN — MAGNESIUM SULFATE HEPTAHYDRATE 2 G: 40 INJECTION, SOLUTION INTRAVENOUS at 21:07

## 2023-01-03 RX ADMIN — AMIODARONE HYDROCHLORIDE 150 MG: 1.5 INJECTION, SOLUTION INTRAVENOUS at 15:39

## 2023-01-03 RX ADMIN — MAGNESIUM SULFATE HEPTAHYDRATE 2 G: 40 INJECTION, SOLUTION INTRAVENOUS at 17:38

## 2023-01-03 RX ADMIN — MIDAZOLAM HYDROCHLORIDE 1 MG: 1 INJECTION, SOLUTION INTRAMUSCULAR; INTRAVENOUS at 15:38

## 2023-01-03 NOTE — Clinical Note
Lesion located in the middle left anterior descending coronary artery. Stenosis (%): 99. The pre interventional distal flow is decreased (CARINA 1).

## 2023-01-03 NOTE — Clinical Note
Middle left anterior descending lesion. Stenosis (%): 99. The post interventional distal flow is decreased (CARINA 1).

## 2023-01-03 NOTE — Clinical Note
Hemostasis started on the right radial artery. R-Band was used in achieving hemostasis. Radial compression device applied to vessel. Hemostasis achieved successfully. Closure device additional comment: 11cc air

## 2023-01-03 NOTE — Clinical Note
The DP pulses are detected w/ doppler bilaterally. The PT pulses are detected w/ doppler bilaterally. The radial pulses are +2 bilaterally.

## 2023-01-03 NOTE — Clinical Note
Level of Care: Telemetry [5]   Diagnosis: Defibrillator discharge [[id number]]   Admitting Physician: RASTA CRESPO [[id number]]   Attending Physician: RASTA CRESPO [[id number]]   Certification: I Certify That Inpatient Hospital Services Are Medically Necessary For Greater Than 2 Midnights   Helical Rim Text: The closure involved the helical rim.

## 2023-01-03 NOTE — Clinical Note
Prepped: groin and Right Wrist. Prepped with: ChloraPrep. The site was clipped. The patient was draped in a sterile fashion.

## 2023-01-03 NOTE — Clinical Note
A 6 fr sheath was  inserted with ultrasound guidance into the right radial artery. Sheath insertion not delayed.

## 2023-01-03 NOTE — ED PROVIDER NOTES
Subjective     Chest Pain  Pain location:  L chest  Pain quality: aching    Pain quality comment:  Shock  Pain radiates to:  Does not radiate  Pain severity:  Moderate  Onset quality:  Sudden  Duration: 1 to 2 seconds.  Timing:  Intermittent  Progression:  Waxing and waning  Chronicity:  New  Context comment:  As of yesterday the patient felt fine.  After waking up today she has had at least 8-10 defibrillator discharges.  Denies medication changes.  Denies recent chest pain or shortness of breath.  Relieved by:  Nothing  Worsened by:  Nothing  Ineffective treatments:  Rest  Associated symptoms: AICD problem, anxiety and palpitations    Associated symptoms: no abdominal pain, no altered mental status, no cough, no diaphoresis, no dizziness, no headache, no shortness of breath and no vomiting        Review of Systems   Constitutional: Negative for diaphoresis.   Respiratory: Negative for cough and shortness of breath.    Cardiovascular: Positive for chest pain and palpitations.   Gastrointestinal: Negative for abdominal pain and vomiting.   Neurological: Negative for dizziness and headaches.       Past Medical History:   Diagnosis Date   • Abnormal thyroid function test    • Acquired hypothyroidism    • Allergic    • Allergic rhinitis    • Anemia    • Breast cyst    • Coronary atherosclerosis     unspecified type of vessel, native or graft      • Dilated cardiomyopathy (HCC)    • Dyslipidemia    • Esophageal reflux    • Essential hypertension    • Family history of malignant neoplasm of breast    • Fibrocystic disease of breast     Low Mireille score   • GERD (gastroesophageal reflux disease)    • Herpes zoster    • History of chicken pox    • Hyperlipidemia    • Measles    • Mumps    • Myocardial infarction (HCC) 2001   • Nasal septal deformity    • Obesity    • Type 2 diabetes mellitus (HCC)     Uncontrolled   • V tach    • Vitamin D deficiency        Allergies   Allergen Reactions   • Codeine GI Intolerance       Past  Surgical History:   Procedure Laterality Date   • BREAST BIOPSY  2010    left breast benign   • BREAST CYST ASPIRATION     • CARDIAC CATHETERIZATION  2002    Left cardiac cath, selective coronary angiography, PTCA to the left anterior descending. Stent placement to the left anterior descending   • CARDIAC DEFIBRILLATOR PLACEMENT Left     Dr. Brunner   •  SECTION      Intrauterine pregnancy, term gestation, cephalopelvic disproportion   • COLONOSCOPY N/A 10/12/2018    Procedure: COLONOSCOPY;  Surgeon: Delroy Miller MD;  Location: [facility] ENDOSCOPY;  Service: General   • ENDOSCOPY AND COLONOSCOPY  04/15/2008    Normal   • ESOPHAGOSCOPY / EGD  2007    With tube-Esophagus appeared normal. Gastritis of the stomach. A biopsy of the stomach was obtained from the stomach. The duodenum appeared normal   • EXTERNAL EAR SURGERY  08/15/1991    Repair biifd ear   • OOPHORECTOMY      Left salpingo-oophorectomy, wedge resection of the right ovary, lysis of adhesions   • TRANSESOPHAGEAL ECHOCARDIOGRAM (LORELEI)  2012    Without color flow-Moderate to severe LV dysfumctional w/ an EF of 30-35%. Wall motion abnormalities as described above. LV enlargement. Mild aortic insufficiency, mild TR regurg, mild M regurg. mild pulmonary insufficiency       Family History   Problem Relation Age of Onset   • Breast cancer Mother         50's   • Other Mother         CVA   • Coronary artery disease Other    • Diabetes Other    • Heart disease Other    • Hypertension Other    • Autism Other         grandson   • Alcohol abuse Brother    • Cardiomyopathy Brother    • Coronary artery disease Brother    • Heart disease Father    • Hypertension Sister    • Diabetes Sister    • Heart disease Son    • Heart attack Son    • Heart disease Maternal Grandmother    • Hypertension Maternal Grandmother    • Hypertension Sister    • Hypertension Sister    • Hypertension Brother    • Hypertension Brother        Social  History     Socioeconomic History   • Marital status: Single   Tobacco Use   • Smoking status: Never   • Smokeless tobacco: Never   Vaping Use   • Vaping Use: Never used   Substance and Sexual Activity   • Alcohol use: No   • Drug use: No   • Sexual activity: Defer     Birth control/protection: Post-menopausal           Objective   Physical Exam  Vitals and nursing note reviewed.   Constitutional:       General: She is not in acute distress.     Appearance: She is obese.      Comments: Twice during the patient's exam and interview she temporarily shared with sudden pain.   HENT:      Nose: Nose normal.   Eyes:      General: No scleral icterus.  Cardiovascular:      Rate and Rhythm: Regular rhythm. Tachycardia present.      Comments: AICD located left upper chest wall.  Pulmonary:      Effort: Pulmonary effort is normal.      Breath sounds: Normal breath sounds.   Abdominal:      General: Abdomen is flat.      Tenderness: There is no abdominal tenderness.   Musculoskeletal:         General: No signs of injury.   Skin:     General: Skin is warm and dry.   Neurological:      Mental Status: She is alert and oriented to person, place, and time.   Psychiatric:      Comments: Anxiety         Procedures           ED Course  ED Course as of 01/04/23 1013   e Jan 03, 2023   1523 Significant hypokalemia and hypomagnesemia possibly contributing to ventricular dysrhythmias. [JV]   1531 Troponin(!!)  Troponin elevated.  Mildly.  Will repeat.  No old values to compare to. [JV]      ED Course User Index  [JV] Zander Padron, DO                                           Medical Decision Making  After initial evaluation of the patient Case discussed with cardiology on-call Dr. Pham.  We discussed the patient's presentation history physical exam and vital signs.  We discussed the patient's abnormal EKG.  Cardiology recommends a dose of Lopressor 5 mg IV, 50 mg p.o. twice daily of Lopressor, as well as amiodarone loading dose and  infusion orders.  He believes the patient should be transferred to her cardiologist facility which in this case is [facility].  Further consultation as needed.  He is in agreement with magnet being placed over the patient's device in the meantime.    On reevaluation cardiac monitor indicates sinus tachycardia with a rate of 120.  No further shocks delivered.  Replacing electrolytes.  We will plan for transfer when labs are fully resulted.  3:24 PM.    Case discussed with [facility] who agrees to accept the patient in a provisional status as they do not currently have a bed.  3:45 PM.  Discussed with patient and family who are agreeable and requesting transfer to this facility which is in keeping with cardiology's recommendations.  Dr. Zhang phone call will take place at the time of bed availability.  I do believe that the patient is stable to wait for bed availability and that there is value specifically in continuity of care for this patient's case.  Therefore we will not attempt transfer to other hospitals.  In the meantime we will continue to monitor potassium and magnesium levels and repeat troponin.  Elevated troponin is suspected to be secondary to multiple defibrillator discharges however we will trend.    1805 p.m.: Accepting physician in bed still pending at [facility].  The patient is stabilizing.  No further discharges or cardiac dysrhythmias in the ED.  Potassium is normalized.  We will continue to check this.  The patient's magnesium is improving but not yet normalized and so we will add additional supplementation.  The patient is tolerating amiodarone well.  The patient's troponin has up trended significantly.  We will repeat EKG, however, I suspect that her symptoms could be secondary to multiple ICD discharges given that in between discharges she is not feeling any significant chest pain weakness or shortness of breath.    Case signed out to Dr. Robertson pending accepting transfer physician and  bed assignment.  I think the patient continues to be stable to await transfer in our emergency department I do not see utility in transferring to another facility.    0830: The patient has been in the department approximately 18 hours.  Currently asymptomatic and vital signs normal.  Case discussed with nurse practitioner for [facility] cardiology group.  We discussed the patient's presentation yesterday and delay in her ability to be transferred.  In the meanwhile the patient's electrolytes have been fully repleted and she has gone 6 hours without a magnet on her chest without any further shocks or arrhythmias.  At this point the patient seems stable and very likely could be seen in the clinic more quickly then she could be transferred unfortunately given the current state of events.  Nurse practitioner will run the case by her attending and will call back with recommendations for disposition.    9:30 AM: Case discussed with nurse practitioner from patient's electrophysiology group.  They do not feel that the patient is appropriate to discharge without a cardiology evaluation.  They do mention that our hospital has access to electrophysiologist onsite and that the patient may not need to be transferred in the first place if this is available locally.  Otherwise they would recommend continuing to wait for transfer.    9:45 AM: Paged to cardiology on-call Dr. Carrero.  He is currently in the Cath Lab and will have to call back.    10:10 AM: Case discussed with hospitalist service.  They will also attempt to contact Dr. Carrero to ensure that the patient can be seen today by cardiology so that they will accept the patient for admission.  If for some reason cardiology is not able to see the patient then they would recommend leaving the patient in a transfer disposition.    Defibrillator discharge: acute illness or injury with systemic symptoms  Elevated troponin: acute illness or injury  Hypokalemia: acute illness or  injury  Hypomagnesemia: acute illness or injury  Amount and/or Complexity of Data Reviewed  Independent Historian: friend     Details: Provided additional details regarding the patient's recent medical problems.  Labs: ordered. Decision-making details documented in ED Course.  Radiology: ordered.  ECG/medicine tests: ordered.     Details: EKG interpretation: Interpreted by myself.  Sinus rhythm with occasional ventricular tachycardia 8-10 beats.  Nonspecific ST wave changes.    Repeat EKG interpretation at 1809.  Interpreted by myself.  Normal sinus rhythm.  Rate 87.  Normal P wave and ND interval.  Left ventricular hypertrophy.  Left axis deviation.  Normal QTc interval.  ST segments show a single 1 mm ST elevation in lead V2 and nonspecific ST wave changes in lead I and aVL.  These changes are chronic compared to EKG dating to 2018.      Risk  Prescription drug management.  Decision regarding hospitalization.      Critical care time:  90 minutes of critical care provided. This time excludes other billable procedures. Time does include preparation of documents, medical consultations, review of old records, and direct bedside care. Patient is at high risk for life-threatening deterioration due to cardiovascular system failure secondary to ventricular tachycardia requiring IV beta-blocker and amiodarone, as well as transfer conversations, and multiple electrolyte replacement with repeat lab evaluation and interpretation.     Final diagnoses:   Defibrillator discharge   Elevated troponin   Hypokalemia   Hypomagnesemia       ED Disposition  ED Disposition     ED Disposition   Decision to Admit    Condition   --    Comment   --             No follow-up provider specified.       Medication List      No changes were made to your prescriptions during this visit.          Zander Padron,   01/03/23 1830       Zander Padron, DO  01/04/23 0839       Zander Padron,   01/04/23 1014       Zander Padron,   01/04/23  1016

## 2023-01-04 PROBLEM — Z45.02 DEFIBRILLATOR DISCHARGE: Status: ACTIVE | Noted: 2023-01-04

## 2023-01-04 LAB
ANION GAP SERPL CALCULATED.3IONS-SCNC: 12 MMOL/L (ref 5–15)
BUN SERPL-MCNC: 16 MG/DL (ref 8–23)
BUN/CREAT SERPL: 15.5 (ref 7–25)
CALCIUM SPEC-SCNC: 9.8 MG/DL (ref 8.6–10.5)
CHLORIDE SERPL-SCNC: 95 MMOL/L (ref 98–107)
CO2 SERPL-SCNC: 23 MMOL/L (ref 22–29)
CREAT SERPL-MCNC: 1.03 MG/DL (ref 0.57–1)
EGFRCR SERPLBLD CKD-EPI 2021: 62 ML/MIN/1.73
GLUCOSE BLDC GLUCOMTR-MCNC: 141 MG/DL (ref 70–130)
GLUCOSE BLDC GLUCOMTR-MCNC: 191 MG/DL (ref 70–130)
GLUCOSE SERPL-MCNC: 152 MG/DL (ref 65–99)
MAGNESIUM SERPL-MCNC: 2.1 MG/DL (ref 1.6–2.4)
MAGNESIUM SERPL-MCNC: 2.2 MG/DL (ref 1.6–2.4)
POTASSIUM SERPL-SCNC: 4.4 MMOL/L (ref 3.5–5.2)
POTASSIUM SERPL-SCNC: 4.5 MMOL/L (ref 3.5–5.2)
SODIUM SERPL-SCNC: 130 MMOL/L (ref 136–145)
TROPONIN T SERPL-MCNC: 0.73 NG/ML (ref 0–0.03)
WHOLE BLOOD HOLD SPECIMEN: NORMAL
WHOLE BLOOD HOLD SPECIMEN: NORMAL

## 2023-01-04 PROCEDURE — 25010000002 ENOXAPARIN PER 10 MG: Performed by: FAMILY MEDICINE

## 2023-01-04 PROCEDURE — 84484 ASSAY OF TROPONIN QUANT: CPT | Performed by: EMERGENCY MEDICINE

## 2023-01-04 PROCEDURE — 84132 ASSAY OF SERUM POTASSIUM: CPT | Performed by: INTERNAL MEDICINE

## 2023-01-04 PROCEDURE — G0378 HOSPITAL OBSERVATION PER HR: HCPCS

## 2023-01-04 PROCEDURE — 83735 ASSAY OF MAGNESIUM: CPT | Performed by: EMERGENCY MEDICINE

## 2023-01-04 PROCEDURE — 82962 GLUCOSE BLOOD TEST: CPT

## 2023-01-04 PROCEDURE — 83735 ASSAY OF MAGNESIUM: CPT | Performed by: INTERNAL MEDICINE

## 2023-01-04 PROCEDURE — 36415 COLL VENOUS BLD VENIPUNCTURE: CPT | Performed by: EMERGENCY MEDICINE

## 2023-01-04 PROCEDURE — 25010000002 AMIODARONE IN DEXTROSE 5% 360-4.14 MG/200ML-% SOLUTION: Performed by: EMERGENCY MEDICINE

## 2023-01-04 PROCEDURE — 99204 OFFICE O/P NEW MOD 45 MIN: CPT | Performed by: INTERNAL MEDICINE

## 2023-01-04 PROCEDURE — 80048 BASIC METABOLIC PNL TOTAL CA: CPT | Performed by: EMERGENCY MEDICINE

## 2023-01-04 RX ORDER — MEXILETINE HYDROCHLORIDE 150 MG/1
150 CAPSULE ORAL EVERY 8 HOURS
Status: DISCONTINUED | OUTPATIENT
Start: 2023-01-04 | End: 2023-01-06 | Stop reason: HOSPADM

## 2023-01-04 RX ORDER — ONDANSETRON 4 MG/1
4 TABLET, FILM COATED ORAL EVERY 6 HOURS PRN
Status: DISCONTINUED | OUTPATIENT
Start: 2023-01-04 | End: 2023-01-06 | Stop reason: HOSPADM

## 2023-01-04 RX ORDER — AMIODARONE HYDROCHLORIDE 200 MG/1
200 TABLET ORAL
Status: DISCONTINUED | OUTPATIENT
Start: 2023-01-04 | End: 2023-01-06 | Stop reason: HOSPADM

## 2023-01-04 RX ORDER — ASPIRIN 325 MG
325 TABLET ORAL DAILY
Status: DISCONTINUED | OUTPATIENT
Start: 2023-01-04 | End: 2023-01-06 | Stop reason: HOSPADM

## 2023-01-04 RX ORDER — DEXTROSE MONOHYDRATE 25 G/50ML
25 INJECTION, SOLUTION INTRAVENOUS
Status: DISCONTINUED | OUTPATIENT
Start: 2023-01-04 | End: 2023-01-06 | Stop reason: HOSPADM

## 2023-01-04 RX ORDER — LANOLIN ALCOHOL/MO/W.PET/CERES
5 CREAM (GRAM) TOPICAL NIGHTLY PRN
Status: DISCONTINUED | OUTPATIENT
Start: 2023-01-04 | End: 2023-01-06 | Stop reason: HOSPADM

## 2023-01-04 RX ORDER — POTASSIUM CHLORIDE 1.5 G/1.77G
40 POWDER, FOR SOLUTION ORAL AS NEEDED
Status: DISCONTINUED | OUTPATIENT
Start: 2023-01-04 | End: 2023-01-06 | Stop reason: HOSPADM

## 2023-01-04 RX ORDER — NICOTINE POLACRILEX 4 MG
15 LOZENGE BUCCAL
Status: DISCONTINUED | OUTPATIENT
Start: 2023-01-04 | End: 2023-01-06 | Stop reason: HOSPADM

## 2023-01-04 RX ORDER — MAGNESIUM SULFATE HEPTAHYDRATE 40 MG/ML
4 INJECTION, SOLUTION INTRAVENOUS AS NEEDED
Status: DISCONTINUED | OUTPATIENT
Start: 2023-01-04 | End: 2023-01-06 | Stop reason: HOSPADM

## 2023-01-04 RX ORDER — ACETAMINOPHEN AND CODEINE PHOSPHATE 300; 30 MG/1; MG/1
1 TABLET ORAL EVERY 6 HOURS PRN
Status: DISCONTINUED | OUTPATIENT
Start: 2023-01-04 | End: 2023-01-06 | Stop reason: HOSPADM

## 2023-01-04 RX ORDER — POTASSIUM CHLORIDE 750 MG/1
40 CAPSULE, EXTENDED RELEASE ORAL AS NEEDED
Status: DISCONTINUED | OUTPATIENT
Start: 2023-01-04 | End: 2023-01-06 | Stop reason: HOSPADM

## 2023-01-04 RX ORDER — AMLODIPINE BESYLATE 5 MG/1
5 TABLET ORAL
Status: DISCONTINUED | OUTPATIENT
Start: 2023-01-04 | End: 2023-01-06 | Stop reason: HOSPADM

## 2023-01-04 RX ORDER — SODIUM CHLORIDE 0.9 % (FLUSH) 0.9 %
10 SYRINGE (ML) INJECTION EVERY 12 HOURS SCHEDULED
Status: DISCONTINUED | OUTPATIENT
Start: 2023-01-04 | End: 2023-01-06 | Stop reason: HOSPADM

## 2023-01-04 RX ORDER — SODIUM CHLORIDE 9 MG/ML
40 INJECTION, SOLUTION INTRAVENOUS AS NEEDED
Status: DISCONTINUED | OUTPATIENT
Start: 2023-01-04 | End: 2023-01-06 | Stop reason: HOSPADM

## 2023-01-04 RX ORDER — MAGNESIUM SULFATE HEPTAHYDRATE 40 MG/ML
2 INJECTION, SOLUTION INTRAVENOUS AS NEEDED
Status: DISCONTINUED | OUTPATIENT
Start: 2023-01-04 | End: 2023-01-06 | Stop reason: HOSPADM

## 2023-01-04 RX ORDER — CETIRIZINE HYDROCHLORIDE 10 MG/1
10 TABLET ORAL DAILY
Status: DISCONTINUED | OUTPATIENT
Start: 2023-01-04 | End: 2023-01-06 | Stop reason: HOSPADM

## 2023-01-04 RX ORDER — PANTOPRAZOLE SODIUM 40 MG/1
40 TABLET, DELAYED RELEASE ORAL EVERY MORNING
Status: DISCONTINUED | OUTPATIENT
Start: 2023-01-05 | End: 2023-01-06 | Stop reason: HOSPADM

## 2023-01-04 RX ORDER — LISINOPRIL 40 MG/1
40 TABLET ORAL DAILY
Status: DISCONTINUED | OUTPATIENT
Start: 2023-01-04 | End: 2023-01-06 | Stop reason: HOSPADM

## 2023-01-04 RX ORDER — ALUMINA, MAGNESIA, AND SIMETHICONE 2400; 2400; 240 MG/30ML; MG/30ML; MG/30ML
15 SUSPENSION ORAL EVERY 6 HOURS PRN
Status: DISCONTINUED | OUTPATIENT
Start: 2023-01-04 | End: 2023-01-06 | Stop reason: HOSPADM

## 2023-01-04 RX ORDER — ACETAMINOPHEN 650 MG/1
650 SUPPOSITORY RECTAL EVERY 4 HOURS PRN
Status: DISCONTINUED | OUTPATIENT
Start: 2023-01-04 | End: 2023-01-06 | Stop reason: HOSPADM

## 2023-01-04 RX ORDER — ATORVASTATIN CALCIUM 20 MG/1
20 TABLET, FILM COATED ORAL NIGHTLY
Status: DISCONTINUED | OUTPATIENT
Start: 2023-01-04 | End: 2023-01-06 | Stop reason: HOSPADM

## 2023-01-04 RX ORDER — ACETAMINOPHEN 325 MG/1
650 TABLET ORAL EVERY 4 HOURS PRN
Status: DISCONTINUED | OUTPATIENT
Start: 2023-01-04 | End: 2023-01-06 | Stop reason: HOSPADM

## 2023-01-04 RX ORDER — ACETAMINOPHEN 160 MG/5ML
650 SOLUTION ORAL EVERY 4 HOURS PRN
Status: DISCONTINUED | OUTPATIENT
Start: 2023-01-04 | End: 2023-01-04 | Stop reason: SDUPTHER

## 2023-01-04 RX ORDER — ONDANSETRON 2 MG/ML
4 INJECTION INTRAMUSCULAR; INTRAVENOUS EVERY 6 HOURS PRN
Status: DISCONTINUED | OUTPATIENT
Start: 2023-01-04 | End: 2023-01-06 | Stop reason: HOSPADM

## 2023-01-04 RX ORDER — INSULIN ASPART 100 [IU]/ML
0-7 INJECTION, SOLUTION INTRAVENOUS; SUBCUTANEOUS
Status: DISCONTINUED | OUTPATIENT
Start: 2023-01-04 | End: 2023-01-06 | Stop reason: HOSPADM

## 2023-01-04 RX ORDER — LEVOTHYROXINE SODIUM 112 UG/1
112 TABLET ORAL EVERY MORNING
Status: DISCONTINUED | OUTPATIENT
Start: 2023-01-04 | End: 2023-01-06 | Stop reason: HOSPADM

## 2023-01-04 RX ORDER — ENOXAPARIN SODIUM 100 MG/ML
40 INJECTION SUBCUTANEOUS DAILY
Status: DISCONTINUED | OUTPATIENT
Start: 2023-01-04 | End: 2023-01-05

## 2023-01-04 RX ORDER — SODIUM CHLORIDE 0.9 % (FLUSH) 0.9 %
10 SYRINGE (ML) INJECTION AS NEEDED
Status: DISCONTINUED | OUTPATIENT
Start: 2023-01-04 | End: 2023-01-06 | Stop reason: HOSPADM

## 2023-01-04 RX ADMIN — METOPROLOL TARTRATE 50 MG: 50 TABLET, FILM COATED ORAL at 20:24

## 2023-01-04 RX ADMIN — ATORVASTATIN CALCIUM 20 MG: 20 TABLET, FILM COATED ORAL at 20:24

## 2023-01-04 RX ADMIN — AMLODIPINE BESYLATE 5 MG: 5 TABLET ORAL at 20:24

## 2023-01-04 RX ADMIN — LISINOPRIL 40 MG: 40 TABLET ORAL at 20:24

## 2023-01-04 RX ADMIN — LEVOTHYROXINE SODIUM 112 MCG: 0.11 TABLET ORAL at 16:01

## 2023-01-04 RX ADMIN — Medication 10 ML: at 20:24

## 2023-01-04 RX ADMIN — CHOLECALCIFEROL TAB 125 MCG (5000 UNIT) 5000 UNITS: 125 TAB at 16:01

## 2023-01-04 RX ADMIN — AMIODARONE HYDROCHLORIDE 200 MG: 200 TABLET ORAL at 16:02

## 2023-01-04 RX ADMIN — ENOXAPARIN SODIUM 40 MG: 40 INJECTION SUBCUTANEOUS at 16:02

## 2023-01-04 RX ADMIN — AMIODARONE HYDROCHLORIDE 0.5 MG/MIN: 1.8 INJECTION, SOLUTION INTRAVENOUS at 11:51

## 2023-01-04 RX ADMIN — MEXILETINE HYDROCHLORIDE 150 MG: 150 CAPSULE ORAL at 23:22

## 2023-01-04 NOTE — ED NOTES
Spoke with Danger, pt therapy rate set at 130. 43 shocks since Dec. 30th-  ATP 28 times since Dec. 30. Last shock documented at this time was 1443 1/3/2023, started at 1338 today. Single lead ICD, shock box no pacing

## 2023-01-04 NOTE — CONSULTS
Cardiology at [facility]  Cardiovascular Consultation Note      Danae Ngo  [unfilled]  [id number]  1960    DATE OF ADMISSION: 1/3/2023  DATE OF CONSULTATION:  1/4/2023    José Ruano APRN  Treatment Team:   Attending Provider: Zander Padron DO  Consulting Physician: Preet Pham MD  Consulting Physician: Jerel Freedman MD  Admitting Provider: Jerel Freedman MD    Chief Complaint   Patient presents with   • defibrillator firing       Chief complaint/ Reason for Consultation; multiple ICD shock (electrical storm)      History of Present Illness  62 years old patient followed at [place] with Dr. Brunner and had documented history of ischemic cardiomyopathy status post PCI and stent several years ago for stent was placed at [place] and second per patient was at [facility] with ischemic cardiomyopathy severely reduced left ventricle systolic function documented ventricular tachycardia s/p single-chamber ICD and presented to our facility for evaluations of multiple ICD shock.  ICD was interrogated yesterday reported to have more than 30 shock all was appropriate.  The patient single-chamber ICD.  The ICD was programmed VVI 40 bpm monitor 120 ATP at 130x3 and subsequent defibrillation and then 200 ATP while charging.  The patient received 28 ATP and 43 shocks yesterday.  Patient noted to have potassium and magnesium on the lower side no chest pain reported patient have mildly abnormal troponin may be multifactorial etiology most likely possibility secondary multiple ICD shock.  She is resting comfortably in bed.  She has a potassium review magnesium replaced.  She received Lopressor 5 mg IV and started on Lopressor 50 mg twice a day with a significant provement in arrhythmia no further ICD shock was reported.  Meanwhile she was also started on amiodarone IV.  We will continue until ran out and start the patient on amiodarone 200 mg.  Patient awaiting a bed to be  transferred today to facility however the bed is not available and decided admit and adjust the medications.  Past Medical History:   Diagnosis Date   • Abnormal thyroid function test    • Acquired hypothyroidism    • Allergic    • Allergic rhinitis    • Anemia    • Breast cyst    • Coronary atherosclerosis     unspecified type of vessel, native or graft      • Dilated cardiomyopathy (HCC)    • Dyslipidemia    • Esophageal reflux    • Essential hypertension    • Family history of malignant neoplasm of breast    • Fibrocystic disease of breast     Low Mireille score   • GERD (gastroesophageal reflux disease)    • Herpes zoster    • History of chicken pox    • Hyperlipidemia    • Measles    • Mumps    • Myocardial infarction (HCC)    • Nasal septal deformity    • Obesity    • Type 2 diabetes mellitus (HCC)     Uncontrolled   • V tach    • Vitamin D deficiency        Past Surgical History:   Procedure Laterality Date   • BREAST BIOPSY  2010    left breast benign   • BREAST CYST ASPIRATION     • CARDIAC CATHETERIZATION  2002    Left cardiac cath, selective coronary angiography, PTCA to the left anterior descending. Stent placement to the left anterior descending   • CARDIAC DEFIBRILLATOR PLACEMENT Left     Dr. Brunner   •  SECTION      Intrauterine pregnancy, term gestation, cephalopelvic disproportion   • COLONOSCOPY N/A 10/12/2018    Procedure: COLONOSCOPY;  Surgeon: Delroy Miller MD;  Location: [facility] ENDOSCOPY;  Service: General   • ENDOSCOPY AND COLONOSCOPY  04/15/2008    Normal   • ESOPHAGOSCOPY / EGD  2007    With tube-Esophagus appeared normal. Gastritis of the stomach. A biopsy of the stomach was obtained from the stomach. The duodenum appeared normal   • EXTERNAL EAR SURGERY  08/15/1991    Repair biifd ear   • OOPHORECTOMY      Left salpingo-oophorectomy, wedge resection of the right ovary, lysis of adhesions   • TRANSESOPHAGEAL ECHOCARDIOGRAM (LORELEI)  2012     Without color flow-Moderate to severe LV dysfumctional w/ an EF of 30-35%. Wall motion abnormalities as described above. LV enlargement. Mild aortic insufficiency, mild TR regurg, mild M regurg. mild pulmonary insufficiency       Allergies   Allergen Reactions   • Codeine GI Intolerance       No current facility-administered medications on file prior to encounter.     Current Outpatient Medications on File Prior to Encounter   Medication Sig Dispense Refill   • amLODIPine (NORVASC) 5 MG tablet Take 1 tablet by mouth every night at bedtime. 90 tablet 3   • aspirin (Aspirin Adult) 325 MG tablet Take 1 tablet by mouth Daily. 60 tablet 2   • atorvastatin (LIPITOR) 20 MG tablet TAKE ONE TABLET BY MOUTH ONCE NIGHTLY 90 tablet 3   • cetirizine (zyrTEC) 10 MG tablet TAKE ONE TABLET BY MOUTH DAILY 90 tablet 3   • Continuous Blood Gluc Sensor (Dexcom G6 Sensor) USE EVERY 10 DAYS AS DIRECTED FOR CONTINUOUS GLUCOSE MONITORING 3 each 3   • Continuous Blood Gluc Transmit (Dexcom G6 Transmitter) misc      • Continuous Blood Gluc Transmit (Dexcom G6 Transmitter) misc USE AS DIRECTED AND CHANGE EVERY 3 MONTHS. 1 each 3   • Empagliflozin-metFORMIN HCl ER (Synjardy XR) 12.5-1000 MG tablet sustained-release 24 hour Take 2 tablets by mouth Daily. 60 tablet 11   • glucose blood test strip 1 each by Other route 3 (Three) Times a Day. Use as instructed 90 each 12   • glucose monitor monitoring kit 1 each 3 (Three) Times a Day. 1 each 0   • hydroCHLOROthiazide (HYDRODIURIL) 50 MG tablet TAKE ONE TABLET BY MOUTH DAILY 90 tablet 2   • Lancet Device misc 1 each 2 (Two) Times a Day. 1 each 12   • levothyroxine (SYNTHROID, LEVOTHROID) 112 MCG tablet Take 1 tablet by mouth Daily. 90 tablet 3   • lisinopril (PRINIVIL,ZESTRIL) 40 MG tablet Take 1 tablet by mouth Daily. 90 tablet 3   • metoprolol succinate XL (TOPROL-XL) 100 MG 24 hr tablet Take 3 tablets by mouth Daily. (Patient taking differently: Take 300 mg by mouth Daily. Half tablet in AM,  half tablet in PM) 270 tablet 3   • mexiletine (MEXITIL) 150 MG capsule Take 150 mg by mouth Every 8 (Eight) Hours.     • omeprazole (priLOSEC) 40 MG capsule TAKE ONE CAPSULE BY MOUTH DAILY 90 capsule 3   • potassium chloride (K-DUR,KLOR-CON) 10 MEQ CR tablet TAKE TWO TABLETS BY MOUTH DAILY 120 tablet 4   • Semaglutide, 2 MG/DOSE, (Ozempic, 2 MG/DOSE,) 8 MG/3ML solution pen-injector Inject 2 mg under the skin into the appropriate area as directed 1 (One) Time Per Week. 12 pen 3   • vitamin D3 125 MCG (5000 UT) capsule capsule TAKE ONE CAPSULE BY MOUTH DAILY 90 capsule 3       Social History     Socioeconomic History   • Marital status: Single   Tobacco Use   • Smoking status: Never   • Smokeless tobacco: Never   Vaping Use   • Vaping Use: Never used   Substance and Sexual Activity   • Alcohol use: No   • Drug use: No   • Sexual activity: Defer     Birth control/protection: Post-menopausal           Review of Systems:     Constitution:  Denies any fatigue, fever or chills.    HENT:  Denies any headache, hearing impairment.    Eyes:  Denies any blurring of vision, or photophobia.     Cardiovascular:  As per history of present illness.     Respiratory: Baseline shortness of breath     Endocrine: No polydipsia reported       Musculoskeletal:  No history of arthritis with musculoskeletal problems.    Gastrointestinal:  No nausea, vomiting, or melena.    Genitourinary:  No dysuria or hematuria.    Neurological:  No history of seizure disorder, stroke, or memory problems.    Psychiatric/Behavioral:  No history of depression, bipolar disorder or schizophrenia.     Hematological:  No history of easy bruising.         Objective:     Vitals:    01/04/23 0600 01/04/23 0715 01/04/23 0815 01/04/23 1145   BP: 125/75 129/87 138/87 127/76   BP Location:       Patient Position:       Pulse: 76 84 96 86   Resp:       Temp:       TempSrc:       SpO2: 96% 100% 97% 98%   Weight:       Height:         Body mass index is 31.07  "kg/m².  Flowsheet Rows    Flowsheet Row First Filed Value   Admission Height 162.6 cm (64\") Documented at 01/03/2023 1434   Admission Weight 82.1 kg (181 lb) Documented at 01/03/2023 1434          Intake/Output Summary (Last 24 hours) at 1/4/2023 1308  Last data filed at 1/3/2023 2253  Gross per 24 hour   Intake 450 ml   Output --   Net 450 ml         Physical Exam   Constitutional: Cooperative, alert and oriented, well developed, well nourished, in no acute distress.     HENT:   Head: Normocephalic, conjunctivae is a pink, thyroid is nonpalpable no carotid bruit and trachea central.     Cardiovascular: Regular rhythm, S1 and S2 normal, no S3 or S4. Apical impulse not displaced. No murmurs    Pulmonary/Chest: Chest: No chest wall tenderness no rales and wheezing    Abdominal: Abdomen soft, bowel sounds normoactive, no masses.    Musculoskeletal: No deformities, clubbing, cyanosis, erythema.   Neurological: No gross motor or sensory deficits noted    Skin: Warm and dry to the touch, no apparent skin lesions .     Psychiatric: Normal mood and affect. Behavior is normal.    Radiology Review    XR Chest 2 View   Final Result   Negative chest      Electronically signed by:  Justin Ac MD  1/3/2023 3:13 PM CST   Workstation: [id number]          Lab Results:  Lab Results (last 24 hours)     Procedure Component Value Units Date/Time    Extra Tubes [[id number]] Collected: 01/04/23 0712    Specimen: Blood, Venous Line Updated: 01/04/23 0815    Narrative:      The following orders were created for panel order Extra Tubes.  Procedure                               Abnormality         Status                     ---------                               -----------         ------                     Lavender Top[[id number]]                                     Final result                 Please view results for these tests on the individual orders.    Lavender Top [[id number]] Collected: 01/04/23 0712    Specimen: Blood Updated: " 01/04/23 0815     Extra Tube hold for add-on     Comment: Auto resulted       Magnesium [[id number]]  (Normal) Collected: 01/04/23 0708    Specimen: Blood Updated: 01/04/23 0741     Magnesium 2.2 mg/dL     Basic Metabolic Panel [[id number]]  (Abnormal) Collected: 01/04/23 0708    Specimen: Blood Updated: 01/04/23 0739     Glucose 152 mg/dL      BUN 16 mg/dL      Creatinine 1.03 mg/dL      Sodium 130 mmol/L      Potassium 4.4 mmol/L      Chloride 95 mmol/L      CO2 23.0 mmol/L      Calcium 9.8 mg/dL      BUN/Creatinine Ratio 15.5     Anion Gap 12.0 mmol/L      eGFR 62.0 mL/min/1.73      Comment: National Kidney Foundation and American Society of Nephrology (ASN) Task Force recommended calculation based on the Chronic Kidney Disease Epidemiology Collaboration (CKD-EPI) equation refit without adjustment for race.       Narrative:      GFR Normal >60  Chronic Kidney Disease <60  Kidney Failure <15      Troponin [[id number]]  (Abnormal) Collected: 01/04/23 0708    Specimen: Blood Updated: 01/04/23 0739     Troponin T 0.728 ng/mL     Narrative:      Troponin T Reference Range:  <= 0.03 ng/mL-   Negative for AMI  >0.03 ng/mL-     Abnormal for myocardial necrosis.  Clinicians would have to utilize clinical acumen, EKG, Troponin and serial changes to determine if it is an Acute Myocardial Infarction or myocardial injury due to an underlying chronic condition.       Results may be falsely decreased if patient taking Biotin.      Troponin [[id number]]  (Abnormal) Collected: 01/03/23 1726    Specimen: Blood Updated: 01/03/23 1802     Troponin T 1.230 ng/mL     Narrative:      Troponin T Reference Range:  <= 0.03 ng/mL-   Negative for AMI  >0.03 ng/mL-     Abnormal for myocardial necrosis.  Clinicians would have to utilize clinical acumen, EKG, Troponin and serial changes to determine if it is an Acute Myocardial Infarction or myocardial injury due to an underlying chronic condition.       Results may be falsely decreased if patient  taking Biotin.      Basic Metabolic Panel [[id number]]  (Abnormal) Collected: 01/03/23 1726    Specimen: Blood Updated: 01/03/23 1802     Glucose 157 mg/dL      BUN 17 mg/dL      Creatinine 0.94 mg/dL      Sodium 134 mmol/L      Potassium 4.2 mmol/L      Chloride 97 mmol/L      CO2 23.0 mmol/L      Calcium 9.8 mg/dL      BUN/Creatinine Ratio 18.1     Anion Gap 14.0 mmol/L      eGFR 69.2 mL/min/1.73      Comment: National Kidney Foundation and American Society of Nephrology (ASN) Task Force recommended calculation based on the Chronic Kidney Disease Epidemiology Collaboration (CKD-EPI) equation refit without adjustment for race.       Narrative:      GFR Normal >60  Chronic Kidney Disease <60  Kidney Failure <15      Magnesium [[id number]]  (Abnormal) Collected: 01/03/23 1726    Specimen: Blood Updated: 01/03/23 1757     Magnesium 1.5 mg/dL     Extra Tubes [[id number]] Collected: 01/03/23 1441    Specimen: Blood, Venous Line Updated: 01/03/23 1545    Narrative:      The following orders were created for panel order Extra Tubes.  Procedure                               Abnormality         Status                     ---------                               -----------         ------                     Gold Top - SST[[id number]]                                   Final result               Light Blue Top[[id number]]                                   Final result                 Please view results for these tests on the individual orders.    Gold Top - SST [[id number]] Collected: 01/03/23 1441    Specimen: Blood Updated: 01/03/23 1545     Extra Tube Hold for add-ons.     Comment: Auto resulted.       Light Blue Top [[id number]] Collected: 01/03/23 1441    Specimen: Blood Updated: 01/03/23 1545     Extra Tube Hold for add-ons.     Comment: Auto resulted       Troponin [[id number]]  (Abnormal) Collected: 01/03/23 1441    Specimen: Blood Updated: 01/03/23 1529     Troponin T 0.081 ng/mL     Narrative:      Troponin T Reference  Range:  <= 0.03 ng/mL-   Negative for AMI  >0.03 ng/mL-     Abnormal for myocardial necrosis.  Clinicians would have to utilize clinical acumen, EKG, Troponin and serial changes to determine if it is an Acute Myocardial Infarction or myocardial injury due to an underlying chronic condition.       Results may be falsely decreased if patient taking Biotin.      BNP [[id number]]  (Normal) Collected: 01/03/23 1441    Specimen: Blood Updated: 01/03/23 1518     proBNP 845.3 pg/mL     Narrative:      Among patients with dyspnea, NT-proBNP is highly sensitive for the detection of acute congestive heart failure. In addition NT-proBNP of <300 pg/ml effectively rules out acute congestive heart failure with 99% negative predictive value.      TSH [[id number]]  (Normal) Collected: 01/03/23 1441    Specimen: Blood Updated: 01/03/23 1518     TSH 1.120 uIU/mL     T4, Free [[id number]]  (Normal) Collected: 01/03/23 1441    Specimen: Blood Updated: 01/03/23 1518     Free T4 1.66 ng/dL     Narrative:      Results may be falsely increased if patient taking Biotin.      Comprehensive Metabolic Panel [[id number]]  (Abnormal) Collected: 01/03/23 1441    Specimen: Blood Updated: 01/03/23 1516     Glucose 216 mg/dL      BUN 18 mg/dL      Creatinine 1.18 mg/dL      Sodium 136 mmol/L      Potassium 2.9 mmol/L      Chloride 92 mmol/L      CO2 22.0 mmol/L      Calcium 9.9 mg/dL      Total Protein 8.7 g/dL      Albumin 4.4 g/dL      ALT (SGPT) 14 U/L      AST (SGOT) 21 U/L      Alkaline Phosphatase 84 U/L      Total Bilirubin 0.6 mg/dL      Globulin 4.3 gm/dL      A/G Ratio 1.0 g/dL      BUN/Creatinine Ratio 15.3     Anion Gap 22.0 mmol/L      eGFR 52.7 mL/min/1.73      Comment: National Kidney Foundation and American Society of Nephrology (ASN) Task Force recommended calculation based on the Chronic Kidney Disease Epidemiology Collaboration (CKD-EPI) equation refit without adjustment for race.       Narrative:      GFR Normal >60  Chronic Kidney  Disease <60  Kidney Failure <15      Magnesium [[id number]]  (Abnormal) Collected: 01/03/23 1441    Specimen: Blood Updated: 01/03/23 1516     Magnesium 1.4 mg/dL     CBC & Differential [[id number]]  (Abnormal) Collected: 01/03/23 1441    Specimen: Blood Updated: 01/03/23 1453    Narrative:      The following orders were created for panel order CBC & Differential.  Procedure                               Abnormality         Status                     ---------                               -----------         ------                     CBC Auto Differential[[id number]]        Abnormal            Final result                 Please view results for these tests on the individual orders.    CBC Auto Differential [[id number]]  (Abnormal) Collected: 01/03/23 1441    Specimen: Blood Updated: 01/03/23 1453     WBC 11.06 10*3/mm3      RBC 5.99 10*6/mm3      Hemoglobin 15.5 g/dL      Hematocrit 48.6 %      MCV 81.1 fL      MCH 25.9 pg      MCHC 31.9 g/dL      RDW 14.2 %      RDW-SD 41.3 fl      MPV 10.6 fL      Platelets 376 10*3/mm3      Neutrophil % 70.1 %      Lymphocyte % 20.6 %      Monocyte % 7.7 %      Eosinophil % 0.6 %      Basophil % 0.5 %      Immature Grans % 0.5 %      Neutrophils, Absolute 7.75 10*3/mm3      Lymphocytes, Absolute 2.28 10*3/mm3      Monocytes, Absolute 0.85 10*3/mm3      Eosinophils, Absolute 0.07 10*3/mm3      Basophils, Absolute 0.06 10*3/mm3      Immature Grans, Absolute 0.05 10*3/mm3      nRBC 0.0 /100 WBC           I personally viewed and interpreted the patient's EKG/Telemetry data       Assessment/Plan:   1 multiple ICD shock (electrical storm)    62 [place] [facility] history of ischemic cardiomyopathy s/p PCI and stent, ventricular tachycardia and single-chamber ICD.  The patient multiple ICD shock all was appropriately detected and treated.  Multiple ATP therapy was delayed given as described above.  The patient continue having electrical shock  even at ER.  I was consulted and patient was given Lopressor 5 mg and started on Lopressor 50 mg p.o. twice daily and no further ICD therapy was delivered and patient maintaining normal sinus rhythm.  Patient also started on amiodarone IV.  We will continue IV amiodarone until infusion completed and start amiodarone 200 mg daily.  Patient waiting for bed to be transferred but there is no bed available.    #2 electrolyte abnormality  Low potassium magnesium not sure as underlying mechanism or secondary multiple ICD shock with hyperadrenergic state.  Recommend to keep the magnesium above 2 and potassium above 4.0.    #3 NSTEMI with abnormal troponin    Likely mechanism appears to be multiple ICD shock as described above however patient no history of CAD and PCI in the past.  We will get Dr. Carrero to see if any further risk stratification needed.    Hypertension with ischemic cardiomyopathy    Continue lisinopril continue beta-blocker recommend to add Aldactone    Patient is well perfused and no evidence of congestive heart failure.  We will arrange an echocardiogram to reassess the biventricular function              This document has been electronically signed by Preet Pham MD on January 4, 2023 13:16 CST                  Thank you for allowing me to participate in the care of Danae Ngo. Feel free to contact me directly with any further questions or concerns.    Preet Pham MD  01/04/23  13:08 CST.      Part of this note may be an electronic transcription/translation of spoken language to printed text using the Dragon Dictation system.

## 2023-01-04 NOTE — H&P
[facility] Medicine Admission      Date of Admission: 1/3/2023      Primary Care Physician: José Ruano APRN      Chief Complaint: ICD discharge    HPI: Patient is a 62-year-old female with a past medical history notable for dilated cardiomyopathy and ventricular tachycardia status post ICD implant, hypertension, GERD, and type 2 diabetes mellitus who presented to the emergency department yesterday after her ICD discharged several times.  Patient was found to have electrolyte abnormalities and runs of V. tach.  Patient's ICD was interrogated in the ER and per report no changes were recommended.  Initial plan was for the patient to be transferred to the hospital facility where her primary cardiologist as well due to bed shortage admission was requested to our facility for ongoing care and evaluation.  Patient on my exam denies any current symptoms but does report endorse that she had chest pain yesterday.  She is now having difficulty with shortness of breath.  Patient was already started on amiodarone infusion and being monitored on telemetry.  Patient was also found to have elevated troponin.    Concurrent Medical History:  has a past medical history of Abnormal thyroid function test, Acquired hypothyroidism, Allergic, Allergic rhinitis, Anemia, Breast cyst, Coronary atherosclerosis, Dilated cardiomyopathy (HCC), Dyslipidemia, Esophageal reflux, Essential hypertension, Family history of malignant neoplasm of breast, Fibrocystic disease of breast, GERD (gastroesophageal reflux disease), Herpes zoster, History of chicken pox, Hyperlipidemia, Measles, Mumps, Myocardial infarction (HCC) (), Nasal septal deformity, Obesity, Type 2 diabetes mellitus (HCC), V tach, and Vitamin D deficiency.    Past Surgical History:  has a past surgical history that includes Cardiac catheterization (2002);  section (); endoscopy and colonoscopy  (04/15/2008); External ear surgery (08/15/1991); transesophageal echocardiogram ([place]) (03/02/2012); Esophagoscopy / EGD (11/30/2007); Oophorectomy (1993); Breast biopsy (06/11/2010); Cardiac defibrillator placement (Left, 2011); Breast cyst aspiration; and Colonoscopy (N/A, 10/12/2018).    Family History: family history includes Alcohol abuse in her brother; Autism in an other family member; Breast cancer in her mother; Cardiomyopathy in her brother; Coronary artery disease in her brother and another family member; Diabetes in her sister and another family member; Heart attack in her son; Heart disease in her father, maternal grandmother, son, and another family member; Hypertension in her brother, brother, maternal grandmother, sister, sister, sister, and another family member; Other in her mother.  No changes    Social History:  reports that she has never smoked. She has never used smokeless tobacco. She reports that she does not drink alcohol and does not use drugs.    Allergies:   Allergies   Allergen Reactions   • Codeine GI Intolerance       Medications:   Prior to Admission medications    Medication Sig Start Date End Date Taking? Authorizing Provider   amLODIPine (NORVASC) 5 MG tablet Take 1 tablet by mouth every night at bedtime. 9/30/22  Yes José Ruano APRN   aspirin (Aspirin Adult) 325 MG tablet Take 1 tablet by mouth Daily. 12/27/21  Yes José Ruano APRN   atorvastatin (LIPITOR) 20 MG tablet TAKE ONE TABLET BY MOUTH ONCE NIGHTLY 5/12/22  Yes José Ruano APRN   cetirizine (zyrTEC) 10 MG tablet TAKE ONE TABLET BY MOUTH DAILY 11/3/22  Yes José Ruano APRN   Continuous Blood Gluc Sensor (Dexcom G6 Sensor) USE EVERY 10 DAYS AS DIRECTED FOR CONTINUOUS GLUCOSE MONITORING 3/30/22  Yes Abad Nixon MD   Continuous Blood Gluc Transmit (Dexcom G6 Transmitter) misc  11/12/20  Yes Emergency, Nurse Ryann, RN   Continuous Blood Gluc Transmit (Dexcom G6 Transmitter) misc USE AS  DIRECTED AND CHANGE EVERY 3 MONTHS. 12/12/22  Yes Herber Galeano APRN   Empagliflozin-metFORMIN HCl ER (Synjardy XR) 12.5-1000 MG tablet sustained-release 24 hour Take 2 tablets by mouth Daily. 8/23/22  Yes Herber Galeano APRN   glucose blood test strip 1 each by Other route 3 (Three) Times a Day. Use as instructed 10/1/21  Yes José Ruano APRN   glucose monitor monitoring kit 1 each 3 (Three) Times a Day. 10/1/21  Yes José Ruano APRN   hydroCHLOROthiazide (HYDRODIURIL) 50 MG tablet TAKE ONE TABLET BY MOUTH DAILY 7/14/22  Yes José Ruano APRN   Lancet Device misc 1 each 2 (Two) Times a Day. 1/29/19  Yes José Ruano APRN   levothyroxine (SYNTHROID, LEVOTHROID) 112 MCG tablet Take 1 tablet by mouth Daily. 8/23/22  Yes Herber Galeano APRN   lisinopril (PRINIVIL,ZESTRIL) 40 MG tablet Take 1 tablet by mouth Daily. 9/30/22  Yes José Ruano APRN   metoprolol succinate XL (TOPROL-XL) 100 MG 24 hr tablet Take 3 tablets by mouth Daily.  Patient taking differently: Take 300 mg by mouth Daily. Half tablet in AM, half tablet in PM 9/30/22  Yes José Ruano APRN   mexiletine (MEXITIL) 150 MG capsule Take 150 mg by mouth Every 8 (Eight) Hours. 11/24/20  Yes Emergency, Nurse Epic, RN   omeprazole (priLOSEC) 40 MG capsule TAKE ONE CAPSULE BY MOUTH DAILY 3/7/22  Yes José Ruano APRN   potassium chloride (K-DUR,KLOR-CON) 10 MEQ CR tablet TAKE TWO TABLETS BY MOUTH DAILY 8/22/22  Yes José Ruano APRN   Semaglutide, 2 MG/DOSE, (Ozempic, 2 MG/DOSE,) 8 MG/3ML solution pen-injector Inject 2 mg under the skin into the appropriate area as directed 1 (One) Time Per Week. 8/23/22  Yes Herber Galeano APRN   vitamin D3 125 MCG (5000 UT) capsule capsule TAKE ONE CAPSULE BY MOUTH DAILY 9/23/22  Yes José Ruano APRN       Review of Systems:  Review of Systems   Constitutional: Positive for activity change. Negative for fatigue.   HENT: Negative for  congestion.    Respiratory: Negative for shortness of breath.    Cardiovascular: Positive for chest pain.   Gastrointestinal: Negative.    Genitourinary: Negative.    Musculoskeletal: Negative.    Skin: Negative.    Neurological: Negative.    Psychiatric/Behavioral: Negative.    All other systems reviewed and are negative.     Otherwise complete ROS is negative except as mentioned above.    Physical Exam:   Temp:  [98.1 °F (36.7 °C)] 98.1 °F (36.7 °C)  Heart Rate:  [] 96  Resp:  [16-20] 18  BP: (125-157)/(75-94) 138/87  Physical Exam  Constitutional:       General: She is not in acute distress.     Appearance: She is not toxic-appearing.   HENT:      Head: Normocephalic and atraumatic.      Right Ear: External ear normal.      Left Ear: External ear normal.      Nose: Nose normal.      Mouth/Throat:      Mouth: Mucous membranes are moist.   Eyes:      Conjunctiva/sclera: Conjunctivae normal.   Cardiovascular:      Rate and Rhythm: Normal rate and regular rhythm.      Pulses: Normal pulses.      Heart sounds: Normal heart sounds.   Pulmonary:      Effort: Pulmonary effort is normal. No respiratory distress.      Breath sounds: Normal breath sounds.   Abdominal:      General: Bowel sounds are normal.      Palpations: Abdomen is soft.      Tenderness: There is no abdominal tenderness.   Musculoskeletal:         General: No deformity.   Skin:     General: Skin is warm and dry.      Capillary Refill: Capillary refill takes less than 2 seconds.   Neurological:      General: No focal deficit present.      Mental Status: She is alert and oriented to person, place, and time. Mental status is at baseline.   Psychiatric:         Behavior: Behavior normal.         Thought Content: Thought content normal.           Results Reviewed:  I have personally reviewed current lab, radiology, and data and agree with results.  Lab Results (last 24 hours)     Procedure Component Value Units Date/Time    Extra Tubes [[id number]]  Collected: 01/04/23 0712    Specimen: Blood, Venous Line Updated: 01/04/23 0815    Narrative:      The following orders were created for panel order Extra Tubes.  Procedure                               Abnormality         Status                     ---------                               -----------         ------                     Lavender Top[[id number]]                                     Final result                 Please view results for these tests on the individual orders.    Lavender Top [[id number]] Collected: 01/04/23 0712    Specimen: Blood Updated: 01/04/23 0815     Extra Tube hold for add-on     Comment: Auto resulted       Magnesium [[id number]]  (Normal) Collected: 01/04/23 0708    Specimen: Blood Updated: 01/04/23 0741     Magnesium 2.2 mg/dL     Basic Metabolic Panel [[id number]]  (Abnormal) Collected: 01/04/23 0708    Specimen: Blood Updated: 01/04/23 0739     Glucose 152 mg/dL      BUN 16 mg/dL      Creatinine 1.03 mg/dL      Sodium 130 mmol/L      Potassium 4.4 mmol/L      Chloride 95 mmol/L      CO2 23.0 mmol/L      Calcium 9.8 mg/dL      BUN/Creatinine Ratio 15.5     Anion Gap 12.0 mmol/L      eGFR 62.0 mL/min/1.73      Comment: National Kidney Foundation and American Society of Nephrology (ASN) Task Force recommended calculation based on the Chronic Kidney Disease Epidemiology Collaboration (CKD-EPI) equation refit without adjustment for race.       Narrative:      GFR Normal >60  Chronic Kidney Disease <60  Kidney Failure <15      Troponin [[id number]]  (Abnormal) Collected: 01/04/23 0708    Specimen: Blood Updated: 01/04/23 0739     Troponin T 0.728 ng/mL     Narrative:      Troponin T Reference Range:  <= 0.03 ng/mL-   Negative for AMI  >0.03 ng/mL-     Abnormal for myocardial necrosis.  Clinicians would have to utilize clinical acumen, EKG, Troponin and serial changes to determine if it is an Acute Myocardial Infarction or myocardial injury due to an underlying chronic condition.        Results may be falsely decreased if patient taking Biotin.      Troponin [[id number]]  (Abnormal) Collected: 01/03/23 1726    Specimen: Blood Updated: 01/03/23 1802     Troponin T 1.230 ng/mL     Narrative:      Troponin T Reference Range:  <= 0.03 ng/mL-   Negative for AMI  >0.03 ng/mL-     Abnormal for myocardial necrosis.  Clinicians would have to utilize clinical acumen, EKG, Troponin and serial changes to determine if it is an Acute Myocardial Infarction or myocardial injury due to an underlying chronic condition.       Results may be falsely decreased if patient taking Biotin.      Basic Metabolic Panel [[id number]]  (Abnormal) Collected: 01/03/23 1726    Specimen: Blood Updated: 01/03/23 1802     Glucose 157 mg/dL      BUN 17 mg/dL      Creatinine 0.94 mg/dL      Sodium 134 mmol/L      Potassium 4.2 mmol/L      Chloride 97 mmol/L      CO2 23.0 mmol/L      Calcium 9.8 mg/dL      BUN/Creatinine Ratio 18.1     Anion Gap 14.0 mmol/L      eGFR 69.2 mL/min/1.73      Comment: National Kidney Foundation and American Society of Nephrology (ASN) Task Force recommended calculation based on the Chronic Kidney Disease Epidemiology Collaboration (CKD-EPI) equation refit without adjustment for race.       Narrative:      GFR Normal >60  Chronic Kidney Disease <60  Kidney Failure <15      Magnesium [[id number]]  (Abnormal) Collected: 01/03/23 1726    Specimen: Blood Updated: 01/03/23 1757     Magnesium 1.5 mg/dL     Extra Tubes [[id number]] Collected: 01/03/23 1441    Specimen: Blood, Venous Line Updated: 01/03/23 8629    Narrative:      The following orders were created for panel order Extra Tubes.  Procedure                               Abnormality         Status                     ---------                               -----------         ------                     Gold Top - SST[[id number]]                                   Final result               Light Blue Top[[id number]]                                   Final  result                 Please view results for these tests on the individual orders.    Gold Top - SST [[id number]] Collected: 01/03/23 1441    Specimen: Blood Updated: 01/03/23 1545     Extra Tube Hold for add-ons.     Comment: Auto resulted.       Light Blue Top [[id number]] Collected: 01/03/23 1441    Specimen: Blood Updated: 01/03/23 1545     Extra Tube Hold for add-ons.     Comment: Auto resulted       Troponin [[id number]]  (Abnormal) Collected: 01/03/23 1441    Specimen: Blood Updated: 01/03/23 1529     Troponin T 0.081 ng/mL     Narrative:      Troponin T Reference Range:  <= 0.03 ng/mL-   Negative for AMI  >0.03 ng/mL-     Abnormal for myocardial necrosis.  Clinicians would have to utilize clinical acumen, EKG, Troponin and serial changes to determine if it is an Acute Myocardial Infarction or myocardial injury due to an underlying chronic condition.       Results may be falsely decreased if patient taking Biotin.      BNP [[id number]]  (Normal) Collected: 01/03/23 1441    Specimen: Blood Updated: 01/03/23 1518     proBNP 845.3 pg/mL     Narrative:      Among patients with dyspnea, NT-proBNP is highly sensitive for the detection of acute congestive heart failure. In addition NT-proBNP of <300 pg/ml effectively rules out acute congestive heart failure with 99% negative predictive value.      TSH [[id number]]  (Normal) Collected: 01/03/23 1441    Specimen: Blood Updated: 01/03/23 1518     TSH 1.120 uIU/mL     T4, Free [[id number]]  (Normal) Collected: 01/03/23 1441    Specimen: Blood Updated: 01/03/23 1518     Free T4 1.66 ng/dL     Narrative:      Results may be falsely increased if patient taking Biotin.      Comprehensive Metabolic Panel [[id number]]  (Abnormal) Collected: 01/03/23 1441    Specimen: Blood Updated: 01/03/23 1516     Glucose 216 mg/dL      BUN 18 mg/dL      Creatinine 1.18 mg/dL      Sodium 136 mmol/L      Potassium 2.9 mmol/L      Chloride 92 mmol/L      CO2 22.0 mmol/L      Calcium 9.9 mg/dL       Total Protein 8.7 g/dL      Albumin 4.4 g/dL      ALT (SGPT) 14 U/L      AST (SGOT) 21 U/L      Alkaline Phosphatase 84 U/L      Total Bilirubin 0.6 mg/dL      Globulin 4.3 gm/dL      A/G Ratio 1.0 g/dL      BUN/Creatinine Ratio 15.3     Anion Gap 22.0 mmol/L      eGFR 52.7 mL/min/1.73      Comment: National Kidney Foundation and American Society of Nephrology (ASN) Task Force recommended calculation based on the Chronic Kidney Disease Epidemiology Collaboration (CKD-EPI) equation refit without adjustment for race.       Narrative:      GFR Normal >60  Chronic Kidney Disease <60  Kidney Failure <15      Magnesium [[id number]]  (Abnormal) Collected: 01/03/23 1441    Specimen: Blood Updated: 01/03/23 1516     Magnesium 1.4 mg/dL     CBC & Differential [[id number]]  (Abnormal) Collected: 01/03/23 1441    Specimen: Blood Updated: 01/03/23 1453    Narrative:      The following orders were created for panel order CBC & Differential.  Procedure                               Abnormality         Status                     ---------                               -----------         ------                     CBC Auto Differential[[id number]]        Abnormal            Final result                 Please view results for these tests on the individual orders.    CBC Auto Differential [[id number]]  (Abnormal) Collected: 01/03/23 1441    Specimen: Blood Updated: 01/03/23 1453     WBC 11.06 10*3/mm3      RBC 5.99 10*6/mm3      Hemoglobin 15.5 g/dL      Hematocrit 48.6 %      MCV 81.1 fL      MCH 25.9 pg      MCHC 31.9 g/dL      RDW 14.2 %      RDW-SD 41.3 fl      MPV 10.6 fL      Platelets 376 10*3/mm3      Neutrophil % 70.1 %      Lymphocyte % 20.6 %      Monocyte % 7.7 %      Eosinophil % 0.6 %      Basophil % 0.5 %      Immature Grans % 0.5 %      Neutrophils, Absolute 7.75 10*3/mm3      Lymphocytes, Absolute 2.28 10*3/mm3      Monocytes, Absolute 0.85 10*3/mm3      Eosinophils, Absolute 0.07 10*3/mm3      Basophils, Absolute 0.06  10*3/mm3      Immature Grans, Absolute 0.05 10*3/mm3      nRBC 0.0 /100 WBC         Imaging Results (Last 24 Hours)     Procedure Component Value Units Date/Time    XR Chest 2 View [[id number]] Collected: 01/03/23 1443     Updated: 01/03/23 1516    Narrative:          PROCEDURE: Chest PA and lateral    REASON FOR EXAM: Defibrillator issues    FINDINGS: Comparison study dated January 3, 2023. Defibrillator  with intact lead extending to right ventricle. Cardiac and  pulmonary vasculature are normal . Lungs are clear. Pleural  spaces are normal . No acute osseous abnormality.      Impression:      Negative chest    Electronically signed by:  Justin Ac MD  1/3/2023 3:13 PM CST  Workstation: [id number]            Assessment:    Active Hospital Problems    Diagnosis    • **Defibrillator discharge    • Type 2 diabetes mellitus (HCC)    • Dilated cardiomyopathy (HCC)    • Ventricular tachycardia              Plan:  -Patient was admitted for ongoing conservation  - Continue to monitor on telemetry  - Cardiology consultation appreciated  - Continue amiodarone infusion and transition to p.o. amiodarone per cardiology recommendations  - Echocardiograms been ordered  - Continue home medications as appropriate  - Monitor glucose with glucose checks and have low-dose sliding scale available if needed  - DVT prophylaxis with Lovenox  - CODE STATUS: Full        Medical Decision Making  Number and Complexity of problems: 2  Differential Diagnosis: None    Conditions and Status:        Condition is improving.     [facility] Data  External documents reviewed: None  My EKG interpretation: Poor quality data and appears to be sinus tachycardia with nonspecific ST-T wave changes  My plain film interpretation: No acute abnormality  Tests considered but not ordered: None     Decision rules/scores evaluated (example [id number], [place], etc): None     Discussed with: The patient     Treatment Plan  Admit for observation amiodarone therapy,  cardiology consultation    Care Planning  Shared decision making: Patient  Code status and discussions: Full code    Disposition  Social Determinants of Health that impact treatment or disposition: None  I expect the patient to be discharged to home in 1-2 days.     I have utilized all available immediate resources to obtain, update, or review the patient's current medications (including all prescriptions, over-the-counter products, herbals, cannabis/cannabidiol products, and vitamin/mineral/dietary (nutritional) supplements).   I confirmed that the patient's Advance Care Plan is present, code status is documented, or surrogate decision maker is listed in the patient's medical record.    I discussed the patient's findings and my recommendations with: The patient    Jerel Freedman MD

## 2023-01-04 NOTE — PLAN OF CARE
Goal Outcome Evaluation:  Plan of Care Reviewed With: patient   Patient admitted to [address] [place] [address].      Progress: improving

## 2023-01-04 NOTE — ED NOTES
Nursing report ED to floor  Danae Ngo  62 y.o.  female    HPI:   Chief Complaint   Patient presents with    defibrillator firing       Admitting doctor:   Jerel Freedman MD    Consulting provider(s):  Consults       Date and Time Order Name Status Description    1/4/2023 10:31 AM Inpatient Cardiology Consult               Admitting diagnosis:   The primary encounter diagnosis was Defibrillator discharge. Diagnoses of Elevated troponin, Hypokalemia, and Hypomagnesemia were also pertinent to this visit.    Code status:   Current Code Status       Date Active Code Status Order ID Comments User Context       Not on file            Allergies:   Codeine    Intake and Output    Intake/Output Summary (Last 24 hours) at 1/4/2023 1053  Last data filed at 1/3/2023 2253  Gross per 24 hour   Intake 450 ml   Output --   Net 450 ml       Weight:       01/03/23  1434   Weight: 82.1 kg (181 lb)       Most recent vitals:   Vitals:    01/04/23 0419 01/04/23 0600 01/04/23 0715 01/04/23 0815   BP: 137/86 125/75 129/87 138/87   BP Location: Left arm      Patient Position: Lying      Pulse:  76 84 96   Resp: 18      Temp:       TempSrc:       SpO2:  96% 100% 97%   Weight:       Height:         Oxygen Therapy: Room air     Active LDAs/IV Access:   Lines, Drains & Airways       Active LDAs       Name Placement date Placement time Site Days    Peripheral IV 01/03/23 1734 Left Antecubital 01/03/23  1734  Antecubital  less than 1                    Labs (abnormal labs have a star):   Labs Reviewed   COMPREHENSIVE METABOLIC PANEL - Abnormal; Notable for the following components:       Result Value    Glucose 216 (*)     Creatinine 1.18 (*)     Potassium 2.9 (*)     Chloride 92 (*)     Total Protein 8.7 (*)     Anion Gap 22.0 (*)     eGFR 52.7 (*)     All other components within normal limits    Narrative:     GFR Normal >60  Chronic Kidney Disease <60  Kidney Failure <15     TROPONIN (IN-HOUSE) - Abnormal; Notable for the following  components:    Troponin T 0.081 (*)     All other components within normal limits    Narrative:     Troponin T Reference Range:  <= 0.03 ng/mL-   Negative for AMI  >0.03 ng/mL-     Abnormal for myocardial necrosis.  Clinicians would have to utilize clinical acumen, EKG, Troponin and serial changes to determine if it is an Acute Myocardial Infarction or myocardial injury due to an underlying chronic condition.       Results may be falsely decreased if patient taking Biotin.     MAGNESIUM - Abnormal; Notable for the following components:    Magnesium 1.4 (*)     All other components within normal limits   CBC WITH AUTO DIFFERENTIAL - Abnormal; Notable for the following components:    WBC 11.06 (*)     RBC 5.99 (*)     Hematocrit 48.6 (*)     MCH 25.9 (*)     Neutrophils, Absolute 7.75 (*)     All other components within normal limits   TROPONIN (IN-HOUSE) - Abnormal; Notable for the following components:    Troponin T 1.230 (*)     All other components within normal limits    Narrative:     Troponin T Reference Range:  <= 0.03 ng/mL-   Negative for AMI  >0.03 ng/mL-     Abnormal for myocardial necrosis.  Clinicians would have to utilize clinical acumen, EKG, Troponin and serial changes to determine if it is an Acute Myocardial Infarction or myocardial injury due to an underlying chronic condition.       Results may be falsely decreased if patient taking Biotin.     MAGNESIUM - Abnormal; Notable for the following components:    Magnesium 1.5 (*)     All other components within normal limits   BASIC METABOLIC PANEL - Abnormal; Notable for the following components:    Glucose 157 (*)     Sodium 134 (*)     Chloride 97 (*)     All other components within normal limits    Narrative:     GFR Normal >60  Chronic Kidney Disease <60  Kidney Failure <15     TROPONIN (IN-HOUSE) - Abnormal; Notable for the following components:    Troponin T 0.728 (*)     All other components within normal limits    Narrative:     Troponin T  Reference Range:  <= 0.03 ng/mL-   Negative for AMI  >0.03 ng/mL-     Abnormal for myocardial necrosis.  Clinicians would have to utilize clinical acumen, EKG, Troponin and serial changes to determine if it is an Acute Myocardial Infarction or myocardial injury due to an underlying chronic condition.       Results may be falsely decreased if patient taking Biotin.     BASIC METABOLIC PANEL - Abnormal; Notable for the following components:    Glucose 152 (*)     Creatinine 1.03 (*)     Sodium 130 (*)     Chloride 95 (*)     All other components within normal limits    Narrative:     GFR Normal >60  Chronic Kidney Disease <60  Kidney Failure <15     BNP (IN-HOUSE) - Normal    Narrative:     Among patients with dyspnea, NT-proBNP is highly sensitive for the detection of acute congestive heart failure. In addition NT-proBNP of <300 pg/ml effectively rules out acute congestive heart failure with 99% negative predictive value.     TSH - Normal   T4, FREE - Normal    Narrative:     Results may be falsely increased if patient taking Biotin.     MAGNESIUM - Normal   CBC AND DIFFERENTIAL    Narrative:     The following orders were created for panel order CBC & Differential.  Procedure                               Abnormality         Status                     ---------                               -----------         ------                     CBC Auto Differential[[id number]]        Abnormal            Final result                 Please view results for these tests on the individual orders.   EXTRA TUBES    Narrative:     The following orders were created for panel order Extra Tubes.  Procedure                               Abnormality         Status                     ---------                               -----------         ------                     Gold Top - SST[[id number]]                                   Final result               Light Blue Top[[id number]]                                   Final result                  Please view results for these tests on the individual orders.   GOLD TOP - SST   LIGHT BLUE TOP   EXTRA TUBES    Narrative:     The following orders were created for panel order Extra Tubes.  Procedure                               Abnormality         Status                     ---------                               -----------         ------                     Lavender Top[[id number]]                                     Final result                 Please view results for these tests on the individual orders.   LAVENDER TOP       Meds given in ED:   Medications   amiodarone in dextrose 5% (NEXTERONE) loading dose 150mg/100mL (0 mg Intravenous Stopped 1/3/23 1558)     Followed by   amiodarone 360 mg in 200 mL D5W infusion (0 mg/min Intravenous Stopped 1/3/23 2253)     Followed by   amiodarone 360 mg in 200 mL D5W infusion (0.5 mg/min Intravenous Currently Infusing 1/4/23 0605)   metoprolol tartrate (LOPRESSOR) tablet 50 mg (50 mg Oral Not Given 1/4/23 1046)   midazolam (VERSED) injection 1 mg (1 mg Intravenous Given 1/3/23 1538)   fentaNYL citrate (PF) (SUBLIMAZE) injection 50 mcg (50 mcg Intravenous Given 1/3/23 1538)   metoprolol tartrate (LOPRESSOR) injection 5 mg (5 mg Intravenous Given 1/3/23 1538)   magnesium sulfate 2g/50 mL (PREMIX) infusion (0 g Intravenous Stopped 1/3/23 2001)   potassium chloride (MICRO-K) CR capsule 40 mEq (40 mEq Oral Given 1/3/23 1546)   potassium chloride 10 mEq in 100 mL IVPB (0 mEq Intravenous Stopped 1/3/23 1834)   magnesium sulfate 2g/50 mL (PREMIX) infusion (0 g Intravenous Stopped 1/4/23 0013)     amiodarone, 0.5 mg/min, Last Rate: 0.5 mg/min (01/04/23 0605)         NIH Stroke Scale:       Isolation/Infection(s):  No active isolations   No active infections     COVID Testing  Collected NA  Resulted NA    Nursing report ED to floor:  Mental status: A&O X4  Ambulatory status: Self  Precautions: Standard    ED nurse phone [phone]- [phone]

## 2023-01-05 PROBLEM — I25.10 CORONARY ARTERY DISEASE INVOLVING NATIVE CORONARY ARTERY OF NATIVE HEART WITHOUT ANGINA PECTORIS: Status: ACTIVE | Noted: 2023-01-05

## 2023-01-05 LAB
ALBUMIN SERPL-MCNC: 4.1 G/DL (ref 3.5–5.2)
ALBUMIN/GLOB SERPL: 1 G/DL
ALP SERPL-CCNC: 81 U/L (ref 39–117)
ALT SERPL W P-5'-P-CCNC: 20 U/L (ref 1–33)
ANION GAP SERPL CALCULATED.3IONS-SCNC: 14 MMOL/L (ref 5–15)
AST SERPL-CCNC: 38 U/L (ref 1–32)
BASOPHILS # BLD AUTO: 0.08 10*3/MM3 (ref 0–0.2)
BASOPHILS NFR BLD AUTO: 0.7 % (ref 0–1.5)
BILIRUB SERPL-MCNC: 0.5 MG/DL (ref 0–1.2)
BUN SERPL-MCNC: 20 MG/DL (ref 8–23)
BUN/CREAT SERPL: 19 (ref 7–25)
CALCIUM SPEC-SCNC: 9.5 MG/DL (ref 8.6–10.5)
CHLORIDE SERPL-SCNC: 94 MMOL/L (ref 98–107)
CO2 SERPL-SCNC: 23 MMOL/L (ref 22–29)
CREAT SERPL-MCNC: 1.05 MG/DL (ref 0.57–1)
DEPRECATED RDW RBC AUTO: 41.4 FL (ref 37–54)
EGFRCR SERPLBLD CKD-EPI 2021: 60.6 ML/MIN/1.73
EOSINOPHIL # BLD AUTO: 0.39 10*3/MM3 (ref 0–0.4)
EOSINOPHIL NFR BLD AUTO: 3.6 % (ref 0.3–6.2)
ERYTHROCYTE [DISTWIDTH] IN BLOOD BY AUTOMATED COUNT: 14.2 % (ref 12.3–15.4)
GLOBULIN UR ELPH-MCNC: 4 GM/DL
GLUCOSE BLDC GLUCOMTR-MCNC: 114 MG/DL (ref 70–130)
GLUCOSE BLDC GLUCOMTR-MCNC: 154 MG/DL (ref 70–130)
GLUCOSE BLDC GLUCOMTR-MCNC: 173 MG/DL (ref 70–130)
GLUCOSE SERPL-MCNC: 146 MG/DL (ref 65–99)
HCT VFR BLD AUTO: 45 % (ref 34–46.6)
HGB BLD-MCNC: 14.7 G/DL (ref 12–15.9)
IMM GRANULOCYTES # BLD AUTO: 0.06 10*3/MM3 (ref 0–0.05)
IMM GRANULOCYTES NFR BLD AUTO: 0.6 % (ref 0–0.5)
LYMPHOCYTES # BLD AUTO: 1.98 10*3/MM3 (ref 0.7–3.1)
LYMPHOCYTES NFR BLD AUTO: 18.2 % (ref 19.6–45.3)
MCH RBC QN AUTO: 26.4 PG (ref 26.6–33)
MCHC RBC AUTO-ENTMCNC: 32.7 G/DL (ref 31.5–35.7)
MCV RBC AUTO: 80.8 FL (ref 79–97)
MONOCYTES # BLD AUTO: 0.91 10*3/MM3 (ref 0.1–0.9)
MONOCYTES NFR BLD AUTO: 8.4 % (ref 5–12)
NEUTROPHILS NFR BLD AUTO: 68.5 % (ref 42.7–76)
NEUTROPHILS NFR BLD AUTO: 7.46 10*3/MM3 (ref 1.7–7)
NRBC BLD AUTO-RTO: 0 /100 WBC (ref 0–0.2)
PLATELET # BLD AUTO: 350 10*3/MM3 (ref 140–450)
PMV BLD AUTO: 10.3 FL (ref 6–12)
POTASSIUM SERPL-SCNC: 4 MMOL/L (ref 3.5–5.2)
PROT SERPL-MCNC: 8.1 G/DL (ref 6–8.5)
RBC # BLD AUTO: 5.57 10*6/MM3 (ref 3.77–5.28)
SODIUM SERPL-SCNC: 131 MMOL/L (ref 136–145)
WBC NRBC COR # BLD: 10.88 10*3/MM3 (ref 3.4–10.8)

## 2023-01-05 PROCEDURE — 93458 L HRT ARTERY/VENTRICLE ANGIO: CPT | Performed by: INTERNAL MEDICINE

## 2023-01-05 PROCEDURE — C1894 INTRO/SHEATH, NON-LASER: HCPCS | Performed by: INTERNAL MEDICINE

## 2023-01-05 PROCEDURE — 99222 1ST HOSP IP/OBS MODERATE 55: CPT | Performed by: INTERNAL MEDICINE

## 2023-01-05 PROCEDURE — 0 IOPAMIDOL PER 1 ML: Performed by: INTERNAL MEDICINE

## 2023-01-05 PROCEDURE — C1769 GUIDE WIRE: HCPCS | Performed by: INTERNAL MEDICINE

## 2023-01-05 PROCEDURE — C1887 CATHETER, GUIDING: HCPCS | Performed by: INTERNAL MEDICINE

## 2023-01-05 PROCEDURE — 85025 COMPLETE CBC W/AUTO DIFF WBC: CPT | Performed by: FAMILY MEDICINE

## 2023-01-05 PROCEDURE — 93005 ELECTROCARDIOGRAM TRACING: CPT | Performed by: INTERNAL MEDICINE

## 2023-01-05 PROCEDURE — 25010000002 FENTANYL CITRATE (PF) 50 MCG/ML SOLUTION: Performed by: INTERNAL MEDICINE

## 2023-01-05 PROCEDURE — 80053 COMPREHEN METABOLIC PANEL: CPT | Performed by: FAMILY MEDICINE

## 2023-01-05 PROCEDURE — 4A023N7 MEASUREMENT OF CARDIAC SAMPLING AND PRESSURE, LEFT HEART, PERCUTANEOUS APPROACH: ICD-10-PCS | Performed by: INTERNAL MEDICINE

## 2023-01-05 PROCEDURE — C1725 CATH, TRANSLUMIN NON-LASER: HCPCS | Performed by: INTERNAL MEDICINE

## 2023-01-05 PROCEDURE — 25010000002 AMIODARONE IN DEXTROSE 5% 360-4.14 MG/200ML-% SOLUTION: Performed by: EMERGENCY MEDICINE

## 2023-01-05 PROCEDURE — 82962 GLUCOSE BLOOD TEST: CPT

## 2023-01-05 PROCEDURE — B2111ZZ FLUOROSCOPY OF MULTIPLE CORONARY ARTERIES USING LOW OSMOLAR CONTRAST: ICD-10-PCS | Performed by: INTERNAL MEDICINE

## 2023-01-05 PROCEDURE — B2151ZZ FLUOROSCOPY OF LEFT HEART USING LOW OSMOLAR CONTRAST: ICD-10-PCS | Performed by: INTERNAL MEDICINE

## 2023-01-05 PROCEDURE — 25010000002 HEPARIN (PORCINE) PER 1000 UNITS: Performed by: INTERNAL MEDICINE

## 2023-01-05 PROCEDURE — 25010000002 MIDAZOLAM PER 1 MG: Performed by: INTERNAL MEDICINE

## 2023-01-05 RX ORDER — LIDOCAINE HYDROCHLORIDE 20 MG/ML
INJECTION, SOLUTION INFILTRATION; PERINEURAL
Status: DISCONTINUED | OUTPATIENT
Start: 2023-01-05 | End: 2023-01-05 | Stop reason: HOSPADM

## 2023-01-05 RX ORDER — ENOXAPARIN SODIUM 100 MG/ML
40 INJECTION SUBCUTANEOUS DAILY
Status: DISCONTINUED | OUTPATIENT
Start: 2023-01-06 | End: 2023-01-06 | Stop reason: HOSPADM

## 2023-01-05 RX ORDER — SODIUM CHLORIDE 9 MG/ML
100 INJECTION, SOLUTION INTRAVENOUS CONTINUOUS
Status: ACTIVE | OUTPATIENT
Start: 2023-01-05 | End: 2023-01-05

## 2023-01-05 RX ORDER — MIDAZOLAM HYDROCHLORIDE 1 MG/ML
INJECTION INTRAMUSCULAR; INTRAVENOUS
Status: DISCONTINUED | OUTPATIENT
Start: 2023-01-05 | End: 2023-01-05 | Stop reason: HOSPADM

## 2023-01-05 RX ORDER — NITROGLYCERIN 5 MG/ML
INJECTION, SOLUTION INTRAVENOUS
Status: DISCONTINUED | OUTPATIENT
Start: 2023-01-05 | End: 2023-01-05 | Stop reason: HOSPADM

## 2023-01-05 RX ORDER — FENTANYL CITRATE 50 UG/ML
INJECTION, SOLUTION INTRAMUSCULAR; INTRAVENOUS
Status: DISCONTINUED | OUTPATIENT
Start: 2023-01-05 | End: 2023-01-05 | Stop reason: HOSPADM

## 2023-01-05 RX ORDER — HEPARIN SODIUM 1000 [USP'U]/ML
INJECTION, SOLUTION INTRAVENOUS; SUBCUTANEOUS
Status: DISCONTINUED | OUTPATIENT
Start: 2023-01-05 | End: 2023-01-05 | Stop reason: HOSPADM

## 2023-01-05 RX ADMIN — LEVOTHYROXINE SODIUM 112 MCG: 0.11 TABLET ORAL at 06:17

## 2023-01-05 RX ADMIN — CETIRIZINE HYDROCHLORIDE 10 MG: 10 TABLET, FILM COATED ORAL at 07:41

## 2023-01-05 RX ADMIN — MEXILETINE HYDROCHLORIDE 150 MG: 150 CAPSULE ORAL at 07:40

## 2023-01-05 RX ADMIN — ATORVASTATIN CALCIUM 20 MG: 20 TABLET, FILM COATED ORAL at 20:42

## 2023-01-05 RX ADMIN — LISINOPRIL 40 MG: 40 TABLET ORAL at 20:42

## 2023-01-05 RX ADMIN — ASPIRIN 325 MG: 325 TABLET ORAL at 07:40

## 2023-01-05 RX ADMIN — METOPROLOL TARTRATE 50 MG: 50 TABLET, FILM COATED ORAL at 07:41

## 2023-01-05 RX ADMIN — MEXILETINE HYDROCHLORIDE 150 MG: 150 CAPSULE ORAL at 16:01

## 2023-01-05 RX ADMIN — AMIODARONE HYDROCHLORIDE 200 MG: 200 TABLET ORAL at 07:41

## 2023-01-05 RX ADMIN — AMLODIPINE BESYLATE 5 MG: 5 TABLET ORAL at 20:42

## 2023-01-05 RX ADMIN — PANTOPRAZOLE SODIUM 40 MG: 40 TABLET, DELAYED RELEASE ORAL at 06:17

## 2023-01-05 RX ADMIN — CHOLECALCIFEROL TAB 125 MCG (5000 UNIT) 5000 UNITS: 125 TAB at 07:42

## 2023-01-05 RX ADMIN — AMIODARONE HYDROCHLORIDE 0.5 MG/MIN: 1.8 INJECTION, SOLUTION INTRAVENOUS at 00:35

## 2023-01-05 RX ADMIN — METOPROLOL TARTRATE 50 MG: 50 TABLET, FILM COATED ORAL at 20:42

## 2023-01-05 NOTE — PROGRESS NOTES
[facility] Medicine Services  INPATIENT PROGRESS NOTE      Length of Stay: 1  Date of Admission: 1/3/2023  Primary Care Physician: José Ruano APRN    Subjective   Chief Complaint: No complaints  HPI:  Seen and examined post heart cath with no concerns during my evaluation. Denies any further ICD discharges or chest pain.     Review of Systems   Constitutional: Positive for activity change. Negative for chills and fever.   HENT: Negative.    Respiratory: Negative for shortness of breath.    Cardiovascular: Negative for chest pain.   Gastrointestinal: Negative for abdominal pain, nausea and vomiting.   Genitourinary: Negative.    Musculoskeletal: Negative.    Skin: Negative.    Neurological: Negative.    Psychiatric/Behavioral: Negative.    All other systems reviewed and are negative.     All pertinent negatives and positives are as above. All other systems have been reviewed and are negative unless otherwise stated.     Objective    As of today 01/05/23  Temp:  [96.1 °F (35.6 °C)-97.6 °F (36.4 °C)] 96.1 °F (35.6 °C)  Heart Rate:  [70-91] 77  Resp:  [16-18] 16  BP: (118-131)/(71-85) 128/72    Physical Exam  Constitutional:       General: She is not in acute distress.     Appearance: She is not toxic-appearing.   HENT:      Head: Normocephalic and atraumatic.      Right Ear: External ear normal.      Left Ear: External ear normal.      Nose: Nose normal.      Mouth/Throat:      Mouth: Mucous membranes are moist.      Pharynx: Oropharynx is clear.   Eyes:      Conjunctiva/sclera: Conjunctivae normal.   Cardiovascular:      Rate and Rhythm: Normal rate and regular rhythm.      Heart sounds: Normal heart sounds.   Pulmonary:      Effort: Pulmonary effort is normal. No respiratory distress.      Breath sounds: Normal breath sounds.   Abdominal:      General: Bowel sounds are normal.      Palpations: Abdomen is soft.      Tenderness: There is no abdominal  tenderness.   Musculoskeletal:         General: No deformity.   Skin:     General: Skin is warm and dry.      Capillary Refill: Capillary refill takes less than 2 seconds.   Neurological:      General: No focal deficit present.      Mental Status: She is alert and oriented to person, place, and time. Mental status is at baseline.   Psychiatric:         Behavior: Behavior normal.         Results Review:  I have reviewed the labs, radiology results, and diagnostic studies.    Laboratory Data:   Results from last 7 days   Lab Units 01/05/23  0629 01/04/23  1257 01/04/23  0708 01/03/23  1726 01/03/23  1441   SODIUM mmol/L 131*  --  130* 134* 136   POTASSIUM mmol/L 4.0 4.5 4.4 4.2 2.9*   CHLORIDE mmol/L 94*  --  95* 97* 92*   CO2 mmol/L 23.0  --  23.0 23.0 22.0   BUN mg/dL 20  --  16 17 18   CREATININE mg/dL 1.05*  --  1.03* 0.94 1.18*   GLUCOSE mg/dL 146*  --  152* 157* 216*   CALCIUM mg/dL 9.5  --  9.8 9.8 9.9   BILIRUBIN mg/dL 0.5  --   --   --  0.6   ALK PHOS U/L 81  --   --   --  84   ALT (SGPT) U/L 20  --   --   --  14   AST (SGOT) U/L 38*  --   --   --  21   ANION GAP mmol/L 14.0  --  12.0 14.0 22.0*     Estimated Creatinine Clearance: 57.2 mL/min (A) (by C-G formula based on SCr of 1.05 mg/dL (H)).  Results from last 7 days   Lab Units 01/04/23  1257 01/04/23  0708 01/03/23  1726   MAGNESIUM mg/dL 2.1 2.2 1.5*         Results from last 7 days   Lab Units 01/05/23  0629 01/03/23  1441   WBC 10*3/mm3 10.88* 11.06*   HEMOGLOBIN g/dL 14.7 15.5   HEMATOCRIT % 45.0 48.6*   PLATELETS 10*3/mm3 350 376           Culture Data:   No results found for: BLOODCX  No results found for: URINECX  No results found for: RESPCX  No results found for: WOUNDCX  No results found for: STOOLCX  No components found for: BODYFLD    Radiology Data:   Imaging Results (Last 24 Hours)     ** No results found for the last 24 hours. **          I have reviewed the patient's current medications.     Assessment/Plan     Principal Problem:     Defibrillator discharge  Active Problems:    Type 2 diabetes mellitus (HCC)    Dilated cardiomyopathy (HCC)    Ventricular tachycardia    Old myocardial infarction    Coronary artery disease involving native coronary artery of native heart without angina pectoris    Plan:  -Continue current recommendations per cardiology  - Heart cath earlier today  - Hold metformin given contrast load  - Continue glucose checks and low-dose sliding scale  - Continue other home medications as appropriate  - DVT prophylaxis Lovenox  - CODE STATUS: Full    Medical Decision Making  Number and Complexity of problems: 1 complex  Differential Diagnosis: None    Conditions and Status:        Condition is improving.     [facility] Data  External documents reviewed: None    Tests considered but not ordered: None      Discussed with: The patient     Treatment Plan  As above    Care Planning  Shared decision making: with the patient  Code status and discussions: Full    Disposition  Social Determinants of Health that impact treatment or disposition: None  I expect the patient to be discharged to home in 1 days.       I have utilized all available immediate resources to obtain, update, or review the patient's current medications (including all prescriptions, over-the-counter products, herbals, cannabis/cannabidiol products, and vitamin/mineral/dietary (nutritional) supplements).   I confirmed that the patient's Advance Care Plan is present, code status is documented, or surrogate decision maker is listed in the patient's medical record.    Discharge Planning: Likely home tomorrow    Jerel Freedman MD

## 2023-01-05 NOTE — PAYOR COMM NOTE
"Roberta Sarahmore  [facility]  Case Management Extender  [phone] phone  [phone] fax      Ref# [id number]    Danae Ngo (62 y.o. Female)     Date of Birth   1960    Social Security Number       Address   [address] [place] [address]    Home Phone   [phone]    MRN   [id number]       Scientologist   Restorationism    Marital Status   Single                            Admission Date   1/3/23    Admission Type   Emergency    Admitting Provider   Jerel Freedman MD    Attending Provider   Jerel Freedman MD    Department, Room/Bed   [address], 306/1       Discharge Date       Discharge Disposition       Discharge Destination                               Attending Provider: Jerel Freedman MD    Allergies: Codeine    Isolation: None   Infection: None   Code Status: CPR    Ht: 162.6 cm (64\")   Wt: 78.9 kg (174 lb)    Admission Cmt: None   Principal Problem: Defibrillator discharge [Z45.02]                 Active Insurance as of 1/3/2023     Primary Coverage     Payor Plan Insurance Group Employer/Plan Group    [facility] BLUE CROSS [employer] EM[employer] [id number]     Payor Plan Address Payor Plan Phone Number Payor Plan Fax Number Effective Dates    [id number] [phone]  1/1/2022 - None Entered    [facility] [id number]       Subscriber Name Subscriber Birth Date Member ID       DANAE NGO 1960 [id number]                 Emergency Contacts      (Rel.) Home Phone Work Phone Mobile Phone    AshleyLatha (Sister) [phone] -- [phone]    zahra ritchie (Relative) -- -- [phone]    Alfredo Ngo (Son) [phone] -- [phone]               History & Physical      Jerel Freedman MD at 01/04/23 1031                  [facility] Medicine Admission      Date of Admission: 1/3/2023      Primary Care Physician: José Ruano, " APRN      Chief Complaint: ICD discharge    HPI: Patient is a 62-year-old female with a past medical history notable for dilated cardiomyopathy and ventricular tachycardia status post ICD implant, hypertension, GERD, and type 2 diabetes mellitus who presented to the emergency department yesterday after her ICD discharged several times.  Patient was found to have electrolyte abnormalities and runs of V. tach.  Patient's ICD was interrogated in the ER and per report no changes were recommended.  Initial plan was for the patient to be transferred to the hospital facility where her primary cardiologist as well due to bed shortage admission was requested to our facility for ongoing care and evaluation.  Patient on my exam denies any current symptoms but does report endorse that she had chest pain yesterday.  She is now having difficulty with shortness of breath.  Patient was already started on amiodarone infusion and being monitored on telemetry.  Patient was also found to have elevated troponin.    Concurrent Medical History:  has a past medical history of Abnormal thyroid function test, Acquired hypothyroidism, Allergic, Allergic rhinitis, Anemia, Breast cyst, Coronary atherosclerosis, Dilated cardiomyopathy (HCC), Dyslipidemia, Esophageal reflux, Essential hypertension, Family history of malignant neoplasm of breast, Fibrocystic disease of breast, GERD (gastroesophageal reflux disease), Herpes zoster, History of chicken pox, Hyperlipidemia, Measles, Mumps, Myocardial infarction (HCC) (), Nasal septal deformity, Obesity, Type 2 diabetes mellitus (HCC), V tach, and Vitamin D deficiency.    Past Surgical History:  has a past surgical history that includes Cardiac catheterization (2002);  section (); endoscopy and colonoscopy (04/15/2008); External ear surgery (08/15/1991); transesophageal echocardiogram ([place]) (2012); Esophagoscopy / EGD (2007); Oophorectomy (); Breast biopsy  (06/11/2010); Cardiac defibrillator placement (Left, 2011); Breast cyst aspiration; and Colonoscopy (N/A, 10/12/2018).    Family History: family history includes Alcohol abuse in her brother; Autism in an other family member; Breast cancer in her mother; Cardiomyopathy in her brother; Coronary artery disease in her brother and another family member; Diabetes in her sister and another family member; Heart attack in her son; Heart disease in her father, maternal grandmother, son, and another family member; Hypertension in her brother, brother, maternal grandmother, sister, sister, sister, and another family member; Other in her mother.  No changes    Social History:  reports that she has never smoked. She has never used smokeless tobacco. She reports that she does not drink alcohol and does not use drugs.    Allergies:   Allergies   Allergen Reactions   • Codeine GI Intolerance       Medications:   Prior to Admission medications    Medication Sig Start Date End Date Taking? Authorizing Provider   amLODIPine (NORVASC) 5 MG tablet Take 1 tablet by mouth every night at bedtime. 9/30/22  Yes José Ruano APRN   aspirin (Aspirin Adult) 325 MG tablet Take 1 tablet by mouth Daily. 12/27/21  Yes José Ruano APRN   atorvastatin (LIPITOR) 20 MG tablet TAKE ONE TABLET BY MOUTH ONCE NIGHTLY 5/12/22  Yes José Ruano APRN   cetirizine (zyrTEC) 10 MG tablet TAKE ONE TABLET BY MOUTH DAILY 11/3/22  Yes José Ruano APRN   Continuous Blood Gluc Sensor (Dexcom G6 Sensor) USE EVERY 10 DAYS AS DIRECTED FOR CONTINUOUS GLUCOSE MONITORING 3/30/22  Yes Abad Nixon MD   Continuous Blood Gluc Transmit (Dexcom G6 Transmitter) misc  11/12/20  Yes Emergency, Nurse Ryann, RN   Continuous Blood Gluc Transmit (Dexcom G6 Transmitter) misc USE AS DIRECTED AND CHANGE EVERY 3 MONTHS. 12/12/22  Yes Herber Galeano APRN   Empagliflozin-metFORMIN HCl ER (Synjardy XR) 12.5-1000 MG tablet sustained-release 24  hour Take 2 tablets by mouth Daily. 8/23/22  Yes Herber Galeano APRN   glucose blood test strip 1 each by Other route 3 (Three) Times a Day. Use as instructed 10/1/21  Yes José Ruano APRN   glucose monitor monitoring kit 1 each 3 (Three) Times a Day. 10/1/21  Yes José Ruano APRN   hydroCHLOROthiazide (HYDRODIURIL) 50 MG tablet TAKE ONE TABLET BY MOUTH DAILY 7/14/22  Yes José Ruano APRN   Lancet Device misc 1 each 2 (Two) Times a Day. 1/29/19  Yes José Ruano APRN   levothyroxine (SYNTHROID, LEVOTHROID) 112 MCG tablet Take 1 tablet by mouth Daily. 8/23/22  Yes Herber Galeano APRN   lisinopril (PRINIVIL,ZESTRIL) 40 MG tablet Take 1 tablet by mouth Daily. 9/30/22  Yes José Ruano APRN   metoprolol succinate XL (TOPROL-XL) 100 MG 24 hr tablet Take 3 tablets by mouth Daily.  Patient taking differently: Take 300 mg by mouth Daily. Half tablet in AM, half tablet in PM 9/30/22  Yes José Ruano APRN   mexiletine (MEXITIL) 150 MG capsule Take 150 mg by mouth Every 8 (Eight) Hours. 11/24/20  Yes Emergency, Nurse Epic, RN   omeprazole (priLOSEC) 40 MG capsule TAKE ONE CAPSULE BY MOUTH DAILY 3/7/22  Yes José Ruano APRN   potassium chloride (K-DUR,KLOR-CON) 10 MEQ CR tablet TAKE TWO TABLETS BY MOUTH DAILY 8/22/22  Yes José Ruano APRN   Semaglutide, 2 MG/DOSE, (Ozempic, 2 MG/DOSE,) 8 MG/3ML solution pen-injector Inject 2 mg under the skin into the appropriate area as directed 1 (One) Time Per Week. 8/23/22  Yes Herber Galeano APRN   vitamin D3 125 MCG (5000 UT) capsule capsule TAKE ONE CAPSULE BY MOUTH DAILY 9/23/22  Yes José Ruano APRN       Review of Systems:  Review of Systems   Constitutional: Positive for activity change. Negative for fatigue.   HENT: Negative for congestion.    Respiratory: Negative for shortness of breath.    Cardiovascular: Positive for chest pain.   Gastrointestinal: Negative.    Genitourinary: Negative.     Musculoskeletal: Negative.    Skin: Negative.    Neurological: Negative.    Psychiatric/Behavioral: Negative.    All other systems reviewed and are negative.     Otherwise complete ROS is negative except as mentioned above.    Physical Exam:   Temp:  [98.1 °F (36.7 °C)] 98.1 °F (36.7 °C)  Heart Rate:  [] 96  Resp:  [16-20] 18  BP: (125-157)/(75-94) 138/87  Physical Exam  Constitutional:       General: She is not in acute distress.     Appearance: She is not toxic-appearing.   HENT:      Head: Normocephalic and atraumatic.      Right Ear: External ear normal.      Left Ear: External ear normal.      Nose: Nose normal.      Mouth/Throat:      Mouth: Mucous membranes are moist.   Eyes:      Conjunctiva/sclera: Conjunctivae normal.   Cardiovascular:      Rate and Rhythm: Normal rate and regular rhythm.      Pulses: Normal pulses.      Heart sounds: Normal heart sounds.   Pulmonary:      Effort: Pulmonary effort is normal. No respiratory distress.      Breath sounds: Normal breath sounds.   Abdominal:      General: Bowel sounds are normal.      Palpations: Abdomen is soft.      Tenderness: There is no abdominal tenderness.   Musculoskeletal:         General: No deformity.   Skin:     General: Skin is warm and dry.      Capillary Refill: Capillary refill takes less than 2 seconds.   Neurological:      General: No focal deficit present.      Mental Status: She is alert and oriented to person, place, and time. Mental status is at baseline.   Psychiatric:         Behavior: Behavior normal.         Thought Content: Thought content normal.           Results Reviewed:  I have personally reviewed current lab, radiology, and data and agree with results.  Lab Results (last 24 hours)     Procedure Component Value Units Date/Time    Extra Tubes [[id number]] Collected: 01/04/23 0712    Specimen: Blood, Venous Line Updated: 01/04/23 0815    Narrative:      The following orders were created for panel order Extra  Tubes.  Procedure                               Abnormality         Status                     ---------                               -----------         ------                     Lavender Top[[id number]]                                     Final result                 Please view results for these tests on the individual orders.    Lavender Top [[id number]] Collected: 01/04/23 0712    Specimen: Blood Updated: 01/04/23 0815     Extra Tube hold for add-on     Comment: Auto resulted       Magnesium [[id number]]  (Normal) Collected: 01/04/23 0708    Specimen: Blood Updated: 01/04/23 0741     Magnesium 2.2 mg/dL     Basic Metabolic Panel [[id number]]  (Abnormal) Collected: 01/04/23 0708    Specimen: Blood Updated: 01/04/23 0739     Glucose 152 mg/dL      BUN 16 mg/dL      Creatinine 1.03 mg/dL      Sodium 130 mmol/L      Potassium 4.4 mmol/L      Chloride 95 mmol/L      CO2 23.0 mmol/L      Calcium 9.8 mg/dL      BUN/Creatinine Ratio 15.5     Anion Gap 12.0 mmol/L      eGFR 62.0 mL/min/1.73      Comment: National Kidney Foundation and American Society of Nephrology (ASN) Task Force recommended calculation based on the Chronic Kidney Disease Epidemiology Collaboration (CKD-EPI) equation refit without adjustment for race.       Narrative:      GFR Normal >60  Chronic Kidney Disease <60  Kidney Failure <15      Troponin [[id number]]  (Abnormal) Collected: 01/04/23 0708    Specimen: Blood Updated: 01/04/23 0739     Troponin T 0.728 ng/mL     Narrative:      Troponin T Reference Range:  <= 0.03 ng/mL-   Negative for AMI  >0.03 ng/mL-     Abnormal for myocardial necrosis.  Clinicians would have to utilize clinical acumen, EKG, Troponin and serial changes to determine if it is an Acute Myocardial Infarction or myocardial injury due to an underlying chronic condition.       Results may be falsely decreased if patient taking Biotin.      Troponin [[id number]]  (Abnormal) Collected: 01/03/23 1726    Specimen: Blood Updated: 01/03/23  1802     Troponin T 1.230 ng/mL     Narrative:      Troponin T Reference Range:  <= 0.03 ng/mL-   Negative for AMI  >0.03 ng/mL-     Abnormal for myocardial necrosis.  Clinicians would have to utilize clinical acumen, EKG, Troponin and serial changes to determine if it is an Acute Myocardial Infarction or myocardial injury due to an underlying chronic condition.       Results may be falsely decreased if patient taking Biotin.      Basic Metabolic Panel [[id number]]  (Abnormal) Collected: 01/03/23 1726    Specimen: Blood Updated: 01/03/23 1802     Glucose 157 mg/dL      BUN 17 mg/dL      Creatinine 0.94 mg/dL      Sodium 134 mmol/L      Potassium 4.2 mmol/L      Chloride 97 mmol/L      CO2 23.0 mmol/L      Calcium 9.8 mg/dL      BUN/Creatinine Ratio 18.1     Anion Gap 14.0 mmol/L      eGFR 69.2 mL/min/1.73      Comment: National Kidney Foundation and American Society of Nephrology (ASN) Task Force recommended calculation based on the Chronic Kidney Disease Epidemiology Collaboration (CKD-EPI) equation refit without adjustment for race.       Narrative:      GFR Normal >60  Chronic Kidney Disease <60  Kidney Failure <15      Magnesium [[id number]]  (Abnormal) Collected: 01/03/23 1726    Specimen: Blood Updated: 01/03/23 1757     Magnesium 1.5 mg/dL     Extra Tubes [[id number]] Collected: 01/03/23 1441    Specimen: Blood, Venous Line Updated: 01/03/23 1311    Narrative:      The following orders were created for panel order Extra Tubes.  Procedure                               Abnormality         Status                     ---------                               -----------         ------                     Gold Top - SST[[id number]]                                   Final result               Light Blue Top[[id number]]                                   Final result                 Please view results for these tests on the individual orders.    Gold Top - SST [[id number]] Collected: 01/03/23 1441    Specimen: Blood  Updated: 01/03/23 1545     Extra Tube Hold for add-ons.     Comment: Auto resulted.       Light Blue Top [[id number]] Collected: 01/03/23 1441    Specimen: Blood Updated: 01/03/23 1545     Extra Tube Hold for add-ons.     Comment: Auto resulted       Troponin [[id number]]  (Abnormal) Collected: 01/03/23 1441    Specimen: Blood Updated: 01/03/23 1529     Troponin T 0.081 ng/mL     Narrative:      Troponin T Reference Range:  <= 0.03 ng/mL-   Negative for AMI  >0.03 ng/mL-     Abnormal for myocardial necrosis.  Clinicians would have to utilize clinical acumen, EKG, Troponin and serial changes to determine if it is an Acute Myocardial Infarction or myocardial injury due to an underlying chronic condition.       Results may be falsely decreased if patient taking Biotin.      BNP [[id number]]  (Normal) Collected: 01/03/23 1441    Specimen: Blood Updated: 01/03/23 1518     proBNP 845.3 pg/mL     Narrative:      Among patients with dyspnea, NT-proBNP is highly sensitive for the detection of acute congestive heart failure. In addition NT-proBNP of <300 pg/ml effectively rules out acute congestive heart failure with 99% negative predictive value.      TSH [[id number]]  (Normal) Collected: 01/03/23 1441    Specimen: Blood Updated: 01/03/23 1518     TSH 1.120 uIU/mL     T4, Free [[id number]]  (Normal) Collected: 01/03/23 1441    Specimen: Blood Updated: 01/03/23 1518     Free T4 1.66 ng/dL     Narrative:      Results may be falsely increased if patient taking Biotin.      Comprehensive Metabolic Panel [[id number]]  (Abnormal) Collected: 01/03/23 1441    Specimen: Blood Updated: 01/03/23 1516     Glucose 216 mg/dL      BUN 18 mg/dL      Creatinine 1.18 mg/dL      Sodium 136 mmol/L      Potassium 2.9 mmol/L      Chloride 92 mmol/L      CO2 22.0 mmol/L      Calcium 9.9 mg/dL      Total Protein 8.7 g/dL      Albumin 4.4 g/dL      ALT (SGPT) 14 U/L      AST (SGOT) 21 U/L      Alkaline Phosphatase 84 U/L      Total Bilirubin 0.6 mg/dL       Globulin 4.3 gm/dL      A/G Ratio 1.0 g/dL      BUN/Creatinine Ratio 15.3     Anion Gap 22.0 mmol/L      eGFR 52.7 mL/min/1.73      Comment: National Kidney Foundation and American Society of Nephrology (ASN) Task Force recommended calculation based on the Chronic Kidney Disease Epidemiology Collaboration (CKD-EPI) equation refit without adjustment for race.       Narrative:      GFR Normal >60  Chronic Kidney Disease <60  Kidney Failure <15      Magnesium [[id number]]  (Abnormal) Collected: 01/03/23 1441    Specimen: Blood Updated: 01/03/23 1516     Magnesium 1.4 mg/dL     CBC & Differential [[id number]]  (Abnormal) Collected: 01/03/23 1441    Specimen: Blood Updated: 01/03/23 1453    Narrative:      The following orders were created for panel order CBC & Differential.  Procedure                               Abnormality         Status                     ---------                               -----------         ------                     CBC Auto Differential[[id number]]        Abnormal            Final result                 Please view results for these tests on the individual orders.    CBC Auto Differential [[id number]]  (Abnormal) Collected: 01/03/23 1441    Specimen: Blood Updated: 01/03/23 1453     WBC 11.06 10*3/mm3      RBC 5.99 10*6/mm3      Hemoglobin 15.5 g/dL      Hematocrit 48.6 %      MCV 81.1 fL      MCH 25.9 pg      MCHC 31.9 g/dL      RDW 14.2 %      RDW-SD 41.3 fl      MPV 10.6 fL      Platelets 376 10*3/mm3      Neutrophil % 70.1 %      Lymphocyte % 20.6 %      Monocyte % 7.7 %      Eosinophil % 0.6 %      Basophil % 0.5 %      Immature Grans % 0.5 %      Neutrophils, Absolute 7.75 10*3/mm3      Lymphocytes, Absolute 2.28 10*3/mm3      Monocytes, Absolute 0.85 10*3/mm3      Eosinophils, Absolute 0.07 10*3/mm3      Basophils, Absolute 0.06 10*3/mm3      Immature Grans, Absolute 0.05 10*3/mm3      nRBC 0.0 /100 WBC         Imaging Results (Last 24 Hours)     Procedure Component Value Units  Date/Time    XR Chest 2 View [[id number]] Collected: 01/03/23 1443     Updated: 01/03/23 1516    Narrative:          PROCEDURE: Chest PA and lateral    REASON FOR EXAM: Defibrillator issues    FINDINGS: Comparison study dated January 3, 2023. Defibrillator  with intact lead extending to right ventricle. Cardiac and  pulmonary vasculature are normal . Lungs are clear. Pleural  spaces are normal . No acute osseous abnormality.      Impression:      Negative chest    Electronically signed by:  Justin Ac MD  1/3/2023 3:13 PM CST  Workstation: [id number]            Assessment:    Active Hospital Problems    Diagnosis    • **Defibrillator discharge    • Type 2 diabetes mellitus (HCC)    • Dilated cardiomyopathy (HCC)    • Ventricular tachycardia              Plan:  -Patient was admitted for ongoing conservation  - Continue to monitor on telemetry  - Cardiology consultation appreciated  - Continue amiodarone infusion and transition to p.o. amiodarone per cardiology recommendations  - Echocardiograms been ordered  - Continue home medications as appropriate  - Monitor glucose with glucose checks and have low-dose sliding scale available if needed  - DVT prophylaxis with Lovenox  - CODE STATUS: Full        Medical Decision Making  Number and Complexity of problems: 2  Differential Diagnosis: None    Conditions and Status:        Condition is improving.     [facility] Data  External documents reviewed: None  My EKG interpretation: Poor quality data and appears to be sinus tachycardia with nonspecific ST-T wave changes  My plain film interpretation: No acute abnormality  Tests considered but not ordered: None     Decision rules/scores evaluated (example [id number], Wells, etc): None     Discussed with: The patient     Treatment Plan  Admit for observation amiodarone therapy, cardiology consultation    Care Planning  Shared decision making: Patient  Code status and discussions: Full code    Disposition  Social Determinants of  Health that impact treatment or disposition: None  I expect the patient to be discharged to home in 1-2 days.     I have utilized all available immediate resources to obtain, update, or review the patient's current medications (including all prescriptions, over-the-counter products, herbals, cannabis/cannabidiol products, and vitamin/mineral/dietary (nutritional) supplements).   I confirmed that the patient's Advance Care Plan is present, code status is documented, or surrogate decision maker is listed in the patient's medical record.    I discussed the patient's findings and my recommendations with: The patient    Jerel Freedman MD            Electronically signed by Jerel Freedman MD at 01/04/23 1845          Emergency Department Notes      Zander Padron DO at 01/03/23 1435          Subjective     Chest Pain  Pain location:  L chest  Pain quality: aching    Pain quality comment:  Shock  Pain radiates to:  Does not radiate  Pain severity:  Moderate  Onset quality:  Sudden  Duration: 1 to 2 seconds.  Timing:  Intermittent  Progression:  Waxing and waning  Chronicity:  New  Context comment:  As of yesterday the patient felt fine.  After waking up today she has had at least 8-10 defibrillator discharges.  Denies medication changes.  Denies recent chest pain or shortness of breath.  Relieved by:  Nothing  Worsened by:  Nothing  Ineffective treatments:  Rest  Associated symptoms: AICD problem, anxiety and palpitations    Associated symptoms: no abdominal pain, no altered mental status, no cough, no diaphoresis, no dizziness, no headache, no shortness of breath and no vomiting        Review of Systems   Constitutional: Negative for diaphoresis.   Respiratory: Negative for cough and shortness of breath.    Cardiovascular: Positive for chest pain and palpitations.   Gastrointestinal: Negative for abdominal pain and vomiting.   Neurological: Negative for dizziness and headaches.       Past Medical History:   Diagnosis  Date   • Abnormal thyroid function test    • Acquired hypothyroidism    • Allergic    • Allergic rhinitis    • Anemia    • Breast cyst    • Coronary atherosclerosis     unspecified type of vessel, native or graft      • Dilated cardiomyopathy (HCC)    • Dyslipidemia    • Esophageal reflux    • Essential hypertension    • Family history of malignant neoplasm of breast    • Fibrocystic disease of breast     Low Mireille score   • GERD (gastroesophageal reflux disease)    • Herpes zoster    • History of chicken pox    • Hyperlipidemia    • Measles    • Mumps    • Myocardial infarction (HCC)    • Nasal septal deformity    • Obesity    • Type 2 diabetes mellitus (HCC)     Uncontrolled   • V tach    • Vitamin D deficiency        Allergies   Allergen Reactions   • Codeine GI Intolerance       Past Surgical History:   Procedure Laterality Date   • BREAST BIOPSY  2010    left breast benign   • BREAST CYST ASPIRATION     • CARDIAC CATHETERIZATION  2002    Left cardiac cath, selective coronary angiography, PTCA to the left anterior descending. Stent placement to the left anterior descending   • CARDIAC DEFIBRILLATOR PLACEMENT Left     Dr. Brunner   •  SECTION      Intrauterine pregnancy, term gestation, cephalopelvic disproportion   • COLONOSCOPY N/A 10/12/2018    Procedure: COLONOSCOPY;  Surgeon: Delroy Miller MD;  Location: [facility] ENDOSCOPY;  Service: General   • ENDOSCOPY AND COLONOSCOPY  04/15/2008    Normal   • ESOPHAGOSCOPY / EGD  2007    With tube-Esophagus appeared normal. Gastritis of the stomach. A biopsy of the stomach was obtained from the stomach. The duodenum appeared normal   • EXTERNAL EAR SURGERY  08/15/1991    Repair biifd ear   • OOPHORECTOMY      Left salpingo-oophorectomy, wedge resection of the right ovary, lysis of adhesions   • TRANSESOPHAGEAL ECHOCARDIOGRAM (LORELEI)  2012    Without color flow-Moderate to severe LV dysfumctional w/ an EF of 30-35%. Wall motion  abnormalities as described above. LV enlargement. Mild aortic insufficiency, mild TR regurg, mild M regurg. mild pulmonary insufficiency       Family History   Problem Relation Age of Onset   • Breast cancer Mother         50's   • Other Mother         CVA   • Coronary artery disease Other    • Diabetes Other    • Heart disease Other    • Hypertension Other    • Autism Other         grandson   • Alcohol abuse Brother    • Cardiomyopathy Brother    • Coronary artery disease Brother    • Heart disease Father    • Hypertension Sister    • Diabetes Sister    • Heart disease Son    • Heart attack Son    • Heart disease Maternal Grandmother    • Hypertension Maternal Grandmother    • Hypertension Sister    • Hypertension Sister    • Hypertension Brother    • Hypertension Brother        Social History     Socioeconomic History   • Marital status: Single   Tobacco Use   • Smoking status: Never   • Smokeless tobacco: Never   Vaping Use   • Vaping Use: Never used   Substance and Sexual Activity   • Alcohol use: No   • Drug use: No   • Sexual activity: Defer     Birth control/protection: Post-menopausal           Objective   Physical Exam  Vitals and nursing note reviewed.   Constitutional:       General: She is not in acute distress.     Appearance: She is obese.      Comments: Twice during the patient's exam and interview she temporarily shared with sudden pain.   HENT:      Nose: Nose normal.   Eyes:      General: No scleral icterus.  Cardiovascular:      Rate and Rhythm: Regular rhythm. Tachycardia present.      Comments: AICD located left upper chest wall.  Pulmonary:      Effort: Pulmonary effort is normal.      Breath sounds: Normal breath sounds.   Abdominal:      General: Abdomen is flat.      Tenderness: There is no abdominal tenderness.   Musculoskeletal:         General: No signs of injury.   Skin:     General: Skin is warm and dry.   Neurological:      Mental Status: She is alert and oriented to person, place, and  time.   Psychiatric:      Comments: Anxiety         Procedures          ED Course  ED Course as of 01/04/23 1013   Tue Jan 03, 2023   1523 Significant hypokalemia and hypomagnesemia possibly contributing to ventricular dysrhythmias. [JV]   1531 Troponin(!!)  Troponin elevated.  Mildly.  Will repeat.  No old values to compare to. [JV]      ED Course User Index  [JV] Zander Padron,                                            Medical Decision Making  After initial evaluation of the patient Case discussed with cardiology on-call Dr. Pham.  We discussed the patient's presentation history physical exam and vital signs.  We discussed the patient's abnormal EKG.  Cardiology recommends a dose of Lopressor 5 mg IV, 50 mg p.o. twice daily of Lopressor, as well as amiodarone loading dose and infusion orders.  He believes the patient should be transferred to her cardiologist facility which in this case is [facility].  Further consultation as needed.  He is in agreement with magnet being placed over the patient's device in the meantime.    On reevaluation cardiac monitor indicates sinus tachycardia with a rate of 120.  No further shocks delivered.  Replacing electrolytes.  We will plan for transfer when labs are fully resulted.  3:24 PM.    Case discussed with [facility] who agrees to accept the patient in a provisional status as they do not currently have a bed.  3:45 PM.  Discussed with patient and family who are agreeable and requesting transfer to this facility which is in keeping with cardiology's recommendations.  Dr. Zhang phone call will take place at the time of bed availability.  I do believe that the patient is stable to wait for bed availability and that there is value specifically in continuity of care for this patient's case.  Therefore we will not attempt transfer to other hospitals.  In the meantime we will continue to monitor potassium and magnesium levels and repeat troponin.  Elevated troponin is  suspected to be secondary to multiple defibrillator discharges however we will trend.    1805 p.m.: Accepting physician in bed still pending at [facility].  The patient is stabilizing.  No further discharges or cardiac dysrhythmias in the ED.  Potassium is normalized.  We will continue to check this.  The patient's magnesium is improving but not yet normalized and so we will add additional supplementation.  The patient is tolerating amiodarone well.  The patient's troponin has up trended significantly.  We will repeat EKG, however, I suspect that her symptoms could be secondary to multiple ICD discharges given that in between discharges she is not feeling any significant chest pain weakness or shortness of breath.    Case signed out to Dr. Robertson pending accepting transfer physician and bed assignment.  I think the patient continues to be stable to await transfer in our emergency department I do not see utility in transferring to another facility.    0830: The patient has been in the department approximately 18 hours.  Currently asymptomatic and vital signs normal.  Case discussed with nurse practitioner for [facility] cardiology group.  We discussed the patient's presentation yesterday and delay in her ability to be transferred.  In the meanwhile the patient's electrolytes have been fully repleted and she has gone 6 hours without a magnet on her chest without any further shocks or arrhythmias.  At this point the patient seems stable and very likely could be seen in the clinic more quickly then she could be transferred unfortunately given the current state of events.  Nurse practitioner will run the case by her attending and will call back with recommendations for disposition.    9:30 AM: Case discussed with nurse practitioner from patient's electrophysiology group.  They do not feel that the patient is appropriate to discharge without a cardiology evaluation.  They do mention that our hospital has access to  electrophysiologist onsite and that the patient may not need to be transferred in the first place if this is available locally.  Otherwise they would recommend continuing to wait for transfer.    9:45 AM: Paged to cardiology on-call Dr. Carrero.  He is currently in the Cath Lab and will have to call back.    10:10 AM: Case discussed with hospitalist service.  They will also attempt to contact Dr. Carrreo to ensure that the patient can be seen today by cardiology so that they will accept the patient for admission.  If for some reason cardiology is not able to see the patient then they would recommend leaving the patient in a transfer disposition.    Defibrillator discharge: acute illness or injury with systemic symptoms  Elevated troponin: acute illness or injury  Hypokalemia: acute illness or injury  Hypomagnesemia: acute illness or injury  Amount and/or Complexity of Data Reviewed  Independent Historian: friend     Details: Provided additional details regarding the patient's recent medical problems.  Labs: ordered. Decision-making details documented in ED Course.  Radiology: ordered.  ECG/medicine tests: ordered.     Details: EKG interpretation: Interpreted by myself.  Sinus rhythm with occasional ventricular tachycardia 8-10 beats.  Nonspecific ST wave changes.    Repeat EKG interpretation at 1809.  Interpreted by myself.  Normal sinus rhythm.  Rate 87.  Normal P wave and TN interval.  Left ventricular hypertrophy.  Left axis deviation.  Normal QTc interval.  ST segments show a single 1 mm ST elevation in lead V2 and nonspecific ST wave changes in lead I and aVL.  These changes are chronic compared to EKG dating to 2018.      Risk  Prescription drug management.  Decision regarding hospitalization.      Critical care time:  90 minutes of critical care provided. This time excludes other billable procedures. Time does include preparation of documents, medical consultations, review of old records, and direct bedside care.  Patient is at high risk for life-threatening deterioration due to cardiovascular system failure secondary to ventricular tachycardia requiring IV beta-blocker and amiodarone, as well as transfer conversations, and multiple electrolyte replacement with repeat lab evaluation and interpretation.     Final diagnoses:   Defibrillator discharge   Elevated troponin   Hypokalemia   Hypomagnesemia       ED Disposition  ED Disposition     ED Disposition   Decision to Admit    Condition   --    Comment   --             No follow-up provider specified.       Medication List      No changes were made to your prescriptions during this visit.          Zander Padron,   01/03/23 1830       Zander Padron,   01/04/23 0839       Zander Padron DO  01/04/23 1014       Zander Padron DO  01/04/23 1019      Electronically signed by Zander Padron DO at 01/04/23 1019     Irene Hunter RN at 01/03/23 2305        Spoke with gee, pt therapy rate set at 130. 43 shocks since Dec. 30th-  ATP 28 times since Dec. 30. Last shock documented at this time was 1443 1/3/2023, started at 1338 today. Single lead ICD, shock box no pacing     Electronically signed by Irene Hunter RN at 01/03/23 2314     Irene Hunter RN at 01/04/23 0452        Pt taking home morning meds. Provider aware.     Electronically signed by Irene Hunter RN at 01/04/23 0452     Doron Recio, RN at 01/04/23 1053          Nursing report ED to floor  Danae Ngo  62 y.o.  female    HPI:   Chief Complaint   Patient presents with   • defibrillator firing       Admitting doctor:   Jerel Freedman MD    Consulting provider(s):  Consults       Date and Time Order Name Status Description    1/4/2023 10:31 AM Inpatient Cardiology Consult               Admitting diagnosis:   The primary encounter diagnosis was Defibrillator discharge. Diagnoses of Elevated troponin, Hypokalemia, and Hypomagnesemia were also pertinent to this visit.    Code status:   Current  Code Status       Date Active Code Status Order ID Comments User Context       Not on file            Allergies:   Codeine    Intake and Output    Intake/Output Summary (Last 24 hours) at 1/4/2023 1053  Last data filed at 1/3/2023 2253  Gross per 24 hour   Intake 450 ml   Output --   Net 450 ml       Weight:       01/03/23  1434   Weight: 82.1 kg (181 lb)       Most recent vitals:   Vitals:    01/04/23 0419 01/04/23 0600 01/04/23 0715 01/04/23 0815   BP: 137/86 125/75 129/87 138/87   BP Location: Left arm      Patient Position: Lying      Pulse:  76 84 96   Resp: 18      Temp:       TempSrc:       SpO2:  96% 100% 97%   Weight:       Height:         Oxygen Therapy: Room air     Active LDAs/IV Access:   Lines, Drains & Airways       Active LDAs       Name Placement date Placement time Site Days    Peripheral IV 01/03/23 1734 Left Antecubital 01/03/23  1734  Antecubital  less than 1                    Labs (abnormal labs have a star):   Labs Reviewed   COMPREHENSIVE METABOLIC PANEL - Abnormal; Notable for the following components:       Result Value    Glucose 216 (*)     Creatinine 1.18 (*)     Potassium 2.9 (*)     Chloride 92 (*)     Total Protein 8.7 (*)     Anion Gap 22.0 (*)     eGFR 52.7 (*)     All other components within normal limits    Narrative:     GFR Normal >60  Chronic Kidney Disease <60  Kidney Failure <15     TROPONIN (IN-HOUSE) - Abnormal; Notable for the following components:    Troponin T 0.081 (*)     All other components within normal limits    Narrative:     Troponin T Reference Range:  <= 0.03 ng/mL-   Negative for AMI  >0.03 ng/mL-     Abnormal for myocardial necrosis.  Clinicians would have to utilize clinical acumen, EKG, Troponin and serial changes to determine if it is an Acute Myocardial Infarction or myocardial injury due to an underlying chronic condition.       Results may be falsely decreased if patient taking Biotin.     MAGNESIUM - Abnormal; Notable for the following components:     Magnesium 1.4 (*)     All other components within normal limits   CBC WITH AUTO DIFFERENTIAL - Abnormal; Notable for the following components:    WBC 11.06 (*)     RBC 5.99 (*)     Hematocrit 48.6 (*)     MCH 25.9 (*)     Neutrophils, Absolute 7.75 (*)     All other components within normal limits   TROPONIN (IN-HOUSE) - Abnormal; Notable for the following components:    Troponin T 1.230 (*)     All other components within normal limits    Narrative:     Troponin T Reference Range:  <= 0.03 ng/mL-   Negative for AMI  >0.03 ng/mL-     Abnormal for myocardial necrosis.  Clinicians would have to utilize clinical acumen, EKG, Troponin and serial changes to determine if it is an Acute Myocardial Infarction or myocardial injury due to an underlying chronic condition.       Results may be falsely decreased if patient taking Biotin.     MAGNESIUM - Abnormal; Notable for the following components:    Magnesium 1.5 (*)     All other components within normal limits   BASIC METABOLIC PANEL - Abnormal; Notable for the following components:    Glucose 157 (*)     Sodium 134 (*)     Chloride 97 (*)     All other components within normal limits    Narrative:     GFR Normal >60  Chronic Kidney Disease <60  Kidney Failure <15     TROPONIN (IN-HOUSE) - Abnormal; Notable for the following components:    Troponin T 0.728 (*)     All other components within normal limits    Narrative:     Troponin T Reference Range:  <= 0.03 ng/mL-   Negative for AMI  >0.03 ng/mL-     Abnormal for myocardial necrosis.  Clinicians would have to utilize clinical acumen, EKG, Troponin and serial changes to determine if it is an Acute Myocardial Infarction or myocardial injury due to an underlying chronic condition.       Results may be falsely decreased if patient taking Biotin.     BASIC METABOLIC PANEL - Abnormal; Notable for the following components:    Glucose 152 (*)     Creatinine 1.03 (*)     Sodium 130 (*)     Chloride 95 (*)     All other components  within normal limits    Narrative:     GFR Normal >60  Chronic Kidney Disease <60  Kidney Failure <15     BNP (IN-HOUSE) - Normal    Narrative:     Among patients with dyspnea, NT-proBNP is highly sensitive for the detection of acute congestive heart failure. In addition NT-proBNP of <300 pg/ml effectively rules out acute congestive heart failure with 99% negative predictive value.     TSH - Normal   T4, FREE - Normal    Narrative:     Results may be falsely increased if patient taking Biotin.     MAGNESIUM - Normal   CBC AND DIFFERENTIAL    Narrative:     The following orders were created for panel order CBC & Differential.  Procedure                               Abnormality         Status                     ---------                               -----------         ------                     CBC Auto Differential[[id number]]        Abnormal            Final result                 Please view results for these tests on the individual orders.   EXTRA TUBES    Narrative:     The following orders were created for panel order Extra Tubes.  Procedure                               Abnormality         Status                     ---------                               -----------         ------                     Gold Top - SST[[id number]]                                   Final result               Light Blue Top[[id number]]                                   Final result                 Please view results for these tests on the individual orders.   GOLD TOP - SST   LIGHT BLUE TOP   EXTRA TUBES    Narrative:     The following orders were created for panel order Extra Tubes.  Procedure                               Abnormality         Status                     ---------                               -----------         ------                     Lavender Top[[id number]]                                     Final result                 Please view results for these tests on the individual orders.   [facility]  given in ED:   Medications   amiodarone in dextrose 5% (NEXTERONE) loading dose 150mg/100mL (0 mg Intravenous Stopped 1/3/23 1558)     Followed by   amiodarone 360 mg in 200 mL D5W infusion (0 mg/min Intravenous Stopped 1/3/23 2253)     Followed by   amiodarone 360 mg in 200 mL D5W infusion (0.5 mg/min Intravenous Currently Infusing 1/4/23 0605)   metoprolol tartrate (LOPRESSOR) tablet 50 mg (50 mg Oral Not Given 1/4/23 1046)   midazolam (VERSED) injection 1 mg (1 mg Intravenous Given 1/3/23 1538)   fentaNYL citrate (PF) (SUBLIMAZE) injection 50 mcg (50 mcg Intravenous Given 1/3/23 1538)   metoprolol tartrate (LOPRESSOR) injection 5 mg (5 mg Intravenous Given 1/3/23 1538)   magnesium sulfate 2g/50 mL (PREMIX) infusion (0 g Intravenous Stopped 1/3/23 2001)   potassium chloride (MICRO-K) CR capsule 40 mEq (40 mEq Oral Given 1/3/23 1546)   potassium chloride 10 mEq in 100 mL IVPB (0 mEq Intravenous Stopped 1/3/23 1834)   magnesium sulfate 2g/50 mL (PREMIX) infusion (0 g Intravenous Stopped 1/4/23 0013)     amiodarone, 0.5 mg/min, Last Rate: 0.5 mg/min (01/04/23 0605)         NIH Stroke Scale:       Isolation/Infection(s):  No active isolations   No active infections     COVID Testing  Collected NA  Resulted NA    Nursing report ED to floor:  Mental status: A&O X4  Ambulatory status: Self  Precautions: Standard    ED nurse phone extentsimn- [phone]     Electronically signed by Doron Recio RN at 01/04/23 1054         Lab Results (last 24 hours)     Procedure Component Value Units Date/Time    POC Glucose Once [[id number]]  (Normal) Collected: 01/05/23 1020    Specimen: Blood Updated: 01/05/23 1047     Glucose 114 mg/dL      Comment: RN NotifiedOperator: [id number] PATRICIO LEESAMeter ID: [id number]       Comprehensive Metabolic Panel [[id number]]  (Abnormal) Collected: 01/05/23 0629    Specimen: Blood Updated: 01/05/23 0717     Glucose 146 mg/dL      BUN 20 mg/dL      Creatinine 1.05 mg/dL      Sodium 131 mmol/L       Potassium 4.0 mmol/L      Chloride 94 mmol/L      CO2 23.0 mmol/L      Calcium 9.5 mg/dL      Total Protein 8.1 g/dL      Albumin 4.1 g/dL      ALT (SGPT) 20 U/L      AST (SGOT) 38 U/L      Alkaline Phosphatase 81 U/L      Total Bilirubin 0.5 mg/dL      Globulin 4.0 gm/dL      A/G Ratio 1.0 g/dL      BUN/Creatinine Ratio 19.0     Anion Gap 14.0 mmol/L      eGFR 60.6 mL/min/1.73      Comment: National Kidney Foundation and American Society of Nephrology (ASN) Task Force recommended calculation based on the Chronic Kidney Disease Epidemiology Collaboration (CKD-EPI) equation refit without adjustment for race.       Narrative:      GFR Normal >60  Chronic Kidney Disease <60  Kidney Failure <15      CBC Auto Differential [[id number]]  (Abnormal) Collected: 01/05/23 0629    Specimen: Blood Updated: 01/05/23 0700     WBC 10.88 10*3/mm3      RBC 5.57 10*6/mm3      Hemoglobin 14.7 g/dL      Hematocrit 45.0 %      MCV 80.8 fL      MCH 26.4 pg      MCHC 32.7 g/dL      RDW 14.2 %      RDW-SD 41.4 fl      MPV 10.3 fL      Platelets 350 10*3/mm3      Neutrophil % 68.5 %      Lymphocyte % 18.2 %      Monocyte % 8.4 %      Eosinophil % 3.6 %      Basophil % 0.7 %      Immature Grans % 0.6 %      Neutrophils, Absolute 7.46 10*3/mm3      Lymphocytes, Absolute 1.98 10*3/mm3      Monocytes, Absolute 0.91 10*3/mm3      Eosinophils, Absolute 0.39 10*3/mm3      Basophils, Absolute 0.08 10*3/mm3      Immature Grans, Absolute 0.06 10*3/mm3      nRBC 0.0 /100 WBC     POC Glucose Once [[id number]]  (Abnormal) Collected: 01/05/23 0624    Specimen: Blood Updated: 01/05/23 0645     Glucose 173 mg/dL      Comment: RN NotifiedOperator: [id number] INO Milligan ID: [id number]       POC Glucose Once [[id number]]  (Abnormal) Collected: 01/04/23 1924    Specimen: Blood Updated: 01/04/23 2006     Glucose 191 mg/dL      Comment: RN NotifiedOperator: [id number] INO Milligan ID: [id number]       POC Glucose Once [[id number]]  (Abnormal)  Collected: 01/04/23 1640    Specimen: Blood Updated: 01/04/23 1659     Glucose 141 mg/dL      Comment: RN NotifiedOperator: [id number] SUAD Jeffries ID: [id number]       Extra Tubes [[id number]] Collected: 01/04/23 1257    Specimen: Blood, Venous Line Updated: 01/04/23 1402    Narrative:      The following orders were created for panel order Extra Tubes.  Procedure                               Abnormality         Status                     ---------                               -----------         ------                     Lavender Top[[id number]]                                     Final result                 Please view results for these tests on the individual orders.    Lavender Top [[id number]] Collected: 01/04/23 1257    Specimen: Blood Updated: 01/04/23 1402     Extra Tube hold for add-on     Comment: Auto resulted       Magnesium [[id number]]  (Normal) Collected: 01/04/23 1257    Specimen: Blood Updated: 01/04/23 1355     Magnesium 2.1 mg/dL     Potassium [[id number]]  (Normal) Collected: 01/04/23 1257    Specimen: Blood Updated: 01/04/23 1355     Potassium 4.5 mmol/L         Imaging Results (Last 24 Hours)     ** No results found for the last 24 hours. **        ECG/EMG Results (last 24 hours)     Procedure Component Value Units Date/Time    ECG 12 Lead Other; defib firing [[id number]] Collected: 01/03/23 1437     Updated: 01/05/23 0941     QT Interval 294 ms      QTC Interval 453 ms     Narrative:      Test Reason : Other~  Blood Pressure :   */*   mmHG  Vent. Rate : 143 BPM     Atrial Rate : 143 BPM     P-R Int : 140 ms          QRS Dur :  88 ms      QT Int : 294 ms       P-R-T Axes :  31 -73  96 degrees     QTc Int : 453 ms    Demand pacemaker, interpretation is based on intrinsic rhythm  Sinus tachycardia with frequent Premature ventricular complexes and Fusion complexes  Possible Left atrial enlargement  Left anterior fascicular block  Septal infarct (cited on or before 31-JUL-2018)  Inferior  injury pattern  ** ** ACUTE MI ** **  Consider right ventricular involvement in acute inferior infarct  Abnormal ECG  When compared with ECG of 31-JUL-2018 13:06,  Significant changes have occurred    Referred By:            Confirmed By:     ECG 12 Lead Other; elevated trop [[id number]] Collected: 01/03/23 1809     Updated: 01/05/23 0942     QT Interval 374 ms      QTC Interval 450 ms     Narrative:      Test Reason : Other~  Blood Pressure :   */*   mmHG  Vent. Rate :  87 BPM     Atrial Rate :  87 BPM     P-R Int : 182 ms          QRS Dur : 112 ms      QT Int : 374 ms       P-R-T Axes :  22 -48  92 degrees     QTc Int : 450 ms    Normal sinus rhythm  Left anterior fascicular block  Voltage criteria for left ventricular hypertrophy  Nonspecific T wave abnormality  Abnormal ECG  When compared with ECG of 03-JAN-2023 14:39,  Significant changes have occurred    Referred By:            Confirmed By:     ECG 12 Lead [[id number]] Collected: 01/05/23 1146     Updated: 01/05/23 1312     QT Interval 434 ms      QTC Interval 471 ms     Narrative:      Test Reason : Post PCI  Blood Pressure :   */*   mmHG  Vent. Rate :  71 BPM     Atrial Rate :  71 BPM     P-R Int : 152 ms          QRS Dur :  94 ms      QT Int : 434 ms       P-R-T Axes :  16 -43 -60 degrees     QTc Int : 471 ms    Sinus rhythm with occasional Premature ventricular complexes  Left axis deviation  Minimal voltage criteria for LVH, may be normal variant  ST & T wave abnormality, consider inferolateral ischemia  Prolonged QT  Abnormal ECG  When compared with ECG of 03-JAN-2023 18:09,  Significant changes have occurred    Referred By:            Confirmed By:           Physician Progress Notes (last 24 hours)  Notes from 01/04/23 1346 through 01/05/23 1346   No notes of this type exist for this encounter.         Medical Student Notes (last 24 hours)  Notes from 01/04/23 1346 through 01/05/23 1346   No notes of this type exist for this encounter.            Consult  "Notes (last 24 hours)      Jose Antonio Carrero MD at 01/05/23 0806      Consult Orders    1. Inpatient Cardiology Consult [[id number]] ordered by Preet Pham MD at 01/04/23 1317               CARDIOVASCULAR CONSULTATION   Jose Antonio Carrero M.D., [facility]                Referring Provider: Dr. Pham  Date and Time of verbal transmission of consultation: 1/4/2023  Consult type: Routine  Reason for Consultation: ICD shocks, elevated serum troponins  Chief complaint ICD shocks    Subjective .     History of present illness:  Danae Ngo is a 62 y.o. female with a known history of CAD s/p PCI several years ago, ischemic cardiomyopathy s/p ICD placement who presented to [facility] emergency room for multiple ICD shocks.  The patient was evaluated by Dr. Pham from cardiac electrophysiology.  According to his note, ICD interrogation suggested that all shocks were appropriate.  The patient denies having any episodes of chest discomfort, palpitations or dyspnea recently.  She felt the device shocking her \"out of the blue\" without any preceding symptoms.  The patient was found to have elevated serum troponin T levels with significant upward and downward trending.  Given the patient's presentation with multiple appropriate ICD shocks, interventional cardiology consultation was requested for consideration of ischemic work-up.  The patient reports having a stent put in her heart sometime around 2002.  Since then, she denies having any further cardiac catheterizations or PCI's.  The patient denies having any recent episodes of dizziness, lightheadedness, presyncope or syncope.    Review of Systems   Constitutional: Negative for chills, fever and weight gain.   HENT: Negative for ear pain, nosebleeds and sore throat.    Eyes: Negative for discharge and pain.   Cardiovascular: Negative for chest pain, irregular heartbeat, leg swelling, near-syncope, palpitations and syncope.   Respiratory: Negative for " cough and hemoptysis.    Endocrine: Negative for polydipsia and polyuria.   Hematologic/Lymphatic: Negative for bleeding problem.   Skin: Negative for dry skin and itching.   Musculoskeletal: Negative for falls, myalgias and neck pain.   Gastrointestinal: Negative for abdominal pain, diarrhea and jaundice.   Genitourinary: Negative for dysuria and hematuria.   Neurological: Negative for difficulty with concentration, focal weakness, loss of balance and seizures.   Psychiatric/Behavioral: Negative for altered mental status, depression and memory loss.   Allergic/Immunologic: Negative for persistent infections.       History  Past Medical History:   Diagnosis Date   • Abnormal thyroid function test    • Acquired hypothyroidism    • Allergic    • Allergic rhinitis    • Anemia    • Breast cyst    • Coronary atherosclerosis     unspecified type of vessel, native or graft      • Dilated cardiomyopathy (HCC)    • Dyslipidemia    • Esophageal reflux    • Essential hypertension    • Family history of malignant neoplasm of breast    • Fibrocystic disease of breast     Low Mireille score   • GERD (gastroesophageal reflux disease)    • Herpes zoster    • History of chicken pox    • Hyperlipidemia    • Measles    • Mumps    • Myocardial infarction (HCC)    • Nasal septal deformity    • Obesity    • Type 2 diabetes mellitus (HCC)     Uncontrolled   • V tach    • Vitamin D deficiency    ,   Past Surgical History:   Procedure Laterality Date   • BREAST BIOPSY  2010    left breast benign   • BREAST CYST ASPIRATION     • CARDIAC CATHETERIZATION  2002    Left cardiac cath, selective coronary angiography, PTCA to the left anterior descending. Stent placement to the left anterior descending   • CARDIAC DEFIBRILLATOR PLACEMENT Left     Dr. Brunner   •  SECTION      Intrauterine pregnancy, term gestation, cephalopelvic disproportion   • COLONOSCOPY N/A 10/12/2018    Procedure: COLONOSCOPY;  Surgeon: Paul  Delroy LINCOLN MD;  Location: [facility] ENDOSCOPY;  Service: General   • ENDOSCOPY AND COLONOSCOPY  04/15/2008    Normal   • ESOPHAGOSCOPY / EGD  11/30/2007    With tube-Esophagus appeared normal. Gastritis of the stomach. A biopsy of the stomach was obtained from the stomach. The duodenum appeared normal   • EXTERNAL EAR SURGERY  08/15/1991    Repair biifd ear   • OOPHORECTOMY  1993    Left salpingo-oophorectomy, wedge resection of the right ovary, lysis of adhesions   • TRANSESOPHAGEAL ECHOCARDIOGRAM (LORELEI)  03/02/2012    Without color flow-Moderate to severe LV dysfumctional w/ an EF of 30-35%. Wall motion abnormalities as described above. LV enlargement. Mild aortic insufficiency, mild TR regurg, mild M regurg. mild pulmonary insufficiency   ,   Family History   Problem Relation Age of Onset   • Breast cancer Mother         50's   • Other Mother         CVA   • Coronary artery disease Other    • Diabetes Other    • Heart disease Other    • Hypertension Other    • Autism Other         grandson   • Alcohol abuse Brother    • Cardiomyopathy Brother    • Coronary artery disease Brother    • Heart disease Father    • Hypertension Sister    • Diabetes Sister    • Heart disease Son    • Heart attack Son    • Heart disease Maternal Grandmother    • Hypertension Maternal Grandmother    • Hypertension Sister    • Hypertension Sister    • Hypertension Brother    • Hypertension Brother    ,   Social History     Tobacco Use   • Smoking status: Never   • Smokeless tobacco: Never   Vaping Use   • Vaping Use: Never used   Substance Use Topics   • Alcohol use: No   • Drug use: No   ,   Medications Prior to Admission   Medication Sig Dispense Refill Last Dose   • amLODIPine (NORVASC) 5 MG tablet Take 1 tablet by mouth every night at bedtime. 90 tablet 3    • aspirin (Aspirin Adult) 325 MG tablet Take 1 tablet by mouth Daily. 60 tablet 2    • atorvastatin (LIPITOR) 20 MG tablet TAKE ONE TABLET BY MOUTH ONCE NIGHTLY 90 tablet 3    •  cetirizine (zyrTEC) 10 MG tablet TAKE ONE TABLET BY MOUTH DAILY 90 tablet 3    • Continuous Blood Gluc Sensor (Dexcom G6 Sensor) USE EVERY 10 DAYS AS DIRECTED FOR CONTINUOUS GLUCOSE MONITORING 3 each 3    • Continuous Blood Gluc Transmit (Dexcom G6 Transmitter) misc       • Continuous Blood Gluc Transmit (Dexcom G6 Transmitter) misc USE AS DIRECTED AND CHANGE EVERY 3 MONTHS. 1 each 3    • Empagliflozin-metFORMIN HCl ER (Synjardy XR) 12.5-1000 MG tablet sustained-release 24 hour Take 2 tablets by mouth Daily. 60 tablet 11    • glucose blood test strip 1 each by Other route 3 (Three) Times a Day. Use as instructed 90 each 12    • glucose monitor monitoring kit 1 each 3 (Three) Times a Day. 1 each 0    • hydroCHLOROthiazide (HYDRODIURIL) 50 MG tablet TAKE ONE TABLET BY MOUTH DAILY 90 tablet 2    • Lancet Device misc 1 each 2 (Two) Times a Day. 1 each 12    • levothyroxine (SYNTHROID, LEVOTHROID) 112 MCG tablet Take 1 tablet by mouth Daily. 90 tablet 3    • lisinopril (PRINIVIL,ZESTRIL) 40 MG tablet Take 1 tablet by mouth Daily. 90 tablet 3    • metoprolol succinate XL (TOPROL-XL) 100 MG 24 hr tablet Take 3 tablets by mouth Daily. (Patient taking differently: Take 300 mg by mouth Daily. Half tablet in AM, half tablet in PM) 270 tablet 3 Patient Taking Differently   • mexiletine (MEXITIL) 150 MG capsule Take 150 mg by mouth Every 8 (Eight) Hours.      • omeprazole (priLOSEC) 40 MG capsule TAKE ONE CAPSULE BY MOUTH DAILY 90 capsule 3    • potassium chloride (K-DUR,KLOR-CON) 10 MEQ CR tablet TAKE TWO TABLETS BY MOUTH DAILY 120 tablet 4    • Semaglutide, 2 MG/DOSE, (Ozempic, 2 MG/DOSE,) 8 MG/3ML solution pen-injector Inject 2 mg under the skin into the appropriate area as directed 1 (One) Time Per Week. 12 pen 3    • vitamin D3 125 MCG (5000 UT) capsule capsule TAKE ONE CAPSULE BY MOUTH DAILY 90 capsule 3     and Allergies:  Codeine    Objective   PHYSICAL EXAM:         Anticoagulants (From admission, onward)    Start      Dose/Rate Route Frequency Ordered Stop    01/04/23 1600  Enoxaparin Sodium (LOVENOX) syringe 40 mg         40 mg Subcutaneous Daily 01/04/23 1428            Vital Sign Min/Max for last 24 hours  Temp  Min: 96.9 °F (36.1 °C)  Max: 97.8 °F (36.6 °C)   BP  Min: 118/76  Max: 148/84   Pulse  Min: 77  Max: 96   Resp  Min: 16  Max: 16   SpO2  Min: 95 %  Max: 99 %   No data recorded   Weight  Min: 78.9 kg (174 lb)  Max: 79.4 kg (175 lb)     [unfilled]  Vitals:    01/04/23 2304 01/05/23 0111 01/05/23 0610 01/05/23 0721   BP: 127/80  131/84    BP Location: Right arm  Right arm    Patient Position: Lying  Lying    Pulse: 77 78 84 77   Resp: 16  16    Temp: 96.9 °F (36.1 °C)      TempSrc: Infrared  Infrared    SpO2: 95%  96%    Weight:   78.9 kg (174 lb)    Height:         [unfilled]  SpO2 Percentage    01/04/23 1923 01/04/23 2304 01/05/23 0610   SpO2: 96% 95% 96%     Body mass index is 29.87 kg/m².   Current Pain Level: None.  Pulse Ox: Normal  on room air  General: Alert, appears stated age and cooperative  .   Body Habitus: Overweight  HEENT: Head: Normocephalic, no lesions.    Neuro: Alert, oriented x3..  JVP: Volume/Pulsation: Normal.  Normal waveforms.   Appropriate inspiratory decrease.   Carotid Exam: No bruit, normal pulsation bilaterally.   Carotid Volume: Normal.     Respirations: No increased work of breathing.   Chest: Normal    Pulmonary: No wheezing heard   Heart rate: Normal.    Heart Rhythm: Regular.     Heart Sounds: S1: Normal.  S2: Normal.  S3: Absent.     Abdomen:   Appearance: Normal.  Palpation: Soft, nontender to palpation, bowel sounds positive in all four quadrants  Extremity: No significant pitting lower extremity edema.       DATA REVIEWED:     EKG. I personally reviewed and interpreted the EKG.  sinus tachycardia, left axis deviation, possible lateral infarct, ventricular pacing on 1/3/2023    ECG/EMG Results (all)     Procedure Component Value Units Date/Time    ECG 12 Lead Other; defib firing  [[id number]] Collected: 01/03/23 1437     Updated: 01/03/23 1812     QT Interval 266 ms      QTC Interval 404 ms     Narrative:      Test Reason : Other~  Blood Pressure :   */*   mmHG  Vent. Rate : 139 BPM     Atrial Rate : 139 BPM     P-R Int : 140 ms          QRS Dur :  92 ms      QT Int : 266 ms       P-R-T Axes :  27 -64  99 degrees     QTc Int : 404 ms    ** Poor data quality, interpretation may be adversely affected  Demand pacemaker, interpretation is based on intrinsic rhythm  Sinus tachycardia with frequent Premature ventricular complexes and Fusion complexes  Left anterior fascicular block  Lateral infarct , new  Inferior injury pattern  ** ** ACUTE MI ** **  Consider right ventricular involvement in acute inferior infarct  Abnormal ECG  When compared with ECG of 31-JUL-2018 13:06,  Significant changes have occurred    Referred By:            Confirmed By:         ---------------------------------------------------  TTE/LORELEI:    -----------------------------------------------------  CXR/Imaging:     Imaging Results (Most Recent)     Procedure Component Value Units Date/Time    XR Chest 2 View [[id number]] Collected: 01/03/23 1443     Updated: 01/03/23 1516    Narrative:          PROCEDURE: Chest PA and lateral    REASON FOR EXAM: Defibrillator issues    FINDINGS: Comparison study dated January 3, 2023. Defibrillator  with intact lead extending to right ventricle. Cardiac and  pulmonary vasculature are normal . Lungs are clear. Pleural  spaces are normal . No acute osseous abnormality.      Impression:      Negative chest    Electronically signed by:  Justin Ac MD  1/3/2023 3:13 PM CST  Workstation: [id number]          -----------------------------------------------------  CT:   XR Chest 2 View    Result Date: 1/3/2023  Negative chest Electronically signed by:  Justin Ac MD  1/3/2023 3:13 PM CST Workstation: [id number]    ----------------------------------------------------  PFTs:    --------------------------------------------------------------------------------------------------  LABS:     The CVD Risk score (Acacia et al., 2008) failed to calculate for the following reasons:    The patient has a prior MI, stroke, CHF, or peripheral vascular disease diagnosis  Lab Results   Component Value Date    GLUCOSE 146 (H) 01/05/2023    BUN 20 01/05/2023    CREATININE 1.05 (H) 01/05/2023    EGFRIFAFRI 57 (L) 09/24/2021    BCR 19.0 01/05/2023    K 4.0 01/05/2023    CO2 23.0 01/05/2023    CALCIUM 9.5 01/05/2023    ALBUMIN 4.1 01/05/2023    LABIL2 1.1 04/25/2019    AST 38 (H) 01/05/2023    ALT 20 01/05/2023       Lab Results   Component Value Date    GLUCOSE 146 (H) 01/05/2023    CALCIUM 9.5 01/05/2023     (L) 01/05/2023    K 4.0 01/05/2023    CO2 23.0 01/05/2023    CL 94 (L) 01/05/2023    BUN 20 01/05/2023    CREATININE 1.05 (H) 01/05/2023    EGFRIFAFRI 57 (L) 09/24/2021    BCR 19.0 01/05/2023    ANIONGAP 14.0 01/05/2023       Lab Results   Component Value Date    WBC 10.88 (H) 01/05/2023    HGB 14.7 01/05/2023    HCT 45.0 01/05/2023    MCV 80.8 01/05/2023     01/05/2023       Lab Results   Component Value Date    CHOL 137 08/17/2022    CHLPL 152 10/27/2016    TRIG 94 08/17/2022    HDL 39 (L) 08/17/2022    LDL 80 08/17/2022       Lab Results   Component Value Date    TSH 1.120 01/03/2023       Lab Results   Component Value Date    TROPONINT 0.728 (C) 01/04/2023       Lab Results   Component Value Date    HGBA1C 7.00 (H) 08/17/2022     No results found for: DDIMER  Lab Results   Component Value Date    ALT 20 01/05/2023     Lab Results   Component Value Date    HGBA1C 7.00 (H) 08/17/2022    HGBA1C 8.20 (H) 03/15/2022    HGBA1C 6.90 (H) 05/13/2021     Lab Results   Component Value Date    MICROALBUR <1.2 08/17/2022    CREATININE 1.05 (H) 01/05/2023     Lab Results   Component Value Date    IRON 59 09/19/2014     No results found for: INR, PROTIME        Assessment & Plan       Coronary  artery disease/elevated serum troponins: The patient has a known history of coronary disease s/p PCI several years ago.  Given her concerning presentation with multiple appropriate ICD shocks per EP evaluation and elevated serum troponins with significant upward and downward trending, recommend definitive evaluation with [facility]/coronary angiography to evaluate for underlying ischemic causes.  I discussed the risks, benefits and alternatives of this procedure in detail with the patient and she opted to proceed after understanding these.    ICD shocks: Being evaluated by Dr. Pham.  The patient appears to be on mexiletine, metoprolol and amiodarone therapy.  Low potassium levels were noted on presentation; this has been corrected.    Ischemic cardiomyopathy: Her volume status appears satisfactory on exam.  She appears to be on metoprolol, Jardiance and lisinopril currently.    C Pre-Op:    Invasive coronary angiography was recommended to the patient.  The patientdenies  bleeding issues. The patient reports use of antiplatelet agents.  The patient denies  CKD. The patient denies  contrast allergy. The patient reports use of diabetes medications.    Pre-Cath Surgical History:     The risks (bleeding, infection, heart attack, stroke, kidney failure, need for urgent open heart bypass surgery, death), benefits (definitive diagnosis and possible treatment) and alternatives (medical therapy) of this procedure were discussed with the patient in detail today and she agreed to proceed after understanding these.    The indications, risks/benefits and alternatives of diagnostic left heart cardiac catheterization, angiography, conscious sedation, and possible blood transfusion were discussed in detail with the patient. The potential complications of 1/2000 chance of death, 1/1000 chance of heart attack or stroke, 1/500 chance of bleeding or clotting of the femoral artery, and 1/500 chance of allergic reaction to contrast were  discussed. We also reviewed possible complications of infection and kidney dysfunction. If PCI were performed and intra-coronary stents indicated, we discussed the details about HARJEET. This included a review of the risks of the infrequent, but relatively higher incidence of late thrombosis with HARJEET. The importance of maintaining a consistent daily regimen of aspirin and an additional anti-platelet agent for as long as directed after implantation was emphasized. No contraindications were found. The patient  appeared to understand and agreed to the above.  -Left heart catheterization, Coronary angiography, Left ventriculography, Intravascular ultrasound, Optical coherence tomography, Flow wire, Balloon Angioplasty, Coronary stent, Iliofemoral angiography, Device closure, femoral artery, Intra-aortic balloon pump, Impella LV assist device implantation, Transvenous pacemaker and Pericardiocentesis    ASA Class: 3  Mallampati Score: 2    Contraindications to DAPT: None        Thank you so much for allowing me to participate and consult on Danae Ngo.      Disposition: Floor          Electronically signed by Jose Antonio Carrero MD at 01/05/23 0863

## 2023-01-05 NOTE — CONSULTS
"CARDIOVASCULAR CONSULTATION   Jose Antonio Carrero M.D., [facility]                Referring Provider: Dr. Pham  Date and Time of verbal transmission of consultation: 1/4/2023  Consult type: Routine  Reason for Consultation: ICD shocks, elevated serum troponins  Chief complaint ICD shocks    Subjective .     History of present illness:  Danae Ngo is a 62 y.o. female with a known history of CAD s/p PCI several years ago, ischemic cardiomyopathy s/p ICD placement who presented to [facility] emergency room for multiple ICD shocks.  The patient was evaluated by Dr. Pham from cardiac electrophysiology.  According to his note, ICD interrogation suggested that all shocks were appropriate.  The patient denies having any episodes of chest discomfort, palpitations or dyspnea recently.  She felt the device shocking her \"out of the blue\" without any preceding symptoms.  The patient was found to have elevated serum troponin T levels with significant upward and downward trending.  Given the patient's presentation with multiple appropriate ICD shocks, interventional cardiology consultation was requested for consideration of ischemic work-up.  The patient reports having a stent put in her heart sometime around 2002.  Since then, she denies having any further cardiac catheterizations or PCI's.  The patient denies having any recent episodes of dizziness, lightheadedness, presyncope or syncope.    Review of Systems   Constitutional: Negative for chills, fever and weight gain.   HENT: Negative for ear pain, nosebleeds and sore throat.    Eyes: Negative for discharge and pain.   Cardiovascular: Negative for chest pain, irregular heartbeat, leg swelling, near-syncope, palpitations and syncope.   Respiratory: Negative for cough and hemoptysis.    Endocrine: Negative for polydipsia and polyuria.   Hematologic/Lymphatic: Negative for bleeding problem.   Skin: Negative for dry skin and itching.   Musculoskeletal: " Negative for falls, myalgias and neck pain.   Gastrointestinal: Negative for abdominal pain, diarrhea and jaundice.   Genitourinary: Negative for dysuria and hematuria.   Neurological: Negative for difficulty with concentration, focal weakness, loss of balance and seizures.   Psychiatric/Behavioral: Negative for altered mental status, depression and memory loss.   Allergic/Immunologic: Negative for persistent infections.       History  Past Medical History:   Diagnosis Date   • Abnormal thyroid function test    • Acquired hypothyroidism    • Allergic    • Allergic rhinitis    • Anemia    • Breast cyst    • Coronary atherosclerosis     unspecified type of vessel, native or graft      • Dilated cardiomyopathy (HCC)    • Dyslipidemia    • Esophageal reflux    • Essential hypertension    • Family history of malignant neoplasm of breast    • Fibrocystic disease of breast     Low Mireille score   • GERD (gastroesophageal reflux disease)    • Herpes zoster    • History of chicken pox    • Hyperlipidemia    • Measles    • Mumps    • Myocardial infarction (HCC)    • Nasal septal deformity    • Obesity    • Type 2 diabetes mellitus (HCC)     Uncontrolled   • V tach    • Vitamin D deficiency    ,   Past Surgical History:   Procedure Laterality Date   • BREAST BIOPSY  2010    left breast benign   • BREAST CYST ASPIRATION     • CARDIAC CATHETERIZATION  2002    Left cardiac cath, selective coronary angiography, PTCA to the left anterior descending. Stent placement to the left anterior descending   • CARDIAC DEFIBRILLATOR PLACEMENT Left     Dr. Brunner   •  SECTION  1984    Intrauterine pregnancy, term gestation, cephalopelvic disproportion   • COLONOSCOPY N/A 10/12/2018    Procedure: COLONOSCOPY;  Surgeon: Delroy Miller MD;  Location: [facility] ENDOSCOPY;  Service: General   • ENDOSCOPY AND COLONOSCOPY  04/15/2008    Normal   • ESOPHAGOSCOPY / EGD  2007    With tube-Esophagus appeared normal.  Gastritis of the stomach. A biopsy of the stomach was obtained from the stomach. The duodenum appeared normal   • EXTERNAL EAR SURGERY  08/15/1991    Repair biifd ear   • OOPHORECTOMY  1993    Left salpingo-oophorectomy, wedge resection of the right ovary, lysis of adhesions   • TRANSESOPHAGEAL ECHOCARDIOGRAM (LORELEI)  03/02/2012    Without color flow-Moderate to severe LV dysfumctional w/ an EF of 30-35%. Wall motion abnormalities as described above. LV enlargement. Mild aortic insufficiency, mild TR regurg, mild M regurg. mild pulmonary insufficiency   ,   Family History   Problem Relation Age of Onset   • Breast cancer Mother         50's   • Other Mother         CVA   • Coronary artery disease Other    • Diabetes Other    • Heart disease Other    • Hypertension Other    • Autism Other         grandson   • Alcohol abuse Brother    • Cardiomyopathy Brother    • Coronary artery disease Brother    • Heart disease Father    • Hypertension Sister    • Diabetes Sister    • Heart disease Son    • Heart attack Son    • Heart disease Maternal Grandmother    • Hypertension Maternal Grandmother    • Hypertension Sister    • Hypertension Sister    • Hypertension Brother    • Hypertension Brother    ,   Social History     Tobacco Use   • Smoking status: Never   • Smokeless tobacco: Never   Vaping Use   • Vaping Use: Never used   Substance Use Topics   • Alcohol use: No   • Drug use: No   ,   Medications Prior to Admission   Medication Sig Dispense Refill Last Dose   • amLODIPine (NORVASC) 5 MG tablet Take 1 tablet by mouth every night at bedtime. 90 tablet 3    • aspirin (Aspirin Adult) 325 MG tablet Take 1 tablet by mouth Daily. 60 tablet 2    • atorvastatin (LIPITOR) 20 MG tablet TAKE ONE TABLET BY MOUTH ONCE NIGHTLY 90 tablet 3    • cetirizine (zyrTEC) 10 MG tablet TAKE ONE TABLET BY MOUTH DAILY 90 tablet 3    • Continuous Blood Gluc Sensor (Dexcom G6 Sensor) USE EVERY 10 DAYS AS DIRECTED FOR CONTINUOUS GLUCOSE MONITORING 3  each 3    • Continuous Blood Gluc Transmit (Dexcom G6 Transmitter) misc       • Continuous Blood Gluc Transmit (Dexcom G6 Transmitter) misc USE AS DIRECTED AND CHANGE EVERY 3 MONTHS. 1 each 3    • Empagliflozin-metFORMIN HCl ER (Synjardy XR) 12.5-1000 MG tablet sustained-release 24 hour Take 2 tablets by mouth Daily. 60 tablet 11    • glucose blood test strip 1 each by Other route 3 (Three) Times a Day. Use as instructed 90 each 12    • glucose monitor monitoring kit 1 each 3 (Three) Times a Day. 1 each 0    • hydroCHLOROthiazide (HYDRODIURIL) 50 MG tablet TAKE ONE TABLET BY MOUTH DAILY 90 tablet 2    • Lancet Device misc 1 each 2 (Two) Times a Day. 1 each 12    • levothyroxine (SYNTHROID, LEVOTHROID) 112 MCG tablet Take 1 tablet by mouth Daily. 90 tablet 3    • lisinopril (PRINIVIL,ZESTRIL) 40 MG tablet Take 1 tablet by mouth Daily. 90 tablet 3    • metoprolol succinate XL (TOPROL-XL) 100 MG 24 hr tablet Take 3 tablets by mouth Daily. (Patient taking differently: Take 300 mg by mouth Daily. Half tablet in AM, half tablet in PM) 270 tablet 3 Patient Taking Differently   • mexiletine (MEXITIL) 150 MG capsule Take 150 mg by mouth Every 8 (Eight) Hours.      • omeprazole (priLOSEC) 40 MG capsule TAKE ONE CAPSULE BY MOUTH DAILY 90 capsule 3    • potassium chloride (K-DUR,KLOR-CON) 10 MEQ CR tablet TAKE TWO TABLETS BY MOUTH DAILY 120 tablet 4    • Semaglutide, 2 MG/DOSE, (Ozempic, 2 MG/DOSE,) 8 MG/3ML solution pen-injector Inject 2 mg under the skin into the appropriate area as directed 1 (One) Time Per Week. 12 pen 3    • vitamin D3 125 MCG (5000 UT) capsule capsule TAKE ONE CAPSULE BY MOUTH DAILY 90 capsule 3     and Allergies:  Codeine    Objective   PHYSICAL EXAM:         Anticoagulants (From admission, onward)    Start     Dose/Rate Route Frequency Ordered Stop    01/04/23 1600  Enoxaparin Sodium (LOVENOX) syringe 40 mg         40 mg Subcutaneous Daily 01/04/23 1428            Vital Sign Min/Max for last 24  hours  Temp  Min: 96.9 °F (36.1 °C)  Max: 97.8 °F (36.6 °C)   BP  Min: 118/76  Max: 148/84   Pulse  Min: 77  Max: 96   Resp  Min: 16  Max: 16   SpO2  Min: 95 %  Max: 99 %   No data recorded   Weight  Min: 78.9 kg (174 lb)  Max: 79.4 kg (175 lb)     [unfilled]  Vitals:    01/04/23 2304 01/05/23 0111 01/05/23 0610 01/05/23 0721   BP: 127/80  131/84    BP Location: Right arm  Right arm    Patient Position: Lying  Lying    Pulse: 77 78 84 77   Resp: 16  16    Temp: 96.9 °F (36.1 °C)      TempSrc: Infrared  Infrared    SpO2: 95%  96%    Weight:   78.9 kg (174 lb)    Height:         [unfilled]  SpO2 Percentage    01/04/23 1923 01/04/23 2304 01/05/23 0610   SpO2: 96% 95% 96%     Body mass index is 29.87 kg/m².   Current Pain Level: None.  Pulse Ox: Normal  on room air  General: Alert, appears stated age and cooperative  .   Body Habitus: Overweight  HEENT: Head: Normocephalic, no lesions.    Neuro: Alert, oriented x3..  JVP: Volume/Pulsation: Normal.  Normal waveforms.   Appropriate inspiratory decrease.   Carotid Exam: No bruit, normal pulsation bilaterally.   Carotid Volume: Normal.     Respirations: No increased work of breathing.   Chest: Normal    Pulmonary: No wheezing heard   Heart rate: Normal.    Heart Rhythm: Regular.     Heart Sounds: S1: Normal.  S2: Normal.  S3: Absent.     Abdomen:   Appearance: Normal.  Palpation: Soft, nontender to palpation, bowel sounds positive in all four quadrants  Extremity: No significant pitting lower extremity edema.       DATA REVIEWED:     EKG. I personally reviewed and interpreted the EKG.  sinus tachycardia, left axis deviation, possible lateral infarct, ventricular pacing on 1/3/2023    ECG/EMG Results (all)     Procedure Component Value Units Date/Time    ECG 12 Lead Other; defib firing [[id number]] Collected: 01/03/23 1437     Updated: 01/03/23 1812     QT Interval 266 ms      QTC Interval 404 ms     Narrative:      Test Reason : Other~  Blood Pressure :   */*   mmHG  Vent.  Rate : 139 BPM     Atrial Rate : 139 BPM     P-R Int : 140 ms          QRS Dur :  92 ms      QT Int : 266 ms       P-R-T Axes :  27 -64  99 degrees     QTc Int : 404 ms    ** Poor data quality, interpretation may be adversely affected  Demand pacemaker, interpretation is based on intrinsic rhythm  Sinus tachycardia with frequent Premature ventricular complexes and Fusion complexes  Left anterior fascicular block  Lateral infarct , new  Inferior injury pattern  ** ** ACUTE MI ** **  Consider right ventricular involvement in acute inferior infarct  Abnormal ECG  When compared with ECG of 31-JUL-2018 13:06,  Significant changes have occurred    Referred By:            Confirmed By:         ---------------------------------------------------  TTE/LORELEI:    -----------------------------------------------------  CXR/Imaging:     Imaging Results (Most Recent)     Procedure Component Value Units Date/Time    XR Chest 2 View [[id number]] Collected: 01/03/23 1443     Updated: 01/03/23 1516    Narrative:          PROCEDURE: Chest PA and lateral    REASON FOR EXAM: Defibrillator issues    FINDINGS: Comparison study dated January 3, 2023. Defibrillator  with intact lead extending to right ventricle. Cardiac and  pulmonary vasculature are normal . Lungs are clear. Pleural  spaces are normal . No acute osseous abnormality.      Impression:      Negative chest    Electronically signed by:  Justin Ac MD  1/3/2023 3:13 PM CST  Workstation: [id number]          -----------------------------------------------------  CT:   XR Chest 2 View    Result Date: 1/3/2023  Negative chest Electronically signed by:  Justin Ac MD  1/3/2023 3:13 PM CST Workstation: [id number]    ----------------------------------------------------  PFTs:   --------------------------------------------------------------------------------------------------  LABS:     The CVD Risk score (Acacia et al., 2008) failed to calculate for the following reasons:    The  patient has a prior MI, stroke, CHF, or peripheral vascular disease diagnosis  Lab Results   Component Value Date    GLUCOSE 146 (H) 01/05/2023    BUN 20 01/05/2023    CREATININE 1.05 (H) 01/05/2023    EGFRIFAFRI 57 (L) 09/24/2021    BCR 19.0 01/05/2023    K 4.0 01/05/2023    CO2 23.0 01/05/2023    CALCIUM 9.5 01/05/2023    ALBUMIN 4.1 01/05/2023    LABIL2 1.1 04/25/2019    AST 38 (H) 01/05/2023    ALT 20 01/05/2023       Lab Results   Component Value Date    GLUCOSE 146 (H) 01/05/2023    CALCIUM 9.5 01/05/2023     (L) 01/05/2023    K 4.0 01/05/2023    CO2 23.0 01/05/2023    CL 94 (L) 01/05/2023    BUN 20 01/05/2023    CREATININE 1.05 (H) 01/05/2023    EGFRIFAFRI 57 (L) 09/24/2021    BCR 19.0 01/05/2023    ANIONGAP 14.0 01/05/2023       Lab Results   Component Value Date    WBC 10.88 (H) 01/05/2023    HGB 14.7 01/05/2023    HCT 45.0 01/05/2023    MCV 80.8 01/05/2023     01/05/2023       Lab Results   Component Value Date    CHOL 137 08/17/2022    CHLPL 152 10/27/2016    TRIG 94 08/17/2022    HDL 39 (L) 08/17/2022    LDL 80 08/17/2022       Lab Results   Component Value Date    TSH 1.120 01/03/2023       Lab Results   Component Value Date    TROPONINT 0.728 (C) 01/04/2023       Lab Results   Component Value Date    HGBA1C 7.00 (H) 08/17/2022     No results found for: DDIMER  Lab Results   Component Value Date    ALT 20 01/05/2023     Lab Results   Component Value Date    HGBA1C 7.00 (H) 08/17/2022    HGBA1C 8.20 (H) 03/15/2022    HGBA1C 6.90 (H) 05/13/2021     Lab Results   Component Value Date    MICROALBUR <1.2 08/17/2022    CREATININE 1.05 (H) 01/05/2023     Lab Results   Component Value Date    IRON 59 09/19/2014     No results found for: INR, PROTIME        Assessment & Plan       Coronary artery disease/elevated serum troponins: The patient has a known history of coronary disease s/p PCI several years ago.  Given her concerning presentation with multiple appropriate ICD shocks per EP evaluation and  elevated serum troponins with significant upward and downward trending, recommend definitive evaluation with [facility]/coronary angiography to evaluate for underlying ischemic causes.  I discussed the risks, benefits and alternatives of this procedure in detail with the patient and she opted to proceed after understanding these.    ICD shocks: Being evaluated by Dr. Pham.  The patient appears to be on mexiletine, metoprolol and amiodarone therapy.  Low potassium levels were noted on presentation; this has been corrected.    Ischemic cardiomyopathy: Her volume status appears satisfactory on exam.  She appears to be on metoprolol, Jardiance and lisinopril currently.    [facility] Pre-Op:    Invasive coronary angiography was recommended to the patient.  The patientdenies  bleeding issues. The patient reports use of antiplatelet agents.  The patient denies  CKD. The patient denies  contrast allergy. The patient reports use of diabetes medications.    Pre-Cath Surgical History:     The risks (bleeding, infection, heart attack, stroke, kidney failure, need for urgent open heart bypass surgery, death), benefits (definitive diagnosis and possible treatment) and alternatives (medical therapy) of this procedure were discussed with the patient in detail today and she agreed to proceed after understanding these.    The indications, risks/benefits and alternatives of diagnostic left heart cardiac catheterization, angiography, conscious sedation, and possible blood transfusion were discussed in detail with the patient. The potential complications of 1/2000 chance of death, 1/1000 chance of heart attack or stroke, 1/500 chance of bleeding or clotting of the femoral artery, and 1/500 chance of allergic reaction to contrast were discussed. We also reviewed possible complications of infection and kidney dysfunction. If PCI were performed and intra-coronary stents indicated, we discussed the details about HARJEET. This included a review of the risks  of the infrequent, but relatively higher incidence of late thrombosis with HARJEET. The importance of maintaining a consistent daily regimen of aspirin and an additional anti-platelet agent for as long as directed after implantation was emphasized. No contraindications were found. The patient  appeared to understand and agreed to the above.  -Left heart catheterization, Coronary angiography, Left ventriculography, Intravascular ultrasound, Optical coherence tomography, Flow wire, Balloon Angioplasty, Coronary stent, Iliofemoral angiography, Device closure, femoral artery, Intra-aortic balloon pump, Impella LV assist device implantation, Transvenous pacemaker and Pericardiocentesis    ASA Class: 3  Mallampati Score: 2    Contraindications to DAPT: None        Thank you so much for allowing me to participate and consult on Danae Ngo.      Disposition: Floor

## 2023-01-06 ENCOUNTER — READMISSION MANAGEMENT (OUTPATIENT)
Dept: CALL CENTER | Facility: HOSPITAL | Age: 63
End: 2023-01-06
Payer: COMMERCIAL

## 2023-01-06 VITALS
OXYGEN SATURATION: 97 % | TEMPERATURE: 97.7 F | RESPIRATION RATE: 16 BRPM | WEIGHT: 174 LBS | DIASTOLIC BLOOD PRESSURE: 68 MMHG | HEART RATE: 77 BPM | HEIGHT: 64 IN | BODY MASS INDEX: 29.71 KG/M2 | SYSTOLIC BLOOD PRESSURE: 119 MMHG

## 2023-01-06 LAB
ANION GAP SERPL CALCULATED.3IONS-SCNC: 13 MMOL/L (ref 5–15)
BUN SERPL-MCNC: 15 MG/DL (ref 8–23)
BUN/CREAT SERPL: 16.1 (ref 7–25)
CALCIUM SPEC-SCNC: 9.2 MG/DL (ref 8.6–10.5)
CHLORIDE SERPL-SCNC: 99 MMOL/L (ref 98–107)
CO2 SERPL-SCNC: 22 MMOL/L (ref 22–29)
CREAT SERPL-MCNC: 0.93 MG/DL (ref 0.57–1)
DEPRECATED RDW RBC AUTO: 40.9 FL (ref 37–54)
EGFRCR SERPLBLD CKD-EPI 2021: 70.1 ML/MIN/1.73
ERYTHROCYTE [DISTWIDTH] IN BLOOD BY AUTOMATED COUNT: 14.3 % (ref 12.3–15.4)
GLUCOSE BLDC GLUCOMTR-MCNC: 139 MG/DL (ref 70–130)
GLUCOSE BLDC GLUCOMTR-MCNC: 146 MG/DL (ref 70–130)
GLUCOSE BLDC GLUCOMTR-MCNC: 159 MG/DL (ref 70–130)
GLUCOSE SERPL-MCNC: 133 MG/DL (ref 65–99)
HCT VFR BLD AUTO: 39.9 % (ref 34–46.6)
HGB BLD-MCNC: 13.5 G/DL (ref 12–15.9)
MCH RBC QN AUTO: 26.8 PG (ref 26.6–33)
MCHC RBC AUTO-ENTMCNC: 33.8 G/DL (ref 31.5–35.7)
MCV RBC AUTO: 79.3 FL (ref 79–97)
PLATELET # BLD AUTO: 318 10*3/MM3 (ref 140–450)
PMV BLD AUTO: 10.1 FL (ref 6–12)
POTASSIUM SERPL-SCNC: 3.7 MMOL/L (ref 3.5–5.2)
RBC # BLD AUTO: 5.03 10*6/MM3 (ref 3.77–5.28)
SODIUM SERPL-SCNC: 134 MMOL/L (ref 136–145)
WBC NRBC COR # BLD: 8.79 10*3/MM3 (ref 3.4–10.8)

## 2023-01-06 PROCEDURE — 82962 GLUCOSE BLOOD TEST: CPT

## 2023-01-06 PROCEDURE — 85027 COMPLETE CBC AUTOMATED: CPT | Performed by: INTERNAL MEDICINE

## 2023-01-06 PROCEDURE — 25010000002 ENOXAPARIN PER 10 MG: Performed by: INTERNAL MEDICINE

## 2023-01-06 PROCEDURE — 80048 BASIC METABOLIC PNL TOTAL CA: CPT | Performed by: INTERNAL MEDICINE

## 2023-01-06 PROCEDURE — 63710000001 INSULIN ASPART PER 5 UNITS: Performed by: INTERNAL MEDICINE

## 2023-01-06 PROCEDURE — 99232 SBSQ HOSP IP/OBS MODERATE 35: CPT | Performed by: INTERNAL MEDICINE

## 2023-01-06 RX ORDER — SODIUM CHLORIDE 0.9 % (FLUSH) 0.9 %
3 SYRINGE (ML) INJECTION EVERY 12 HOURS SCHEDULED
Status: DISCONTINUED | OUTPATIENT
Start: 2023-01-06 | End: 2023-01-06 | Stop reason: HOSPADM

## 2023-01-06 RX ORDER — SODIUM CHLORIDE 0.9 % (FLUSH) 0.9 %
10 SYRINGE (ML) INJECTION AS NEEDED
Status: DISCONTINUED | OUTPATIENT
Start: 2023-01-06 | End: 2023-01-06 | Stop reason: HOSPADM

## 2023-01-06 RX ORDER — SODIUM CHLORIDE 9 MG/ML
50 INJECTION, SOLUTION INTRAVENOUS ONCE
Status: DISCONTINUED | OUTPATIENT
Start: 2023-01-06 | End: 2023-01-06 | Stop reason: HOSPADM

## 2023-01-06 RX ORDER — MAGNESIUM OXIDE 400 MG/1
400 TABLET ORAL DAILY
Qty: 30 TABLET | Refills: 0 | Status: SHIPPED | OUTPATIENT
Start: 2023-01-06

## 2023-01-06 RX ORDER — AMIODARONE HYDROCHLORIDE 200 MG/1
200 TABLET ORAL
Qty: 30 TABLET | Refills: 0 | Status: SHIPPED | OUTPATIENT
Start: 2023-01-07 | End: 2023-02-06

## 2023-01-06 RX ORDER — METOPROLOL SUCCINATE 100 MG/1
300 TABLET, EXTENDED RELEASE ORAL DAILY
Start: 2023-01-06

## 2023-01-06 RX ORDER — SODIUM CHLORIDE 9 MG/ML
40 INJECTION, SOLUTION INTRAVENOUS AS NEEDED
Status: DISCONTINUED | OUTPATIENT
Start: 2023-01-06 | End: 2023-01-06 | Stop reason: HOSPADM

## 2023-01-06 RX ADMIN — CETIRIZINE HYDROCHLORIDE 10 MG: 10 TABLET, FILM COATED ORAL at 08:21

## 2023-01-06 RX ADMIN — Medication 10 ML: at 08:23

## 2023-01-06 RX ADMIN — AMIODARONE HYDROCHLORIDE 200 MG: 200 TABLET ORAL at 08:21

## 2023-01-06 RX ADMIN — MEXILETINE HYDROCHLORIDE 150 MG: 150 CAPSULE ORAL at 08:20

## 2023-01-06 RX ADMIN — ENOXAPARIN SODIUM 40 MG: 40 INJECTION SUBCUTANEOUS at 08:23

## 2023-01-06 RX ADMIN — LEVOTHYROXINE SODIUM 112 MCG: 0.11 TABLET ORAL at 06:03

## 2023-01-06 RX ADMIN — ASPIRIN 325 MG: 325 TABLET ORAL at 08:21

## 2023-01-06 RX ADMIN — CHOLECALCIFEROL TAB 125 MCG (5000 UNIT) 5000 UNITS: 125 TAB at 08:21

## 2023-01-06 RX ADMIN — INSULIN ASPART 2 UNITS: 100 INJECTION, SOLUTION INTRAVENOUS; SUBCUTANEOUS at 12:56

## 2023-01-06 RX ADMIN — PANTOPRAZOLE SODIUM 40 MG: 40 TABLET, DELAYED RELEASE ORAL at 06:03

## 2023-01-06 RX ADMIN — METOPROLOL TARTRATE 50 MG: 50 TABLET, FILM COATED ORAL at 08:21

## 2023-01-06 RX ADMIN — MEXILETINE HYDROCHLORIDE 150 MG: 150 CAPSULE ORAL at 01:33

## 2023-01-06 NOTE — PLAN OF CARE
Goal Outcome Evaluation:  Plan of Care Reviewed With: patient   VS stable, tr band removed and band aid applied to rt radial cath site. Patient is still wearing the wrist immobilizer and educated on the need to wear it for 24 hrs.     Progress: improving

## 2023-01-06 NOTE — PROGRESS NOTES
LOS: 2 days   Patient Care Team:  José Ruano APRN as PCP - General (Nurse Practitioner)  Rodo Holcomb MD as Surgeon (General Surgery)  Charmaine Vanessa MA as Medical Assistant    Chief Complaint: No new complaint or symptoms reported by the     62 years old patient admitted for evaluation of multiple ICD shock all shocks were appropriate.  The patient started on amiodarone and Lopressor 50 mg twice a day and no further recurrent.  The patient was evaluated with Dr. Carrero risk stratify with cardiac catheterization.  Monitor no further tachycardia or PVCs noted.  She continues amiodarone 200 mg patient advised to follow-up with Dr. Brunner at [place]       Review of Systems:     Constitution:  Denies any fatigue, fever or chills.  Positive for activity change    HENT:  Denies any headache, hearing impairment.    Eyes:  Denies any blurring of vision     Cardiovascular:  As per history of present illness.     Respiratory:  Denies any COPD, shortness of breath.       Gastrointestinal:  No nausea, vomiting, or melena.    Extremity: No edema, positive pulses    Objective     Current Facility-Administered Medications   Medication Dose Route Frequency Provider Last Rate Last Admin   • acetaminophen (TYLENOL) tablet 650 mg  650 mg Oral Q4H PRN Jose Antonio Carrero MD        Or   • acetaminophen (TYLENOL) suppository 650 mg  650 mg Rectal Q4H PRN Jose Antonio Carrero MD       • acetaminophen-codeine (TYLENOL #3) 300-30 MG per tablet 1 tablet  1 tablet Oral Q6H PRN Jose Antonio Carrero MD       • aluminum-magnesium hydroxide-simethicone (MAALOX MAX) 400-400-40 MG/5ML suspension 15 mL  15 mL Oral Q6H PRN Jose Antonio Carrero MD       • amiodarone (PACERONE) tablet 200 mg  200 mg Oral Q24H Jose Antonio Carrero MD   200 mg at 01/06/23 0821   • amLODIPine (NORVASC) tablet 5 mg  5 mg Oral Q24H Jose Antonio Carrero MD   5 mg at 01/05/23 2042   • aspirin tablet 325 mg  325 mg Oral Daily Jose Antonio Carrero MD   325 mg at  01/06/23 0821   • atorvastatin (LIPITOR) tablet 20 mg  20 mg Oral Nightly Jose Antonio Carrero MD   20 mg at 01/05/23 2042   • cetirizine (zyrTEC) tablet 10 mg  10 mg Oral Daily Jose Antonio Carrero MD   10 mg at 01/06/23 0821   • dextrose (D50W) (25 g/50 mL) IV injection 25 g  25 g Intravenous Q15 Min PRN Jose Antonio Carrero MD       • dextrose (GLUTOSE) oral gel 15 g  15 g Oral Q15 Min PRN Jose Antonio Carrero MD       • empagliflozin (JARDIANCE) 12.5 mg, metFORMIN ER (GLUCOPHAGE-XR) 1,000 mg for SYNJARDY XR 12.5-1000   Oral Daily Jose Antonio Carrero MD       • Enoxaparin Sodium (LOVENOX) syringe 40 mg  40 mg Subcutaneous Daily Jose Antonio Carrero MD   40 mg at 01/06/23 0823   • glucagon (human recombinant) (GLUCAGEN DIAGNOSTIC) injection 1 mg  1 mg Intramuscular Q15 Min PRN Jose Antonio Carrero MD       • Insulin Aspart (novoLOG) injection 0-7 Units  0-7 Units Subcutaneous TID AC Jose Antonio Carrero MD   2 Units at 01/06/23 1256   • levothyroxine (SYNTHROID, LEVOTHROID) tablet 112 mcg  112 mcg Oral QAM Jose Antonio Carrero MD   112 mcg at 01/06/23 0603   • lisinopril (PRINIVIL,ZESTRIL) tablet 40 mg  40 mg Oral Daily Jose Antonio Carrero MD   40 mg at 01/05/23 2042   • Magnesium Sulfate 2 gram Bolus, followed by 8 gram infusion (total Mg dose 10 grams)- Mg less than or equal to 1mg/dL  2 g Intravenous PRN Jose Antonio Carrero MD        Or   • Magnesium Sulfate 2 gram / 50mL Infusion (GIVE X 3 BAGS TO EQUAL 6GM TOTAL DOSE) - Mg 1.1 - 1.5 mg/dl  2 g Intravenous PRN Jose Antonio Carrero MD        Or   • Magnesium Sulfate 4 gram infusion- Mg 1.6-1.9 mg/dL  4 g Intravenous PRN Jose Antonio Carrero MD       • melatonin tablet 5.25 mg  5.25 mg Oral Nightly PRN Jose Antonio Carrero MD       • metoprolol tartrate (LOPRESSOR) tablet 50 mg  50 mg Oral Q12H Jose Antonio Carrero MD   50 mg at 01/06/23 0821   • mexiletine (MEXITIL) capsule 150 mg  150 mg Oral Q8H Jose Antonio Carrero MD   150 mg at 01/06/23 0820   •  "ondansetron (ZOFRAN) tablet 4 mg  4 mg Oral Q6H PRN Jose Antonio Carrero MD        Or   • ondansetron (ZOFRAN) injection 4 mg  4 mg Intravenous Q6H PRN Jose Antonio Carrero MD       • pantoprazole (PROTONIX) EC tablet 40 mg  40 mg Oral QAM Jose Antonio Carrero MD   40 mg at 01/06/23 0603   • potassium chloride (KLOR-CON) packet 40 mEq  40 mEq Oral PRN Jose Antonio Carrero MD       • potassium chloride (MICRO-K) CR capsule 40 mEq  40 mEq Oral PRN Jose Antonio Carrero MD       • sodium chloride 0.9 % flush 10 mL  10 mL Intravenous Q12H Jose Antonio Carrero MD   10 mL at 01/06/23 0823   • sodium chloride 0.9 % flush 10 mL  10 mL Intravenous PRN Jose Antonio Carrero MD       • sodium chloride 0.9 % infusion 40 mL  40 mL Intravenous PRN Jose Antonio Carrero MD       • vitamin D3 tablet 5,000 Units  5,000 Units Oral Daily Jose Antonio Carrero MD   5,000 Units at 01/06/23 0821       Vital Sign Min/Max for last 24 hours  Temp  Min: 96.8 °F (36 °C)  Max: 97.8 °F (36.6 °C)   BP  Min: 106/63  Max: 135/79   Pulse  Min: 73  Max: 91   Resp  Min: 16  Max: 18   SpO2  Min: 93 %  Max: 98 %   No data recorded   No data recorded     Flowsheet Rows    Flowsheet Row First Filed Value   Admission Height 162.6 cm (64\") Documented at 01/03/2023 1434   Admission Weight 82.1 kg (181 lb) Documented at 01/03/2023 1434            Intake/Output Summary (Last 24 hours) at 1/6/2023 1257  Last data filed at 1/5/2023 1800  Gross per 24 hour   Intake 240 ml   Output --   Net 240 ml       Physical Exam:  Constitutional no apparent distress.  Chest: Air entry equal, normal respiration.  No rhonchi or creps.  Cardiovascular system:  Regular rate and rhythm, no murmurs.  Abdomen: Soft, no tenderness, bowel sounds present, no hepatosplenomegaly.  CNS: Alert, oriented to place and time.  No motor or sensory deficit.  Cranial nerves intact.  Musculoskeletal:.  Pulses equal on both sides.     Results Review:   I reviewed the patient's new clinical " results.  Lab Results   Component Value Date    WBC 8.79 01/06/2023    HGB 13.5 01/06/2023    HCT 39.9 01/06/2023    MCV 79.3 01/06/2023     01/06/2023     Lab Results   Component Value Date    GLUCOSE 133 (H) 01/06/2023    BUN 15 01/06/2023    CREATININE 0.93 01/06/2023    EGFRIFAFRI 57 (L) 09/24/2021    BCR 16.1 01/06/2023    CO2 22.0 01/06/2023    CALCIUM 9.2 01/06/2023    ALBUMIN 4.1 01/05/2023    LABIL2 1.1 04/25/2019    AST 38 (H) 01/05/2023    ALT 20 01/05/2023     Lab Results   Component Value Date    TSH 1.120 01/03/2023     No results found for: INR, PROTIME  No results found for: PTT    EKG:     Telemetry:    Ejection Fraction  No results found for: EF    Echo EF Estimated  No results found for: ECHOEFEST      Present on Admission:  • Ventricular tachycardia  • Dilated cardiomyopathy (HCC)  • Type 2 diabetes mellitus (HCC)    Assessment & Plan   multiple ICD shock (electrical storm)    No further recurrence of ventricular tachycardia or ICD therapy.  Monitor revealed normal sinus rhythm.  She is tolerating amiodarone.  She will continue amiodarone 200 mg a day.  Advised the patient to follow-up with Dr. Brunner at [place]    #2 ischemic cardiomyopathy    Clear she is not in heart failure well perfused with a normal hemodynamic.  Continue beta-blocker continue lisinopril          This document has been electronically signed by Preet Pham MD on January 6, 2023 13:01 CST      Preet Pham MD  01/06/23  12:57 CST      Part of this note may be an electronic transcription/translation of spoken language to printed text using the Dragon Dictation system.

## 2023-01-06 NOTE — PLAN OF CARE
Problem: Adult Inpatient Plan of Care  Goal: Plan of Care Review  Outcome: Ongoing, Progressing  Flowsheets (Taken 1/6/2023 1412)  Progress: no change  Plan of Care Reviewed With: patient  Outcome Evaluation: Patient denies any chest pain today.  Goal: Patient-Specific Goal (Individualized)  Outcome: Ongoing, Progressing  Goal: Absence of Hospital-Acquired Illness or Injury  Outcome: Ongoing, Progressing  Intervention: Identify and Manage Fall Risk  Recent Flowsheet Documentation  Taken 1/6/2023 0928 by Val Ventura RN  Safety Promotion/Fall Prevention: safety round/check completed  Taken 1/6/2023 0825 by Val Ventura RN  Safety Promotion/Fall Prevention: safety round/check completed  Taken 1/6/2023 0715 by Val Ventura RN  Safety Promotion/Fall Prevention: safety round/check completed  Intervention: Prevent Skin Injury  Recent Flowsheet Documentation  Taken 1/6/2023 0928 by Val Ventura RN  Body Position: position changed independently  Taken 1/6/2023 0825 by Val Ventura RN  Body Position:   position changed independently   sitting up in bed  Goal: Optimal Comfort and Wellbeing  Outcome: Ongoing, Progressing  Intervention: Provide Person-Centered Care  Recent Flowsheet Documentation  Taken 1/6/2023 0825 by Val Ventura RN  Trust Relationship/Rapport: care explained  Goal: Readiness for Transition of Care  Outcome: Ongoing, Progressing   Goal Outcome Evaluation:  Plan of Care Reviewed With: patient        Progress: no change  Outcome Evaluation: Patient denies any chest pain today.

## 2023-01-06 NOTE — PLAN OF CARE
Goal Outcome Evaluation:           Progress: improving         Both TR band devices on Rt wrist have been fully deflated at this time. No bleeding at site; site soft, mildly tender, and bruised from hematoma that was addressed on arrival back to [facility]. 2+ palpable pulses. No complaints of chest pain, vss on room air.

## 2023-01-06 NOTE — DISCHARGE SUMMARY
[facility] Medicine Services  DISCHARGE SUMMARY       Date of Admission: 1/3/2023  Date of Discharge:  1/6/2023  Primary Care Physician: José Ruano APRN    Presenting Problem/History of Present Illness:  Hypokalemia [E87.6]  Hypomagnesemia [E83.42]  Elevated troponin [R77.8]  Defibrillator discharge [Z45.02]     Final Discharge Diagnoses:  Active Hospital Problems    Diagnosis    • **Defibrillator discharge    • Coronary artery disease involving native coronary artery of native heart without angina pectoris    • Type 2 diabetes mellitus (HCC)    • Old myocardial infarction    • Dilated cardiomyopathy (HCC)    • Ventricular tachycardia        Consults:   Consults     Date and Time Order Name Status Description    1/4/2023  1:17 PM Inpatient Cardiology Consult Completed     1/4/2023 10:31 AM Inpatient Cardiology Consult Completed           Procedures Performed: Procedure(s):  Left Heart Cath                Pertinent Test Results:   Lab Results (most recent)     Procedure Component Value Units Date/Time    POC Glucose Once [[id number]]  (Abnormal) Collected: 01/06/23 1030    Specimen: Blood Updated: 01/06/23 1100     Glucose 159 mg/dL      Comment: RN NotifiedOperator: [id number] LATIF MELISSAMeter ID: [id number]       POC Glucose Once [[id number]]  (Abnormal) Collected: 01/06/23 0608    Specimen: Blood Updated: 01/06/23 0651     Glucose 146 mg/dL      Comment: RN NotifiedOperator: [id number] MAN YENEYMeter ID: [id number]       Basic Metabolic Panel [[id number]]  (Abnormal) Collected: 01/06/23 0534    Specimen: Blood Updated: 01/06/23 0620     Glucose 133 mg/dL      BUN 15 mg/dL      Creatinine 0.93 mg/dL      Sodium 134 mmol/L      Potassium 3.7 mmol/L      Chloride 99 mmol/L      CO2 22.0 mmol/L      Calcium 9.2 mg/dL      BUN/Creatinine Ratio 16.1     Anion Gap 13.0 mmol/L      eGFR 70.1 mL/min/1.73      Comment: National Kidney Foundation and  American Society of Nephrology (ASN) Task Force recommended calculation based on the Chronic Kidney Disease Epidemiology Collaboration (CKD-EPI) equation refit without adjustment for race.       Narrative:      GFR Normal >60  Chronic Kidney Disease <60  Kidney Failure <15      CBC (No Diff) [[id number]]  (Normal) Collected: 01/06/23 0534    Specimen: Blood Updated: 01/06/23 0602     WBC 8.79 10*3/mm3      RBC 5.03 10*6/mm3      Hemoglobin 13.5 g/dL      Hematocrit 39.9 %      MCV 79.3 fL      MCH 26.8 pg      MCHC 33.8 g/dL      RDW 14.3 %      RDW-SD 40.9 fl      MPV 10.1 fL      Platelets 318 10*3/mm3     Comprehensive Metabolic Panel [[id number]]  (Abnormal) Collected: 01/05/23 0629    Specimen: Blood Updated: 01/05/23 0717     Glucose 146 mg/dL      BUN 20 mg/dL      Creatinine 1.05 mg/dL      Sodium 131 mmol/L      Potassium 4.0 mmol/L      Chloride 94 mmol/L      CO2 23.0 mmol/L      Calcium 9.5 mg/dL      Total Protein 8.1 g/dL      Albumin 4.1 g/dL      ALT (SGPT) 20 U/L      AST (SGOT) 38 U/L      Alkaline Phosphatase 81 U/L      Total Bilirubin 0.5 mg/dL      Globulin 4.0 gm/dL      A/G Ratio 1.0 g/dL      BUN/Creatinine Ratio 19.0     Anion Gap 14.0 mmol/L      eGFR 60.6 mL/min/1.73      Comment: National Kidney Foundation and American Society of Nephrology (ASN) Task Force recommended calculation based on the Chronic Kidney Disease Epidemiology Collaboration (CKD-EPI) equation refit without adjustment for race.       Narrative:      GFR Normal >60  Chronic Kidney Disease <60  Kidney Failure <15      CBC Auto Differential [[id number]]  (Abnormal) Collected: 01/05/23 0629    Specimen: Blood Updated: 01/05/23 0700     WBC 10.88 10*3/mm3      RBC 5.57 10*6/mm3      Hemoglobin 14.7 g/dL      Hematocrit 45.0 %      MCV 80.8 fL      MCH 26.4 pg      MCHC 32.7 g/dL      RDW 14.2 %      RDW-SD 41.4 fl      MPV 10.3 fL      Platelets 350 10*3/mm3      Neutrophil % 68.5 %      Lymphocyte % 18.2 %      Monocyte % 8.4  %      Eosinophil % 3.6 %      Basophil % 0.7 %      Immature Grans % 0.6 %      Neutrophils, Absolute 7.46 10*3/mm3      Lymphocytes, Absolute 1.98 10*3/mm3      Monocytes, Absolute 0.91 10*3/mm3      Eosinophils, Absolute 0.39 10*3/mm3      Basophils, Absolute 0.08 10*3/mm3      Immature Grans, Absolute 0.06 10*3/mm3      nRBC 0.0 /100 WBC     Extra Tubes [[id number]] Collected: 01/04/23 1257    Specimen: Blood, Venous Line Updated: 01/04/23 1402    Narrative:      The following orders were created for panel order Extra Tubes.  Procedure                               Abnormality         Status                     ---------                               -----------         ------                     Lavender Top[[id number]]                                     Final result                 Please view results for these tests on the individual orders.    Lavender Top [[id number]] Collected: 01/04/23 1257    Specimen: Blood Updated: 01/04/23 1402     Extra Tube hold for add-on     Comment: Auto resulted       Magnesium [[id number]]  (Normal) Collected: 01/04/23 1257    Specimen: Blood Updated: 01/04/23 1355     Magnesium 2.1 mg/dL     Potassium [[id number]]  (Normal) Collected: 01/04/23 1257    Specimen: Blood Updated: 01/04/23 1355     Potassium 4.5 mmol/L     Extra Tubes [[id number]] Collected: 01/04/23 0712    Specimen: Blood, Venous Line Updated: 01/04/23 0815    Narrative:      The following orders were created for panel order Extra Tubes.  Procedure                               Abnormality         Status                     ---------                               -----------         ------                     Lavender Top[[id number]]                                     Final result                 Please view results for these tests on the individual orders.    Lavender Top [[id number]] Collected: 01/04/23 0712    Specimen: Blood Updated: 01/04/23 0815     Extra Tube hold for add-on     Comment: Auto resulted        Magnesium [[id number]]  (Normal) Collected: 01/04/23 0708    Specimen: Blood Updated: 01/04/23 0741     Magnesium 2.2 mg/dL     Basic Metabolic Panel [[id number]]  (Abnormal) Collected: 01/04/23 0708    Specimen: Blood Updated: 01/04/23 0739     Glucose 152 mg/dL      BUN 16 mg/dL      Creatinine 1.03 mg/dL      Sodium 130 mmol/L      Potassium 4.4 mmol/L      Chloride 95 mmol/L      CO2 23.0 mmol/L      Calcium 9.8 mg/dL      BUN/Creatinine Ratio 15.5     Anion Gap 12.0 mmol/L      eGFR 62.0 mL/min/1.73      Comment: National Kidney Foundation and American Society of Nephrology (ASN) Task Force recommended calculation based on the Chronic Kidney Disease Epidemiology Collaboration (CKD-EPI) equation refit without adjustment for race.       Narrative:      GFR Normal >60  Chronic Kidney Disease <60  Kidney Failure <15      Troponin [[id number]]  (Abnormal) Collected: 01/04/23 0708    Specimen: Blood Updated: 01/04/23 0739     Troponin T 0.728 ng/mL     Narrative:      Troponin T Reference Range:  <= 0.03 ng/mL-   Negative for AMI  >0.03 ng/mL-     Abnormal for myocardial necrosis.  Clinicians would have to utilize clinical acumen, EKG, Troponin and serial changes to determine if it is an Acute Myocardial Infarction or myocardial injury due to an underlying chronic condition.       Results may be falsely decreased if patient taking Biotin.      Troponin [[id number]]  (Abnormal) Collected: 01/03/23 1726    Specimen: Blood Updated: 01/03/23 1802     Troponin T 1.230 ng/mL     Narrative:      Troponin T Reference Range:  <= 0.03 ng/mL-   Negative for AMI  >0.03 ng/mL-     Abnormal for myocardial necrosis.  Clinicians would have to utilize clinical acumen, EKG, Troponin and serial changes to determine if it is an Acute Myocardial Infarction or myocardial injury due to an underlying chronic condition.       Results may be falsely decreased if patient taking Biotin.      [facility] - SST [[id number]] Collected: 01/03/23 1441     Specimen: Blood Updated: 01/03/23 1545     Extra Tube Hold for add-ons.     Comment: Auto resulted.       Light Blue Top [[id number]] Collected: 01/03/23 1441    Specimen: Blood Updated: 01/03/23 1545     Extra Tube Hold for add-ons.     Comment: Auto resulted       BNP [[id number]]  (Normal) Collected: 01/03/23 1441    Specimen: Blood Updated: 01/03/23 1518     proBNP 845.3 pg/mL     Narrative:      Among patients with dyspnea, NT-proBNP is highly sensitive for the detection of acute congestive heart failure. In addition NT-proBNP of <300 pg/ml effectively rules out acute congestive heart failure with 99% negative predictive value.      TSH [[id number]]  (Normal) Collected: 01/03/23 1441    Specimen: Blood Updated: 01/03/23 1518     TSH 1.120 uIU/mL     T4, Free [[id number]]  (Normal) Collected: 01/03/23 1441    Specimen: Blood Updated: 01/03/23 1518     Free T4 1.66 ng/dL     Narrative:      Results may be falsely increased if patient taking Biotin.      Comprehensive Metabolic Panel [[id number]]  (Abnormal) Collected: 01/03/23 1441    Specimen: Blood Updated: 01/03/23 1516     Glucose 216 mg/dL      BUN 18 mg/dL      Creatinine 1.18 mg/dL      Sodium 136 mmol/L      Potassium 2.9 mmol/L      Chloride 92 mmol/L      CO2 22.0 mmol/L      Calcium 9.9 mg/dL      Total Protein 8.7 g/dL      Albumin 4.4 g/dL      ALT (SGPT) 14 U/L      AST (SGOT) 21 U/L      Alkaline Phosphatase 84 U/L      Total Bilirubin 0.6 mg/dL      Globulin 4.3 gm/dL      A/G Ratio 1.0 g/dL      BUN/Creatinine Ratio 15.3     Anion Gap 22.0 mmol/L      eGFR 52.7 mL/min/1.73      Comment: National Kidney Foundation and American Society of Nephrology (ASN) Task Force recommended calculation based on the Chronic Kidney Disease Epidemiology Collaboration (CKD-EPI) equation refit without adjustment for race.       Narrative:      GFR Normal >60  Chronic Kidney Disease <60  Kidney Failure <15      CBC & Differential [[id number]]  (Abnormal) Collected:  01/03/23 1441    Specimen: Blood Updated: 01/03/23 1453    Narrative:      The following orders were created for panel order CBC & Differential.  Procedure                               Abnormality         Status                     ---------                               -----------         ------                     CBC Auto Differential[[id number]]        Abnormal            Final result                 Please view results for these tests on the individual orders.    CBC Auto Differential [[id number]]  (Abnormal) Collected: 01/03/23 1441    Specimen: Blood Updated: 01/03/23 1453     WBC 11.06 10*3/mm3      RBC 5.99 10*6/mm3      Hemoglobin 15.5 g/dL      Hematocrit 48.6 %      MCV 81.1 fL      MCH 25.9 pg      MCHC 31.9 g/dL      RDW 14.2 %      RDW-SD 41.3 fl      MPV 10.6 fL      Platelets 376 10*3/mm3      Neutrophil % 70.1 %      Lymphocyte % 20.6 %      Monocyte % 7.7 %      Eosinophil % 0.6 %      Basophil % 0.5 %      Immature Grans % 0.5 %      Neutrophils, Absolute 7.75 10*3/mm3      Lymphocytes, Absolute 2.28 10*3/mm3      Monocytes, Absolute 0.85 10*3/mm3      Eosinophils, Absolute 0.07 10*3/mm3      Basophils, Absolute 0.06 10*3/mm3      Immature Grans, Absolute 0.05 10*3/mm3      nRBC 0.0 /100 WBC         Imaging Results (Most Recent)     Procedure Component Value Units Date/Time    XR Chest 2 View [[id number]] Collected: 01/03/23 1443     Updated: 01/03/23 1516    Narrative:          PROCEDURE: Chest PA and lateral    REASON FOR EXAM: Defibrillator issues    FINDINGS: Comparison study dated January 3, 2023. Defibrillator  with intact lead extending to right ventricle. Cardiac and  pulmonary vasculature are normal . Lungs are clear. Pleural  spaces are normal . No acute osseous abnormality.      Impression:      Negative chest    Electronically signed by:  Justin Ac MD  1/3/2023 3:13 PM CST  Workstation: [id number]          Chief Complaint on Day of Discharge: No new complaints    Hospital  "Course:  The patient is a 62 y.o. female with previous medical history notable for diabetes mellitus and ventricular tachycardia status post ICD placement as well as ischemic cardiomyopathy who [facility] to [facility] with [facility] pain and ICD discharge.  Patient was admitted for observation.  She was started on amiodarone infusion.  She did have elevated troponins and cardiology was consulted.  Adjustments to her medications were made per cardiology recommendations.  Patient underwent unsuccessful heart cath during her stay as well.  Patient was otherwise noted to be in stable condition and doing well with no further signs of ICD discharge or further chest pain which was likely felt related to be due to her ICD discharge.  Patient also was noted to have electrolyte abnormalities which were corrected.  Patient was cleared for discharge and will follow up with her primary cardiologist in [facility].  Patient voiced understanding agreement..      Condition on Discharge: Stable    Physical Exam on Discharge:  /68 (BP Location: Right arm, Patient Position: Sitting)   Pulse 77   Temp 97.7 °F (36.5 °C) (Infrared)   Resp 16   Ht 162.6 cm (64\")   Wt 78.9 kg (174 lb)   LMP  (LMP Unknown)   SpO2 97%   BMI 29.87 kg/m²   Physical Exam  Constitutional:       General: She is not in acute distress.     Appearance: She is not toxic-appearing.   HENT:      Head: Normocephalic and atraumatic.      Right Ear: External ear normal.      Left Ear: External ear normal.      Nose: Nose normal.      Mouth/Throat:      Mouth: Mucous membranes are moist.      Pharynx: Oropharynx is clear.   Eyes:      Conjunctiva/sclera: Conjunctivae normal.   Cardiovascular:      Rate and Rhythm: Normal rate and regular rhythm.      Pulses: Normal pulses.      Heart sounds: Normal heart sounds.   Pulmonary:      Effort: Pulmonary effort is normal. No respiratory distress.      Breath sounds: Normal breath sounds. "   Abdominal:      General: Bowel sounds are normal.      Palpations: Abdomen is soft.      Tenderness: There is no abdominal tenderness.   Musculoskeletal:         General: No deformity.   Skin:     General: Skin is warm and dry.      Capillary Refill: Capillary refill takes less than 2 seconds.   Neurological:      General: No focal deficit present.      Mental Status: She is alert and oriented to person, place, and time. Mental status is at baseline.   Psychiatric:         Thought Content: Thought content normal.         Discharge Disposition:  Home or Self Care    Discharge Medications:     Discharge Medications      New Medications      Instructions Start Date   amiodarone 200 MG tablet  Commonly known as: PACERONE   200 mg, Oral, Every 24 Hours Scheduled   Start Date: January 7, 2023     magnesium oxide 400 MG tablet  Commonly known as: MAG-OX   400 mg, Oral, Daily         Continue These Medications      Instructions Start Date   amLODIPine 5 MG tablet  Commonly known as: NORVASC   5 mg, Oral, Every Night at Bedtime      aspirin 325 MG tablet  Commonly known as: Aspirin Adult   325 mg, Oral, Daily      atorvastatin 20 MG tablet  Commonly known as: LIPITOR   TAKE ONE TABLET BY MOUTH ONCE NIGHTLY      cetirizine 10 MG tablet  Commonly known as: zyrTEC   TAKE ONE TABLET BY MOUTH DAILY      Dexcom G6 Sensor   USE EVERY 10 DAYS AS DIRECTED FOR CONTINUOUS GLUCOSE MONITORING      Dexcom G6 Transmitter misc   No dose, route, or frequency recorded.      Dexcom G6 Transmitter misc   USE AS DIRECTED AND CHANGE EVERY 3 MONTHS.      glucose blood test strip   1 each, Other, 3 Times Daily, Use as instructed      glucose monitor monitoring kit   1 each, Does not apply, 3 Times Daily      Lancet Device misc   1 each, Does not apply, 2 Times Daily      levothyroxine 112 MCG tablet  Commonly known as: SYNTHROID, LEVOTHROID   112 mcg, Oral, Daily      lisinopril 40 MG tablet  Commonly known as: PRINIVIL,ZESTRIL   40 mg, Oral,  Daily      metoprolol succinate  MG 24 hr tablet  Commonly known as: TOPROL-XL   300 mg, Oral, Daily, Half tablet in AM, half tablet in PM      mexiletine 150 MG capsule  Commonly known as: MEXITIL   150 mg, Oral, Every 8 Hours      omeprazole 40 MG capsule  Commonly known as: priLOSEC   TAKE ONE CAPSULE BY MOUTH DAILY      Ozempic (2 MG/DOSE) 8 MG/3ML solution pen-injector  Generic drug: Semaglutide (2 MG/DOSE)   2 mg, Subcutaneous, Weekly      potassium chloride 10 MEQ CR tablet  Commonly known as: K-DUR,KLOR-CON   TAKE TWO TABLETS BY MOUTH DAILY      Synjardy XR 12.5-1000 MG tablet sustained-release 24 hour  Generic drug: Empagliflozin-metFORMIN HCl ER   2 tablets, Oral, Daily      vitamin D3 125 MCG (5000 UT) capsule capsule   TAKE ONE CAPSULE BY MOUTH DAILY         Stop These Medications    hydroCHLOROthiazide 50 MG tablet  Commonly known as: HYDRODIURIL            Discharge Diet:   Diet Instructions     Diet: Consistent Carbohydrate, Cardiac      Discharge Diet:  Consistent Carbohydrate  Cardiac             Activity at Discharge:   Activity Instructions     Activity as Tolerated            Discharge Care Plan/Instructions: Take medications as prescribed, follow-up with PCP and cardiology, and return for worsening symptoms.    Follow-up Appointments:   Future Appointments   Date Time Provider Department Center   1/13/2023  2:00 PM José Ruano APRN [facility] [address]   3/31/2023 10:30 AM José Ruano APRN [facility] [address]   4/27/2023  9:30 AM Herber Galeano APRN [facility]   9/20/2023  9:30 AM [facility]   9/27/2023  9:30 AM Rodo Holcomb MD [facility] None       Test Results Pending at Discharge: None    The patient has current or prior documentation of left ventricular ejection fraction (LVEF) less than or equal to 40%, or moderate or severely depressed left ventricular systolic function. ; The patient was prescribed or already taking an  Angiotensin-Converting Enzyme (ACE) Inhibitor or Angiotensin Receptor Blocker (ARB) and The patient was prescribed or already taking a beta-blocker.    Jreel Freedman MD    Time: 34 minutes

## 2023-01-07 LAB
QT INTERVAL: 294 MS
QT INTERVAL: 374 MS
QTC INTERVAL: 450 MS
QTC INTERVAL: 453 MS

## 2023-01-07 NOTE — OUTREACH NOTE
Prep Survey    Flowsheet Row Responses   [facility] patient discharged from? [place]   Is LACE score < 7 ? No   Eligibility [facility]   Date of Admission 01/03/23   Date of Discharge 01/06/23   Discharge Disposition Home or Self Care   Discharge diagnosis CARDIAC CATHETERIZATION  Defibrillator discharge  Ventricular tachycardia   Does the patient have one of the following disease processes/diagnoses(primary or secondary)? Other   Does the patient have Home health ordered? No   Is there a DME ordered? No   Prep survey completed? Yes          YARON KEATING - Registered Nurse

## 2023-01-08 LAB
QT INTERVAL: 440 MS
QTC INTERVAL: 479 MS

## 2023-01-09 ENCOUNTER — TRANSITIONAL CARE MANAGEMENT TELEPHONE ENCOUNTER (OUTPATIENT)
Dept: CALL CENTER | Facility: HOSPITAL | Age: 63
End: 2023-01-09
Payer: COMMERCIAL

## 2023-01-09 NOTE — OUTREACH NOTE
Call Center TCM Note    Flowsheet Row Responses   [facility] patient discharged from? [place]   Does the patient have one of the following disease processes/diagnoses(primary or secondary)? Other   TCM attempt successful? Yes   Call start time 1353   Call end time 1355   Discharge diagnosis CARDIAC CATHETERIZATION  Defibrillator discharge  Ventricular tachycardia   Meds reviewed with patient/caregiver? Yes   Is the patient having any side effects they believe may be caused by any medication additions or changes? No   Does the patient have all medications ordered at discharge? Yes   Is the patient taking all medications as directed (includes completed medication regime)? Yes   Comments PCP FOLLOW UP APPOINTMENT IS 1/13/23@200pm   Does the patient have an appointment with their PCP within 7 days of discharge? Yes   Has home health visited the patient within 72 hours of discharge? N/A   Psychosocial issues? No   Did the patient receive a copy of their discharge instructions? Yes   Nursing interventions Reviewed instructions with patient   What is the patient's perception of their health status since discharge? Improving  [Call brief--pt denies CP, palpitations and no issues with AICD.  NO questions or concerns]   TCM call completed? Yes   Call end time 1355          Shavonne Parkinson RN    1/9/2023, 13:56 CST

## 2023-01-09 NOTE — OUTREACH NOTE
Call Center TCM Note    Flowsheet Row Responses   [facility] patient discharged from? [place]   Does the patient have one of the following disease processes/diagnoses(primary or secondary)? Other   TCM attempt successful? No  [verbal release ]   Unsuccessful attempts Attempt 1          Shavonne Parkinson RN    2023, 10:50 CST

## 2023-01-09 NOTE — PAYOR COMM NOTE
"Roberta Bang  [facility]  Case Management Extender  [phone] phone  [phone] fax      Auth# [id number]    Danae Ngo (62 y.o. Female)     Date of Birth   1960    Social Security Number       Address   [address] [place] [address]    Home Phone   [phone]    MRN   [id number]       Anabaptism   Yazdanism    Marital Status   Single                            Admission Date   1/3/23    Admission Type   Emergency    Admitting Provider   Jerel Freedman MD    Attending Provider       Department, Room/Bed   [address], 306/1       Discharge Date   1/6/2023    Discharge Disposition   Home or Self Care    Discharge Destination                               Attending Provider: (none)   Allergies: Codeine    Isolation: None   Infection: None   Code Status: Prior    Ht: 162.6 cm (64\")   Wt: 78.9 kg (174 lb)    Admission Cmt: None   Principal Problem: Defibrillator discharge [Z45.02]                 Active Insurance as of 1/3/2023     Primary Coverage     Payor Plan Insurance Group Employer/Plan Group    [facility] BL[employer] CROSS [employer] [employer] EMPLOYEE [id number]     Payor Plan Address Payor Plan Phone Number Payor Plan Fax Number Effective Dates    [phone]  1/1/2022 - None Entered    [facility] [id number]       Subscriber Name Subscriber Birth Date Member ID       DANAE NGO 1960 [id number]                 Emergency Contacts      (Rel.) Home Phone Work Phone Mobile Phone    Latha Ramirez (Sister) [phone] -- [phone]    zahra ritchie (Relative) -- -- [phone]    Alfredo Ngo (Son) [phone] -- [phone]               Discharge Summary      Jerel Freedman MD at 01/06/23 [facility]3              [facility] Medicine Services  DISCHARGE SUMMARY       Date of Admission: 1/3/2023  Date of Discharge:  1/6/2023  Primary Care " Physician: José Ruano APRN    Presenting Problem/History of Present Illness:  Hypokalemia [E87.6]  Hypomagnesemia [E83.42]  Elevated troponin [R77.8]  Defibrillator discharge [Z45.02]     Final Discharge Diagnoses:  Active Hospital Problems    Diagnosis    • **Defibrillator discharge    • Coronary artery disease involving native coronary artery of native heart without angina pectoris    • Type 2 diabetes mellitus (HCC)    • Old myocardial infarction    • Dilated cardiomyopathy (HCC)    • Ventricular tachycardia        Consults:   Consults     Date and Time Order Name Status Description    1/4/2023  1:17 PM Inpatient Cardiology Consult Completed     1/4/2023 10:31 AM Inpatient Cardiology Consult Completed           Procedures Performed: Procedure(s):  Left Heart Cath                Pertinent Test Results:   Lab Results (most recent)     Procedure Component Value Units Date/Time    POC Glucose Once [[id number]]  (Abnormal) Collected: 01/06/23 1030    Specimen: Blood Updated: 01/06/23 1100     Glucose 159 mg/dL      Comment: RN NotifiedOperator: [id number] LATIF MELISSAMeter ID: [id number]       POC Glucose Once [[id number]]  (Abnormal) Collected: 01/06/23 0608    Specimen: Blood Updated: 01/06/23 0651     Glucose 146 mg/dL      Comment: RN NotifiedOperator: [id number] MAN COURTNEYMeter ID: [id number]       Basic Metabolic Panel [[id number]]  (Abnormal) Collected: 01/06/23 0534    Specimen: Blood Updated: 01/06/23 0620     Glucose 133 mg/dL      BUN 15 mg/dL      Creatinine 0.93 mg/dL      Sodium 134 mmol/L      Potassium 3.7 mmol/L      Chloride 99 mmol/L      CO2 22.0 mmol/L      Calcium 9.2 mg/dL      BUN/Creatinine Ratio 16.1     Anion Gap 13.0 mmol/L      eGFR 70.1 mL/min/1.73      Comment: National Kidney Foundation and American Society of Nephrology (ASN) Task Force recommended calculation based on the Chronic Kidney Disease Epidemiology Collaboration (CKD-EPI) equation refit without adjustment for  race.       Narrative:      GFR Normal >60  Chronic Kidney Disease <60  Kidney Failure <15      CBC (No Diff) [[id number]]  (Normal) Collected: 01/06/23 0534    Specimen: Blood Updated: 01/06/23 0602     WBC 8.79 10*3/mm3      RBC 5.03 10*6/mm3      Hemoglobin 13.5 g/dL      Hematocrit 39.9 %      MCV 79.3 fL      MCH 26.8 pg      MCHC 33.8 g/dL      RDW 14.3 %      RDW-SD 40.9 fl      MPV 10.1 fL      Platelets 318 10*3/mm3     Comprehensive Metabolic Panel [[id number]]  (Abnormal) Collected: 01/05/23 0629    Specimen: Blood Updated: 01/05/23 0717     Glucose 146 mg/dL      BUN 20 mg/dL      Creatinine 1.05 mg/dL      Sodium 131 mmol/L      Potassium 4.0 mmol/L      Chloride 94 mmol/L      CO2 23.0 mmol/L      Calcium 9.5 mg/dL      Total Protein 8.1 g/dL      Albumin 4.1 g/dL      ALT (SGPT) 20 U/L      AST (SGOT) 38 U/L      Alkaline Phosphatase 81 U/L      Total Bilirubin 0.5 mg/dL      Globulin 4.0 gm/dL      A/G Ratio 1.0 g/dL      BUN/Creatinine Ratio 19.0     Anion Gap 14.0 mmol/L      eGFR 60.6 mL/min/1.73      Comment: National Kidney Foundation and American Society of Nephrology (ASN) Task Force recommended calculation based on the Chronic Kidney Disease Epidemiology Collaboration (CKD-EPI) equation refit without adjustment for race.       Narrative:      GFR Normal >60  Chronic Kidney Disease <60  Kidney Failure <15      CBC Auto Differential [[id number]]  (Abnormal) Collected: 01/05/23 0629    Specimen: Blood Updated: 01/05/23 0700     WBC 10.88 10*3/mm3      RBC 5.57 10*6/mm3      Hemoglobin 14.7 g/dL      Hematocrit 45.0 %      MCV 80.8 fL      MCH 26.4 pg      MCHC 32.7 g/dL      RDW 14.2 %      RDW-SD 41.4 fl      MPV 10.3 fL      Platelets 350 10*3/mm3      Neutrophil % 68.5 %      Lymphocyte % 18.2 %      Monocyte % 8.4 %      Eosinophil % 3.6 %      Basophil % 0.7 %      Immature Grans % 0.6 %      Neutrophils, Absolute 7.46 10*3/mm3      Lymphocytes, Absolute 1.98 10*3/mm3      Monocytes, Absolute  0.91 10*3/mm3      Eosinophils, Absolute 0.39 10*3/mm3      Basophils, Absolute 0.08 10*3/mm3      Immature Grans, Absolute 0.06 10*3/mm3      nRBC 0.0 /100 WBC     Extra Tubes [[id number]] Collected: 01/04/23 1257    Specimen: Blood, Venous Line Updated: 01/04/23 1402    Narrative:      The following orders were created for panel order Extra Tubes.  Procedure                               Abnormality         Status                     ---------                               -----------         ------                     Lavender Top[[id number]]                                     Final result                 Please view results for these tests on the individual orders.    Lavender Top [[id number]] Collected: 01/04/23 1257    Specimen: Blood Updated: 01/04/23 1402     Extra Tube hold for add-on     Comment: Auto resulted       Magnesium [[id number]]  (Normal) Collected: 01/04/23 1257    Specimen: Blood Updated: 01/04/23 1355     Magnesium 2.1 mg/dL     Potassium [[id number]]  (Normal) Collected: 01/04/23 1257    Specimen: Blood Updated: 01/04/23 1355     Potassium 4.5 mmol/L     Extra Tubes [[id number]] Collected: 01/04/23 0712    Specimen: Blood, Venous Line Updated: 01/04/23 0815    Narrative:      The following orders were created for panel order Extra Tubes.  Procedure                               Abnormality         Status                     ---------                               -----------         ------                     Lavender Top[[id number]]                                     Final result                 Please view results for these tests on the individual orders.    Lavender Top [[id number]] Collected: 01/04/23 0712    Specimen: Blood Updated: 01/04/23 0815     Extra Tube hold for add-on     Comment: Auto resulted       Magnesium [[id number]]  (Normal) Collected: 01/04/23 0708    Specimen: Blood Updated: 01/04/23 0741     Magnesium 2.2 mg/dL     Basic Metabolic Panel [[id number]]  (Abnormal) Collected:  01/04/23 0708    Specimen: Blood Updated: 01/04/23 0739     Glucose 152 mg/dL      BUN 16 mg/dL      Creatinine 1.03 mg/dL      Sodium 130 mmol/L      Potassium 4.4 mmol/L      Chloride 95 mmol/L      CO2 23.0 mmol/L      Calcium 9.8 mg/dL      BUN/Creatinine Ratio 15.5     Anion Gap 12.0 mmol/L      eGFR 62.0 mL/min/1.73      Comment: National Kidney Foundation and American Society of Nephrology (ASN) Task Force recommended calculation based on the Chronic Kidney Disease Epidemiology Collaboration (CKD-EPI) equation refit without adjustment for race.       Narrative:      GFR Normal >60  Chronic Kidney Disease <60  Kidney Failure <15      Troponin [[id number]]  (Abnormal) Collected: 01/04/23 0708    Specimen: Blood Updated: 01/04/23 0739     Troponin T 0.728 ng/mL     Narrative:      Troponin T Reference Range:  <= 0.03 ng/mL-   Negative for AMI  >0.03 ng/mL-     Abnormal for myocardial necrosis.  Clinicians would have to utilize clinical acumen, EKG, Troponin and serial changes to determine if it is an Acute Myocardial Infarction or myocardial injury due to an underlying chronic condition.       Results may be falsely decreased if patient taking Biotin.      Troponin [[id number]]  (Abnormal) Collected: 01/03/23 1726    Specimen: Blood Updated: 01/03/23 1802     Troponin T 1.230 ng/mL     Narrative:      Troponin T Reference Range:  <= 0.03 ng/mL-   Negative for AMI  >0.03 ng/mL-     Abnormal for myocardial necrosis.  Clinicians would have to utilize clinical acumen, EKG, Troponin and serial changes to determine if it is an Acute Myocardial Infarction or myocardial injury due to an underlying chronic condition.       Results may be falsely decreased if patient taking Biotin.      Gold Top - SST [[id number]] Collected: 01/03/23 1441    Specimen: Blood Updated: 01/03/23 1545     Extra Tube Hold for add-ons.     Comment: Auto resulted.       Light Blue Top [[id number]] Collected: 01/03/23 1441    Specimen: Blood Updated:  01/03/23 1545     Extra Tube Hold for add-ons.     Comment: Auto resulted       BNP [[id number]]  (Normal) Collected: 01/03/23 1441    Specimen: Blood Updated: 01/03/23 1518     proBNP 845.3 pg/mL     Narrative:      Among patients with dyspnea, NT-proBNP is highly sensitive for the detection of acute congestive heart failure. In addition NT-proBNP of <300 pg/ml effectively rules out acute congestive heart failure with 99% negative predictive value.      TSH [[id number]]  (Normal) Collected: 01/03/23 1441    Specimen: Blood Updated: 01/03/23 1518     TSH 1.120 uIU/mL     T4, Free [[id number]]  (Normal) Collected: 01/03/23 1441    Specimen: Blood Updated: 01/03/23 1518     Free T4 1.66 ng/dL     Narrative:      Results may be falsely increased if patient taking Biotin.      Comprehensive Metabolic Panel [[id number]]  (Abnormal) Collected: 01/03/23 1441    Specimen: Blood Updated: 01/03/23 1516     Glucose 216 mg/dL      BUN 18 mg/dL      Creatinine 1.18 mg/dL      Sodium 136 mmol/L      Potassium 2.9 mmol/L      Chloride 92 mmol/L      CO2 22.0 mmol/L      Calcium 9.9 mg/dL      Total Protein 8.7 g/dL      Albumin 4.4 g/dL      ALT (SGPT) 14 U/L      AST (SGOT) 21 U/L      Alkaline Phosphatase 84 U/L      Total Bilirubin 0.6 mg/dL      Globulin 4.3 gm/dL      A/G Ratio 1.0 g/dL      BUN/Creatinine Ratio 15.3     Anion Gap 22.0 mmol/L      eGFR 52.7 mL/min/1.73      Comment: National Kidney Foundation and American Society of Nephrology (ASN) Task Force recommended calculation based on the Chronic Kidney Disease Epidemiology Collaboration (CKD-EPI) equation refit without adjustment for race.       Narrative:      GFR Normal >60  Chronic Kidney Disease <60  Kidney Failure <15      CBC & Differential [[id number]]  (Abnormal) Collected: 01/03/23 1441    Specimen: Blood Updated: 01/03/23 1453    Narrative:      The following orders were created for panel order CBC & Differential.  Procedure                                Abnormality         Status                     ---------                               -----------         ------                     CBC Auto Differential[[id number]]        Abnormal            Final result                 Please view results for these tests on the individual orders.    CBC Auto Differential [[id number]]  (Abnormal) Collected: 01/03/23 1441    Specimen: Blood Updated: 01/03/23 1453     WBC 11.06 10*3/mm3      RBC 5.99 10*6/mm3      Hemoglobin 15.5 g/dL      Hematocrit 48.6 %      MCV 81.1 fL      MCH 25.9 pg      MCHC 31.9 g/dL      RDW 14.2 %      RDW-SD 41.3 fl      MPV 10.6 fL      Platelets 376 10*3/mm3      Neutrophil % 70.1 %      Lymphocyte % 20.6 %      Monocyte % 7.7 %      Eosinophil % 0.6 %      Basophil % 0.5 %      Immature Grans % 0.5 %      Neutrophils, Absolute 7.75 10*3/mm3      Lymphocytes, Absolute 2.28 10*3/mm3      Monocytes, Absolute 0.85 10*3/mm3      Eosinophils, Absolute 0.07 10*3/mm3      Basophils, Absolute 0.06 10*3/mm3      Immature Grans, Absolute 0.05 10*3/mm3      nRBC 0.0 /100 WBC         Imaging Results (Most Recent)     Procedure Component Value Units Date/Time    XR Chest 2 View [[id number]] Collected: 01/03/23 1443     Updated: 01/03/23 1516    Narrative:          PROCEDURE: Chest PA and lateral    REASON FOR EXAM: Defibrillator issues    FINDINGS: Comparison study dated January 3, 2023. Defibrillator  with intact lead extending to right ventricle. Cardiac and  pulmonary vasculature are normal . Lungs are clear. Pleural  spaces are normal . No acute osseous abnormality.      Impression:      Negative chest    Electronically signed by:  Justin Ac MD  1/3/2023 3:13 PM CST  Workstation: [id number]          Chief Complaint on Day of Discharge: No new complaints    Hospital Course:  The patient is a 62 y.o. female with previous medical history notable for diabetes mellitus and ventricular tachycardia status post ICD placement as well as ischemic cardiomyopathy who  "presented to [facility] with chest pain and ICD discharge.  Patient was admitted for observation.  She was started on amiodarone infusion.  She did have elevated troponins and cardiology was consulted.  Adjustments to her medications were made per cardiology recommendations.  Patient underwent unsuccessful heart cath during her stay as well.  Patient was otherwise noted to be in stable condition and doing well with no further signs of ICD discharge or further chest pain which was likely felt related to be due to her ICD discharge.  Patient also was noted to have electrolyte abnormalities which were corrected.  Patient was cleared for discharge and will follow up with her primary cardiologist in [facility].  Patient voiced understanding agreement..      Condition on Discharge: Stable    Physical Exam on Discharge:  /68 (BP Location: Right arm, Patient Position: Sitting)   Pulse 77   Temp 97.7 °F (36.5 °C) (Infrared)   Resp 16   Ht 162.6 cm (64\")   Wt 78.9 kg (174 lb)   LMP  (LMP Unknown)   SpO2 97%   BMI 29.87 kg/m²   Physical Exam  Constitutional:       General: She is not in acute distress.     Appearance: She is not toxic-appearing.   HENT:      Head: Normocephalic and atraumatic.      Right Ear: External ear normal.      Left Ear: External ear normal.      Nose: Nose normal.      Mouth/Throat:      Mouth: Mucous membranes are moist.      Pharynx: Oropharynx is clear.   Eyes:      Conjunctiva/sclera: Conjunctivae normal.   Cardiovascular:      Rate and Rhythm: Normal rate and regular rhythm.      Pulses: Normal pulses.      Heart sounds: Normal heart sounds.   Pulmonary:      Effort: Pulmonary effort is normal. No respiratory distress.      Breath sounds: Normal breath sounds.   Abdominal:      General: Bowel sounds are normal.      Palpations: Abdomen is soft.      Tenderness: There is no abdominal tenderness.   Musculoskeletal:         General: No deformity.   Skin:     " General: Skin is warm and dry.      Capillary Refill: Capillary refill takes less than 2 seconds.   Neurological:      General: No focal deficit present.      Mental Status: She is alert and oriented to person, place, and time. Mental status is at baseline.   Psychiatric:         Thought Content: Thought content normal.         Discharge Disposition:  Home or Self Care    Discharge Medications:     Discharge Medications      New Medications      Instructions Start Date   amiodarone 200 MG tablet  Commonly known as: PACERONE   200 mg, Oral, Every 24 Hours Scheduled   Start Date: January 7, 2023     magnesium oxide 400 MG tablet  Commonly known as: MAG-OX   400 mg, Oral, Daily         Continue These Medications      Instructions Start Date   amLODIPine 5 MG tablet  Commonly known as: NORVASC   5 mg, Oral, Every Night at Bedtime      aspirin 325 MG tablet  Commonly known as: Aspirin Adult   325 mg, Oral, Daily      atorvastatin 20 MG tablet  Commonly known as: LIPITOR   TAKE ONE TABLET BY MOUTH ONCE NIGHTLY      cetirizine 10 MG tablet  Commonly known as: zyrTEC   TAKE ONE TABLET BY MOUTH DAILY      Dexcom G6 Sensor   USE EVERY 10 DAYS AS DIRECTED FOR CONTINUOUS GLUCOSE MONITORING      Dexcom G6 Transmitter misc   No dose, route, or frequency recorded.      Dexcom G6 Transmitter misc   USE AS DIRECTED AND CHANGE EVERY 3 MONTHS.      glucose blood test strip   1 each, Other, 3 Times Daily, Use as instructed      glucose monitor monitoring kit   1 each, Does not apply, 3 Times Daily      Lancet Device misc   1 each, Does not apply, 2 Times Daily      levothyroxine 112 MCG tablet  Commonly known as: SYNTHROID, LEVOTHROID   112 mcg, Oral, Daily      lisinopril 40 MG tablet  Commonly known as: PRINIVIL,ZESTRIL   40 mg, Oral, Daily      metoprolol succinate  MG 24 hr tablet  Commonly known as: TOPROL-XL   300 mg, Oral, Daily, Half tablet in AM, half tablet in PM      mexiletine 150 MG capsule  Commonly known as:  MEXITIL   150 mg, Oral, Every 8 Hours      omeprazole 40 MG capsule  Commonly known as: priLOSEC   TAKE ONE CAPSULE BY MOUTH DAILY      Ozempic (2 MG/DOSE) 8 MG/3ML solution pen-injector  Generic drug: Semaglutide (2 MG/DOSE)   2 mg, Subcutaneous, Weekly      potassium chloride 10 MEQ CR tablet  Commonly known as: K-DUR,KLOR-CON   TAKE TWO TABLETS BY MOUTH DAILY      Synjardy XR 12.5-1000 MG tablet sustained-release 24 hour  Generic drug: Empagliflozin-metFORMIN HCl ER   2 tablets, Oral, Daily      vitamin D3 125 MCG (5000 UT) capsule capsule   TAKE ONE CAPSULE BY MOUTH DAILY         Stop These Medications    hydroCHLOROthiazide 50 MG tablet  Commonly known as: HYDRODIURIL            Discharge Diet:   Diet Instructions     Diet: Consistent Carbohydrate, Cardiac      Discharge Diet:  Consistent Carbohydrate  Cardiac             Activity at Discharge:   Activity Instructions     Activity as Tolerated            Discharge Care Plan/Instructions: Take medications as prescribed, follow-up with PCP and cardiology, and return for worsening symptoms.    Follow-up Appointments:   Future Appointments   Date Time Provider Department Center   1/13/2023  2:00 PM José Ruano APRN [address]   3/31/2023 10:30 AM José Ruano APRN [facility] [address]   4/27/2023  9:30 AM Herber Galeano APRN [facility]   9/20/2023  9:30 AM [facility] 1 [facility]   9/27/2023  9:30 AM Rodo Holcomb MD [facility] None       Test Results Pending at Discharge: None    The patient has current or prior documentation of left ventricular ejection fraction (LVEF) less than or equal to 40%, or moderate or severely depressed left ventricular systolic function. ; The patient was prescribed or already taking an Angiotensin-Converting Enzyme (ACE) Inhibitor or Angiotensin Receptor Blocker (ARB) and The patient was prescribed or already taking a beta-blocker.    Jerel Freedman MD    Time: 34  minutes      Electronically signed by Jerel Freedman MD at 01/06/23 1956       Discharge Order (From admission, onward)     Start     Ordered    01/06/23 1322  Discharge patient  Once        Expected Discharge Date: 01/06/23    Discharge Disposition: Home or Self Care    Physician of Record for Attribution - Please select from Treatment Team: JEREL FREEDMAN [[id number]]    Review needed by CMO to determine Physician of Record: No       Question Answer Comment   Physician of Record for Attribution - Please select from Treatment Team JEREL FREEDMAN    Review needed by CMO to determine Physician of Record No        01/06/23 1329

## 2023-01-13 ENCOUNTER — OFFICE VISIT (OUTPATIENT)
Dept: FAMILY MEDICINE CLINIC | Facility: CLINIC | Age: 63
End: 2023-01-13
Payer: COMMERCIAL

## 2023-01-13 VITALS
HEIGHT: 64 IN | HEART RATE: 80 BPM | SYSTOLIC BLOOD PRESSURE: 122 MMHG | DIASTOLIC BLOOD PRESSURE: 80 MMHG | WEIGHT: 182.5 LBS | TEMPERATURE: 99.3 F | OXYGEN SATURATION: 96 % | RESPIRATION RATE: 18 BRPM | BODY MASS INDEX: 31.16 KG/M2

## 2023-01-13 DIAGNOSIS — Z09 HOSPITAL DISCHARGE FOLLOW-UP: Primary | ICD-10-CM

## 2023-01-13 DIAGNOSIS — E83.42 HYPOMAGNESEMIA: ICD-10-CM

## 2023-01-13 DIAGNOSIS — I47.20 VENTRICULAR TACHYCARDIA: ICD-10-CM

## 2023-01-13 DIAGNOSIS — E87.6 HYPOKALEMIA: ICD-10-CM

## 2023-01-13 PROCEDURE — 99495 TRANSJ CARE MGMT MOD F2F 14D: CPT | Performed by: NURSE PRACTITIONER

## 2023-01-13 RX ORDER — AMIODARONE HYDROCHLORIDE 200 MG/1
200 TABLET ORAL
COMMUNITY
Start: 2023-01-11 | End: 2023-01-13 | Stop reason: SDUPTHER

## 2023-01-13 NOTE — PROGRESS NOTES
Transitional Care Follow Up Visit  Subjective     Danae Ines Ngo is a 62 y.o. female who presents for a transitional care management visit.    Within 48 business hours after discharge our office contacted her via telephone to coordinate her care and needs.      I reviewed and discussed the details of that call along with the discharge summary, hospital problems, inpatient lab results, inpatient diagnostic studies, and consultation reports with Danae.     Current outpatient and discharge medications have been reconciled for the patient.  Reviewed by: WILLIS Manzano      Date of TCM Phone Call 1/6/2023   [facility]   Date of Admission 1/3/2023   Date of Discharge 1/6/2023   Discharge Disposition Home or Self Care     Risk for Readmission (LACE) Score: 10 (1/6/2023  5:00 AM)      History of Present Illness   Course During Hospital Stay:   Ms. Ngo is a 62-year-old female who presents today for hospital follow-up related to defibrillator discharge, hypokalemia, hypomagnesemia.  Patient presented to the ER reporting ICD discharge multiple times.  Patient was found to be in V. tach and was placed on amiodarone infusion.  She is also found to have a low magnesium level of 1.4 and a low potassium level of 2.9.  Troponins were evaluated and trended and patient was evaluated by cardiology.  Heart cath was attempted but was unsuccessful.  Potassium and magnesium supplementation was provided.  Patient remained asymptomatic and stable at time of discharge.  Since hospital discharge she has had no repeated symptoms or ICD discharge.  She is already followed up with cardiology and had her defibrillator interrogated.  Patient continues to take magnesium and potassium supplements daily.        The following portions of the patient's history were reviewed and updated as appropriate: allergies, current medications, past family history, past medical history, past social history, past surgical history and  problem list.    Review of Systems   Constitutional: Negative.  Negative for activity change, appetite change, chills, diaphoresis, fatigue, fever and unexpected weight change.   HENT: Negative.    Eyes: Negative.    Respiratory: Negative.  Negative for apnea, cough, choking, chest tightness, shortness of breath, wheezing and stridor.    Cardiovascular: Negative.  Negative for chest pain, palpitations and leg swelling.   Gastrointestinal: Negative.    Endocrine: Negative.    Genitourinary: Negative.    Skin: Negative.    Neurological: Negative.    Hematological: Negative.    Psychiatric/Behavioral: Negative.        Objective   Physical Exam  Vitals and nursing note reviewed.   Constitutional:       General: She is not in acute distress.     Appearance: Normal appearance. She is well-developed. She is obese. She is not ill-appearing, toxic-appearing or diaphoretic.   HENT:      Head: Normocephalic and atraumatic.      Right Ear: External ear normal.      Left Ear: External ear normal.      Nose: Nose normal.   Eyes:      Conjunctiva/sclera: Conjunctivae normal.      Pupils: Pupils are equal, round, and reactive to light.   Neck:      Thyroid: No thyromegaly.      Trachea: No tracheal deviation.   Cardiovascular:      Rate and Rhythm: Normal rate and regular rhythm.      Heart sounds: Normal heart sounds. No murmur heard.    No friction rub. No gallop.   Pulmonary:      Effort: Pulmonary effort is normal. No respiratory distress.      Breath sounds: Normal breath sounds. No stridor. No wheezing, rhonchi or rales.   Abdominal:      General: Bowel sounds are normal. There is no distension.      Palpations: Abdomen is soft. There is no mass.      Tenderness: There is no abdominal tenderness. There is no guarding or rebound.      Hernia: No hernia is present.   Musculoskeletal:         General: No tenderness. Normal range of motion.      Cervical back: Normal range of motion and neck supple.   Lymphadenopathy:       Cervical: No cervical adenopathy.   Skin:     General: Skin is warm and dry.      Coloration: Skin is not pale.      Findings: No erythema or rash.   Neurological:      Mental Status: She is alert and oriented to person, place, and time.      Cranial Nerves: No cranial nerve deficit.      Coordination: Coordination normal.   Psychiatric:         Behavior: Behavior normal.         Thought Content: Thought content normal.         Judgment: Judgment normal.         Assessment & Plan   Diagnoses and all orders for this visit:    1. Hospital discharge follow-up (Primary)    2. Ventricular tachycardia   -Patient remains asymptomatic, rate controlled, taking medication as prescribed.  Potassium and magnesium levels were repeated outpatient by cardiology.  Those levels have been reviewed and discussed with patient.  Magnesium was slightly low at follow-up at 1.4.  Cardiology has instructed her to increase her magnesium tablets to twice daily.  Potassium was 4.4.  Patient to continue potassium and magnesium as prescribed.  Continue amiodarone as prescribed.  Follow-up with cardiology as scheduled.  Return to ER should patient develop chest pain, shortness of breath, palpitations, tachycardia or her ICD discharge again.  Patient verbalized understanding of instruction.    3. Hypomagnesemia   -Plan of care stated above #2.  We will continue to monitor.    4. Hypokalemia   -Plan of care stated above in #2.  We will continue to monitor.    5.  Follow-up in 3 months or sooner for any acute needs.            This document has been electronically signed by WILLIS Manzano on January 13, 2023 15:52 CST

## 2023-01-17 ENCOUNTER — READMISSION MANAGEMENT (OUTPATIENT)
Dept: CALL CENTER | Facility: HOSPITAL | Age: 63
End: 2023-01-17
Payer: COMMERCIAL

## 2023-01-17 NOTE — OUTREACH NOTE
Medical Week 2 Survey    Flowsheet Row Responses   [facility] facility patient discharged from? [place]   Does the patient have one of the following disease processes/diagnoses(primary or secondary)? Other   Week 2 attempt successful? No   Unsuccessful attempts Attempt 1          ELIAZAR Landa Registered Nurse

## 2023-01-26 ENCOUNTER — ANESTHESIA EVENT (OUTPATIENT)
Dept: PERIOP | Facility: HOSPITAL | Age: 63
End: 2023-01-26
Payer: COMMERCIAL

## 2023-01-26 NOTE — ANESTHESIA PREPROCEDURE EVALUATION
Anesthesia Evaluation     Patient summary reviewed and Nursing notes reviewed   no history of anesthetic complications:  NPO Solid Status: > 8 hours  NPO Liquid Status: < 2 hours           Airway   Mallampati: III  TM distance: >3 FB  Dental    (+) poor dentition    Pulmonary     breath sounds clear to auscultation  (-) asthma, sleep apnea, rhonchi, decreased breath sounds, wheezes, not a smoker  Cardiovascular - normal exam    ECG reviewed  Patient on routine beta blocker and Beta blocker given within 24 hours of surgery  Rhythm: regular  Rate: normal    (+) pacemaker ICD interrogated 3-6 months ago, hypertension, past MI ()  >12 months, CAD, cardiac stents Drug eluting stent more than 12 months ago dysrhythmias Paroxysmal Atrial Fib, PVC, PVD, hyperlipidemia,   (-) murmur, DVT    ROS comment: EK23  Sinus rhythm with occasional Premature ventricular complexes  Left axis deviation  Minimal voltage criteria for LVH, may be normal variant  ST & T wave abnormality, consider inferolateral ischemia  Prolonged Qtc  Abnormal ECG  When compared with ECG of 2023 18:09,  T wave inversion now evident in Inferolateral leads  Premature ventricular complexes are now present    Cardiac Cath: 23  Conclusion :  There is a diffuse 30% in-stent restenosis in a prior stent in proximal LAD.  Mid and distal LAD exhibit two tandem lesions (subtotal occlusions).  The LAD appears relatively small caliber in these segments.  These lesions could not be successfully crossed with a low-profile (1.2 mm) balloon.  Further attempts at PCI were aborted.  The lesions appear to have chronic characteristics.  Severe LV systolic dysfunction on left ventriculography.      Neuro/Psych  (+) headaches,    (-) seizures, TIA, CVA  GI/Hepatic/Renal/Endo    (+) obesity,  GERD,  diabetes mellitus type 2, thyroid problem hypothyroidism    Musculoskeletal     Abdominal   (+) obese,    Substance History      OB/GYN          Other   blood  dyscrasia anemia,                     Anesthesia Plan    ASA 3     MAC     intravenous induction     Anesthetic plan, risks, benefits, and alternatives have been provided, discussed and informed consent has been obtained with: patient.

## 2023-01-27 ENCOUNTER — HOSPITAL ENCOUNTER (OUTPATIENT)
Facility: HOSPITAL | Age: 63
Setting detail: HOSPITAL OUTPATIENT SURGERY
Discharge: HOME | End: 2023-01-27
Attending: OPHTHALMOLOGY | Admitting: OPHTHALMOLOGY
Payer: COMMERCIAL

## 2023-01-27 ENCOUNTER — ANESTHESIA (OUTPATIENT)
Dept: PERIOP | Facility: HOSPITAL | Age: 63
End: 2023-01-27
Payer: COMMERCIAL

## 2023-01-27 VITALS
BODY MASS INDEX: 30.56 KG/M2 | RESPIRATION RATE: 18 BRPM | SYSTOLIC BLOOD PRESSURE: 152 MMHG | HEIGHT: 64 IN | HEART RATE: 77 BPM | DIASTOLIC BLOOD PRESSURE: 81 MMHG | WEIGHT: 179.01 LBS | OXYGEN SATURATION: 95 % | TEMPERATURE: 96.9 F

## 2023-01-27 LAB — GLUCOSE BLDC GLUCOMTR-MCNC: 131 MG/DL (ref 70–130)

## 2023-01-27 PROCEDURE — 82962 GLUCOSE BLOOD TEST: CPT

## 2023-01-27 PROCEDURE — 25010000002 MIDAZOLAM PER 1 MG: Performed by: NURSE ANESTHETIST, CERTIFIED REGISTERED

## 2023-01-27 PROCEDURE — 25010000002 VANCOMYCIN 1 G RECONSTITUTED SOLUTION 1 EACH VIAL: Performed by: OPHTHALMOLOGY

## 2023-01-27 PROCEDURE — 25010000002 FENTANYL CITRATE (PF) 50 MCG/ML SOLUTION: Performed by: NURSE ANESTHETIST, CERTIFIED REGISTERED

## 2023-01-27 PROCEDURE — 25010000002 EPINEPHRINE PER 0.1 MG: Performed by: OPHTHALMOLOGY

## 2023-01-27 PROCEDURE — V2632 POST CHMBR INTRAOCULAR LENS: HCPCS | Performed by: OPHTHALMOLOGY

## 2023-01-27 DEVICE — LENS ACRYSOF IQ 6X13MM SN60WF 22.0: Type: IMPLANTABLE DEVICE | Site: EYE | Status: FUNCTIONAL

## 2023-01-27 RX ORDER — MEPERIDINE HYDROCHLORIDE 50 MG/ML
12.5 INJECTION INTRAMUSCULAR; INTRAVENOUS; SUBCUTANEOUS
Status: DISCONTINUED | OUTPATIENT
Start: 2023-01-27 | End: 2023-01-27 | Stop reason: HOSPADM

## 2023-01-27 RX ORDER — MOXIFLOXACIN 5 MG/ML
SOLUTION/ DROPS OPHTHALMIC AS NEEDED
Status: DISCONTINUED | OUTPATIENT
Start: 2023-01-27 | End: 2023-01-27 | Stop reason: HOSPADM

## 2023-01-27 RX ORDER — PREDNISOLONE ACETATE 10 MG/ML
SUSPENSION/ DROPS OPHTHALMIC AS NEEDED
Status: DISCONTINUED | OUTPATIENT
Start: 2023-01-27 | End: 2023-01-27 | Stop reason: HOSPADM

## 2023-01-27 RX ORDER — PROMETHAZINE HYDROCHLORIDE 25 MG/1
25 TABLET ORAL ONCE AS NEEDED
Status: DISCONTINUED | OUTPATIENT
Start: 2023-01-27 | End: 2023-01-27 | Stop reason: HOSPADM

## 2023-01-27 RX ORDER — PHENYLEPHRINE HCL 2.5 %
1 DROPS OPHTHALMIC (EYE)
Status: COMPLETED | OUTPATIENT
Start: 2023-01-27 | End: 2023-01-27

## 2023-01-27 RX ORDER — ONDANSETRON 2 MG/ML
4 INJECTION INTRAMUSCULAR; INTRAVENOUS ONCE AS NEEDED
Status: DISCONTINUED | OUTPATIENT
Start: 2023-01-27 | End: 2023-01-27 | Stop reason: HOSPADM

## 2023-01-27 RX ORDER — CYCLOPENTOLATE HYDROCHLORIDE 20 MG/ML
1 SOLUTION/ DROPS OPHTHALMIC
Status: COMPLETED | OUTPATIENT
Start: 2023-01-27 | End: 2023-01-27

## 2023-01-27 RX ORDER — PROMETHAZINE HYDROCHLORIDE 25 MG/1
25 SUPPOSITORY RECTAL ONCE AS NEEDED
Status: DISCONTINUED | OUTPATIENT
Start: 2023-01-27 | End: 2023-01-27 | Stop reason: HOSPADM

## 2023-01-27 RX ORDER — MIDAZOLAM HYDROCHLORIDE 1 MG/ML
INJECTION INTRAMUSCULAR; INTRAVENOUS AS NEEDED
Status: DISCONTINUED | OUTPATIENT
Start: 2023-01-27 | End: 2023-01-27 | Stop reason: SURG

## 2023-01-27 RX ORDER — BRIMONIDINE TARTRATE 0.15 %
DROPS OPHTHALMIC (EYE) AS NEEDED
Status: DISCONTINUED | OUTPATIENT
Start: 2023-01-27 | End: 2023-01-27 | Stop reason: HOSPADM

## 2023-01-27 RX ORDER — FENTANYL CITRATE 50 UG/ML
INJECTION, SOLUTION INTRAMUSCULAR; INTRAVENOUS AS NEEDED
Status: DISCONTINUED | OUTPATIENT
Start: 2023-01-27 | End: 2023-01-27 | Stop reason: SURG

## 2023-01-27 RX ORDER — SODIUM CHLORIDE 0.9 % (FLUSH) 0.9 %
10 SYRINGE (ML) INJECTION AS NEEDED
Status: DISCONTINUED | OUTPATIENT
Start: 2023-01-27 | End: 2023-01-27 | Stop reason: HOSPADM

## 2023-01-27 RX ORDER — TETRACAINE HYDROCHLORIDE 5 MG/ML
1 SOLUTION OPHTHALMIC AS NEEDED
Status: COMPLETED | OUTPATIENT
Start: 2023-01-27 | End: 2023-01-27

## 2023-01-27 RX ORDER — TETRACAINE HYDROCHLORIDE 5 MG/ML
SOLUTION OPHTHALMIC AS NEEDED
Status: DISCONTINUED | OUTPATIENT
Start: 2023-01-27 | End: 2023-01-27 | Stop reason: HOSPADM

## 2023-01-27 RX ADMIN — PHENYLEPHRINE HYDROCHLORIDE 1 DROP: 25 SOLUTION/ DROPS OPHTHALMIC at 06:19

## 2023-01-27 RX ADMIN — CYCLOPENTOLATE HYDROCHLORIDE 1 DROP: 20 SOLUTION/ DROPS OPHTHALMIC at 06:30

## 2023-01-27 RX ADMIN — Medication 10 ML: at 06:31

## 2023-01-27 RX ADMIN — MIDAZOLAM HYDROCHLORIDE 1 MG: 1 INJECTION, SOLUTION INTRAMUSCULAR; INTRAVENOUS at 08:15

## 2023-01-27 RX ADMIN — CYCLOPENTOLATE HYDROCHLORIDE 1 DROP: 20 SOLUTION/ DROPS OPHTHALMIC at 06:19

## 2023-01-27 RX ADMIN — PHENYLEPHRINE HYDROCHLORIDE 1 DROP: 25 SOLUTION/ DROPS OPHTHALMIC at 06:30

## 2023-01-27 RX ADMIN — PHENYLEPHRINE HYDROCHLORIDE 1 DROP: 25 SOLUTION/ DROPS OPHTHALMIC at 06:41

## 2023-01-27 RX ADMIN — CYCLOPENTOLATE HYDROCHLORIDE 1 DROP: 20 SOLUTION/ DROPS OPHTHALMIC at 06:41

## 2023-01-27 RX ADMIN — TETRACAINE HYDROCHLORIDE 1 DROP: 5 SOLUTION OPHTHALMIC at 06:18

## 2023-01-27 RX ADMIN — TETRACAINE HYDROCHLORIDE 1 DROP: 5 SOLUTION OPHTHALMIC at 06:41

## 2023-01-27 RX ADMIN — FENTANYL CITRATE 25 MCG: 50 INJECTION, SOLUTION INTRAMUSCULAR; INTRAVENOUS at 08:15

## 2023-01-27 NOTE — ANESTHESIA POSTPROCEDURE EVALUATION
Patient: Danae Ngo    Procedure Summary     Date: 01/27/23 Room / Location: [facility] OR  / [facility] OR    Anesthesia Start: 0817 Anesthesia Stop: 0833    Procedure: REMOVE CATARACT AND IMPLANT INTRAOCULAR LENS (Left: Eye) Diagnosis:       Age-related nuclear cataract of right eye      (Age-related nuclear cataract of right eye [H25.11])    Surgeons: Alex Chino MD Provider: Jenna Espinosa CRNA    Anesthesia Type: MAC ASA Status: 3          Anesthesia Type: MAC    Vitals  No vitals data found for the desired time range.          Post Anesthesia Care and Evaluation    Patient location during evaluation: bedside  Patient participation: complete - patient participated  Level of consciousness: awake  Pain score: 0  Pain management: adequate    Airway patency: patent  Anesthetic complications: No anesthetic complications  PONV Status: none  Cardiovascular status: stable  Respiratory status: acceptable  Hydration status: acceptable    Comments: bp 139/79  Pr 71  No anesthesia care post op

## 2023-02-02 ENCOUNTER — ANESTHESIA EVENT (OUTPATIENT)
Dept: PERIOP | Facility: HOSPITAL | Age: 63
End: 2023-02-02
Payer: COMMERCIAL

## 2023-02-03 ENCOUNTER — HOSPITAL ENCOUNTER (OUTPATIENT)
Facility: HOSPITAL | Age: 63
Setting detail: HOSPITAL OUTPATIENT SURGERY
Discharge: HOME | End: 2023-02-03
Attending: OPHTHALMOLOGY | Admitting: OPHTHALMOLOGY
Payer: COMMERCIAL

## 2023-02-03 ENCOUNTER — ANESTHESIA (OUTPATIENT)
Dept: PERIOP | Facility: HOSPITAL | Age: 63
End: 2023-02-03
Payer: COMMERCIAL

## 2023-02-03 VITALS
DIASTOLIC BLOOD PRESSURE: 76 MMHG | HEART RATE: 69 BPM | OXYGEN SATURATION: 97 % | SYSTOLIC BLOOD PRESSURE: 122 MMHG | HEIGHT: 64 IN | BODY MASS INDEX: 30.41 KG/M2 | TEMPERATURE: 96.9 F | RESPIRATION RATE: 18 BRPM | WEIGHT: 178.13 LBS

## 2023-02-03 LAB — GLUCOSE BLDC GLUCOMTR-MCNC: 124 MG/DL (ref 70–130)

## 2023-02-03 PROCEDURE — 25010000002 EPINEPHRINE PER 0.1 MG: Performed by: OPHTHALMOLOGY

## 2023-02-03 PROCEDURE — 82962 GLUCOSE BLOOD TEST: CPT

## 2023-02-03 PROCEDURE — 25010000002 FENTANYL CITRATE (PF) 50 MCG/ML SOLUTION: Performed by: NURSE ANESTHETIST, CERTIFIED REGISTERED

## 2023-02-03 PROCEDURE — V2632 POST CHMBR INTRAOCULAR LENS: HCPCS | Performed by: OPHTHALMOLOGY

## 2023-02-03 PROCEDURE — 25010000002 MIDAZOLAM PER 1 MG: Performed by: NURSE ANESTHETIST, CERTIFIED REGISTERED

## 2023-02-03 PROCEDURE — 25010000002 VANCOMYCIN 1 G RECONSTITUTED SOLUTION 1 EACH VIAL: Performed by: OPHTHALMOLOGY

## 2023-02-03 DEVICE — LENS ACRYSOF IQ 6X13MM SN60WF 22.5: Type: IMPLANTABLE DEVICE | Site: EYE | Status: FUNCTIONAL

## 2023-02-03 RX ORDER — PHENYLEPHRINE HCL 2.5 %
1 DROPS OPHTHALMIC (EYE)
Status: COMPLETED | OUTPATIENT
Start: 2023-02-03 | End: 2023-02-03

## 2023-02-03 RX ORDER — PREDNISOLONE ACETATE 10 MG/ML
SUSPENSION/ DROPS OPHTHALMIC AS NEEDED
Status: DISCONTINUED | OUTPATIENT
Start: 2023-02-03 | End: 2023-02-03 | Stop reason: HOSPADM

## 2023-02-03 RX ORDER — BRIMONIDINE TARTRATE 0.15 %
DROPS OPHTHALMIC (EYE) AS NEEDED
Status: DISCONTINUED | OUTPATIENT
Start: 2023-02-03 | End: 2023-02-03 | Stop reason: HOSPADM

## 2023-02-03 RX ORDER — MOXIFLOXACIN 5 MG/ML
SOLUTION/ DROPS OPHTHALMIC AS NEEDED
Status: DISCONTINUED | OUTPATIENT
Start: 2023-02-03 | End: 2023-02-03 | Stop reason: HOSPADM

## 2023-02-03 RX ORDER — FENTANYL CITRATE 50 UG/ML
INJECTION, SOLUTION INTRAMUSCULAR; INTRAVENOUS AS NEEDED
Status: DISCONTINUED | OUTPATIENT
Start: 2023-02-03 | End: 2023-02-03 | Stop reason: SURG

## 2023-02-03 RX ORDER — TETRACAINE HYDROCHLORIDE 5 MG/ML
SOLUTION OPHTHALMIC AS NEEDED
Status: DISCONTINUED | OUTPATIENT
Start: 2023-02-03 | End: 2023-02-03 | Stop reason: HOSPADM

## 2023-02-03 RX ORDER — CYCLOPENTOLATE HYDROCHLORIDE 20 MG/ML
1 SOLUTION/ DROPS OPHTHALMIC
Status: COMPLETED | OUTPATIENT
Start: 2023-02-03 | End: 2023-02-03

## 2023-02-03 RX ORDER — SODIUM CHLORIDE 0.9 % (FLUSH) 0.9 %
10 SYRINGE (ML) INJECTION AS NEEDED
Status: DISCONTINUED | OUTPATIENT
Start: 2023-02-03 | End: 2023-02-03 | Stop reason: HOSPADM

## 2023-02-03 RX ORDER — MIDAZOLAM HYDROCHLORIDE 1 MG/ML
INJECTION INTRAMUSCULAR; INTRAVENOUS AS NEEDED
Status: DISCONTINUED | OUTPATIENT
Start: 2023-02-03 | End: 2023-02-03 | Stop reason: SURG

## 2023-02-03 RX ORDER — TETRACAINE HYDROCHLORIDE 5 MG/ML
1 SOLUTION OPHTHALMIC
Status: COMPLETED | OUTPATIENT
Start: 2023-02-03 | End: 2023-02-03

## 2023-02-03 RX ORDER — ONDANSETRON 2 MG/ML
4 INJECTION INTRAMUSCULAR; INTRAVENOUS ONCE AS NEEDED
Status: DISCONTINUED | OUTPATIENT
Start: 2023-02-03 | End: 2023-02-03 | Stop reason: HOSPADM

## 2023-02-03 RX ADMIN — TETRACAINE HYDROCHLORIDE 1 DROP: 5 SOLUTION OPHTHALMIC at 06:42

## 2023-02-03 RX ADMIN — PHENYLEPHRINE HYDROCHLORIDE 1 DROP: 25 SOLUTION/ DROPS OPHTHALMIC at 06:32

## 2023-02-03 RX ADMIN — CYCLOPENTOLATE HYDROCHLORIDE 1 DROP: 20 SOLUTION/ DROPS OPHTHALMIC at 06:32

## 2023-02-03 RX ADMIN — CYCLOPENTOLATE HYDROCHLORIDE 1 DROP: 20 SOLUTION/ DROPS OPHTHALMIC at 06:22

## 2023-02-03 RX ADMIN — TETRACAINE HYDROCHLORIDE 1 DROP: 5 SOLUTION OPHTHALMIC at 06:22

## 2023-02-03 RX ADMIN — PHENYLEPHRINE HYDROCHLORIDE 1 DROP: 25 SOLUTION/ DROPS OPHTHALMIC at 06:42

## 2023-02-03 RX ADMIN — CYCLOPENTOLATE HYDROCHLORIDE 1 DROP: 20 SOLUTION/ DROPS OPHTHALMIC at 06:42

## 2023-02-03 RX ADMIN — MIDAZOLAM 1 MG: 1 INJECTION, SOLUTION INTRAMUSCULAR; INTRAVENOUS at 08:03

## 2023-02-03 RX ADMIN — PHENYLEPHRINE HYDROCHLORIDE 1 DROP: 25 SOLUTION/ DROPS OPHTHALMIC at 06:22

## 2023-02-03 RX ADMIN — Medication 10 ML: at 06:32

## 2023-02-03 RX ADMIN — FENTANYL CITRATE 25 MCG: 50 INJECTION, SOLUTION INTRAMUSCULAR; INTRAVENOUS at 08:03

## 2023-02-03 NOTE — ANESTHESIA PREPROCEDURE EVALUATION
Anesthesia Evaluation     Patient summary reviewed and Nursing notes reviewed   no history of anesthetic complications:  NPO Solid Status: > 8 hours  NPO Liquid Status: < 2 hours           Airway   Mallampati: III  TM distance: >3 FB  Dental    (+) poor dentition    Pulmonary     breath sounds clear to auscultation  (-) asthma, sleep apnea, rhonchi, decreased breath sounds, wheezes, not a smoker  Cardiovascular - normal exam    ECG reviewed  Patient on routine beta blocker and Beta blocker given within 24 hours of surgery  Rhythm: regular  Rate: normal    (+) pacemaker ICD interrogated 3-6 months ago, hypertension, past MI ()  >12 months, CAD, cardiac stents Drug eluting stent more than 12 months ago dysrhythmias Paroxysmal Atrial Fib, PVC, PVD, hyperlipidemia,   (-) murmur, DVT    ROS comment: EK23  Sinus rhythm with occasional Premature ventricular complexes  Left axis deviation  Minimal voltage criteria for LVH, may be normal variant  ST & T wave abnormality, consider inferolateral ischemia  Prolonged Qtc  Abnormal ECG  When compared with ECG of 2023 18:09,  T wave inversion now evident in Inferolateral leads  Premature ventricular complexes are now present    Cardiac Cath: 23  Conclusion :  There is a diffuse 30% in-stent restenosis in a prior stent in proximal LAD.  Mid and distal LAD exhibit two tandem lesions (subtotal occlusions).  The LAD appears relatively small caliber in these segments.  These lesions could not be successfully crossed with a low-profile (1.2 mm) balloon.  Further attempts at PCI were aborted.  The lesions appear to have chronic characteristics.  Severe LV systolic dysfunction on left ventriculography.      Neuro/Psych  (+) headaches,    (-) seizures, TIA, CVA  GI/Hepatic/Renal/Endo    (+) obesity,  GERD,  diabetes mellitus type 2, thyroid problem hypothyroidism    Musculoskeletal     Abdominal   (+) obese,    Substance History      OB/GYN          Other   blood  dyscrasia anemia,                       Anesthesia Plan    ASA 3     MAC     (2nd eye )  intravenous induction     Anesthetic plan, risks, benefits, and alternatives have been provided, discussed and informed consent has been obtained with: patient.

## 2023-02-03 NOTE — ANESTHESIA POSTPROCEDURE EVALUATION
Patient: Danae Ngo    Procedure Summary     Date: 02/03/23 Room / Location: [facility] OR  / [facility] OR    Anesthesia Start: 0801 Anesthesia Stop: 0816    Procedure: REMOVE CATARACT AND IMPLANT INTRAOCULAR LENS (Right: Eye) Diagnosis:       Age-related nuclear cataract of right eye      (Age-related nuclear cataract of right eye [H25.11])    Surgeons: Alex Chino MD Provider: Nellie Dukes CRNA    Anesthesia Type: MAC ASA Status: 3          Anesthesia Type: MAC    Vitals  No vitals data found for the desired time range.          Post Anesthesia Care and Evaluation    Patient location during evaluation: bedside  Patient participation: complete - patient participated  Level of consciousness: awake  Pain score: 0  Pain management: adequate    Airway patency: patent  Anesthetic complications: No anesthetic complications  PONV Status: none  Cardiovascular status: hemodynamically stable  Respiratory status: room air  Hydration status: acceptable

## 2023-02-17 RX ORDER — OMEPRAZOLE 40 MG/1
CAPSULE, DELAYED RELEASE ORAL
Qty: 90 CAPSULE | Refills: 3 | Status: SHIPPED | OUTPATIENT
Start: 2023-02-17

## 2023-03-28 ENCOUNTER — DOCUMENTATION (OUTPATIENT)
Dept: ENDOCRINOLOGY | Facility: CLINIC | Age: 63
End: 2023-03-28
Payer: COMMERCIAL

## 2023-03-28 RX ORDER — PROCHLORPERAZINE 25 MG/1
SUPPOSITORY RECTAL
Qty: 9 EACH | Refills: 3 | Status: SHIPPED | OUTPATIENT
Start: 2023-03-28

## 2023-03-29 ENCOUNTER — DOCUMENTATION (OUTPATIENT)
Dept: ENDOCRINOLOGY | Facility: CLINIC | Age: 63
End: 2023-03-29
Payer: COMMERCIAL

## 2023-04-07 RX ORDER — HYDROCHLOROTHIAZIDE 50 MG/1
TABLET ORAL
Qty: 90 TABLET | Refills: 1 | Status: SHIPPED | OUTPATIENT
Start: 2023-04-07 | End: 2023-04-13

## 2023-04-13 ENCOUNTER — OFFICE VISIT (OUTPATIENT)
Dept: FAMILY MEDICINE CLINIC | Facility: CLINIC | Age: 63
End: 2023-04-13
Payer: COMMERCIAL

## 2023-04-13 VITALS
DIASTOLIC BLOOD PRESSURE: 78 MMHG | RESPIRATION RATE: 18 BRPM | TEMPERATURE: 99.1 F | BODY MASS INDEX: 30.35 KG/M2 | HEIGHT: 64 IN | OXYGEN SATURATION: 99 % | SYSTOLIC BLOOD PRESSURE: 110 MMHG | WEIGHT: 177.8 LBS | HEART RATE: 77 BPM

## 2023-04-13 DIAGNOSIS — I25.10 CORONARY ARTERY DISEASE INVOLVING NATIVE CORONARY ARTERY OF NATIVE HEART WITHOUT ANGINA PECTORIS: ICD-10-CM

## 2023-04-13 DIAGNOSIS — E11.69 HYPERLIPIDEMIA ASSOCIATED WITH TYPE 2 DIABETES MELLITUS: ICD-10-CM

## 2023-04-13 DIAGNOSIS — Z00.00 ANNUAL PHYSICAL EXAM: Primary | ICD-10-CM

## 2023-04-13 DIAGNOSIS — E78.5 HYPERLIPIDEMIA ASSOCIATED WITH TYPE 2 DIABETES MELLITUS: ICD-10-CM

## 2023-04-13 DIAGNOSIS — E78.2 MIXED HYPERLIPIDEMIA: ICD-10-CM

## 2023-04-13 DIAGNOSIS — E11.65 TYPE 2 DIABETES MELLITUS WITH HYPERGLYCEMIA, WITHOUT LONG-TERM CURRENT USE OF INSULIN: ICD-10-CM

## 2023-04-13 DIAGNOSIS — I10 ESSENTIAL (PRIMARY) HYPERTENSION: ICD-10-CM

## 2023-04-13 RX ORDER — ATORVASTATIN CALCIUM 20 MG/1
20 TABLET, FILM COATED ORAL NIGHTLY
Qty: 90 TABLET | Refills: 3 | Status: SHIPPED | OUTPATIENT
Start: 2023-04-13

## 2023-04-13 NOTE — PROGRESS NOTES
Subjective   Danae Ngo is a 63 y.o. female.     History of Present Illness  CC: Annual follow-up: Hypertension, hyperlipidemia, CAD, diabetes  Hypertension  This is a chronic problem. The current episode started more than 1 year ago. The problem is controlled. Associated symptoms include sweats. Pertinent negatives include no blurred vision, chest pain, palpitations or shortness of breath. Risk factors for coronary artery disease include family history, dyslipidemia, diabetes mellitus, obesity and sedentary lifestyle. Current antihypertension treatment includes calcium channel blockers, ACE inhibitors and beta blockers. The current treatment provides significant improvement. Compliance problems include exercise and diet.  Hypertensive end-organ damage includes CAD/MI.   Hyperlipidemia  This is a chronic problem. The current episode started more than 1 year ago. Recent lipid tests were reviewed and are variable. Exacerbating diseases include diabetes and obesity. Factors aggravating her hyperlipidemia include fatty foods and beta blockers. Pertinent negatives include no chest pain, focal sensory loss, focal weakness, leg pain, myalgias or shortness of breath. Current antihyperlipidemic treatment includes statins. The current treatment provides significant improvement of lipids. Compliance problems include adherence to exercise and adherence to diet.  Risk factors for coronary artery disease include diabetes mellitus, dyslipidemia, family history, hypertension, obesity, a sedentary lifestyle and post-menopausal.   Coronary Artery Disease  Presents for follow-up visit. Pertinent negatives include no chest pain, chest pressure, chest tightness, dizziness, leg swelling, muscle weakness, palpitations, shortness of breath or weight gain. Risk factors include hyperlipidemia and obesity. The symptoms have been stable. Compliance with diet is good. Compliance with exercise is variable. Compliance with medications is  good.   Diabetes  She presents for her follow-up diabetic visit. She has type 2 diabetes mellitus. Her disease course has been stable. Hypoglycemia symptoms include sweats and tremors (only with hypoglycemia). Pertinent negatives for hypoglycemia include no dizziness or nervousness/anxiousness. Pertinent negatives for diabetes include no blurred vision, no chest pain, no fatigue, no polydipsia, no polyphagia, no polyuria, no weakness and no weight loss. There are no hypoglycemic complications. Symptoms are stable. Diabetic complications include heart disease. Risk factors for coronary artery disease include family history, dyslipidemia, diabetes mellitus, hypertension, obesity, sedentary lifestyle and post-menopausal. Current diabetic treatment includes oral agent (dual therapy) (ozempic). She is compliant with treatment most of the time. Her weight is decreasing steadily. She is following a diabetic diet. Her home blood glucose trend is fluctuating minimally. Her overall blood glucose range is 110-130 mg/dl. An ACE inhibitor/angiotensin II receptor blocker is being taken.        The following portions of the patient's history were reviewed and updated as appropriate: allergies, current medications, past family history, past medical history, past social history, past surgical history and problem list.    Review of Systems   Constitutional: Negative for activity change, appetite change, chills, diaphoresis, fatigue, fever, unexpected weight gain and unexpected weight loss.   HENT: Negative for congestion, sore throat, trouble swallowing and voice change.    Eyes: Negative for blurred vision, double vision, photophobia, pain and visual disturbance.   Respiratory: Negative for cough, chest tightness, shortness of breath and wheezing.    Cardiovascular: Negative for chest pain, palpitations and leg swelling.   Gastrointestinal: Negative for abdominal distention, abdominal pain, anal bleeding, blood in stool,  constipation, diarrhea, nausea, vomiting, GERD and indigestion.   Endocrine: Negative for cold intolerance, heat intolerance, polydipsia, polyphagia and polyuria.   Genitourinary: Negative for dysuria, frequency, hematuria and urgency.   Musculoskeletal: Negative for arthralgias, myalgias and muscle weakness.   Skin: Negative for rash.   Allergic/Immunologic: Negative.    Neurological: Positive for tremors (only with hypoglycemia). Negative for dizziness, focal weakness, syncope, weakness, light-headedness and headache.   Hematological: Negative.    Psychiatric/Behavioral: The patient is not nervous/anxious.        Objective   Physical Exam  Vitals and nursing note reviewed.   Constitutional:       General: She is not in acute distress.     Appearance: Normal appearance. She is well-developed. She is obese. She is not ill-appearing, toxic-appearing or diaphoretic.   HENT:      Head: Normocephalic and atraumatic.      Right Ear: External ear normal.      Left Ear: External ear normal.      Nose: Nose normal.   Eyes:      Conjunctiva/sclera: Conjunctivae normal.      Pupils: Pupils are equal, round, and reactive to light.   Neck:      Thyroid: No thyromegaly.      Trachea: No tracheal deviation.   Cardiovascular:      Rate and Rhythm: Normal rate and regular rhythm.      Heart sounds: Normal heart sounds. No murmur heard.    No friction rub. No gallop.   Pulmonary:      Effort: Pulmonary effort is normal. No respiratory distress.      Breath sounds: Normal breath sounds. No stridor. No wheezing, rhonchi or rales.   Abdominal:      General: Bowel sounds are normal. There is no distension.      Palpations: Abdomen is soft. There is no mass.      Tenderness: There is no abdominal tenderness. There is no guarding or rebound.      Hernia: No hernia is present.   Musculoskeletal:         General: No tenderness. Normal range of motion.      Cervical back: Normal range of motion and neck supple.   Lymphadenopathy:       Cervical: No cervical adenopathy.   Skin:     General: Skin is warm and dry.      Coloration: Skin is not pale.      Findings: No erythema or rash.   Neurological:      Mental Status: She is alert and oriented to person, place, and time.      Cranial Nerves: No cranial nerve deficit.      Coordination: Coordination normal.   Psychiatric:         Mood and Affect: Mood normal.         Behavior: Behavior normal.         Thought Content: Thought content normal.         Judgment: Judgment normal.           Assessment & Plan   Diagnoses and all orders for this visit:    1. Annual physical exam (Primary)    2. Essential (primary) hypertension   -Controlled.  Continue amlodipine, lisinopril, Toprol-XL as prescribed.  We will continue to monitor.    3. Mixed hyperlipidemia   -Lipid panel ordered by endocrinology.  Will monitor for results.  Continue low-fat diet and Lipitor as prescribed.  We will continue to monitor.    4. Coronary artery disease involving native coronary artery of native heart without angina pectoris   -Stable, denies chest pain, shortness of breath or palpitations.  Continue amlodipine, aspirin, Lipitor, lisinopril, Toprol-XL as prescribed.  Follow-up cardiology as scheduled.    5. Type 2 diabetes mellitus with hyperglycemia, without long-term current use of insulin   - Relatively stable but patient reports having 2 hypoglycemic episodes recently with glucose levels in the 60s.  1 occurred in the overnight hours that woke her up the other in the mid morning hours.  Instructed patient to make sure she is eating protein with every meal and encouraged protein-based snack before bedtime.  Patient has follow-up with endocrinology in approximately 2 weeks.  Instructed to discuss this further with her endocrinologist especially if dietary changes do not resolve this issue.  Patient verbalized standing instruction.  Continue diabetic diet medications as prescribed.    6. Hyperlipidemia associated with type 2  diabetes mellitus  -    Refill atorvastatin (LIPITOR) 20 MG tablet; Take 1 tablet by mouth Every Night.  Dispense: 90 tablet; Refill: 3    7.  Follow-up in 6 months or sooner for any acute needs.            This document has been electronically signed by WILLIS Manzano on April 13, 2023 13:04 CDT

## 2023-04-21 ENCOUNTER — LAB (OUTPATIENT)
Dept: LAB | Facility: HOSPITAL | Age: 63
End: 2023-04-21
Payer: COMMERCIAL

## 2023-04-21 DIAGNOSIS — E78.2 MIXED HYPERLIPIDEMIA: ICD-10-CM

## 2023-04-21 DIAGNOSIS — E03.9 ACQUIRED HYPOTHYROIDISM: ICD-10-CM

## 2023-04-21 DIAGNOSIS — E11.65 TYPE 2 DIABETES MELLITUS WITH HYPERGLYCEMIA, WITHOUT LONG-TERM CURRENT USE OF INSULIN: ICD-10-CM

## 2023-04-21 DIAGNOSIS — E55.9 VITAMIN D DEFICIENCY: ICD-10-CM

## 2023-04-21 LAB
ALBUMIN SERPL-MCNC: 4.2 G/DL (ref 3.5–5.2)
ALBUMIN/GLOB SERPL: 1.1 G/DL
ALP SERPL-CCNC: 69 U/L (ref 39–117)
ALT SERPL W P-5'-P-CCNC: 30 U/L (ref 1–33)
ANION GAP SERPL CALCULATED.3IONS-SCNC: 13 MMOL/L (ref 5–15)
AST SERPL-CCNC: 30 U/L (ref 1–32)
BASOPHILS # BLD AUTO: 0.05 10*3/MM3 (ref 0–0.2)
BASOPHILS NFR BLD AUTO: 0.7 % (ref 0–1.5)
BILIRUB SERPL-MCNC: 0.7 MG/DL (ref 0–1.2)
BUN SERPL-MCNC: 15 MG/DL (ref 8–23)
BUN/CREAT SERPL: 12.3 (ref 7–25)
CALCIUM SPEC-SCNC: 10 MG/DL (ref 8.6–10.5)
CHLORIDE SERPL-SCNC: 101 MMOL/L (ref 98–107)
CHOLEST SERPL-MCNC: 145 MG/DL (ref 0–200)
CO2 SERPL-SCNC: 26 MMOL/L (ref 22–29)
CREAT SERPL-MCNC: 1.22 MG/DL (ref 0.57–1)
DEPRECATED RDW RBC AUTO: 44.6 FL (ref 37–54)
EGFRCR SERPLBLD CKD-EPI 2021: 50.3 ML/MIN/1.73
EOSINOPHIL # BLD AUTO: 0.21 10*3/MM3 (ref 0–0.4)
EOSINOPHIL NFR BLD AUTO: 3 % (ref 0.3–6.2)
ERYTHROCYTE [DISTWIDTH] IN BLOOD BY AUTOMATED COUNT: 15.1 % (ref 12.3–15.4)
GLOBULIN UR ELPH-MCNC: 3.7 GM/DL
GLUCOSE SERPL-MCNC: 119 MG/DL (ref 65–99)
HCT VFR BLD AUTO: 43.6 % (ref 34–46.6)
HDLC SERPL-MCNC: 49 MG/DL (ref 40–60)
HGB BLD-MCNC: 13.9 G/DL (ref 12–15.9)
IMM GRANULOCYTES # BLD AUTO: 0.02 10*3/MM3 (ref 0–0.05)
IMM GRANULOCYTES NFR BLD AUTO: 0.3 % (ref 0–0.5)
LDLC SERPL CALC-MCNC: 84 MG/DL (ref 0–100)
LDLC/HDLC SERPL: 1.73 {RATIO}
LYMPHOCYTES # BLD AUTO: 1.24 10*3/MM3 (ref 0.7–3.1)
LYMPHOCYTES NFR BLD AUTO: 17.9 % (ref 19.6–45.3)
MCH RBC QN AUTO: 25.9 PG (ref 26.6–33)
MCHC RBC AUTO-ENTMCNC: 31.9 G/DL (ref 31.5–35.7)
MCV RBC AUTO: 81.3 FL (ref 79–97)
MONOCYTES # BLD AUTO: 0.45 10*3/MM3 (ref 0.1–0.9)
MONOCYTES NFR BLD AUTO: 6.5 % (ref 5–12)
NEUTROPHILS NFR BLD AUTO: 4.96 10*3/MM3 (ref 1.7–7)
NEUTROPHILS NFR BLD AUTO: 71.6 % (ref 42.7–76)
NRBC BLD AUTO-RTO: 0 /100 WBC (ref 0–0.2)
PLATELET # BLD AUTO: 342 10*3/MM3 (ref 140–450)
PMV BLD AUTO: 11.2 FL (ref 6–12)
POTASSIUM SERPL-SCNC: 4.1 MMOL/L (ref 3.5–5.2)
PROT SERPL-MCNC: 7.9 G/DL (ref 6–8.5)
RBC # BLD AUTO: 5.36 10*6/MM3 (ref 3.77–5.28)
SODIUM SERPL-SCNC: 140 MMOL/L (ref 136–145)
TRIGL SERPL-MCNC: 56 MG/DL (ref 0–150)
TSH SERPL DL<=0.05 MIU/L-ACNC: 1.67 UIU/ML (ref 0.27–4.2)
VLDLC SERPL-MCNC: 12 MG/DL (ref 5–40)
WBC NRBC COR # BLD: 6.93 10*3/MM3 (ref 3.4–10.8)

## 2023-04-21 PROCEDURE — 36415 COLL VENOUS BLD VENIPUNCTURE: CPT

## 2023-04-21 PROCEDURE — 83036 HEMOGLOBIN GLYCOSYLATED A1C: CPT

## 2023-04-21 PROCEDURE — 82306 VITAMIN D 25 HYDROXY: CPT

## 2023-04-21 PROCEDURE — 80061 LIPID PANEL: CPT

## 2023-04-21 PROCEDURE — 80050 GENERAL HEALTH PANEL: CPT

## 2023-04-21 PROCEDURE — 82570 ASSAY OF URINE CREATININE: CPT

## 2023-04-21 PROCEDURE — 82043 UR ALBUMIN QUANTITATIVE: CPT

## 2023-04-22 LAB
25(OH)D3 SERPL-MCNC: 80.1 NG/ML (ref 30–100)
ALBUMIN UR-MCNC: 4.7 MG/DL
CREAT UR-MCNC: 212.3 MG/DL
HBA1C MFR BLD: 6.7 % (ref 4.8–5.6)
MICROALBUMIN/CREAT UR: 22.1 MG/G

## 2023-04-27 ENCOUNTER — OFFICE VISIT (OUTPATIENT)
Dept: ENDOCRINOLOGY | Facility: CLINIC | Age: 63
End: 2023-04-27
Payer: COMMERCIAL

## 2023-04-27 VITALS
WEIGHT: 174.4 LBS | DIASTOLIC BLOOD PRESSURE: 110 MMHG | HEART RATE: 79 BPM | BODY MASS INDEX: 29.78 KG/M2 | SYSTOLIC BLOOD PRESSURE: 150 MMHG | HEIGHT: 64 IN | OXYGEN SATURATION: 98 %

## 2023-04-27 DIAGNOSIS — E78.5 DYSLIPIDEMIA: ICD-10-CM

## 2023-04-27 DIAGNOSIS — E55.9 VITAMIN D DEFICIENCY: ICD-10-CM

## 2023-04-27 DIAGNOSIS — E03.9 ACQUIRED HYPOTHYROIDISM: ICD-10-CM

## 2023-04-27 DIAGNOSIS — E78.2 MIXED HYPERLIPIDEMIA: ICD-10-CM

## 2023-04-27 DIAGNOSIS — E11.9 TYPE 2 DIABETES MELLITUS WITHOUT COMPLICATION, WITHOUT LONG-TERM CURRENT USE OF INSULIN: Primary | ICD-10-CM

## 2023-04-27 NOTE — PROGRESS NOTES
"Chief Complaint  Diabetes (Pt does not use reader and does not remember her dexcom information to log into dexcom clarity)    Subjective          Danae Ngo presents to [facility] ENDOCRINOLOGY  Diabetes         In office visit     Primary Care Provider     José Ruano APRN        63-year-old female presents for follow-up    Diabetes mellitus type 2    Diagnosed in 2015    Timing constant    Quality great control     Severity moderate    Complications CAD    Current diabetes regimen    SGLT2, metformin and GLP-1      Glucose monitoring    Checking 6 times daily    Uses a Dexcom G6    We could not download today         Review of Systems - General ROS: negative                Objective   Vital Signs:   BP (!) 150/110   Pulse 79   Ht 162.6 cm (64\")   Wt 79.1 kg (174 lb 6.4 oz)   SpO2 98%   BMI 29.94 kg/m²     Physical Exam  Constitutional:       Appearance: Normal appearance.   Cardiovascular:      Rate and Rhythm: Regular rhythm.      Heart sounds: Normal heart sounds.   Musculoskeletal:      Cervical back: Normal range of motion.   Neurological:      General: No focal deficit present.      Mental Status: She is alert.        Result Review :   The following data was reviewed by: WILLIS Gabriel on 05/13/2022:  Common labs        1/5/2023    06:29 1/6/2023    05:34 4/21/2023    09:39 4/21/2023    09:48   Common Labs   Glucose 146   133   119      BUN 20   15   15      Creatinine 1.05   0.93   1.22      Sodium 131   134   140      Potassium 4.0   3.7   4.1      Chloride 94   99   101      Calcium 9.5   9.2   10.0      Albumin 4.1    4.2      Total Bilirubin 0.5    0.7      Alkaline Phosphatase 81    69      AST (SGOT) 38    30      ALT (SGPT) 20    30      WBC 10.88   8.79   6.93      Hemoglobin 14.7   13.5   13.9      Hematocrit 45.0   39.9   43.6      Platelets 350   318   342      Total Cholesterol   145      Triglycerides   56      HDL Cholesterol   49    "   LDL Cholesterol    84      Hemoglobin A1C   6.70      Microalbumin, Urine    4.7                   Assessment and Plan    Diagnoses and all orders for this visit:    1. Type 2 diabetes mellitus without complication, without long-term current use of insulin (Primary)  -     CBC & Differential; Future  -     Comprehensive Metabolic Panel; Future  -     Hemoglobin A1c; Future  -     Lipid Panel; Future  -     Microalbumin / Creatinine Urine Ratio - Urine, Clean Catch; Future  -     TSH; Future  -     Vitamin B12; Future  -     Vitamin D,25-Hydroxy; Future    2. Vitamin D deficiency  -     CBC & Differential; Future  -     Comprehensive Metabolic Panel; Future  -     Hemoglobin A1c; Future  -     Lipid Panel; Future  -     Microalbumin / Creatinine Urine Ratio - Urine, Clean Catch; Future  -     TSH; Future  -     Vitamin B12; Future  -     Vitamin D,25-Hydroxy; Future    3. Mixed hyperlipidemia  -     CBC & Differential; Future  -     Comprehensive Metabolic Panel; Future  -     Hemoglobin A1c; Future  -     Lipid Panel; Future  -     Microalbumin / Creatinine Urine Ratio - Urine, Clean Catch; Future  -     TSH; Future  -     Vitamin B12; Future  -     Vitamin D,25-Hydroxy; Future    4. Acquired hypothyroidism  -     CBC & Differential; Future  -     Comprehensive Metabolic Panel; Future  -     Hemoglobin A1c; Future  -     Lipid Panel; Future  -     Microalbumin / Creatinine Urine Ratio - Urine, Clean Catch; Future  -     TSH; Future  -     Vitamin B12; Future  -     Vitamin D,25-Hydroxy; Future    5. Dyslipidemia  -     CBC & Differential; Future  -     Comprehensive Metabolic Panel; Future  -     Hemoglobin A1c; Future  -     Lipid Panel; Future  -     Microalbumin / Creatinine Urine Ratio - Urine, Clean Catch; Future  -     TSH; Future  -     Vitamin B12; Future  -     Vitamin D,25-Hydroxy; Future             Glycemic Management:         Diabetes mellitus type 2         Lab Results   Component Value Date     HGBA1C 6.70 (H) 04/21/2023          Uses the dexcom G6                Taking Synjardy XR 12/5 /1000 mg - one tablet daily       Taking  Ozempic  2 mg one weekly --keep     If nausea try to eat less  If vomiting , stop   Ozempic will help generate more of your own insulin and it decreases the probability of Death / Heart / Stroke by 27%        Keep meals at 50 gm of CHO             she does not want insulin at this time         Stopped glipizide     Stopped jardiance      Stopped metformin         Lipid Management        Taking  atorvastatin 20 mg qhs        Total Cholesterol   Date Value Ref Range Status   04/21/2023 145 0 - 200 mg/dL Final     Triglycerides   Date Value Ref Range Status   04/21/2023 56 0 - 150 mg/dL Final     HDL Cholesterol   Date Value Ref Range Status   04/21/2023 49 40 - 60 mg/dL Final     LDL Cholesterol    Date Value Ref Range Status   04/21/2023 84 0 - 100 mg/dL Final        Blood Pressure Management:       Taking lisinopril 40 mg one daily      Taking metoprolol xl 100 mg daily               Microvascular Complication Monitoring:       Last eye exam Dec. 2022 , no retinopathy                Component      Latest Ref Rng & Units 8/17/2022   Microalbumin/Creatinine Ratio          Creatinine, Urine      mg/dL 71.2   Microalbumin, Urine      mg/dL <1.2                 Neuropathy  None                     Bone Health     Vitamin d def     Taking MVI      Thyroid Health        Lab Results   Component Value Date    TSH 1.670 04/21/2023          Taking Levothyroxine 112 mcgs daily ---change to daily but on Sunday take 1/2 tablet              Other Diabetes Related Aspects             Lab Results   Component Value Date    [id number] 08/17/2022                 Follow Up   Return in about 6 months (around 10/27/2023) for Recheck, labs one week prior to next appointment.  Patient was given instructions and counseling regarding her condition or for health maintenance advice. Please see specific  information pulled into the AVS if appropriate.         This document has been electronically signed by WILLIS Gabriel on April 27, 2023 09:55 CDT.

## 2023-05-16 DIAGNOSIS — E87.6 HYPOKALEMIA: ICD-10-CM

## 2023-05-16 RX ORDER — POTASSIUM CHLORIDE 750 MG/1
TABLET, EXTENDED RELEASE ORAL
Qty: 180 TABLET | Refills: 2 | Status: SHIPPED | OUTPATIENT
Start: 2023-05-16

## 2023-05-22 ENCOUNTER — DOCUMENTATION (OUTPATIENT)
Dept: FAMILY MEDICINE CLINIC | Facility: CLINIC | Age: 63
End: 2023-05-22
Payer: COMMERCIAL

## 2023-05-22 NOTE — PROGRESS NOTES
Prior authorization for OMEPRAZOLE was approved from 05/19/23-05/18/26. Patient/pharmacy notified.

## 2023-05-30 ENCOUNTER — LAB (OUTPATIENT)
Dept: LAB | Facility: HOSPITAL | Age: 63
End: 2023-05-30

## 2023-05-30 DIAGNOSIS — E11.9 TYPE 2 DIABETES MELLITUS WITHOUT COMPLICATION, WITHOUT LONG-TERM CURRENT USE OF INSULIN: ICD-10-CM

## 2023-05-30 LAB
ALBUMIN SERPL-MCNC: 4.1 G/DL (ref 3.5–5.2)
ALBUMIN/GLOB SERPL: 1 G/DL
ALP SERPL-CCNC: 74 U/L (ref 39–117)
ALT SERPL W P-5'-P-CCNC: 23 U/L (ref 1–33)
ANION GAP SERPL CALCULATED.3IONS-SCNC: 16 MMOL/L (ref 5–15)
AST SERPL-CCNC: 23 U/L (ref 1–32)
BILIRUB SERPL-MCNC: 0.7 MG/DL (ref 0–1.2)
BUN SERPL-MCNC: 18 MG/DL (ref 8–23)
BUN/CREAT SERPL: 16.1 (ref 7–25)
CALCIUM SPEC-SCNC: 9.7 MG/DL (ref 8.6–10.5)
CHLORIDE SERPL-SCNC: 97 MMOL/L (ref 98–107)
CO2 SERPL-SCNC: 25 MMOL/L (ref 22–29)
CREAT SERPL-MCNC: 1.12 MG/DL (ref 0.57–1)
EGFRCR SERPLBLD CKD-EPI 2021: 55.7 ML/MIN/1.73
GLOBULIN UR ELPH-MCNC: 4 GM/DL
GLUCOSE SERPL-MCNC: 132 MG/DL (ref 65–99)
POTASSIUM SERPL-SCNC: 3.7 MMOL/L (ref 3.5–5.2)
PROT SERPL-MCNC: 8.1 G/DL (ref 6–8.5)
SODIUM SERPL-SCNC: 138 MMOL/L (ref 136–145)

## 2023-05-30 PROCEDURE — 36415 COLL VENOUS BLD VENIPUNCTURE: CPT

## 2023-05-30 PROCEDURE — 80053 COMPREHEN METABOLIC PANEL: CPT

## 2023-08-11 ENCOUNTER — TELEPHONE (OUTPATIENT)
Dept: FAMILY MEDICINE CLINIC | Facility: CLINIC | Age: 63
End: 2023-08-11
Payer: COMMERCIAL

## 2023-08-14 DIAGNOSIS — E11.65 TYPE 2 DIABETES MELLITUS WITH HYPERGLYCEMIA, WITHOUT LONG-TERM CURRENT USE OF INSULIN: ICD-10-CM

## 2023-08-14 RX ORDER — EMPAGLIFLOZIN, METFORMIN HYDROCHLORIDE 12.5; 1 MG/1; MG/1
TABLET, EXTENDED RELEASE ORAL
Qty: 60 TABLET | Refills: 11 | Status: SHIPPED | OUTPATIENT
Start: 2023-08-14

## 2023-08-31 ENCOUNTER — PREP FOR SURGERY (OUTPATIENT)
Dept: OTHER | Facility: HOSPITAL | Age: 63
End: 2023-08-31
Payer: COMMERCIAL

## 2023-08-31 DIAGNOSIS — Z12.11 SCREEN FOR COLON CANCER: Primary | ICD-10-CM

## 2023-08-31 RX ORDER — DEXTROSE AND SODIUM CHLORIDE 5; .45 G/100ML; G/100ML
100 INJECTION, SOLUTION INTRAVENOUS CONTINUOUS PRN
OUTPATIENT
Start: 2023-08-31

## 2023-09-16 DIAGNOSIS — E03.9 ACQUIRED HYPOTHYROIDISM: ICD-10-CM

## 2023-09-18 RX ORDER — LEVOTHYROXINE SODIUM 112 UG/1
TABLET ORAL
Qty: 90 TABLET | Refills: 3 | Status: SHIPPED | OUTPATIENT
Start: 2023-09-18

## 2023-09-25 DIAGNOSIS — I10 ESSENTIAL (PRIMARY) HYPERTENSION: ICD-10-CM

## 2023-09-25 RX ORDER — METOPROLOL SUCCINATE 100 MG/1
TABLET, EXTENDED RELEASE ORAL
Qty: 270 TABLET | Refills: 2 | Status: SHIPPED | OUTPATIENT
Start: 2023-09-25

## 2023-09-25 RX ORDER — AMLODIPINE BESYLATE 5 MG/1
TABLET ORAL
Qty: 90 TABLET | Refills: 3 | Status: SHIPPED | OUTPATIENT
Start: 2023-09-25 | End: 2023-09-28 | Stop reason: HOSPADM

## 2023-09-26 ENCOUNTER — TELEPHONE (OUTPATIENT)
Dept: FAMILY MEDICINE CLINIC | Facility: CLINIC | Age: 63
End: 2023-09-26
Payer: COMMERCIAL

## 2023-09-26 ENCOUNTER — HOSPITAL ENCOUNTER (OUTPATIENT)
Facility: HOSPITAL | Age: 63
Setting detail: OBSERVATION
Discharge: HOME | End: 2023-09-28
Attending: STUDENT IN AN ORGANIZED HEALTH CARE EDUCATION/TRAINING PROGRAM | Admitting: HOSPITALIST
Payer: COMMERCIAL

## 2023-09-26 ENCOUNTER — APPOINTMENT (OUTPATIENT)
Dept: GENERAL RADIOLOGY | Facility: HOSPITAL | Age: 63
End: 2023-09-26
Payer: COMMERCIAL

## 2023-09-26 DIAGNOSIS — R09.89 PULMONARY CONGESTION: Primary | ICD-10-CM

## 2023-09-26 PROBLEM — R06.02 SOB (SHORTNESS OF BREATH): Status: ACTIVE | Noted: 2023-09-26

## 2023-09-26 LAB
ALBUMIN SERPL-MCNC: 4 G/DL (ref 3.5–5.2)
ALBUMIN/GLOB SERPL: 1.1 G/DL
ALP SERPL-CCNC: 77 U/L (ref 39–117)
ALT SERPL W P-5'-P-CCNC: 31 U/L (ref 1–33)
ANION GAP SERPL CALCULATED.3IONS-SCNC: 13 MMOL/L (ref 5–15)
AST SERPL-CCNC: 26 U/L (ref 1–32)
BASOPHILS # BLD AUTO: 0.05 10*3/MM3 (ref 0–0.2)
BASOPHILS NFR BLD AUTO: 0.5 % (ref 0–1.5)
BILIRUB SERPL-MCNC: 0.6 MG/DL (ref 0–1.2)
BUN SERPL-MCNC: 16 MG/DL (ref 8–23)
BUN/CREAT SERPL: 12.1 (ref 7–25)
CALCIUM SPEC-SCNC: 9.3 MG/DL (ref 8.6–10.5)
CHLORIDE SERPL-SCNC: 101 MMOL/L (ref 98–107)
CO2 SERPL-SCNC: 24 MMOL/L (ref 22–29)
CREAT SERPL-MCNC: 1.32 MG/DL (ref 0.57–1)
D-DIMER, QUANTITATIVE (MAD,POW, STR): 670 NG/ML (FEU) (ref 0–620)
DEPRECATED RDW RBC AUTO: 46.7 FL (ref 37–54)
EGFRCR SERPLBLD CKD-EPI 2021: 45.7 ML/MIN/1.73
EOSINOPHIL # BLD AUTO: 0.04 10*3/MM3 (ref 0–0.4)
EOSINOPHIL NFR BLD AUTO: 0.4 % (ref 0.3–6.2)
ERYTHROCYTE [DISTWIDTH] IN BLOOD BY AUTOMATED COUNT: 17 % (ref 12.3–15.4)
GLOBULIN UR ELPH-MCNC: 3.6 GM/DL
GLUCOSE SERPL-MCNC: 136 MG/DL (ref 65–99)
HCT VFR BLD AUTO: 42.6 % (ref 34–46.6)
HGB BLD-MCNC: 13.5 G/DL (ref 12–15.9)
HOLD SPECIMEN: NORMAL
IMM GRANULOCYTES # BLD AUTO: 0.03 10*3/MM3 (ref 0–0.05)
IMM GRANULOCYTES NFR BLD AUTO: 0.3 % (ref 0–0.5)
LYMPHOCYTES # BLD AUTO: 1.63 10*3/MM3 (ref 0.7–3.1)
LYMPHOCYTES NFR BLD AUTO: 16.9 % (ref 19.6–45.3)
MCH RBC QN AUTO: 24.6 PG (ref 26.6–33)
MCHC RBC AUTO-ENTMCNC: 31.7 G/DL (ref 31.5–35.7)
MCV RBC AUTO: 77.7 FL (ref 79–97)
MONOCYTES # BLD AUTO: 0.69 10*3/MM3 (ref 0.1–0.9)
MONOCYTES NFR BLD AUTO: 7.1 % (ref 5–12)
NEUTROPHILS NFR BLD AUTO: 7.22 10*3/MM3 (ref 1.7–7)
NEUTROPHILS NFR BLD AUTO: 74.8 % (ref 42.7–76)
NRBC BLD AUTO-RTO: 0 /100 WBC (ref 0–0.2)
NT-PROBNP SERPL-MCNC: 3766 PG/ML (ref 0–900)
PLATELET # BLD AUTO: 404 10*3/MM3 (ref 140–450)
PMV BLD AUTO: 10.1 FL (ref 6–12)
POTASSIUM SERPL-SCNC: 3.7 MMOL/L (ref 3.5–5.2)
PROT SERPL-MCNC: 7.6 G/DL (ref 6–8.5)
RBC # BLD AUTO: 5.48 10*6/MM3 (ref 3.77–5.28)
SODIUM SERPL-SCNC: 138 MMOL/L (ref 136–145)
TROPONIN T SERPL HS-MCNC: 15 NG/L
WBC NRBC COR # BLD: 9.66 10*3/MM3 (ref 3.4–10.8)
WHOLE BLOOD HOLD COAG: NORMAL
WHOLE BLOOD HOLD SPECIMEN: NORMAL

## 2023-09-26 PROCEDURE — 99284 EMERGENCY DEPT VISIT MOD MDM: CPT

## 2023-09-26 PROCEDURE — 25010000002 ENOXAPARIN PER 10 MG: Performed by: PHYSICIAN ASSISTANT

## 2023-09-26 PROCEDURE — 93010 ELECTROCARDIOGRAM REPORT: CPT | Performed by: INTERNAL MEDICINE

## 2023-09-26 PROCEDURE — 85379 FIBRIN DEGRADATION QUANT: CPT | Performed by: PHYSICIAN ASSISTANT

## 2023-09-26 PROCEDURE — 82948 REAGENT STRIP/BLOOD GLUCOSE: CPT

## 2023-09-26 PROCEDURE — 96372 THER/PROPH/DIAG INJ SC/IM: CPT

## 2023-09-26 PROCEDURE — 80053 COMPREHEN METABOLIC PANEL: CPT | Performed by: STUDENT IN AN ORGANIZED HEALTH CARE EDUCATION/TRAINING PROGRAM

## 2023-09-26 PROCEDURE — G0378 HOSPITAL OBSERVATION PER HR: HCPCS

## 2023-09-26 PROCEDURE — 25010000002 FUROSEMIDE PER 20 MG: Performed by: STUDENT IN AN ORGANIZED HEALTH CARE EDUCATION/TRAINING PROGRAM

## 2023-09-26 PROCEDURE — 93005 ELECTROCARDIOGRAM TRACING: CPT | Performed by: STUDENT IN AN ORGANIZED HEALTH CARE EDUCATION/TRAINING PROGRAM

## 2023-09-26 PROCEDURE — 71046 X-RAY EXAM CHEST 2 VIEWS: CPT

## 2023-09-26 PROCEDURE — 96374 THER/PROPH/DIAG INJ IV PUSH: CPT

## 2023-09-26 PROCEDURE — 85025 COMPLETE CBC W/AUTO DIFF WBC: CPT | Performed by: STUDENT IN AN ORGANIZED HEALTH CARE EDUCATION/TRAINING PROGRAM

## 2023-09-26 PROCEDURE — 84484 ASSAY OF TROPONIN QUANT: CPT | Performed by: STUDENT IN AN ORGANIZED HEALTH CARE EDUCATION/TRAINING PROGRAM

## 2023-09-26 PROCEDURE — 83880 ASSAY OF NATRIURETIC PEPTIDE: CPT | Performed by: STUDENT IN AN ORGANIZED HEALTH CARE EDUCATION/TRAINING PROGRAM

## 2023-09-26 PROCEDURE — 93005 ELECTROCARDIOGRAM TRACING: CPT

## 2023-09-26 RX ORDER — DEXTROSE MONOHYDRATE 25 G/50ML
25 INJECTION, SOLUTION INTRAVENOUS
Status: DISCONTINUED | OUTPATIENT
Start: 2023-09-26 | End: 2023-09-28 | Stop reason: HOSPADM

## 2023-09-26 RX ORDER — SODIUM CHLORIDE 0.9 % (FLUSH) 0.9 %
10 SYRINGE (ML) INJECTION AS NEEDED
Status: DISCONTINUED | OUTPATIENT
Start: 2023-09-26 | End: 2023-09-28 | Stop reason: HOSPADM

## 2023-09-26 RX ORDER — AMLODIPINE BESYLATE 5 MG/1
5 TABLET ORAL
Status: DISCONTINUED | OUTPATIENT
Start: 2023-09-27 | End: 2023-09-27

## 2023-09-26 RX ORDER — NICOTINE POLACRILEX 4 MG
15 LOZENGE BUCCAL
Status: DISCONTINUED | OUTPATIENT
Start: 2023-09-26 | End: 2023-09-28 | Stop reason: HOSPADM

## 2023-09-26 RX ORDER — PANTOPRAZOLE SODIUM 40 MG/1
40 TABLET, DELAYED RELEASE ORAL
Status: DISCONTINUED | OUTPATIENT
Start: 2023-09-27 | End: 2023-09-28 | Stop reason: HOSPADM

## 2023-09-26 RX ORDER — MEXILETINE HYDROCHLORIDE 150 MG/1
150 CAPSULE ORAL EVERY 8 HOURS
Status: DISCONTINUED | OUTPATIENT
Start: 2023-09-26 | End: 2023-09-28 | Stop reason: HOSPADM

## 2023-09-26 RX ORDER — SODIUM CHLORIDE 9 MG/ML
40 INJECTION, SOLUTION INTRAVENOUS AS NEEDED
Status: DISCONTINUED | OUTPATIENT
Start: 2023-09-26 | End: 2023-09-28 | Stop reason: HOSPADM

## 2023-09-26 RX ORDER — SODIUM CHLORIDE 0.9 % (FLUSH) 0.9 %
10 SYRINGE (ML) INJECTION EVERY 12 HOURS SCHEDULED
Status: DISCONTINUED | OUTPATIENT
Start: 2023-09-26 | End: 2023-09-28 | Stop reason: HOSPADM

## 2023-09-26 RX ORDER — ASPIRIN 325 MG
325 TABLET ORAL DAILY
Status: DISCONTINUED | OUTPATIENT
Start: 2023-09-27 | End: 2023-09-27

## 2023-09-26 RX ORDER — BISACODYL 10 MG
10 SUPPOSITORY, RECTAL RECTAL DAILY PRN
Status: DISCONTINUED | OUTPATIENT
Start: 2023-09-26 | End: 2023-09-28 | Stop reason: HOSPADM

## 2023-09-26 RX ORDER — LISINOPRIL 40 MG/1
40 TABLET ORAL DAILY
Status: DISCONTINUED | OUTPATIENT
Start: 2023-09-27 | End: 2023-09-27

## 2023-09-26 RX ORDER — AMOXICILLIN 250 MG
2 CAPSULE ORAL 2 TIMES DAILY
Status: DISCONTINUED | OUTPATIENT
Start: 2023-09-26 | End: 2023-09-28 | Stop reason: HOSPADM

## 2023-09-26 RX ORDER — ACETAMINOPHEN 325 MG/1
650 TABLET ORAL EVERY 4 HOURS PRN
Status: DISCONTINUED | OUTPATIENT
Start: 2023-09-26 | End: 2023-09-28 | Stop reason: HOSPADM

## 2023-09-26 RX ORDER — FUROSEMIDE 10 MG/ML
80 INJECTION INTRAMUSCULAR; INTRAVENOUS ONCE
Status: COMPLETED | OUTPATIENT
Start: 2023-09-26 | End: 2023-09-26

## 2023-09-26 RX ORDER — ONDANSETRON 2 MG/ML
4 INJECTION INTRAMUSCULAR; INTRAVENOUS EVERY 6 HOURS PRN
Status: DISCONTINUED | OUTPATIENT
Start: 2023-09-26 | End: 2023-09-28 | Stop reason: HOSPADM

## 2023-09-26 RX ORDER — SEMAGLUTIDE 2.68 MG/ML
INJECTION, SOLUTION SUBCUTANEOUS
Qty: 9 ML | Refills: 0 | Status: SHIPPED | OUTPATIENT
Start: 2023-09-26

## 2023-09-26 RX ORDER — LEVOTHYROXINE SODIUM 112 UG/1
112 TABLET ORAL
Status: DISCONTINUED | OUTPATIENT
Start: 2023-09-27 | End: 2023-09-28 | Stop reason: HOSPADM

## 2023-09-26 RX ORDER — ONDANSETRON 4 MG/1
4 TABLET, FILM COATED ORAL EVERY 6 HOURS PRN
Status: DISCONTINUED | OUTPATIENT
Start: 2023-09-26 | End: 2023-09-28 | Stop reason: HOSPADM

## 2023-09-26 RX ORDER — POLYETHYLENE GLYCOL 3350 17 G/17G
17 POWDER, FOR SOLUTION ORAL DAILY PRN
Status: DISCONTINUED | OUTPATIENT
Start: 2023-09-26 | End: 2023-09-28 | Stop reason: HOSPADM

## 2023-09-26 RX ORDER — AMIODARONE HYDROCHLORIDE 200 MG/1
200 TABLET ORAL
Status: DISCONTINUED | OUTPATIENT
Start: 2023-09-27 | End: 2023-09-28 | Stop reason: HOSPADM

## 2023-09-26 RX ORDER — BISACODYL 5 MG/1
5 TABLET, DELAYED RELEASE ORAL DAILY PRN
Status: DISCONTINUED | OUTPATIENT
Start: 2023-09-26 | End: 2023-09-28 | Stop reason: HOSPADM

## 2023-09-26 RX ORDER — POTASSIUM CHLORIDE 750 MG/1
10 CAPSULE, EXTENDED RELEASE ORAL 2 TIMES DAILY WITH MEALS
Status: DISCONTINUED | OUTPATIENT
Start: 2023-09-26 | End: 2023-09-27

## 2023-09-26 RX ORDER — GLUCAGON 1 MG/ML
1 KIT INJECTION
Status: DISCONTINUED | OUTPATIENT
Start: 2023-09-26 | End: 2023-09-28 | Stop reason: HOSPADM

## 2023-09-26 RX ORDER — ENOXAPARIN SODIUM 100 MG/ML
40 INJECTION SUBCUTANEOUS DAILY
Status: DISCONTINUED | OUTPATIENT
Start: 2023-09-26 | End: 2023-09-27

## 2023-09-26 RX ORDER — ATORVASTATIN CALCIUM 20 MG/1
20 TABLET, FILM COATED ORAL NIGHTLY
Status: DISCONTINUED | OUTPATIENT
Start: 2023-09-26 | End: 2023-09-28 | Stop reason: HOSPADM

## 2023-09-26 RX ORDER — METOPROLOL SUCCINATE 100 MG/1
300 TABLET, EXTENDED RELEASE ORAL DAILY
Status: DISCONTINUED | OUTPATIENT
Start: 2023-09-27 | End: 2023-09-27

## 2023-09-26 RX ORDER — INSULIN ASPART 100 [IU]/ML
0-9 INJECTION, SOLUTION INTRAVENOUS; SUBCUTANEOUS
Status: DISCONTINUED | OUTPATIENT
Start: 2023-09-27 | End: 2023-09-28 | Stop reason: HOSPADM

## 2023-09-26 RX ADMIN — Medication 400 MG: at 20:45

## 2023-09-26 RX ADMIN — FUROSEMIDE 80 MG: 10 INJECTION, SOLUTION INTRAMUSCULAR; INTRAVENOUS at 19:12

## 2023-09-26 RX ADMIN — POTASSIUM CHLORIDE 10 MEQ: 750 CAPSULE, EXTENDED RELEASE ORAL at 20:45

## 2023-09-26 RX ADMIN — ENOXAPARIN SODIUM 40 MG: 40 INJECTION SUBCUTANEOUS at 20:45

## 2023-09-26 RX ADMIN — Medication 10 ML: at 20:27

## 2023-09-26 RX ADMIN — MEXILETINE HYDROCHLORIDE 150 MG: 150 CAPSULE ORAL at 20:51

## 2023-09-26 RX ADMIN — ATORVASTATIN CALCIUM 20 MG: 20 TABLET, FILM COATED ORAL at 20:45

## 2023-09-26 NOTE — ED PROVIDER NOTES
Subjective   History of Present Illness  63-year-old female comes to the ER with a chief complaint cough and shortness of breath is worse when she lays flat.  She denies swelling of her lower extremities.  This has been going on for about a week.  She denies other symptoms.    History provided by:  Patient   used: No      Review of Systems   Constitutional:  Negative for activity change, chills and fever.   HENT:  Negative for drooling.    Eyes:  Negative for redness.   Respiratory:  Positive for cough and shortness of breath. Negative for chest tightness and wheezing.    Cardiovascular:  Negative for chest pain and leg swelling.   Gastrointestinal:  Negative for abdominal pain, nausea and vomiting.   Genitourinary:  Negative for flank pain.   Skin:  Negative for color change.   Neurological:  Negative for seizures.   Psychiatric/Behavioral:  Negative for confusion.      Past Medical History:   Diagnosis Date    Abnormal thyroid function test     Acquired hypothyroidism     Allergic     Allergic rhinitis     Anemia     Breast cyst     Coronary atherosclerosis     unspecified type of vessel, native or graft       Dilated cardiomyopathy     Dyslipidemia     Esophageal reflux     Essential hypertension     Family history of malignant neoplasm of breast     Fibrocystic disease of breast     Low Mireille score    GERD (gastroesophageal reflux disease)     Herpes zoster     History of chicken pox     Hyperlipidemia     Measles     Mumps     Myocardial infarction 2001    Nasal septal deformity     Obesity     Type 2 diabetes mellitus     Uncontrolled    V tach     Vitamin D deficiency        Allergies   Allergen Reactions    Codeine GI Intolerance       Past Surgical History:   Procedure Laterality Date    BREAST BIOPSY  06/11/2010    left breast benign    BREAST CYST ASPIRATION      CARDIAC CATHETERIZATION  07/31/2002    Left cardiac cath, selective coronary angiography, PTCA to the left anterior  descending. Stent placement to the left anterior descending    CARDIAC CATHETERIZATION N/A 2023    Procedure: Left Heart Cath;  Surgeon: Jose Antonio Carrero MD;  Location: [facility] CATH INVASIVE LOCATION;  Service: Cardiology;  Laterality: N/A;    CARDIAC DEFIBRILLATOR PLACEMENT Left     Dr. Brunner    CATARACT EXTRACTION W/ INTRAOCULAR LENS IMPLANT Left 2023    Procedure: REMOVE CATARACT AND IMPLANT INTRAOCULAR LENS;  Surgeon: Alex Chino MD;  Location: [facility] OR;  Service: Ophthalmology;  Laterality: Left;    CATARACT EXTRACTION W/ INTRAOCULAR LENS IMPLANT Right 2/3/2023    Procedure: REMOVE CATARACT AND IMPLANT INTRAOCULAR LENS;  Surgeon: Alex Chino MD;  Location: [facility] OR;  Service: Ophthalmology;  Laterality: Right;     SECTION      Intrauterine pregnancy, term gestation, cephalopelvic disproportion    COLONOSCOPY N/A 10/12/2018    Procedure: COLONOSCOPY;  Surgeon: Delroy Miller MD;  Location: [facility] ENDOSCOPY;  Service: General    ENDOSCOPY AND COLONOSCOPY  04/15/2008    Normal    ESOPHAGOSCOPY / EGD  2007    With tube-Esophagus appeared normal. Gastritis of the stomach. A biopsy of the stomach was obtained from the stomach. The duodenum appeared normal    EXTERNAL EAR SURGERY  08/15/1991    Repair biifd ear    OOPHORECTOMY      Left salpingo-oophorectomy, wedge resection of the right ovary, lysis of adhesions    TRANSESOPHAGEAL ECHOCARDIOGRAM (LORELEI)  2012    Without color flow-Moderate to severe LV dysfumctional w/ an EF of 30-35%. Wall motion abnormalities as described above. LV enlargement. Mild aortic insufficiency, mild TR regurg, mild M regurg. mild pulmonary insufficiency       Family History   Problem Relation Age of Onset    Breast cancer Mother         50's    Other Mother         CVA    Coronary artery disease Other     Diabetes Other     Heart disease Other     Hypertension Other     Autism Other         grandson    Alcohol abuse  "Brother     Cardiomyopathy Brother     Coronary artery disease Brother     Heart disease Father     Hypertension Sister     Diabetes Sister     Heart disease Son     Heart attack Son     Heart disease Maternal Grandmother     Hypertension Maternal Grandmother     Hypertension Sister     Hypertension Sister     Hypertension Brother     Hypertension Brother        Social History     Socioeconomic History    Marital status: Single   Tobacco Use    Smoking status: Never    Smokeless tobacco: Never   Vaping Use    Vaping Use: Never used   Substance and Sexual Activity    Alcohol use: No    Drug use: No    Sexual activity: Defer     Birth control/protection: Post-menopausal           Objective   Vitals:    09/26/23 1635   BP: 134/88   BP Location: Right arm   Patient Position: Sitting   Pulse: 83   Resp: 20   Temp: 98.7 °F (37.1 °C)   TempSrc: Oral   SpO2: 94%   Weight: 78.5 kg (173 lb)   Height: 162.6 cm (64\")       Physical Exam  Vitals and nursing note reviewed.   Constitutional:       General: She is not in acute distress.     Appearance: She is well-developed. She is not ill-appearing, toxic-appearing or diaphoretic.   Pulmonary:      Effort: Pulmonary effort is normal. No accessory muscle usage or respiratory distress.   Chest:      Chest wall: No tenderness.   Abdominal:      Palpations: Abdomen is soft.      Tenderness: There is no abdominal tenderness (deep palpation). There is no guarding or rebound.   Musculoskeletal:      Right lower leg: No edema.      Left lower leg: No edema.   Skin:     General: Skin is warm and dry.      Capillary Refill: Capillary refill takes less than 2 seconds.   Neurological:      Mental Status: She is alert and oriented to person, place, and time.       Procedures           ED Course      Results for orders placed or performed during the hospital encounter of 09/26/23   Comprehensive Metabolic Panel    Specimen: Blood   Result Value Ref Range    Glucose 136 (H) 65 - 99 mg/dL    BUN " 16 8 - 23 mg/dL    Creatinine 1.32 (H) 0.57 - 1.00 mg/dL    Sodium 138 136 - 145 mmol/L    Potassium 3.7 3.5 - 5.2 mmol/L    Chloride 101 98 - 107 mmol/L    CO2 24.0 22.0 - 29.0 mmol/L    Calcium 9.3 8.6 - 10.5 mg/dL    Total Protein 7.6 6.0 - 8.5 g/dL    Albumin 4.0 3.5 - 5.2 g/dL    ALT (SGPT) 31 1 - 33 U/L    AST (SGOT) 26 1 - 32 U/L    Alkaline Phosphatase 77 39 - 117 U/L    Total Bilirubin 0.6 0.0 - 1.2 mg/dL    Globulin 3.6 gm/dL    A/G Ratio 1.1 g/dL    BUN/Creatinine Ratio 12.1 7.0 - 25.0    Anion Gap 13.0 5.0 - 15.0 mmol/L    eGFR 45.7 (L) >60.0 mL/min/1.73   BNP    Specimen: Blood   Result Value Ref Range    proBNP 3,766.0 (H) 0.0 - 900.0 pg/mL   Single High Sensitivity Troponin T    Specimen: Blood   Result Value Ref Range    HS Troponin T 15 (H) <10 ng/L   CBC Auto Differential    Specimen: Blood   Result Value Ref Range    WBC 9.66 3.40 - 10.80 10*3/mm3    RBC 5.48 (H) 3.77 - 5.28 10*6/mm3    Hemoglobin 13.5 12.0 - 15.9 g/dL    Hematocrit 42.6 34.0 - 46.6 %    MCV 77.7 (L) 79.0 - 97.0 fL    MCH 24.6 (L) 26.6 - 33.0 pg    MCHC 31.7 31.5 - 35.7 g/dL    RDW 17.0 (H) 12.3 - 15.4 %    RDW-SD 46.7 37.0 - 54.0 fl    MPV 10.1 6.0 - 12.0 fL    Platelets 404 140 - 450 10*3/mm3    Neutrophil % 74.8 42.7 - 76.0 %    Lymphocyte % 16.9 (L) 19.6 - 45.3 %    Monocyte % 7.1 5.0 - 12.0 %    Eosinophil % 0.4 0.3 - 6.2 %    Basophil % 0.5 0.0 - 1.5 %    Immature Grans % 0.3 0.0 - 0.5 %    Neutrophils, Absolute 7.22 (H) 1.70 - 7.00 10*3/mm3    Lymphocytes, Absolute 1.63 0.70 - 3.10 10*3/mm3    Monocytes, Absolute 0.69 0.10 - 0.90 10*3/mm3    Eosinophils, Absolute 0.04 0.00 - 0.40 10*3/mm3    Basophils, Absolute 0.05 0.00 - 0.20 10*3/mm3    Immature Grans, Absolute 0.03 0.00 - 0.05 10*3/mm3    nRBC 0.0 0.0 - 0.2 /100 WBC   ECG 12 Lead Dyspnea   Result Value Ref Range    QT Interval 410 ms    QTC Interval 467 ms     XR Chest 2 View   Final Result   1. Increasing hazy opacities in both lungs could be due to pulmonary edema.                   Medical Decision Making  Vital signs are stable, afebrile.  Labs obtained significant for a BNP of 3800.  Chest x-ray shows bilateral pulmonary edema.  IV Lasix given.  Patient had a cath done about a year ago showed an ejection fraction of about 20%.  Patient states she is not on a water pill at home.  Spoke with the on-call hospitalist who agrees to admit.    Problems Addressed:  Pulmonary congestion: complicated acute illness or injury    Amount and/or Complexity of Data Reviewed  Labs: ordered.  Radiology: ordered.  ECG/medicine tests: ordered.    Risk  Prescription drug management.  Decision regarding hospitalization.        Final diagnoses:   Pulmonary congestion       ED Disposition  ED Disposition       ED Disposition   Decision to Admit    Condition   --    Comment   Level of Care: Telemetry [5]   Diagnosis: SOB (shortness of breath) [405759]   Admitting Physician: JOE CUADRA [[id number]]   Attending Physician: JOE CUADRA [[id number]]                 No follow-up provider specified.       Medication List      No changes were made to your prescriptions during this visit.            Enio Washington MD  09/26/23 1486

## 2023-09-26 NOTE — H&P
[facility] Medicine  HISTORY AND PHYSICAL      Date of Admission: 9/26/2023  Primary Care Physician: José Ruano APRN    Subjective     Chief Complaint: Shortness of breath    History of Present Illness  Patient is a 63 year old female, PMHx CAD, Hypothyroid, DM, Ischemic Cardiomyopathy, Ventricular Tach, GERD, who presented to [facility] ED from Urgent Care for evaluation of shortness of breath. Patient reports she has been experiencing dyspnea for the last two weeks, mainly while trying to lay flat. Last night, she felt short of breath and this was noted with ambulation, as well. She denies any associated chest pain, palpitations, cough, or congestion. She reports upper respiratory infection two weeks ago managed with cough syrup and a steroid injection. She is established with cardiology providers at [facility]; last saw their office in May. She is on aspirin alone, has ICD in place, and is on amiodarone and mexiletine.     ED findings notable for elevated BNP at 3,700; CXR shows hazy opacities in bilateral bases possibly due to pulmonary edema. CBC unremarkable. CMP shows mild elevation of creatinine at 1.32 with GFR 45. Minimal hsTroponin noted at 15. EKG stable. Dr Brantley admitted patient to hospitalist service on observation.      Review of Systems   Constitutional:  Negative for appetite change, chills, diaphoresis, fatigue and fever.   HENT:  Negative for congestion, ear pain, postnasal drip, rhinorrhea, sinus pressure, sinus pain, sneezing, sore throat and trouble swallowing.    Eyes:  Negative for photophobia, pain and discharge.   Respiratory:  Positive for shortness of breath. Negative for cough, chest tightness and wheezing.    Cardiovascular:  Negative for chest pain, palpitations and leg swelling.   Gastrointestinal:  Negative for abdominal pain, blood in stool, constipation, diarrhea, nausea and vomiting.   Endocrine: Negative for polydipsia,  polyphagia and polyuria.   Genitourinary:  Negative for difficulty urinating, dysuria, flank pain, frequency, hematuria and urgency.   Musculoskeletal:  Negative for arthralgias, back pain, myalgias and neck pain.   Skin:  Negative for color change, rash and wound.   Neurological:  Negative for dizziness, seizures, syncope, weakness and headaches.   Hematological:  Does not bruise/bleed easily.   Psychiatric/Behavioral:  Negative for behavioral problems, confusion, hallucinations, sleep disturbance and suicidal ideas.       Otherwise complete ROS reviewed and negative except as mentioned in the HPI.    Past Medical History:   Past Medical History:   Diagnosis Date    Abnormal thyroid function test     Acquired hypothyroidism     Allergic     Allergic rhinitis     Anemia     Breast cyst     Coronary atherosclerosis     unspecified type of vessel, native or graft       Dilated cardiomyopathy     Dyslipidemia     Esophageal reflux     Essential hypertension     Family history of malignant neoplasm of breast     Fibrocystic disease of breast     Low Mireille score    GERD (gastroesophageal reflux disease)     Herpes zoster     History of chicken pox     Hyperlipidemia     Measles     Mumps     Myocardial infarction 2001    Nasal septal deformity     Obesity     Type 2 diabetes mellitus     Uncontrolled    V tach     Vitamin D deficiency      Past Surgical History:  Past Surgical History:   Procedure Laterality Date    BREAST BIOPSY  06/11/2010    left breast benign    BREAST CYST ASPIRATION      CARDIAC CATHETERIZATION  07/31/2002    Left cardiac cath, selective coronary angiography, PTCA to the left anterior descending. Stent placement to the left anterior descending    CARDIAC CATHETERIZATION N/A 1/5/2023    Procedure: Left Heart Cath;  Surgeon: Jose Antonio Carrero MD;  Location: [facility] INVASIVE LOCATION;  Service: Cardiology;  Laterality: N/A;    CARDIAC DEFIBRILLATOR PLACEMENT Left 2011    Dr. Kannan HOLLOWAY  EXTRACTION W/ INTRAOCULAR LENS IMPLANT Left 2023    Procedure: REMOVE CATARACT AND IMPLANT INTRAOCULAR LENS;  Surgeon: Alex Chino MD;  Location: [facility] OR;  Service: Ophthalmology;  Laterality: Left;    CATARACT EXTRACTION W/ INTRAOCULAR LENS IMPLANT Right 2/3/2023    Procedure: REMOVE CATARACT AND IMPLANT INTRAOCULAR LENS;  Surgeon: Alex Chino MD;  Location: [facility] OR;  Service: Ophthalmology;  Laterality: Right;     SECTION  1984    Intrauterine pregnancy, term gestation, cephalopelvic disproportion    COLONOSCOPY N/A 10/12/2018    Procedure: COLONOSCOPY;  Surgeon: Delroy Miller MD;  Location: [facility] ENDOSCOPY;  Service: General    ENDOSCOPY AND COLONOSCOPY  04/15/2008    Normal    ESOPHAGOSCOPY / EGD  2007    With tube-Esophagus appeared normal. Gastritis of the stomach. A biopsy of the stomach was obtained from the stomach. The duodenum appeared normal    EXTERNAL EAR SURGERY  08/15/1991    Repair biifd ear    OOPHORECTOMY      Left salpingo-oophorectomy, wedge resection of the right ovary, lysis of adhesions    TRANSESOPHAGEAL ECHOCARDIOGRAM (LORELEI)  2012    Without color flow-Moderate to severe LV dysfumctional w/ an EF of 30-35%. Wall motion abnormalities as described above. LV enlargement. Mild aortic insufficiency, mild TR regurg, mild M regurg. mild pulmonary insufficiency     Social History:  reports that she has never smoked. She has never used smokeless tobacco. She reports that she does not drink alcohol and does not use drugs.    Family History: family history includes Alcohol abuse in her brother; Autism in an other family member; Breast cancer in her mother; Cardiomyopathy in her brother; Coronary artery disease in her brother and another family member; Diabetes in her sister and another family member; Heart attack in her son; Heart disease in her father, maternal grandmother, son, and another family member; Hypertension in her brother,  brother, maternal grandmother, sister, sister, sister, and another family member; Other in her mother.       Allergies:  Allergies   Allergen Reactions    Codeine GI Intolerance       Medications:  Prior to Admission medications    Medication Sig Start Date End Date Taking? Authorizing Provider   amiodarone (PACERONE) 200 MG tablet  4/27/23  Yes Provider, MD Celestina   amLODIPine (NORVASC) 5 MG tablet TAKE ONE TABLET BY MOUTH EVERY NIGHT AT BEDTIME 9/25/23   José Ruano APRN   aspirin (Aspirin Adult) 325 MG tablet Take 1 tablet by mouth Daily. 12/27/21   José Ruano APRN   atorvastatin (LIPITOR) 20 MG tablet Take 1 tablet by mouth Every Night. 4/13/23   José Ruano APRN   cetirizine (zyrTEC) 10 MG tablet TAKE ONE TABLET BY MOUTH DAILY  Patient taking differently: Take 1 tablet by mouth Daily. 11/3/22   José Ruano APRN   Continuous Blood Gluc Sensor (Dexcom G6 Sensor) CHANGE EVERY 10 DAYS. DX CODE: E11.65 3/28/23   Abad Nixon MD   Continuous Blood Gluc Transmit (Dexcom G6 Transmitter) misc USE AS DIRECTED AND CHANGE EVERY 3 MONTHS. 12/12/22   Herber Galeano APRN   fluticasone (FLONASE) 50 MCG/ACT nasal spray 2 sprays into the nostril(s) as directed by provider Daily for 30 days. 5/1/23 5/31/23  Hanna Scott APRN   glucose blood test strip 1 each by Other route 3 (Three) Times a Day. Use as instructed 10/1/21   José Ruano APRN   glucose monitor monitoring kit 1 each 3 (Three) Times a Day. 10/1/21   José Ruano APRN   Lancet Device misc 1 each 2 (Two) Times a Day. 1/29/19   José Ruano APRN   levothyroxine (SYNTHROID, LEVOTHROID) 112 MCG tablet TAKE ONE TABLET BY MOUTH DAILY 9/18/23   Herber Galeano APRN   lisinopril (PRINIVIL,ZESTRIL) 40 MG tablet Take 1 tablet by mouth Daily. 9/30/22   José Ruano APRN   magnesium oxide (MAG-OX) 400 MG tablet Take 1 tablet by mouth Daily. 1/6/23   Jerel Freedman MD   metoprolol  "succinate XL (TOPROL-XL) 100 MG 24 hr tablet TAKE THREE TABLETS BY MOUTH DAILY 9/25/23   José Ruano APRN   mexiletine (MEXITIL) 150 MG capsule Take 1 capsule by mouth Every 8 (Eight) Hours. 11/24/20   Emergency, Nurse Epic, RN   omeprazole (priLOSEC) 40 MG capsule TAKE ONE CAPSULE BY MOUTH DAILY 2/17/23   José Ruano APRN   potassium chloride (K-DUR,KLOR-CON) 10 MEQ CR tablet TAKE TWO TABLETS BY MOUTH DAILY 5/16/23   José Ruano APRN   promethazine-dextromethorphan (PROMETHAZINE-DM) 6.25-15 MG/5ML syrup Take 5 mL by mouth 4 (Four) Times a Day As Needed for Cough. 9/15/23   Bradley Qureshi APRN   Semaglutide, 2 MG/DOSE, (Ozempic, 2 MG/DOSE,) 8 MG/3ML solution pen-injector DIAL AND INJECT UNDER THE SKIN 2 MG WEEKLY 9/26/23   Herber Galeano APRN   Synjardy XR 12.5-1000 MG tablet sustained-release 24 hour TAKE TWO TABLETS BY MOUTH DAILY 8/14/23   Herber Galeano APRN   vitamin D3 125 MCG (5000 UT) capsule capsule TAKE ONE CAPSULE BY MOUTH DAILY  Patient taking differently: Take 1 capsule by mouth Daily. 9/23/22   José Ruano APRN   Semaglutide, 2 MG/DOSE, (Ozempic, 2 MG/DOSE,) 8 MG/3ML solution pen-injector Inject 2 mg under the skin into the appropriate area as directed 1 (One) Time Per Week. 8/23/22 9/26/23  Herber Galeano APRN I have utilized all available immediate resources to obtain, update, and review the patient's current medications.    Objective     Vital Signs: /88 (BP Location: Right arm, Patient Position: Sitting)   Pulse 78   Temp 98.7 °F (37.1 °C) (Oral)   Resp 20   Ht 162.6 cm (64\")   Wt 78.5 kg (173 lb)   LMP  (LMP Unknown)   SpO2 95%   BMI 29.70 kg/m²   Physical Exam  Vitals and nursing note reviewed.   Constitutional:       Appearance: Normal appearance.   HENT:      Head: Normocephalic and atraumatic.      Right Ear: External ear normal.      Left Ear: External ear normal.      Nose: Nose normal. No congestion or rhinorrhea. "      Mouth/Throat:      Mouth: Mucous membranes are moist.      Pharynx: Oropharynx is clear.   Eyes:      General: No scleral icterus.     Extraocular Movements: Extraocular movements intact.      Conjunctiva/sclera: Conjunctivae normal.      Pupils: Pupils are equal, round, and reactive to light.   Neck:      Vascular: No carotid bruit.   Cardiovascular:      Rate and Rhythm: Normal rate and regular rhythm.      Pulses: Normal pulses.      Heart sounds: Normal heart sounds. No murmur heard.  Pulmonary:      Effort: Pulmonary effort is normal.      Breath sounds: Normal breath sounds. No wheezing, rhonchi or rales.   Abdominal:      General: Abdomen is protuberant. Bowel sounds are normal.      Palpations: Abdomen is soft.      Tenderness: There is no abdominal tenderness. There is no guarding.   Musculoskeletal:         General: No swelling or deformity. Normal range of motion.      Cervical back: Normal range of motion and neck supple. No tenderness.      Right lower leg: No edema.      Left lower leg: No edema.   Skin:     General: Skin is warm and dry.      Capillary Refill: Capillary refill takes less than 2 seconds.      Findings: No rash.   Neurological:      General: No focal deficit present.      Mental Status: She is alert and oriented to person, place, and time.      Motor: No weakness.   Psychiatric:         Mood and Affect: Mood normal.         Behavior: Behavior normal.         Thought Content: Thought content normal.         Judgment: Judgment normal.            Results Reviewed:  Lab Results (last 24 hours)       Procedure Component Value Units Date/Time    [place] Draw [[id number]] Collected: 09/26/23 1730    Specimen: Blood Updated: 09/26/23 1831    Narrative:      The following orders were created for panel order [place] Draw.  Procedure                               Abnormality         Status                     ---------                               -----------         ------                      Green Top (Gel)[[id number]]                                  Final result               Lavender Top[[id number]]                                     Final result               Gold Top - SST[[id number]]                                   Final result               Light Blue Top[[id number]]                                   Final result                 Please view results for these tests on the individual orders.    Green Top (Gel) [[id number]] Collected: 09/26/23 1730    Specimen: Blood Updated: 09/26/23 1831     Extra Tube Hold for add-ons.     Comment: Auto resulted.       Lavender Top [[id number]] Collected: 09/26/23 1730    Specimen: Blood Updated: 09/26/23 1831     Extra Tube hold for add-on     Comment: Auto resulted       Gold Top - SST [[id number]] Collected: 09/26/23 1730    Specimen: Blood Updated: 09/26/23 1831     Extra Tube Hold for add-ons.     Comment: Auto resulted.       Light Blue Top [[id number]] Collected: 09/26/23 1730    Specimen: Blood Updated: 09/26/23 1831     Extra Tube Hold for add-ons.     Comment: Auto resulted       Comprehensive Metabolic Panel [[id number]]  (Abnormal) Collected: 09/26/23 1730    Specimen: Blood Updated: 09/26/23 1801     Glucose 136 mg/dL      BUN 16 mg/dL      Creatinine 1.32 mg/dL      Sodium 138 mmol/L      Potassium 3.7 mmol/L      Chloride 101 mmol/L      CO2 24.0 mmol/L      Calcium 9.3 mg/dL      Total Protein 7.6 g/dL      Albumin 4.0 g/dL      ALT (SGPT) 31 U/L      AST (SGOT) 26 U/L      Alkaline Phosphatase 77 U/L      Total Bilirubin 0.6 mg/dL      Globulin 3.6 gm/dL      A/G Ratio 1.1 g/dL      BUN/Creatinine Ratio 12.1     Anion Gap 13.0 mmol/L      eGFR 45.7 mL/min/1.73     Narrative:      GFR Normal >60  Chronic Kidney Disease <60  Kidney Failure <15      Single High Sensitivity Troponin T [[id number]]  (Abnormal) Collected: 09/26/23 1730    Specimen: Blood Updated: 09/26/23 1801     HS Troponin T 15 ng/L     Narrative:      High Sensitive Troponin T Reference  Range:  <10.0 ng/L- Negative Female for AMI  <15.0 ng/L- Negative Male for AMI  >=10 - Abnormal Female indicating possible myocardial injury.  >=15 - Abnormal Male indicating possible myocardial injury.   Clinicians would have to utilize clinical acumen, EKG, Troponin, and serial changes to determine if it is an Acute Myocardial Infarction or myocardial injury due to an underlying chronic condition.         BNP [[id number]]  (Abnormal) Collected: 09/26/23 1730    Specimen: Blood Updated: 09/26/23 1757     proBNP 3,766.0 pg/mL     Narrative:      This assay is used as an aid in the diagnosis of individuals suspected of having heart failure. It can be used as an aid in the diagnosis of acute decompensated heart failure (ADHF) in patients presenting with signs and symptoms of ADHF to the emergency department (ED). In addition, NT-proBNP of <300 pg/mL indicates ADHF is not likely.    CBC & Differential [[id number]]  (Abnormal) Collected: 09/26/23 1730    Specimen: Blood Updated: 09/26/23 1739    Narrative:      The following orders were created for panel order CBC & Differential.  Procedure                               Abnormality         Status                     ---------                               -----------         ------                     CBC Auto Differential[[id number]]        Abnormal            Final result                 Please view results for these tests on the individual orders.    CBC Auto Differential [[id number]]  (Abnormal) Collected: 09/26/23 1730    Specimen: Blood Updated: 09/26/23 1739     WBC 9.66 10*3/mm3      RBC 5.48 10*6/mm3      Hemoglobin 13.5 g/dL      Hematocrit 42.6 %      MCV 77.7 fL      MCH 24.6 pg      MCHC 31.7 g/dL      RDW 17.0 %      RDW-SD 46.7 fl      MPV 10.1 fL      Platelets 404 10*3/mm3      Neutrophil % 74.8 %      Lymphocyte % 16.9 %      Monocyte % 7.1 %      Eosinophil % 0.4 %      Basophil % 0.5 %      Immature Grans % 0.3 %      Neutrophils, Absolute 7.22 10*3/mm3       Lymphocytes, Absolute 1.63 10*3/mm3      Monocytes, Absolute 0.69 10*3/mm3      Eosinophils, Absolute 0.04 10*3/mm3      Basophils, Absolute 0.05 10*3/mm3      Immature Grans, Absolute 0.03 10*3/mm3      nRBC 0.0 /100 WBC     Extra Tubes [[id number]] Collected: 09/26/23 1736    Specimen: Blood, Venous Line Updated: 09/26/23 1736    Narrative:      The following orders were created for panel order Extra Tubes.  Procedure                               Abnormality         Status                     ---------                               -----------         ------                     Contreras Top[[id number]]                                         In process                   Please view results for these tests on the individual orders.    Gray Top [[id number]] Collected: 09/26/23 1736    Specimen: Blood Updated: 09/26/23 1736          Imaging Results (Last 24 Hours)       Procedure Component Value Units Date/Time    XR Chest 2 View [[id number]] Collected: 09/26/23 1748     Updated: 09/26/23 1752    Narrative:      XR CHEST 2 VW    HISTORY: CHF/COPD Protocol    COMPARISON: 9/26/2023 at 1529 hours    FINDINGS:  Upright frontal and lateral radiographs of the chest were obtained.    The pacemaker lead is stable. Mild hazy opacities have increased in both lungs.  The cardiac silhouette is stable. The pulmonary vasculature is within normal  limits.    The osseous structures and surrounding soft tissues demonstrate no acute  abnormality.      Impression:      1. Increasing hazy opacities in both lungs could be due to pulmonary edema.          I have personally reviewed and interpreted the radiology studies and ECG obtained at time of admission.       Assessment / Plan   Treatment Plan:  1.  SOB (shortness of breath)    -known ischemic cardiomyopathy with last EF 20%   -repeat ECHO   -consulted with Dr. Martel, who agrees to see; appreciate input   -d-dimer elevated at 670; V/Q scan ordered   -supportive care as needed; currently  "appropriately saturating on room air    2. History of ventricular tachycardia   -ICD in place; continue amiodarone and mexiletine     3. CAD   -continue ASA    4. HTN   -continue Amlodipine, Lisinopril, Metoprolol    5. HLD   -continue statin    6. DM   -Consistent carbohydrate diet, Accu-chek with sliding scale insulin    7. Hypothyroid   - continue Synthroid      VTE Ppx: Lovenox subQ      Medical Decision Making  Number and Complexity of problems: 1 high, 6 moderate    Conditions and Status:        Condition is unchanged.     The patient has current or prior documentation of left ventricular ejection fraction (LVEF) less than or equal to 40%, or moderate or severely depressed left ventricular systolic function. ; The patient was prescribed or already taking an Angiotensin-Converting Enzyme (ACE) Inhibitor or Angiotensin Receptor Blocker (ARB) and The patient was prescribed or already taking a beta-blocker.    [facility] Data  External documents reviewed: The patient's recent past medical charts for this facility as well as outside facilities via the \"Care Everywhere\" application of Epic reviewed.     Discussed with: admitting physician, ED provider, cardiology physician     I have utilized all available immediate resources to obtain, update, or review the patient's current medications (including all prescriptions, over-the-counter products, herbals, cannabis/cannabidiol products, and vitamin/mineral/dietary (nutritional) supplements).     Care Planning  Shared decision making: Patient updated on current status and informed of proposed care plan; is in agreement with plan  Code status and discussions: Full code, Healthcare surrogate is sisters  I confirmed that the patient's Advance Care Plan is present, code status is documented, or surrogate decision maker is listed in the patient's medical record.       Disposition  Social Determinants of Health that impact treatment or disposition: n/a  I expect the patient to be " discharged to home in 1-2 days.     The patient was seen and examined by me on 09/26/23.        Electronically signed by Julissa Charles PA-C, 09/26/23, 18:39 CDT.

## 2023-09-26 NOTE — TELEPHONE ENCOUNTER
Danae called asking to speak with Charmaine. She states that she is wheezing really bad and needs to talk to Milton's nurse (Charmaine).       Please call back @ [phone]

## 2023-09-26 NOTE — ED NOTES
Pt reports shortness of breath for the past several days that is worse when lying flat. Pt has had a productive cough.

## 2023-09-26 NOTE — TELEPHONE ENCOUNTER
Patient was called to let her know to go to the Urgent care for further evaluation as there are some upper respiratory issues along with the covid going around.  If having difficulty breathing or SOB to go to the ER.

## 2023-09-27 ENCOUNTER — APPOINTMENT (OUTPATIENT)
Dept: CARDIOLOGY | Facility: HOSPITAL | Age: 63
End: 2023-09-27
Payer: COMMERCIAL

## 2023-09-27 ENCOUNTER — APPOINTMENT (OUTPATIENT)
Dept: NUCLEAR MEDICINE | Facility: HOSPITAL | Age: 63
End: 2023-09-27
Payer: COMMERCIAL

## 2023-09-27 LAB
BH CV ECHO MEAS - ACS: 1.91 CM
BH CV ECHO MEAS - AI P1/2T: 512.1 MSEC
BH CV ECHO MEAS - AO MAX PG: 5.1 MMHG
BH CV ECHO MEAS - AO MEAN PG: 3 MMHG
BH CV ECHO MEAS - AO ROOT DIAM: 3.6 CM
BH CV ECHO MEAS - AO V2 MAX: 113 CM/SEC
BH CV ECHO MEAS - AO V2 VTI: 21.7 CM
BH CV ECHO MEAS - AVA(I,D): 2.5 CM2
BH CV ECHO MEAS - EDV(CUBED): 248.5 ML
BH CV ECHO MEAS - EDV(MOD-SP2): 174 ML
BH CV ECHO MEAS - EDV(MOD-SP4): 230 ML
BH CV ECHO MEAS - EF(MOD-BP): 20.9 %
BH CV ECHO MEAS - EF(MOD-SP2): 18.4 %
BH CV ECHO MEAS - EF(MOD-SP4): 20 %
BH CV ECHO MEAS - ESV(CUBED): 173.4 ML
BH CV ECHO MEAS - ESV(MOD-SP2): 142 ML
BH CV ECHO MEAS - ESV(MOD-SP4): 184 ML
BH CV ECHO MEAS - FS: 11.3 %
BH CV ECHO MEAS - IVS/LVPW: 0.95 CM
BH CV ECHO MEAS - IVSD: 0.82 CM
BH CV ECHO MEAS - LA DIMENSION: 3.5 CM
BH CV ECHO MEAS - LAT PEAK E' VEL: 9.6 CM/SEC
BH CV ECHO MEAS - LV DIASTOLIC VOL/BSA (35-75): 127 CM2
BH CV ECHO MEAS - LV MASS(C)D: 213.4 GRAMS
BH CV ECHO MEAS - LV MAX PG: 3.5 MMHG
BH CV ECHO MEAS - LV MEAN PG: 2 MMHG
BH CV ECHO MEAS - LV SYSTOLIC VOL/BSA (12-30): 101.6 CM2
BH CV ECHO MEAS - LV V1 MAX: 93.6 CM/SEC
BH CV ECHO MEAS - LV V1 VTI: 17.6 CM
BH CV ECHO MEAS - LVIDD: 6.3 CM
BH CV ECHO MEAS - LVIDS: 5.6 CM
BH CV ECHO MEAS - LVOT AREA: 3.1 CM2
BH CV ECHO MEAS - LVOT DIAM: 1.99 CM
BH CV ECHO MEAS - LVPWD: 0.86 CM
BH CV ECHO MEAS - MED PEAK E' VEL: 4 CM/SEC
BH CV ECHO MEAS - MR MAX PG: 41 MMHG
BH CV ECHO MEAS - MR MAX VEL: 320.3 CM/SEC
BH CV ECHO MEAS - MV A MAX VEL: 24.9 CM/SEC
BH CV ECHO MEAS - MV DEC SLOPE: 723.8 CM/SEC2
BH CV ECHO MEAS - MV DEC TIME: 0.17 SEC
BH CV ECHO MEAS - MV E MAX VEL: 76.3 CM/SEC
BH CV ECHO MEAS - MV E/A: 3.1
BH CV ECHO MEAS - MV MAX PG: 3 MMHG
BH CV ECHO MEAS - MV MEAN PG: 0.96 MMHG
BH CV ECHO MEAS - MV P1/2T: 37.4 MSEC
BH CV ECHO MEAS - MV V2 VTI: 17.1 CM
BH CV ECHO MEAS - MVA(P1/2T): 5.9 CM2
BH CV ECHO MEAS - MVA(VTI): 3.2 CM2
BH CV ECHO MEAS - PA V2 MAX: 88 CM/SEC
BH CV ECHO MEAS - RAP SYSTOLE: 8 MMHG
BH CV ECHO MEAS - RVDD: 2.8 CM
BH CV ECHO MEAS - RVSP: 64.5 MMHG
BH CV ECHO MEAS - SI(MOD-SP2): 17.7 ML/M2
BH CV ECHO MEAS - SI(MOD-SP4): 25.4 ML/M2
BH CV ECHO MEAS - SV(LVOT): 55 ML
BH CV ECHO MEAS - SV(MOD-SP2): 32 ML
BH CV ECHO MEAS - SV(MOD-SP4): 46 ML
BH CV ECHO MEAS - TAPSE (>1.6): 2.14 CM
BH CV ECHO MEAS - TR MAX PG: 56.5 MMHG
BH CV ECHO MEAS - TR MAX VEL: 375.8 CM/SEC
BH CV ECHO MEASUREMENTS AVERAGE E/E' RATIO: 11.22
GLUCOSE BLDC GLUCOMTR-MCNC: 119 MG/DL (ref 70–130)
GLUCOSE BLDC GLUCOMTR-MCNC: 123 MG/DL (ref 70–130)
GLUCOSE BLDC GLUCOMTR-MCNC: 128 MG/DL (ref 70–130)
GLUCOSE BLDC GLUCOMTR-MCNC: 136 MG/DL (ref 70–130)
LEFT ATRIUM VOLUME INDEX: 49.9 ML/M2

## 2023-09-27 PROCEDURE — A9540 TC99M MAA: HCPCS

## 2023-09-27 PROCEDURE — G0378 HOSPITAL OBSERVATION PER HR: HCPCS

## 2023-09-27 PROCEDURE — 0 TECHNETIUM ALBUMIN AGGREGATED

## 2023-09-27 PROCEDURE — 0 TECHNETIUM TC 99M PENTETATE INHALER

## 2023-09-27 PROCEDURE — 82948 REAGENT STRIP/BLOOD GLUCOSE: CPT

## 2023-09-27 PROCEDURE — 93306 TTE W/DOPPLER COMPLETE: CPT

## 2023-09-27 PROCEDURE — 99214 OFFICE O/P EST MOD 30 MIN: CPT | Performed by: INTERNAL MEDICINE

## 2023-09-27 PROCEDURE — 78582 LUNG VENTILAT&PERFUS IMAGING: CPT

## 2023-09-27 PROCEDURE — A9567 TECHNETIUM TC-99M AEROSOL: HCPCS

## 2023-09-27 PROCEDURE — 25510000001 PERFLUTREN (DEFINITY) 8.476 MG IN SODIUM CHLORIDE (PF) 0.9 % 10 ML INJECTION

## 2023-09-27 PROCEDURE — 93306 TTE W/DOPPLER COMPLETE: CPT | Performed by: INTERNAL MEDICINE

## 2023-09-27 RX ORDER — METOPROLOL SUCCINATE 50 MG/1
150 TABLET, EXTENDED RELEASE ORAL 2 TIMES DAILY
Status: DISCONTINUED | OUTPATIENT
Start: 2023-09-27 | End: 2023-09-28 | Stop reason: HOSPADM

## 2023-09-27 RX ORDER — ASPIRIN 81 MG/1
81 TABLET ORAL DAILY
Status: DISCONTINUED | OUTPATIENT
Start: 2023-09-28 | End: 2023-09-28 | Stop reason: HOSPADM

## 2023-09-27 RX ORDER — METFORMIN HYDROCHLORIDE 500 MG/1
2000 TABLET, EXTENDED RELEASE ORAL DAILY
Status: DISCONTINUED | OUTPATIENT
Start: 2023-09-27 | End: 2023-09-28 | Stop reason: HOSPADM

## 2023-09-27 RX ADMIN — Medication 400 MG: at 10:29

## 2023-09-27 RX ADMIN — METFORMIN HYDROCHLORIDE 2000 MG: 500 TABLET, EXTENDED RELEASE ORAL at 10:31

## 2023-09-27 RX ADMIN — MEXILETINE HYDROCHLORIDE 150 MG: 150 CAPSULE ORAL at 23:29

## 2023-09-27 RX ADMIN — CANAGLIFLOZIN 300 MG: 300 TABLET, FILM COATED ORAL at 10:31

## 2023-09-27 RX ADMIN — MEXILETINE HYDROCHLORIDE 150 MG: 150 CAPSULE ORAL at 17:15

## 2023-09-27 RX ADMIN — POTASSIUM CHLORIDE 10 MEQ: 750 CAPSULE, EXTENDED RELEASE ORAL at 10:31

## 2023-09-27 RX ADMIN — AMIODARONE HYDROCHLORIDE 200 MG: 200 TABLET ORAL at 10:31

## 2023-09-27 RX ADMIN — Medication 10 ML: at 20:59

## 2023-09-27 RX ADMIN — Medication 10 ML: at 10:36

## 2023-09-27 RX ADMIN — KIT FOR THE PREPARATION OF TECHNETIUM TC 99M PENTETATE 1 DOSE: 20 INJECTION, POWDER, LYOPHILIZED, FOR SOLUTION INTRAVENOUS; RESPIRATORY (INHALATION) at 11:57

## 2023-09-27 RX ADMIN — ASPIRIN 325 MG: 325 TABLET ORAL at 10:29

## 2023-09-27 RX ADMIN — MEXILETINE HYDROCHLORIDE 150 MG: 150 CAPSULE ORAL at 05:32

## 2023-09-27 RX ADMIN — RIVAROXABAN 15 MG: 15 TABLET, FILM COATED ORAL at 17:15

## 2023-09-27 RX ADMIN — METOPROLOL SUCCINATE 150 MG: 50 TABLET, EXTENDED RELEASE ORAL at 20:59

## 2023-09-27 RX ADMIN — PANTOPRAZOLE SODIUM 40 MG: 40 TABLET, DELAYED RELEASE ORAL at 05:32

## 2023-09-27 RX ADMIN — KIT FOR THE PREPARATION OF TECHNETIUM TC 99M ALBUMIN AGGREGATED 1 DOSE: 2.5 INJECTION, POWDER, FOR SOLUTION INTRAVENOUS at 12:27

## 2023-09-27 RX ADMIN — METOPROLOL SUCCINATE 150 MG: 100 TABLET, EXTENDED RELEASE ORAL at 10:29

## 2023-09-27 RX ADMIN — LEVOTHYROXINE SODIUM 112 MCG: 0.11 TABLET ORAL at 05:32

## 2023-09-27 RX ADMIN — ATORVASTATIN CALCIUM 20 MG: 20 TABLET, FILM COATED ORAL at 20:59

## 2023-09-27 RX ADMIN — Medication 12.5 MG: at 17:15

## 2023-09-27 RX ADMIN — PERFLUTREN 1.5 ML: 6.52 INJECTION, SUSPENSION INTRAVENOUS at 10:03

## 2023-09-27 RX ADMIN — Medication 400 MG: at 20:59

## 2023-09-27 NOTE — CONSULTS
[facility] Cardiology Consult    Referring Provider: Enio Washington MD    Reason for Consultation: Heart Failure    Patient Care Team:  José Ruano APRN as PCP - General (Nurse Practitioner)  Rodo Holcomb MD as Surgeon (General Surgery)  Charmaine Vanessa MA as Medical Assistant    Chief complaint   Chief Complaint   Patient presents with    Shortness of Breath       Subjective .     History of present illness:     Danae Ngo is a 63-year-old -American female with medical history of hypothyroidism, allergic rhinitis, hypertension, hypercholesterolemia, type 2 diabetes, vitamin D deficiency, nonsustained VT on amiodarone and Mexiletine therapy with single-chamber ICD, paroxysmal atrial fibrillation.  CAD with history of anterior MI and PCI to LAD in 2002. Last cardiac catheterization in January 2023 showing 30% in-stent restenosis in proximal LAD stent, mid LAD and distal LAD Exhibit 2 tandem lesions subtotal occlusions. The LAD appears relatively small caliber in these segments.  These lesions could not be successfully crossed with a low-profile (1.2 mm) balloon.   Further attempts at PCI were aborted.  The lesions appear to have chronic characteristics. Severe LV systolic dysfunction on left ventriculography.  Hx of severe systolic heart failure with LVEF 20-30% with septal and anterior akinesis with thinning, as wells as apical akinesis.  For cardiology care she follows with Dr. Brunner for electrophysiology at [facility] in [place], Dr. Calzada for interventional cardiology.     Patient was seen at the  and noted to have worsening shortness of breath, cough, wheezing x2 weeks, worsening the night prior to presentation when she developed orthopnea.   She was recommended to go to the ER.  Patient denies chest pain, palpitations, edema, dizziness or syncope. Chest x-ray PA and lateral showing increasing hazy opacities in both lungs likely pulmonary edema.  High-sensitivity troponin 15.  proBNP  elevated at 3700.  D-dimer 670.  VQ scan pending.  EKG showing normal sinus rhythm, LVH changes, no acute ST-T wave changes.  Patient was admitted for CHF exacerbation.  Cardiology consult requested for ongoing cardiology care and CHF.    The CVD Risk score (Nadiraino, et al., 2008) failed to calculate for the following reasons:    The patient has a prior MI, stroke, CHF, or peripheral vascular disease diagnosis      Review of Systems  Review of Systems   Constitutional:  Negative for activity change, chills, fatigue, fever and unexpected weight change.   HENT:  Negative for hearing loss, nosebleeds, tinnitus, trouble swallowing and voice change.    Eyes:  Negative for visual disturbance.   Respiratory:  Positive for cough and shortness of breath. Negative for apnea, chest tightness and wheezing.    Cardiovascular:  Negative for chest pain, palpitations and leg swelling.   Gastrointestinal:  Negative for abdominal distention, abdominal pain and blood in stool.   Endocrine: Negative for cold intolerance, heat intolerance, polydipsia, polyphagia and polyuria.   Genitourinary: Negative.    Musculoskeletal:  Negative for arthralgias and back pain.   Skin: Negative.    Allergic/Immunologic: Negative.    Neurological:  Negative for seizures, syncope, speech difficulty, numbness and headaches.   Hematological:  Negative for adenopathy. Does not bruise/bleed easily.   Psychiatric/Behavioral:  Negative for agitation, behavioral problems and suicidal ideas. The patient is not nervous/anxious.      History  Past Medical History:   Diagnosis Date    Abnormal thyroid function test     Acquired hypothyroidism     Allergic     Allergic rhinitis     Anemia     Breast cyst     Coronary atherosclerosis     unspecified type of vessel, native or graft       Dilated cardiomyopathy     Dyslipidemia     Esophageal reflux     Essential hypertension     Family history of malignant neoplasm of breast     Fibrocystic disease of breast      Low Mireille score    GERD (gastroesophageal reflux disease)     Herpes zoster     History of chicken pox     Hyperlipidemia     Measles     Mumps     Myocardial infarction     Nasal septal deformity     Obesity     Type 2 diabetes mellitus     Uncontrolled    V tach     Vitamin D deficiency    ,   Past Surgical History:   Procedure Laterality Date    BREAST BIOPSY  2010    left breast benign    BREAST CYST ASPIRATION      CARDIAC CATHETERIZATION  2002    Left cardiac cath, selective coronary angiography, PTCA to the left anterior descending. Stent placement to the left anterior descending    CARDIAC CATHETERIZATION N/A 2023    Procedure: Left Heart Cath;  Surgeon: Jose Antonio Carrero MD;  Location: [facility] CATH INVASIVE LOCATION;  Service: Cardiology;  Laterality: N/A;    CARDIAC DEFIBRILLATOR PLACEMENT Left     Dr. Brunner    CATARACT EXTRACTION W/ INTRAOCULAR LENS IMPLANT Left 2023    Procedure: REMOVE CATARACT AND IMPLANT INTRAOCULAR LENS;  Surgeon: Alex Chino MD;  Location: [facility] OR;  Service: Ophthalmology;  Laterality: Left;    CATARACT EXTRACTION W/ INTRAOCULAR LENS IMPLANT Right 2/3/2023    Procedure: REMOVE CATARACT AND IMPLANT INTRAOCULAR LENS;  Surgeon: Alex Chino MD;  Location: [facility] OR;  Service: Ophthalmology;  Laterality: Right;     SECTION  1984    Intrauterine pregnancy, term gestation, cephalopelvic disproportion    COLONOSCOPY N/A 10/12/2018    Procedure: COLONOSCOPY;  Surgeon: Delroy Miller MD;  Location: [facility] ENDOSCOPY;  Service: General    ENDOSCOPY AND COLONOSCOPY  04/15/2008    Normal    ESOPHAGOSCOPY / EGD  2007    With tube-Esophagus appeared normal. Gastritis of the stomach. A biopsy of the stomach was obtained from the stomach. The duodenum appeared normal    EXTERNAL EAR SURGERY  08/15/1991    Repair biifd ear    OOPHORECTOMY      Left salpingo-oophorectomy, wedge resection of the right ovary, lysis of adhesions     TRANSESOPHAGEAL ECHOCARDIOGRAM (LORELEI)  03/02/2012    Without color flow-Moderate to severe LV dysfumctional w/ an EF of 30-35%. Wall motion abnormalities as described above. LV enlargement. Mild aortic insufficiency, mild TR regurg, mild M regurg. mild pulmonary insufficiency   ,   Family History   Problem Relation Age of Onset    Breast cancer Mother         50's    Other Mother         CVA    Coronary artery disease Other     Diabetes Other     Heart disease Other     Hypertension Other     Autism Other         grandson    Alcohol abuse Brother     Cardiomyopathy Brother     Coronary artery disease Brother     Heart disease Father     Hypertension Sister     Diabetes Sister     Heart disease Son     Heart attack Son     Heart disease Maternal Grandmother     Hypertension Maternal Grandmother     Hypertension Sister     Hypertension Sister     Hypertension Brother     Hypertension Brother    ,   Social History     Tobacco Use    Smoking status: Never    Smokeless tobacco: Never   Vaping Use    Vaping Use: Never used   Substance Use Topics    Alcohol use: No    Drug use: No   ,   Medications Prior to Admission   Medication Sig Dispense Refill Last Dose    amiodarone (PACERONE) 200 MG tablet        amLODIPine (NORVASC) 5 MG tablet TAKE ONE TABLET BY MOUTH EVERY NIGHT AT BEDTIME 90 tablet 3     aspirin (Aspirin Adult) 325 MG tablet Take 1 tablet by mouth Daily. 60 tablet 2     atorvastatin (LIPITOR) 20 MG tablet Take 1 tablet by mouth Every Night. 90 tablet 3     cetirizine (zyrTEC) 10 MG tablet TAKE ONE TABLET BY MOUTH DAILY (Patient taking differently: Take 1 tablet by mouth Daily.) 90 tablet 3     Continuous Blood Gluc Sensor (Dexcom G6 Sensor) CHANGE EVERY 10 DAYS. DX CODE: E11.65 9 each 3     Continuous Blood Gluc Transmit (Dexcom G6 Transmitter) misc USE AS DIRECTED AND CHANGE EVERY 3 MONTHS. 1 each 3     fluticasone (FLONASE) 50 MCG/ACT nasal spray 2 sprays into the nostril(s) as directed by provider Daily  "for 30 days. 11 mL 0     glucose blood test strip 1 each by Other route 3 (Three) Times a Day. Use as instructed 90 each 12     glucose monitor monitoring kit 1 each 3 (Three) Times a Day. 1 each 0     Lancet Device misc 1 each 2 (Two) Times a Day. 1 each 12     levothyroxine (SYNTHROID, LEVOTHROID) 112 MCG tablet TAKE ONE TABLET BY MOUTH DAILY 90 tablet 3     lisinopril (PRINIVIL,ZESTRIL) 40 MG tablet Take 1 tablet by mouth Daily. 90 tablet 3     magnesium oxide (MAG-OX) 400 MG tablet Take 1 tablet by mouth Daily. (Patient taking differently: Take 1 tablet by mouth 2 (Two) Times a Day.) 30 tablet 0     metoprolol succinate XL (TOPROL-XL) 100 MG 24 hr tablet TAKE THREE TABLETS BY MOUTH DAILY 270 tablet 2     mexiletine (MEXITIL) 150 MG capsule Take 1 capsule by mouth Every 8 (Eight) Hours.       omeprazole (priLOSEC) 40 MG capsule TAKE ONE CAPSULE BY MOUTH DAILY 90 capsule 3     potassium chloride (K-DUR,KLOR-CON) 10 MEQ CR tablet TAKE TWO TABLETS BY MOUTH DAILY 180 tablet 2     Semaglutide, 2 MG/DOSE, (Ozempic, 2 MG/DOSE,) 8 MG/3ML solution pen-injector DIAL AND INJECT UNDER THE SKIN 2 MG WEEKLY 9 mL 0     Synjardy XR 12.5-1000 MG tablet sustained-release 24 hour TAKE TWO TABLETS BY MOUTH DAILY 60 tablet 11     vitamin D3 125 MCG (5000 UT) capsule capsule TAKE ONE CAPSULE BY MOUTH DAILY (Patient taking differently: Take 1 capsule by mouth Daily.) 90 capsule 3       ALLERGIES  Codeine    Objective     Vital Sign Min/Max for last 24 hours  Temp  Min: 97.3 °F (36.3 °C)  Max: 98.7 °F (37.1 °C)   BP  Min: 120/78  Max: 153/88   Pulse  Min: 69  Max: 83   Resp  Min: 18  Max: 20   SpO2  Min: 92 %  Max: 98 %   No data recorded   Weight  Min: 76 kg (167 lb 8.8 oz)  Max: 78.7 kg (173 lb 6.4 oz)     Flowsheet Rows      Flowsheet Row First Filed Value   Admission Height 162.6 cm (64\") Documented at 09/26/2023 1635   Admission Weight 78.5 kg (173 lb) Documented at 09/26/2023 1635               Physical Exam:  Physical " Exam  Vitals and nursing note reviewed.   Constitutional:       General: She is not in acute distress.     Appearance: She is overweight. She is not ill-appearing, toxic-appearing or diaphoretic.   HENT:      Head: Normocephalic.      Right Ear: External ear normal.      Left Ear: External ear normal.   Eyes:      General: Lids are normal.      Pupils: Pupils are equal, round, and reactive to light.   Neck:      Thyroid: No thyromegaly.      Vascular: No carotid bruit or JVD.      Trachea: No tracheal deviation.   Cardiovascular:      Rate and Rhythm: Normal rate and regular rhythm.      Heart sounds: Normal heart sounds. No murmur heard.    No friction rub. No gallop.   Pulmonary:      Effort: Pulmonary effort is normal. No respiratory distress.      Breath sounds: Normal breath sounds. No stridor. No wheezing or rales.   Chest:      Chest wall: No tenderness.   Abdominal:      General: Bowel sounds are normal. There is no distension.      Palpations: Abdomen is soft.      Tenderness: There is no abdominal tenderness.   Musculoskeletal:      Right lower leg: No edema.      Left lower leg: No edema.   Skin:     General: Skin is warm and dry.      Findings: No erythema or rash.   Neurological:      Mental Status: She is alert and oriented to person, place, and time.      Cranial Nerves: No cranial nerve deficit.      Deep Tendon Reflexes: Reflexes are normal and symmetric. Reflexes normal.   Psychiatric:         Behavior: Behavior normal.         Thought Content: Thought content normal.         Judgment: Judgment normal.          Results Review:     Results from last 7 days   Lab Units 09/26/23  1730   SODIUM mmol/L 138   POTASSIUM mmol/L 3.7   CHLORIDE mmol/L 101   CO2 mmol/L 24.0   BUN mg/dL 16   CREATININE mg/dL 1.32*   CALCIUM mg/dL 9.3   BILIRUBIN mg/dL 0.6   ALK PHOS U/L 77   ALT (SGPT) U/L 31   AST (SGOT) U/L 26   GLUCOSE mg/dL 136*       Estimated Creatinine Clearance: 44.1 mL/min (A) (by C-G formula based on  SCr of 1.32 mg/dL (H)).          Results from last 7 days   Lab Units 09/26/23  1730   WBC 10*3/mm3 9.66   HEMOGLOBIN g/dL 13.5   PLATELETS 10*3/mm3 404             Lab Results   Component Value Date    TROPONINT 15 (H) 09/26/2023     Lab Results   Component Value Date    PROBNP 3,766.0 (H) 09/26/2023       I/O last 3 completed shifts:  In: 240 [P.O.:240]  Out: -     Cardiographics  ECG/EMG Results (last 24 hours)       Procedure Component Value Units Date/Time    ECG 12 Lead Dyspnea [[id number]] Collected: 09/26/23 1639     Updated: 09/26/23 1857     QT Interval 410 ms      QTC Interval 467 ms     Narrative:      Test Reason : Dyspnea  Blood Pressure :   */*   mmHG  Vent. Rate :  78 BPM     Atrial Rate :  78 BPM     P-R Int : 182 ms          QRS Dur : 128 ms      QT Int : 410 ms       P-R-T Axes :  28 -49 118 degrees     QTc Int : 467 ms    Normal sinus rhythm  Possible Left atrial enlargement  Left axis deviation  Left ventricular hypertrophy with QRS widening and repolarization abnormality  Abnormal ECG  When compared with ECG of 05-JAN-2023 11:46,  Significant changes have occurred    Referred By:            Confirmed By:     Adult Transthoracic Echo Complete w/ Color, Spectral and Contrast if Necessary Per Protocol [[id number]] Resulted: 09/27/23 1201     Updated: 09/27/23 1209     EF(MOD-bp) 20.9 %      LVIDd 6.3 cm      LVIDs 5.6 cm      IVSd 0.82 cm      LVPWd 0.86 cm      FS 11.3 %      IVS/LVPW 0.95 cm      ESV(cubed) 173.4 ml      LV Sys Vol (BSA corrected) 101.6 cm2      EDV(cubed) 248.5 ml      LV Hernandez Vol (BSA corrected) 127.0 cm2      LVOT area 3.1 cm2      LV mass(C)d 213.4 grams      LVOT diam 1.99 cm      EDV(MOD-sp2) 174.0 ml      EDV(MOD-sp4) 230.0 ml      ESV(MOD-sp2) 142.0 ml      ESV(MOD-sp4) 184.0 ml      SV(MOD-sp2) 32.0 ml      SV(MOD-sp4) 46.0 ml      SI(MOD-sp2) 17.7 ml/m2      SI(MOD-sp4) 25.4 ml/m2      EF(MOD-sp2) 18.4 %      EF(MOD-sp4) 20.0 %      MV E max fernando 76.3 cm/sec      MV A max  logan 24.9 cm/sec      MV dec time 0.17 sec      MV E/A 3.1     LA ESV Index (BP) 49.9 ml/m2      Med Peak E' Logan 4.0 cm/sec      Lat Peak E' Logan 9.6 cm/sec      Avg E/e' ratio 11.22     SV(LVOT) 55.0 ml      RVIDd 2.8 cm      TAPSE (>1.6) 2.14 cm      LA dimension (2D)  3.5 cm      LV V1 max 93.6 cm/sec      LV V1 max PG 3.5 mmHg      LV V1 mean PG 2.00 mmHg      LV V1 VTI 17.6 cm      Ao pk logan 113.0 cm/sec      Ao max PG 5.1 mmHg      Ao mean PG 3.0 mmHg      Ao V2 VTI 21.7 cm      OLENA(I,D) 2.5 cm2      AI P1/2t 512.1 msec      MV max PG 3.0 mmHg      MV mean PG 0.96 mmHg      MV V2 VTI 17.1 cm      MV P1/2t 37.4 msec      MVA(P1/2t) 5.9 cm2      MVA(VTI) 3.2 cm2      MV dec slope 723.8 cm/sec2      MR max logan 320.3 cm/sec      MR max PG 41.0 mmHg      TR max logan 375.8 cm/sec      TR max PG 56.5 mmHg      PA V2 max 88.0 cm/sec      Ao root diam 3.6 cm      ACS 1.91 cm      RVSP(TR) 64.5 mmHg      RAP systole 8.0 mmHg     Narrative:        Left ventricular systolic function is severely decreased. Left   ventricular ejection fraction appears to be 21 - 25%.    The left ventricular cavity is moderately dilated.    Left ventricular diastolic function is consistent with (grade II w/high   LAP) pseudonormalization.    The left atrial cavity is severely dilated.    Moderate tricuspid valve regurgitation is present.    Estimated right ventricular systolic pressure from tricuspid   regurgitation is markedly elevated (>55 mmHg).    4 x 4 mm immobile echogenic density noted at LV apex suggestive of LV   apical thrombus.            Results for orders placed during the hospital encounter of 09/26/23    Adult Transthoracic Echo Complete w/ Color, Spectral and Contrast if Necessary Per Protocol    Interpretation Summary    Left ventricular systolic function is severely decreased. Left ventricular ejection fraction appears to be 21 - 25%.    The left ventricular cavity is moderately dilated.    Left ventricular diastolic function  "is consistent with (grade II w/high LAP) pseudonormalization.    The left atrial cavity is severely dilated.    Moderate tricuspid valve regurgitation is present.    Estimated right ventricular systolic pressure from tricuspid regurgitation is markedly elevated (>55 mmHg).    4 x 4 mm immobile echogenic density noted at LV apex suggestive of LV apical thrombus.        XR Chest 2 View    Result Date: 9/26/2023  1. Increasing hazy opacities in both lungs could be due to pulmonary edema.     Intake/Output         09/26/23 0700 - 09/27/23 0659    Intake (ml) 240    Output (ml) --    Net (ml) 240    Last Weight 76 kg (167 lb 9.6 oz)              Assessment & Plan       SOB (shortness of breath)    #1.  Acute on chronic systolic and diastolic heart failure with LVEF reduced at 21 to 25% with grade 2 diastolic dysfunction.  Etiology ischemic and nonischemic.  She appears euvolemic upon exam today with adequate perfusion and hemodynamics.  She diuresed well overnight with subsequent improvement in symptoms.  She will be recommended for guideline directed medical therapy and optimization of medications.  -Continue Toprol- mg  -Start Aldactone 12.5 mg  -Stop lisinopril 40 mg, we will provide washout.  And plan to start Entresto  -SGLT2 inhibitor: Invokana 300 mg daily  -Afterload duction can be considered if needed  Daily weight  Strict intake and output  Continuous cardiac monitoring    #2.  History of nonsustained VT: Patient is managed with amiodarone and Mexiletine for antiarrhythmic therapy, Toprol- mg.  Single-chamber ICD is in place.  Follows with Dr. Brunner electrophysiologist at [facility].    #3.  CAD status post history of MI in 2002 with PCI to LAD.  She underwent cardiac catheterization in May 2023 showing\"  showing 30% in-stent restenosis in proximal LAD stent, mid LAD and distal LAD Exhibit 2 tandem lesions subtotal occlusions. The LAD appears relatively small caliber in these segments.  These lesions " could not be successfully crossed with a low-profile (1.2 mm) balloon.   Further attempts at PCI were aborted.  The lesions appear to have chronic characteristics. Severe LV systolic dysfunction on left ventriculography.     We will continue with medical management.  She denies angina.  Stop aspirin 325 mg, change to aspirin 81 mg. Continue Toprol- mg.     #4. LV thrombus: new.  4 x 4 mm immobile echogenic density at the LV apex suggestive of LV thrombus.   -Start Xarelto 15 mg twice daily for acute dosing.  Results were discussed with patient.    Danae Ngo  reports that she has never smoked. She has never used smokeless tobacco.    Patient's (Body mass index is 28.75 kg/m².) indicates that they are overweight (BMI 25-29.9) with health related conditions that include hypertension, coronary heart disease, diabetes mellitus, dyslipidemias, and GERD . Weight is unchanged. BMI is is above average; BMI management plan is completed. We discussed portion control and increasing exercise.      I discussed the patient's findings and my recommendations with patient and family and Dr. Martel. Thank you for the consult. We will continue to follow.             This document has been electronically signed by WILLIS Kirby on September 27, 2023 12:23 CDT   Electronically signed by WILLIS Kirby, 09/27/23, 12:23 PM CDT.    I personally saw and examined Danae Ngo after the APRN.  I personally performed a history and physical examination of the patient.  I personally reviewed independent findings and plan of care.  I discussed management with the APRN.  I agree with the APRN's documentation.    This is a 63-year-old -American female with history of hypertension, hyperlipidemia, diabetes, coronary artery disease with remote PCI to LAD, admitted in January at which time repeat cardiac cath was performed showed  of the distal LAD for which PCI attempt was performed and was unsuccessful,  "has known EF of 2025% status post ICD, previous ICD shocks requiring amiodarone, mexiletine and metoprolol XL now has presented to the hospital with shortness of breath.  Patient is denying any chest pain.  On my visit she was lying comfortably without acute distress.  Support is improved.    Vitals:    09/27/23 0728 09/27/23 0749 09/27/23 1001 09/27/23 1132   BP: 133/79   122/77   BP Location: Right arm   Right arm   Patient Position: Lying   Lying   Pulse: 70 69  71   Resp: 18   18   Temp: 97.3 °F (36.3 °C)   97.6 °F (36.4 °C)   TempSrc: Temporal   Temporal   SpO2: 92%   94%   Weight:   76 kg (167 lb 8.8 oz)    Height:   162.6 cm (64.02\")      1.  Acute on chronic systolic heart failure:  Known severe reduced LV systolic function and EF of 2025%.  She has known coronary artery disease with cardiomyopathy appears to be out of proportion to her underlying coronary artery disease.  Was fluid overloaded on admission.  Has responded well to IV diuresis.  She will be continued on metoprolol XL, will switch her lisinopril to Entresto with a washout and continue metoprolol XL.  Continue SGLT2 inhibitor.  We will follow with primary cardiologist for further up titration of medications.    2.  Coronary artery disease:  History of remote PCI to LAD.  Cardiac cath in January showed  of the distal LAD for which PCI attempt was performed was not successful.  Mild troponin elevation now in the setting of decompensated heart failure.  Currently denying any chest pain.  We will switch her aspirin to 81 mg.  Continue metoprolol XL.    3.  Nonsustained ventricular tachycardia.  Known history of ventricular tachycardia requiring ICD shocks previously.  Follows with cardiology at [facility] and is on metoprolol XL, amiodarone and mexiletine.    4.  LV thrombus: Small echogenic density in LV apex suggestive of possible LV thrombus on the echocardiogram performed here.  Patient was started on anticoagulation with Xarelto and consider " cardiac MRI as outpatient for further evaluation.  Patient was noted to have elevated D-dimer, underwent VQ scan.  Results are pending.  Has started anticoagulation as above for potential LV apical thrombus.    Thank you for the consult.  Please call with questions.        Electronically signed by Renée Martel MD, 09/27/23, 1:43 PM CDT.

## 2023-09-27 NOTE — PROGRESS NOTES
[facility] Medicine Services  INPATIENT PROGRESS NOTE    Length of Stay: 0  Date of Admission: 9/26/2023  Primary Care Physician: José Ruano APRN    Subjective   Chief Complaint: Shortness of breath  HPI: Patient states she feels much better.  Denies pain of any sort.  Shortness of breath is much improved.    As of today, 09/27/23  Review of Systems   Constitutional:  Negative for appetite change, chills, fatigue, fever and unexpected weight change.   Respiratory:  Positive for shortness of breath. Negative for cough, choking, chest tightness and wheezing.         Orthopnea   Cardiovascular:  Negative for chest pain, palpitations and leg swelling.   Gastrointestinal:  Negative for abdominal pain, blood in stool, constipation, diarrhea, nausea and vomiting.   Genitourinary:  Negative for dysuria, flank pain and hematuria.   Neurological:  Negative for dizziness, seizures, syncope, speech difficulty, weakness, light-headedness, numbness and headaches.   Hematological:  Does not bruise/bleed easily.      All pertinent negatives and positives are as above. All other systems have been reviewed and are negative unless otherwise stated.    Objective    Temp:  [97.3 °F (36.3 °C)-98.7 °F (37.1 °C)] 98.6 °F (37 °C)  Heart Rate:  [69-83] 73  Resp:  [18-20] 18  BP: (116-153)/(65-92) 116/65    AM-PAC 6 Clicks Score (PT): 24 (09/27/23 1031)    As of today, 09/27/23  Physical Exam  Vitals reviewed.   Constitutional:       Appearance: She is well-developed.   HENT:      Head: Normocephalic and atraumatic.   Eyes:      Pupils: Pupils are equal, round, and reactive to light.   Cardiovascular:      Rate and Rhythm: Normal rate and regular rhythm.      Heart sounds: Normal heart sounds. No murmur heard.    No friction rub. No gallop.   Pulmonary:      Effort: Pulmonary effort is normal. No respiratory distress.      Breath sounds: Normal breath sounds. No wheezing or rales.    Chest:      Chest wall: No tenderness.   Abdominal:      General: Bowel sounds are normal. There is no distension.      Palpations: Abdomen is soft.      Tenderness: There is no abdominal tenderness. There is no guarding.   Musculoskeletal:      Cervical back: Normal range of motion and neck supple.   Skin:     General: Skin is warm and dry.   Psychiatric:         Behavior: Behavior normal.         Thought Content: Thought content normal.         Results Review:  I have reviewed the labs, radiology results, and diagnostic studies.    Laboratory Data:   Results from last 7 days   Lab Units 09/26/23  1730   SODIUM mmol/L 138   POTASSIUM mmol/L 3.7   CHLORIDE mmol/L 101   CO2 mmol/L 24.0   BUN mg/dL 16   CREATININE mg/dL 1.32*   GLUCOSE mg/dL 136*   CALCIUM mg/dL 9.3   BILIRUBIN mg/dL 0.6   ALK PHOS U/L 77   ALT (SGPT) U/L 31   AST (SGOT) U/L 26   ANION GAP mmol/L 13.0     Estimated Creatinine Clearance: 44.1 mL/min (A) (by C-G formula based on SCr of 1.32 mg/dL (H)).          Results from last 7 days   Lab Units 09/26/23  1730   WBC 10*3/mm3 9.66   HEMOGLOBIN g/dL 13.5   HEMATOCRIT % 42.6   PLATELETS 10*3/mm3 404           Culture Data:   No results found for: BLOODCX  No results found for: URINECX  No results found for: RESPCX  No results found for: WOUNDCX  No results found for: STOOLCX  No components found for: BODYFLD    Radiology Data:   Imaging Results (Last 24 Hours)       Procedure Component Value Units Date/Time    NM Lung Ventilation Perfusion [[id number]] Collected: 09/27/23 1435     Updated: 09/27/23 1437    Narrative:      INDICATION:  dyspnea, elevated d-dimer.    COMPARISON:  No existing relevant imaging studies are available.    RADIOPHARMACEUTICAL:  28.9 mCi Tc 99m DTPA, inhalation  5.9 mCi Tc 99m MAA, intravenous    TECHNIQUE:  Static images in multiple projections were obtained of the lungs after the  administration of radiotracer.  SPECT-CT was subsequently performed.    FINDINGS:  Mild  heterogeneous distribution of ventilation radiotracer seen bilaterally.  Perfusion is essentially normal.  No matching ventilation/perfusion abnormality  present within either lung. No moderate or large perfusion ventilation mismatch  is seen.      Impression:      Low probability of pulmonary vascular occlusive disease.        XR Chest 2 View [[id number]] Collected: 09/26/23 1748     Updated: 09/26/23 1752    Narrative:      XR CHEST 2 VW    HISTORY: CHF/COPD Protocol    COMPARISON: 9/26/2023 at 1529 hours    FINDINGS:  Upright frontal and lateral radiographs of the chest were obtained.    The pacemaker lead is stable. Mild hazy opacities have increased in both lungs.  The cardiac silhouette is stable. The pulmonary vasculature is within normal  limits.    The osseous structures and surrounding soft tissues demonstrate no acute  abnormality.      Impression:      1. Increasing hazy opacities in both lungs could be due to pulmonary edema.            I have utilized all available immediate resources to obtain, update, or review the patient's current medications (including all prescriptions, over-the-counter products, herbals, cannabis/cannabidiol products, and vitamin/mineral/dietary (nutritional) supplements).       Assessment/Plan     Active Hospital Problems    Diagnosis     **SOB (shortness of breath)        Plan:  1.  Acute exacerbation of chronic systolic congestive heart failure: Appreciate cardiology's help.  Medications adjusted.  Symptoms are better today.  Toprol XL was continued as well as Invokana.  Aldactone was started and lisinopril was stopped.  Plan will be to start Entresto after appropriate washout.  2.  History of nonsustained ventricular tachycardia: Continue current medication.  Patient has single-chamber ICD in place.  3.  LV thrombus: Xarelto started today.  4.  Coronary artery disease: Not amenable to PTCA: Continue medical management.  5.  Diabetes mellitus: Continue sliding scale  insulin.  6.  Hypothyroidism: Continue Synthroid.  7.  DVT prophylaxis: Lovenox discontinued and Xarelto started.      Medical Decision Making  Number and Complexity of problems: Several highly complex medical problems    Conditions and Status:        Condition is improving.     Discussed with: Patient and family     Treatment Plan  As above    Care Planning  Shared decision making: Patient in agreement with plan of care  Code status and discussions: Full code    Disposition  Social Determinants of Health that impact treatment or disposition: None  I expect the patient to be discharged to home in 1-2 days.       The patient was evaluated during the global COVID-19 pandemic, and the diagnosis was suspected/considered upon their initial presentation.  Evaluation, treatment, and testing were consistent with current guidelines for patients who present with complaints or symptoms that may be related to COVID-19.    I confirmed that the patient's Advance Care Plan is present, code status is documented, or surrogate decision maker is listed in the patient's medical record.        This document has been electronically signed by Chiki Robertson MD on September 27, 2023 16:30 CDT

## 2023-09-28 ENCOUNTER — READMISSION MANAGEMENT (OUTPATIENT)
Dept: CALL CENTER | Facility: HOSPITAL | Age: 63
End: 2023-09-28
Payer: COMMERCIAL

## 2023-09-28 VITALS
SYSTOLIC BLOOD PRESSURE: 122 MMHG | WEIGHT: 167 LBS | OXYGEN SATURATION: 95 % | TEMPERATURE: 97.8 F | DIASTOLIC BLOOD PRESSURE: 80 MMHG | HEIGHT: 64 IN | BODY MASS INDEX: 28.51 KG/M2 | RESPIRATION RATE: 18 BRPM | HEART RATE: 69 BPM

## 2023-09-28 PROBLEM — I50.22 CHRONIC SYSTOLIC HEART FAILURE: Status: ACTIVE | Noted: 2023-09-28

## 2023-09-28 PROBLEM — I50.21 ACUTE SYSTOLIC HEART FAILURE: Status: ACTIVE | Noted: 2023-09-28

## 2023-09-28 LAB
GLUCOSE BLDC GLUCOMTR-MCNC: 122 MG/DL (ref 70–130)
GLUCOSE BLDC GLUCOMTR-MCNC: 125 MG/DL (ref 70–130)
GLUCOSE BLDC GLUCOMTR-MCNC: 134 MG/DL (ref 70–130)

## 2023-09-28 PROCEDURE — 99232 SBSQ HOSP IP/OBS MODERATE 35: CPT | Performed by: INTERNAL MEDICINE

## 2023-09-28 PROCEDURE — G0378 HOSPITAL OBSERVATION PER HR: HCPCS

## 2023-09-28 PROCEDURE — 82948 REAGENT STRIP/BLOOD GLUCOSE: CPT

## 2023-09-28 RX ORDER — ASPIRIN 81 MG/1
81 TABLET ORAL DAILY
Qty: 30 TABLET | Refills: 0 | Status: SHIPPED | OUTPATIENT
Start: 2023-09-28

## 2023-09-28 RX ORDER — SPIRONOLACTONE 25 MG/1
12.5 TABLET ORAL DAILY
Qty: 30 TABLET | Refills: 0 | Status: SHIPPED | OUTPATIENT
Start: 2023-09-28

## 2023-09-28 RX ADMIN — Medication 400 MG: at 09:03

## 2023-09-28 RX ADMIN — RIVAROXABAN 15 MG: 15 TABLET, FILM COATED ORAL at 09:03

## 2023-09-28 RX ADMIN — ASPIRIN 81 MG: 81 TABLET, COATED ORAL at 09:03

## 2023-09-28 RX ADMIN — CANAGLIFLOZIN 300 MG: 300 TABLET, FILM COATED ORAL at 09:06

## 2023-09-28 RX ADMIN — Medication 10 ML: at 09:45

## 2023-09-28 RX ADMIN — METFORMIN HYDROCHLORIDE 2000 MG: 500 TABLET, EXTENDED RELEASE ORAL at 09:03

## 2023-09-28 RX ADMIN — METOPROLOL SUCCINATE 150 MG: 50 TABLET, EXTENDED RELEASE ORAL at 09:03

## 2023-09-28 RX ADMIN — SACUBITRIL AND VALSARTAN 1 TABLET: 49; 51 TABLET, FILM COATED ORAL at 09:03

## 2023-09-28 RX ADMIN — PANTOPRAZOLE SODIUM 40 MG: 40 TABLET, DELAYED RELEASE ORAL at 05:46

## 2023-09-28 RX ADMIN — AMIODARONE HYDROCHLORIDE 200 MG: 200 TABLET ORAL at 09:03

## 2023-09-28 RX ADMIN — LEVOTHYROXINE SODIUM 112 MCG: 0.11 TABLET ORAL at 05:46

## 2023-09-28 RX ADMIN — MEXILETINE HYDROCHLORIDE 150 MG: 150 CAPSULE ORAL at 05:46

## 2023-09-28 RX ADMIN — Medication 12.5 MG: at 09:06

## 2023-09-28 NOTE — DISCHARGE SUMMARY
[facility] Medicine Services  DISCHARGE SUMMARY       Date of Admission: 9/26/2023  Date of Discharge:  9/28/2023  Primary Care Physician: José Ruano APRN    Presenting Problem/History of Present Illness:  Pulmonary congestion [R09.89]  SOB (shortness of breath) [R06.02]       Final Discharge Diagnoses:  Active Hospital Problems    Diagnosis     **SOB (shortness of breath)     Acute systolic heart failure     Chronic systolic heart failure        Consults:   Consults       Date and Time Order Name Status Description    9/26/2023  7:17 PM Inpatient Cardiology Consult Completed           Pertinent Test Results:   Lab Results (most recent)       Procedure Component Value Units Date/Time    POC Glucose Once [[id number]]  (Abnormal) Collected: 09/28/23 1034    Specimen: Blood Updated: 09/28/23 1127     Glucose 134 mg/dL      Comment: RN NotifiedOperator: [id number] JAMES WENDYMeter ID: [id number]       POC Glucose Once [[id number]]  (Normal) Collected: 09/28/23 0609    Specimen: Blood Updated: 09/28/23 0622     Glucose 122 mg/dL      Comment: RN NotifiedOperator: [id number] OLIVIA SCHULTZMeter ID: [id number]       Extra Tubes [[id number]] Collected: 09/26/23 1736    Specimen: Blood, Venous Line Updated: 09/26/23 2146    Narrative:      The following orders were created for panel order Extra Tubes.  Procedure                               Abnormality         Status                     ---------                               -----------         ------                     Contreras Top[[id number]]                                         Final result                 Please view results for these tests on the individual orders.    Gray Top [[id number]] Collected: 09/26/23 1736    Specimen: Blood Updated: 09/26/23 2146     Extra Tube Hold for add-ons.     Comment: Auto resulted.       D-dimer, Quantitative [[id number]]  (Abnormal) Collected: 09/26/23 1730    Specimen:  "Blood Updated: 09/26/23 1940     D-Dimer, Quantitative 670 ng/mL (FEU)     Narrative:      According to the assay 's published package insert, a normal (<500 ng/mL (FEU)) D-dimer result in conjunction with a non-high clinical probability assessment, excludes deep vein thrombosis (DVT) and pulmonary embolism (PE) with high sensitivity.    D-dimer values increase with age and this can make VTE exclusion of an older population difficult. To address this, the [employer], based on best available evidence and recent guidelines, recommends that clinicians use age-adjusted D-dimer thresholds in patients greater than 50 years of age with: a) a low probability of PE who do not meet all Pulmonary Embolism Rule Out Criteria, or b) in those with intermediate probability of PE.   The formula for an age-adjusted D-dimer cut-off is \"age*10\".  For example, a 60 year old patient would have an age-adjusted cut-off of 600 ng/mL (FEU) and an 80 year old 800 ng/mL (FEU).      [place] Draw [[id number]] Collected: 09/26/23 1730    Specimen: Blood Updated: 09/26/23 1831    Narrative:      The following orders were created for panel order [place] Draw.  Procedure                               Abnormality         Status                     ---------                               -----------         ------                     Green Top (Gel)[[id number]]                                  Final result               Lavender Top[[id number]]                                     Final result               Gold Top - SST[[id number]]                                   Final result               Light Blue Top[[id number]]                                   Final result                 Please view results for these tests on the individual orders.    Green Top (Gel) [[id number]] Collected: 09/26/23 1730    Specimen: Blood Updated: 09/26/23 1831     Extra Tube Hold for add-ons.     Comment: Auto resulted.       Lavender Top " [[id number]] Collected: 09/26/23 1730    Specimen: Blood Updated: 09/26/23 1831     Extra Tube hold for add-on     Comment: Auto resulted       Gold Top - SST [[id number]] Collected: 09/26/23 1730    Specimen: Blood Updated: 09/26/23 1831     Extra Tube Hold for add-ons.     Comment: Auto resulted.       Light Blue Top [[id number]] Collected: 09/26/23 1730    Specimen: Blood Updated: 09/26/23 1831     Extra Tube Hold for add-ons.     Comment: Auto resulted       Comprehensive Metabolic Panel [[id number]]  (Abnormal) Collected: 09/26/23 1730    Specimen: Blood Updated: 09/26/23 1801     Glucose 136 mg/dL      BUN 16 mg/dL      Creatinine 1.32 mg/dL      Sodium 138 mmol/L      Potassium 3.7 mmol/L      Chloride 101 mmol/L      CO2 24.0 mmol/L      Calcium 9.3 mg/dL      Total Protein 7.6 g/dL      Albumin 4.0 g/dL      ALT (SGPT) 31 U/L      AST (SGOT) 26 U/L      Alkaline Phosphatase 77 U/L      Total Bilirubin 0.6 mg/dL      Globulin 3.6 gm/dL      A/G Ratio 1.1 g/dL      BUN/Creatinine Ratio 12.1     Anion Gap 13.0 mmol/L      eGFR 45.7 mL/min/1.73     Narrative:      GFR Normal >60  Chronic Kidney Disease <60  Kidney Failure <15      Single High Sensitivity Troponin T [[id number]]  (Abnormal) Collected: 09/26/23 1730    Specimen: Blood Updated: 09/26/23 1801     HS Troponin T 15 ng/L     Narrative:      High Sensitive Troponin T Reference Range:  <10.0 ng/L- Negative Female for AMI  <15.0 ng/L- Negative Male for AMI  >=10 - Abnormal Female indicating possible myocardial injury.  >=15 - Abnormal Male indicating possible myocardial injury.   Clinicians would have to utilize clinical acumen, EKG, Troponin, and serial changes to determine if it is an Acute Myocardial Infarction or myocardial injury due to an underlying chronic condition.         BNP [[id number]]  (Abnormal) Collected: 09/26/23 1730    Specimen: Blood Updated: 09/26/23 1757     proBNP 3,766.0 pg/mL     Narrative:      This assay is used as an aid in the  diagnosis of individuals suspected of having heart failure. It can be used as an aid in the diagnosis of acute decompensated heart failure (ADHF) in patients presenting with signs and symptoms of ADHF to the emergency department (ED). In addition, NT-proBNP of <300 pg/mL indicates ADHF is not likely.    CBC & Differential [[id number]]  (Abnormal) Collected: 09/26/23 1730    Specimen: Blood Updated: 09/26/23 1739    Narrative:      The following orders were created for panel order CBC & Differential.  Procedure                               Abnormality         Status                     ---------                               -----------         ------                     CBC Auto Differential[[id number]]        Abnormal            Final result                 Please view results for these tests on the individual orders.    CBC Auto Differential [[id number]]  (Abnormal) Collected: 09/26/23 1730    Specimen: Blood Updated: 09/26/23 1739     WBC 9.66 10*3/mm3      RBC 5.48 10*6/mm3      Hemoglobin 13.5 g/dL      Hematocrit 42.6 %      MCV 77.7 fL      MCH 24.6 pg      MCHC 31.7 g/dL      RDW 17.0 %      RDW-SD 46.7 fl      MPV 10.1 fL      Platelets 404 10*3/mm3      Neutrophil % 74.8 %      Lymphocyte % 16.9 %      Monocyte % 7.1 %      Eosinophil % 0.4 %      Basophil % 0.5 %      Immature Grans % 0.3 %      Neutrophils, Absolute 7.22 10*3/mm3      Lymphocytes, Absolute 1.63 10*3/mm3      Monocytes, Absolute 0.69 10*3/mm3      Eosinophils, Absolute 0.04 10*3/mm3      Basophils, Absolute 0.05 10*3/mm3      Immature Grans, Absolute 0.03 10*3/mm3      nRBC 0.0 /100 WBC           Imaging Results (Most Recent)       Procedure Component Value Units Date/Time    NM Lung Ventilation Perfusion [[id number]] Collected: 09/27/23 1435     Updated: 09/27/23 1928    Narrative:      INDICATION:  dyspnea, elevated d-dimer.    COMPARISON:  No existing relevant imaging studies are available.    RADIOPHARMACEUTICAL:  28.9 mCi Tc 99m DTPA,  "inhalation  5.9 mCi Tc 99m MAA, intravenous    TECHNIQUE:  Static images in multiple projections were obtained of the lungs after the  administration of radiotracer.  SPECT-CT was subsequently performed.    FINDINGS:  Mild heterogeneous distribution of ventilation radiotracer seen bilaterally.  Perfusion is essentially normal.  No matching ventilation/perfusion abnormality  present within either lung. No moderate or large perfusion ventilation mismatch  is seen.      Impression:      Low probability of pulmonary vascular occlusive disease.        XR Chest 2 View [[id number]] Collected: 09/26/23 1748     Updated: 09/26/23 1752    Narrative:      XR CHEST 2 VW    HISTORY: CHF/COPD Protocol    COMPARISON: 9/26/2023 at 1529 hours    FINDINGS:  Upright frontal and lateral radiographs of the chest were obtained.    The pacemaker lead is stable. Mild hazy opacities have increased in both lungs.  The cardiac silhouette is stable. The pulmonary vasculature is within normal  limits.    The osseous structures and surrounding soft tissues demonstrate no acute  abnormality.      Impression:      1. Increasing hazy opacities in both lungs could be due to pulmonary edema.            Chief Complaint on Day of Discharge: None    Hospital Course:  The patient is a 63 y.o. female who presented to [facility] with difficulty lying flat.  She was found to have acute exacerbation of chronic systolic congestive heart failure with orthopnea and paroxysmal nocturnal dyspnea.  She was diuresed and medications were adjusted.  Echocardiography was performed and she was found to have an LV thrombus.  She was started on Xarelto for this.  She did extremely well and was discharged home in good condition.    Condition on Discharge: Hemodynamically stable    Physical Exam on Discharge:  /80 (BP Location: Left arm, Patient Position: Lying)   Pulse 69   Temp 97.8 °F (36.6 °C) (Temporal)   Resp 18   Ht 162.6 cm (64.02\")   Wt " 75.8 kg (167 lb)   LMP  (LMP Unknown)   SpO2 95%   BMI 28.65 kg/m²   Physical Exam  Vitals reviewed.   Constitutional:       Appearance: She is well-developed.   HENT:      Head: Normocephalic and atraumatic.   Eyes:      Pupils: Pupils are equal, round, and reactive to light.   Cardiovascular:      Rate and Rhythm: Normal rate and regular rhythm.      Heart sounds: Normal heart sounds. No murmur heard.    No friction rub. No gallop.   Pulmonary:      Effort: Pulmonary effort is normal. No respiratory distress.      Breath sounds: Normal breath sounds. No wheezing or rales.   Chest:      Chest wall: No tenderness.   Abdominal:      General: Bowel sounds are normal. There is no distension.      Palpations: Abdomen is soft.      Tenderness: There is no abdominal tenderness. There is no guarding.   Musculoskeletal:      Cervical back: Normal range of motion and neck supple.   Skin:     General: Skin is warm and dry.   Psychiatric:         Behavior: Behavior normal.         Thought Content: Thought content normal.         Discharge Disposition:  Home or Self Care    Discharge Medications:     Discharge Medications        New Medications        Instructions Start Date   aspirin 81 MG EC tablet  Replaces: aspirin 325 MG tablet   81 mg, Oral, Daily      rivaroxaban 15 MG tablet  Commonly known as: XARELTO   15 mg, Oral, 2 Times Daily With Meals      rivaroxaban 20 MG tablet  Commonly known as: XARELTO   20 mg, Oral, Daily, Start taking the day after you finish the 15mg tablets.   Start Date: October 20, 2023     sacubitril-valsartan 49-51 MG tablet  Commonly known as: ENTRESTO   1 tablet, Oral, Every 12 Hours Scheduled      spironolactone 25 MG tablet  Commonly known as: ALDACTONE   12.5 mg, Oral, Daily             Changes to Medications        Instructions Start Date   magnesium oxide 400 MG tablet  Commonly known as: MAG-OX  What changed: when to take this   400 mg, Oral, Daily             Continue These Medications         Instructions Start Date   amiodarone 200 MG tablet  Commonly known as: PACERONE   No dose, route, or frequency recorded.      atorvastatin 20 MG tablet  Commonly known as: LIPITOR   20 mg, Oral, Nightly      cetirizine 10 MG tablet  Commonly known as: zyrTEC   TAKE ONE TABLET BY MOUTH DAILY      Dexcom G6 Sensor   CHANGE EVERY 10 DAYS. DX CODE: E11.65      Dexcom G6 Transmitter misc   USE AS DIRECTED AND CHANGE EVERY 3 MONTHS.      fluticasone 50 MCG/ACT nasal spray  Commonly known as: FLONASE   2 sprays, Nasal, Daily      glucose blood test strip   1 each, Other, 3 Times Daily, Use as instructed      glucose monitor monitoring kit   1 each, Does not apply, 3 Times Daily      Lancet Device misc   1 each, Does not apply, 2 Times Daily      levothyroxine 112 MCG tablet  Commonly known as: SYNTHROID, LEVOTHROID   TAKE ONE TABLET BY MOUTH DAILY      metoprolol succinate  MG 24 hr tablet  Commonly known as: TOPROL-XL   TAKE THREE TABLETS BY MOUTH DAILY      mexiletine 150 MG capsule  Commonly known as: MEXITIL   150 mg, Oral, Every 8 Hours      omeprazole 40 MG capsule  Commonly known as: priLOSEC   TAKE ONE CAPSULE BY MOUTH DAILY      Ozempic (2 MG/DOSE) 8 MG/3ML solution pen-injector  Generic drug: Semaglutide (2 MG/DOSE)   DIAL AND INJECT UNDER THE SKIN 2 MG WEEKLY      Synjardy XR 12.5-1000 MG tablet sustained-release 24 hour  Generic drug: Empagliflozin-metFORMIN HCl ER   TAKE TWO TABLETS BY MOUTH DAILY      vitamin D3 125 MCG (5000 UT) capsule capsule   TAKE ONE CAPSULE BY MOUTH DAILY             Stop These Medications      amLODIPine 5 MG tablet  Commonly known as: NORVASC     aspirin 325 MG tablet  Commonly known as: Aspirin Adult  Replaced by: aspirin 81 MG EC tablet     lisinopril 40 MG tablet  Commonly known as: PRINIVIL,ZESTRIL     potassium chloride 10 MEQ CR tablet  Commonly known as: K-DUR,KLOR-CON              Discharge Diet:   Diet Instructions       Diet: Cardiac Diets, Diabetic Diets;  Healthy Heart (2-3 Na+); Regular Texture (IDDSI 7); Thin (IDDSI 0); Consistent Carbohydrate      Discharge Diet:  Cardiac Diets  Diabetic Diets       Cardiac Diet: Healthy Heart (2-3 Na+)    Texture: Regular Texture (IDDSI 7)    Fluid Consistency: Thin (IDDSI 0)    Diabetic Diet: Consistent Carbohydrate            Activity at Discharge:   Activity Instructions       Activity as Tolerated              Follow-up Appointments:   Future Appointments   Date Time Provider Department [place]   10/17/2023  9:00 AM José Ruano APRN [facility] [address]   10/27/2023  9:30 AM Herber Galeano APRN [facility]   Patient will follow-up with her primary cardiologist as scheduled            This document has been electronically signed by Chiki Robertson MD on September 28, 2023 16:56 CDT      Time: 35 minutes

## 2023-09-28 NOTE — PROGRESS NOTES
"[facility] Progress Note   LOS: 0 days   Patient Care Team:  José Ruano APRN as PCP - General (Nurse Practitioner)  Rodo Holcomb MD as Surgeon (General Surgery)  Charmaine Vanessa MA as Medical Assistant    Chief Complaint:    Chief Complaint   Patient presents with    Shortness of Breath        Subjective     Interval History:   Patient Denies: no complaints today  Patient Complaints: no complaints today  History taken from: patient chart    Patient denies any complaints today.  Reports improvement in her breathing.  Reports feeling well and ready to go home.    Objective     Vital Sign Min/Max for last 24 hours  Temp  Min: 97.1 °F (36.2 °C)  Max: 98.6 °F (37 °C)   BP  Min: 116/65  Max: 136/86   Pulse  Min: 69  Max: 85   Resp  Min: 18  Max: 18   SpO2  Min: 92 %  Max: 96 %   No data recorded   Weight  Min: 75.8 kg (167 lb)  Max: 75.8 kg (167 lb)     Flowsheet Rows      Flowsheet Row First Filed Value   Admission Height 162.6 cm (64\") Documented at 09/26/2023 1635   Admission Weight 78.5 kg (173 lb) Documented at 09/26/2023 1635              09/27/23  0533 09/27/23  1001 09/28/23  0545   Weight: 76 kg (167 lb 9.6 oz) 76 kg (167 lb 8.8 oz) 75.8 kg (167 lb)       Physical Exam:  Physical Exam  Vitals and nursing note reviewed.   Constitutional:       General: She is not in acute distress.     Appearance: She is overweight. She is not ill-appearing, toxic-appearing or diaphoretic.   HENT:      Head: Normocephalic.      Right Ear: External ear normal.      Left Ear: External ear normal.   Eyes:      General: Lids are normal.      Pupils: Pupils are equal, round, and reactive to light.   Neck:      Thyroid: No thyromegaly.      Vascular: No carotid bruit or JVD.      Trachea: No tracheal deviation.   Cardiovascular:      Rate and Rhythm: Normal rate and regular rhythm.      Heart sounds: Normal heart sounds. No murmur heard.    No friction rub. No gallop.   Pulmonary:      Effort: Pulmonary effort is " normal. No respiratory distress.      Breath sounds: Normal breath sounds. No stridor. No wheezing or rales.   Chest:      Chest wall: No tenderness.   Abdominal:      General: Bowel sounds are normal. There is no distension.      Palpations: Abdomen is soft.      Tenderness: There is no abdominal tenderness.   Skin:     General: Skin is warm and dry.      Findings: No erythema or rash.   Neurological:      Mental Status: She is alert and oriented to person, place, and time.      Cranial Nerves: No cranial nerve deficit.      Deep Tendon Reflexes: Reflexes are normal and symmetric. Reflexes normal.   Psychiatric:         Behavior: Behavior normal.         Thought Content: Thought content normal.         Judgment: Judgment normal.        Results Review:     Results from last 7 days   Lab Units 09/26/23  1730   SODIUM mmol/L 138   POTASSIUM mmol/L 3.7   CHLORIDE mmol/L 101   CO2 mmol/L 24.0   BUN mg/dL 16   CREATININE mg/dL 1.32*   CALCIUM mg/dL 9.3   BILIRUBIN mg/dL 0.6   ALK PHOS U/L 77   ALT (SGPT) U/L 31   AST (SGOT) U/L 26   GLUCOSE mg/dL 136*       Estimated Creatinine Clearance: 44 mL/min (A) (by C-G formula based on SCr of 1.32 mg/dL (H)).                Results from last 7 days   Lab Units 09/26/23  1730   WBC 10*3/mm3 9.66   HEMOGLOBIN g/dL 13.5   PLATELETS 10*3/mm3 404           Lab Results   Component Value Date    PROBNP 3,766.0 (H) 09/26/2023       I/O last 3 completed shifts:  In: 720 [P.O.:720]  Out: -     Cardiographics:  ECG/EMG Results (last 24 hours)       Procedure Component Value Units Date/Time    Adult Transthoracic Echo Complete w/ Color, Spectral and Contrast if Necessary Per Protocol [[id number]] Resulted: 09/27/23 1201     Updated: 09/27/23 1209     EF(MOD-bp) 20.9 %      LVIDd 6.3 cm      LVIDs 5.6 cm      IVSd 0.82 cm      LVPWd 0.86 cm      FS 11.3 %      IVS/LVPW 0.95 cm      ESV(cubed) 173.4 ml      LV Sys Vol (BSA corrected) 101.6 cm2      EDV(cubed) 248.5 ml      LV Hernandez Vol (BSA  corrected) 127.0 cm2      LVOT area 3.1 cm2      LV mass(C)d 213.4 grams      LVOT diam 1.99 cm      EDV(MOD-sp2) 174.0 ml      EDV(MOD-sp4) 230.0 ml      ESV(MOD-sp2) 142.0 ml      ESV(MOD-sp4) 184.0 ml      SV(MOD-sp2) 32.0 ml      SV(MOD-sp4) 46.0 ml      SI(MOD-sp2) 17.7 ml/m2      SI(MOD-sp4) 25.4 ml/m2      EF(MOD-sp2) 18.4 %      EF(MOD-sp4) 20.0 %      MV E max logan 76.3 cm/sec      MV A max logan 24.9 cm/sec      MV dec time 0.17 sec      MV E/A 3.1     LA ESV Index (BP) 49.9 ml/m2      Med Peak E' Logan 4.0 cm/sec      Lat Peak E' Logan 9.6 cm/sec      Avg E/e' ratio 11.22     SV(LVOT) 55.0 ml      RVIDd 2.8 cm      TAPSE (>1.6) 2.14 cm      LA dimension (2D)  3.5 cm      LV V1 max 93.6 cm/sec      LV V1 max PG 3.5 mmHg      LV V1 mean PG 2.00 mmHg      LV V1 VTI 17.6 cm      Ao pk logan 113.0 cm/sec      Ao max PG 5.1 mmHg      Ao mean PG 3.0 mmHg      Ao V2 VTI 21.7 cm      OLENA(I,D) 2.5 cm2      AI P1/2t 512.1 msec      MV max PG 3.0 mmHg      MV mean PG 0.96 mmHg      MV V2 VTI 17.1 cm      MV P1/2t 37.4 msec      MVA(P1/2t) 5.9 cm2      MVA(VTI) 3.2 cm2      MV dec slope 723.8 cm/sec2      MR max logan 320.3 cm/sec      MR max PG 41.0 mmHg      TR max logan 375.8 cm/sec      TR max PG 56.5 mmHg      PA V2 max 88.0 cm/sec      Ao root diam 3.6 cm      ACS 1.91 cm      RVSP(TR) 64.5 mmHg      RAP systole 8.0 mmHg     Narrative:        Left ventricular systolic function is severely decreased. Left   ventricular ejection fraction appears to be 21 - 25%.    The left ventricular cavity is moderately dilated.    Left ventricular diastolic function is consistent with (grade II w/high   LAP) pseudonormalization.    The left atrial cavity is severely dilated.    Moderate tricuspid valve regurgitation is present.    Estimated right ventricular systolic pressure from tricuspid   regurgitation is markedly elevated (>55 mmHg).    4 x 4 mm immobile echogenic density noted at LV apex suggestive of LV   apical thrombus.       SCANNED EKG [[id number]] Resulted: 09/26/23     Updated: 09/27/23 1453    SCANNED - TELEMETRY   [[id number]] Resulted: 09/26/23     Updated: 09/27/23 1603    SCANNED - TELEMETRY   [[id number]] Resulted: 09/26/23     Updated: 09/27/23 1950    SCANNED - TELEMETRY   [[id number]] Resulted: 09/26/23     Updated: 09/27/23 2038          Results for orders placed during the hospital encounter of 09/26/23    Adult Transthoracic Echo Complete w/ Color, Spectral and Contrast if Necessary Per Protocol    Interpretation Summary    Left ventricular systolic function is severely decreased. Left ventricular ejection fraction appears to be 21 - 25%.    The left ventricular cavity is moderately dilated.    Left ventricular diastolic function is consistent with (grade II w/high LAP) pseudonormalization.    The left atrial cavity is severely dilated.    Moderate tricuspid valve regurgitation is present.    Estimated right ventricular systolic pressure from tricuspid regurgitation is markedly elevated (>55 mmHg).    4 x 4 mm immobile echogenic density noted at LV apex suggestive of LV apical thrombus.      Imaging Results (Most Recent)       Procedure Component Value Units Date/Time    NM Lung Ventilation Perfusion [[id number]] Collected: 09/27/23 1435     Updated: 09/27/23 1928    Narrative:      INDICATION:  dyspnea, elevated d-dimer.    COMPARISON:  No existing relevant imaging studies are available.    RADIOPHARMACEUTICAL:  28.9 mCi Tc 99m DTPA, inhalation  5.9 mCi Tc 99m MAA, intravenous    TECHNIQUE:  Static images in multiple projections were obtained of the lungs after the  administration of radiotracer.  SPECT-CT was subsequently performed.    FINDINGS:  Mild heterogeneous distribution of ventilation radiotracer seen bilaterally.  Perfusion is essentially normal.  No matching ventilation/perfusion abnormality  present within either lung. No moderate or large perfusion ventilation mismatch  is seen.      Impression:      Low  probability of pulmonary vascular occlusive disease.        XR Chest 2 View [[id number]] Collected: 09/26/23 1748     Updated: 09/26/23 1752    Narrative:      XR CHEST 2 VW    HISTORY: CHF/COPD Protocol    COMPARISON: 9/26/2023 at 1529 hours    FINDINGS:  Upright frontal and lateral radiographs of the chest were obtained.    The pacemaker lead is stable. Mild hazy opacities have increased in both lungs.  The cardiac silhouette is stable. The pulmonary vasculature is within normal  limits.    The osseous structures and surrounding soft tissues demonstrate no acute  abnormality.      Impression:      1. Increasing hazy opacities in both lungs could be due to pulmonary edema.            XR Chest 2 View    Result Date: 9/26/2023  1. Increasing hazy opacities in both lungs could be due to pulmonary edema.    NM Lung Ventilation Perfusion    Result Date: 9/27/2023  Low probability of pulmonary vascular occlusive disease.      Medication Review:     Current Facility-Administered Medications:     acetaminophen (TYLENOL) tablet 650 mg, 650 mg, Oral, Q4H PRN, Julissa Charles C, PA-C    amiodarone (PACERONE) tablet 200 mg, 200 mg, Oral, Q24H, Julissa Charles C, PA-C, 200 mg at 09/28/23 0903    aspirin EC tablet 81 mg, 81 mg, Oral, Daily, Adeline Thorpe, APRN, 81 mg at 09/28/23 0903    atorvastatin (LIPITOR) tablet 20 mg, 20 mg, Oral, Nightly, Julissa Charles C, PA-C, 20 mg at 09/27/23 2059    sennosides-docusate (PERICOLACE) 8.6-50 MG per tablet 2 tablet, 2 tablet, Oral, BID **AND** polyethylene glycol (MIRALAX) packet 17 g, 17 g, Oral, Daily PRN **AND** bisacodyl (DULCOLAX) EC tablet 5 mg, 5 mg, Oral, Daily PRN **AND** bisacodyl (DULCOLAX) suppository 10 mg, 10 mg, Rectal, Daily PRN, Julissa Charles C, PA-C    Canagliflozin (INVOKANA) 300 MG tablet tablet 300 mg, 300 mg, Oral, Daily, 300 mg at 09/28/23 0906 **AND** metFORMIN ER (GLUCOPHAGE-XR) 24 hr tablet 2,000 mg, 2,000 mg, Oral, Daily, Julissa Charles PA-C, 2,000 mg  at 09/28/23 0903    dextrose (D50W) (25 g/50 mL) IV injection 25 g, 25 g, Intravenous, Q15 Min PRN, Julissa Charles PA-C    dextrose (GLUTOSE) oral gel 15 g, 15 g, Oral, Q15 Min PRN, Julissa Charles PA-C    glucagon HCl (Diagnostic) injection 1 mg, 1 mg, Intramuscular, Q15 Min PRN, Julissa Charles PA-C    Insulin Aspart (novoLOG) injection 0-9 Units, 0-9 Units, Subcutaneous, TID AC, Julissa Charles PA-C    levothyroxine (SYNTHROID, LEVOTHROID) tablet 112 mcg, 112 mcg, Oral, Q AM, Julissa Charles PA-C, 112 mcg at 09/28/23 0546    magnesium oxide (MAG-OX) tablet 400 mg, 400 mg, Oral, BID, Julissa Charles PA-C, 400 mg at 09/28/23 0903    metoprolol succinate XL (TOPROL-XL) 24 hr tablet 150 mg, 150 mg, Oral, BID, Chiki Robertson MD, 150 mg at 09/28/23 0903    mexiletine (MEXITIL) capsule 150 mg, 150 mg, Oral, Q8H, Julissa Charles PA-C, 150 mg at 09/28/23 0546    ondansetron (ZOFRAN) tablet 4 mg, 4 mg, Oral, Q6H PRN **OR** ondansetron (ZOFRAN) injection 4 mg, 4 mg, Intravenous, Q6H PRN, Julissa Charles PA-C    pantoprazole (PROTONIX) EC tablet 40 mg, 40 mg, Oral, Q AM, Julissa Charles PA-C, 40 mg at 09/28/23 0546    rivaroxaban (XARELTO) tablet 15 mg, 15 mg, Oral, BID With Meals, Adeline Thorpe APRDARRICK, 15 mg at 09/28/23 0903    sacubitril-valsartan (ENTRESTO) 49-51 MG tablet 1 tablet, 1 tablet, Oral, Q12H, Adeline Thorpe APRN, 1 tablet at 09/28/23 0903    sodium chloride 0.9 % flush 10 mL, 10 mL, Intravenous, PRN, Enio Washington MD    sodium chloride 0.9 % flush 10 mL, 10 mL, Intravenous, Q12H, Julissa Charles PA-C, 10 mL at 09/28/23 0945    sodium chloride 0.9 % flush 10 mL, 10 mL, Intravenous, PRN, Julissa Charles, PA-C    sodium chloride 0.9 % infusion 40 mL, 40 mL, Intravenous, PRN, Julissa Charles, PA-C    spironolactone (ALDACTONE) half tablet 12.5 mg, 12.5 mg, Oral, Daily, Adeline Thorpe, APRN, 12.5 mg at 09/28/23 0906    Assessment & Plan       SOB (shortness of  "breath)    Acute systolic heart failure    Chronic systolic heart failure    #1.  Acute on chronic systolic and diastolic heart failure with LVEF reduced at 21 to 25% with grade 2 diastolic dysfunction.  Etiology ischemic and nonischemic.  She appears euvolemic upon exam today with adequate perfusion and hemodynamics.  She diuresed well overnight with subsequent improvement in symptoms.  She will be recommended for guideline directed medical therapy and optimization of medications.  -Continue Toprol- mg  -Aldactone 12.5 mg was added this admission  -Lisinopril was transitioned to Entresto 49-51mg BID  -SGLT2 inhibitor: Invokana 300 mg daily  -Afterload duction can be considered if needed  Daily weight  Strict intake and output  Continuous cardiac monitoring     #2.  History of nonsustained VT: Patient is managed with amiodarone and Mexiletine for antiarrhythmic therapy, Toprol- mg.  Single-chamber ICD is in place.  Follows with Dr. Brunner electrophysiologist at [facility].     #3.  CAD status post history of MI in 2002 with PCI to LAD.  She underwent cardiac catheterization in May 2023 showing\"  showing 30% in-stent restenosis in proximal LAD stent, mid LAD and distal LAD Exhibit 2 tandem lesions subtotal occlusions. The LAD appears relatively small caliber in these segments.  These lesions could not be successfully crossed with a low-profile (1.2 mm) balloon.   Further attempts at PCI were aborted.  The lesions appear to have chronic characteristics. Severe LV systolic dysfunction on left ventriculography.      We will continue with medical management.  She denies angina.  Stop aspirin 325 mg, change to aspirin 81 mg. Continue Toprol- mg.      #4. LV thrombus: new.  4 x 4 mm immobile echogenic density at the LV apex suggestive of LV thrombus.   -15 mg BID x21 days followed by 20 mg daily for acute dosing.  Patient will be recommended for close follow-up with primary cardiologist and repeat limited " echocardiogram.  Cardiac MRI would be recommended as well       Danae Ngo  reports that she has never smoked. She has never used smokeless tobacco.     Patient's (Body mass index is 28.75 kg/m².) indicates that they are overweight (BMI 25-29.9) with health related conditions that include hypertension, coronary heart disease, diabetes mellitus, dyslipidemias, and GERD . Weight is unchanged. BMI is is above average; BMI management plan is completed. We discussed portion control and increasing exercise.     Plan for disposition:Where: home and When:  today Follow up in CHF clinic at [facility] 1 week. Follow up with Primary cardiology dr. Rogers x4-6 weeks.            This document has been electronically signed by WILLIS Kirby on September 28, 2023 11:43 CDT    \Electronically signed by WILLIS Kirby, 09/28/23, 11:43 AM CDT.      I personally saw and examined Danae Ngo after the APRN.  I personally performed a history and physical examination of the patient.  I personally reviewed independent findings and plan of care.  I discussed management with the APRN.  I agree with the APRN's documentation.    Subjective: No acute issues overnight.  Any chest pain.  Shortness of breath is resolved.    Vitals:    09/28/23 0545 09/28/23 0724 09/28/23 0738 09/28/23 1056   BP:   136/86 122/80   BP Location:   Right arm Left arm   Patient Position:   Lying Lying   Pulse:  69 76 69   Resp:   18 18   Temp:   97.6 °F (36.4 °C) 97.8 °F (36.6 °C)   TempSrc:   Temporal Temporal   SpO2:   96% 95%   Weight: 75.8 kg (167 lb)      Height:         1.  Acute on chronic systolic heart failure:  Known severe reduced LV systolic function and EF of 20-25%.  She has known coronary artery disease with cardiomyopathy appears to be out of proportion to her underlying coronary artery disease.  Was fluid overloaded on admission.  Has responded well to IV diuresis.  She will be continued on metoprolol XL, will switch her  lisinopril to Entresto with a washout and continue metoprolol XL.  Continue SGLT2 inhibitor.  We will follow with primary cardiologist for further up titration of medications.     2.  Coronary artery disease:  History of remote PCI to LAD.  Cardiac cath in January showed  of the distal LAD for which PCI attempt was performed was not successful.  Mild troponin elevation now in the setting of decompensated heart failure.  Currently denying any chest pain.  We will switch her aspirin to 81 mg.  Continue metoprolol XL.     3.  Nonsustained ventricular tachycardia.  Known history of ventricular tachycardia requiring ICD shocks previously.  Follows with cardiology at [facility] and is on metoprolol XL, amiodarone and mexiletine.     4.  LV thrombus: Small echogenic density in LV apex suggestive of possible LV thrombus on the echocardiogram performed here.  Patient was started on anticoagulation with Xarelto and recommended cardiac MRI as outpatient for further evaluation.  Patient was noted to have elevated D-dimer, underwent VQ scan which was low probability.    Thank you for the consult.  Follow with primary cardiologist on discharge.        Electronically signed by Renée Martel MD, 09/28/23, 12:04 PM CDT.

## 2023-09-28 NOTE — OUTREACH NOTE
Prep Survey      Flowsheet Row Responses   [facility] facility patient discharged from? [place]   Is LACE score < 7 ? Yes   Eligibility [facility]   Date of Admission 09/26/23   Date of Discharge 09/28/23   Discharge Disposition Home or Self Care   Discharge diagnosis Acute systolic heart failure   Does the patient have one of the following disease processes/diagnoses(primary or secondary)? CHF   Does the patient have Home health ordered? No   Is there a DME ordered? No   Prep survey completed? Yes            YARON KEATING - Registered Nurse

## 2023-09-29 ENCOUNTER — TRANSITIONAL CARE MANAGEMENT TELEPHONE ENCOUNTER (OUTPATIENT)
Dept: CALL CENTER | Facility: HOSPITAL | Age: 63
End: 2023-09-29
Payer: COMMERCIAL

## 2023-09-29 LAB
QT INTERVAL: 410 MS
QTC INTERVAL: 467 MS

## 2023-09-29 NOTE — OUTREACH NOTE
Call Center TCM Note      Flowsheet Row Responses   [facility] patient discharged from? [place]   Does the patient have one of the following disease processes/diagnoses(primary or secondary)? CHF   TCM attempt successful? Yes   Call start time 1135   Call end time 1137   Discharge diagnosis Acute systolic heart failure   Does the patient have all medications ordered at discharge? Yes   Is the patient taking all medications as directed (includes completed medication regime)? Yes   Comments Hospital f/u appt with PCP next week   Does the patient have an appointment with their PCP within 7-14 days of discharge? Yes   Has home health visited the patient within 72 hours of discharge? N/A   Psychosocial issues? No   Did the patient receive a copy of their discharge instructions? Yes   Nursing interventions Reviewed instructions with patient   What is the patient's perception of their health status since discharge? Improving   Nursing interventions Nurse provided patient education   Is the patient able to teach back signs and symptoms of worsening condition? (i.e. weight gain, shortness of air, etc.) Yes   Is the patient/caregiver able to teach back the hierarchy of who to call/visit for symptoms/problems? PCP, Specialist, Home health nurse, Urgent Care, ED, 911 Yes   CHF Zone this Call Green Zone   Green Zone Patient reports doing well, No new or worsening shortness of breath, Physical activity level is normal for you, No new swelling -  feet, ankles and legs look normal for you, No chest pain  [states she has not been checking her weight daily but will start doing this]   Green Zone Interventions Daily weight check, Meds as directed, Low sodium diet, Follow up visits planned   TCM call completed? Yes   Wrap up additional comments Doing well, no questions, states she will start checking her weight daily, confirmed appt with PCP for next week.   Call end time 1137   Would this patient benefit from a Referral to  Amb Social Work? No   Is the patient interested in additional calls from an ambulatory ? No             Aby Sánchez RN    9/29/2023, 11:37 CDT

## 2023-10-10 ENCOUNTER — READMISSION MANAGEMENT (OUTPATIENT)
Dept: CALL CENTER | Facility: HOSPITAL | Age: 63
End: 2023-10-10
Payer: COMMERCIAL

## 2023-10-10 NOTE — OUTREACH NOTE
CHF Week 2 Survey      Flowsheet Row Responses   [facility] [place] facility patient discharged from? [place]   Does the patient have one of the following disease processes/diagnoses(primary or secondary)? CHF   Week 2 attempt successful? No   Unsuccessful attempts Attempt 1            YARON TSAI - Registered Nurse

## (undated) DEVICE — MINI TREK CORONARY DILATATION CATHETER 1.20 MM X 08 MM / RAPID-EXCHANGE: Brand: MINI TREK

## (undated) DEVICE — 6F .070 XB LAD 3.5 100CM: Brand: VISTA BRITE TIP

## (undated) DEVICE — DEV INFL MONARCH 20ML

## (undated) DEVICE — Device

## (undated) DEVICE — Device: Brand: FIELDER XT

## (undated) DEVICE — PK CATH LAB 60

## (undated) DEVICE — ELECTRODE,RT,STRESS,FOAM,50PK: Brand: MEDLINE

## (undated) DEVICE — Device: Brand: DISPOSABLE ELECTROSURGICAL SNARE

## (undated) DEVICE — RADIFOCUS OPTITORQUE ANGIOGRAPHIC CATHETER: Brand: OPTITORQUE

## (undated) DEVICE — GW PERIPH GUIDERIGHT STD/EXCHNG/J/TIP SS 0.035IN 5X260CM

## (undated) DEVICE — TR BAND RADIAL ARTERY COMPRESSION DEVICE: Brand: TR BAND

## (undated) DEVICE — TRAP SXN POLYP QUICKCATCH LF

## (undated) DEVICE — RUNTHROUGH NS EXTRA FLOPPY PTCA GUIDEWIRE: Brand: RUNTHROUGH

## (undated) DEVICE — MINI TREK™ II CORONARY DILATATION CATHETER 1.20 MM X 8 MM / OVER-THE-WIRE: Brand: MINI TREK™

## (undated) DEVICE — THE SUPERCROSS MICROCATHETER IS INTENDED TO BE USED IN CONJUNCTION WITH STEERABLE GUIDEWIRES TO ACCESS DISCRETE REGIONS OF THE CORONARY AND/OR PERIPHERAL VASCULATURE. IT MAY BE USED TO FACILITATE PLACEMENT AND EXCHANGE OF GUIDEWIRES AND OTHER INTERVENTIONAL DEVICES AND TO SUBSELECTIVELY INFUSE/DELIVER DIAGNOSTIC AND THERAPEUTIC AGENTS.: Brand: SUPERCROSS™ MICROCATHETER

## (undated) DEVICE — GLIDESHEATH SLENDER STAINLESS STEEL KIT: Brand: GLIDESHEATH SLENDER

## (undated) DEVICE — CATH DIAG IMPULSE M/ PK 145 5FR

## (undated) DEVICE — SOL IRR H2O BTL 1000ML STRL

## (undated) DEVICE — GLV SURG SENSICARE PI ORTHO PF SZ7 LF STRL

## (undated) DEVICE — ST CVR PROB PULLUP ULTRASND 5X48IN

## (undated) DEVICE — GLV SURG SENSICARE POLYISPRN W/ALOE PF LF 6.5 GRN STRL

## (undated) DEVICE — GLV SURG SENSICARE MICRO PF LF 7.5 STRL

## (undated) DEVICE — COPILOT KIT INCLUDES BLEEDBACK CONTROL VALVE / GUIDE WIRE INTRODUCER / TORQUE DEVICE: Brand: ACCESSORIES

## (undated) DEVICE — CANN SMPL SOFTECH BIFLO ETCO2 A/M 7FT

## (undated) DEVICE — GLV SURG SIGNATURE ESSENTIAL PF LTX SZ8